# Patient Record
Sex: FEMALE | Race: BLACK OR AFRICAN AMERICAN | Employment: OTHER | URBAN - METROPOLITAN AREA
[De-identification: names, ages, dates, MRNs, and addresses within clinical notes are randomized per-mention and may not be internally consistent; named-entity substitution may affect disease eponyms.]

---

## 2017-02-23 ENCOUNTER — HOSPITAL ENCOUNTER (OUTPATIENT)
Dept: MAMMOGRAPHY | Age: 62
Discharge: HOME | End: 2017-02-23
Attending: FAMILY MEDICINE
Payer: MEDICARE

## 2017-02-23 DIAGNOSIS — Z12.31 VISIT FOR SCREENING MAMMOGRAM: ICD-10-CM

## 2017-02-23 PROCEDURE — 77067 SCR MAMMO BI INCL CAD: CPT

## 2017-10-12 ENCOUNTER — APPOINTMENT (OUTPATIENT)
Dept: GENERAL RADIOLOGY | Age: 62
End: 2017-10-12
Attending: EMERGENCY MEDICINE
Payer: MEDICARE

## 2017-10-12 ENCOUNTER — HOSPITAL ENCOUNTER (EMERGENCY)
Age: 62
Discharge: HOME | End: 2017-10-12
Attending: EMERGENCY MEDICINE
Payer: MEDICARE

## 2017-10-12 VITALS
SYSTOLIC BLOOD PRESSURE: 134 MMHG | HEART RATE: 99 BPM | DIASTOLIC BLOOD PRESSURE: 86 MMHG | TEMPERATURE: 98.8 F | OXYGEN SATURATION: 96 % | RESPIRATION RATE: 18 BRPM | HEIGHT: 66 IN | BODY MASS INDEX: 36 KG/M2 | WEIGHT: 224 LBS

## 2017-10-12 DIAGNOSIS — J98.01 BRONCHOSPASM: ICD-10-CM

## 2017-10-12 DIAGNOSIS — J45.41 MODERATE PERSISTENT ASTHMA WITH ACUTE EXACERBATION: Primary | ICD-10-CM

## 2017-10-12 PROCEDURE — 74011250636 HC RX REV CODE- 250/636: Performed by: NURSE PRACTITIONER

## 2017-10-12 PROCEDURE — 71010 XR CHEST SNGL V: CPT

## 2017-10-12 PROCEDURE — 94640 AIRWAY INHALATION TREATMENT: CPT

## 2017-10-12 PROCEDURE — 74011000250 HC RX REV CODE- 250: Performed by: EMERGENCY MEDICINE

## 2017-10-12 PROCEDURE — 96374 THER/PROPH/DIAG INJ IV PUSH: CPT | Performed by: NURSE PRACTITIONER

## 2017-10-12 PROCEDURE — 74011250637 HC RX REV CODE- 250/637: Performed by: EMERGENCY MEDICINE

## 2017-10-12 PROCEDURE — 74011000250 HC RX REV CODE- 250: Performed by: NURSE PRACTITIONER

## 2017-10-12 PROCEDURE — 99283 EMERGENCY DEPT VISIT LOW MDM: CPT | Performed by: NURSE PRACTITIONER

## 2017-10-12 RX ORDER — PREDNISONE 10 MG/1
TABLET ORAL
Qty: 22 TAB | Refills: 0 | Status: SHIPPED | OUTPATIENT
Start: 2017-10-12 | End: 2017-11-14

## 2017-10-12 RX ORDER — HYDROCODONE BITARTRATE AND ACETAMINOPHEN 5; 325 MG/1; MG/1
1 TABLET ORAL
Qty: 15 TAB | Refills: 0 | Status: SHIPPED | OUTPATIENT
Start: 2017-10-12 | End: 2018-01-26

## 2017-10-12 RX ORDER — ALBUTEROL SULFATE 0.83 MG/ML
5 SOLUTION RESPIRATORY (INHALATION) CONTINUOUS
Status: DISCONTINUED | OUTPATIENT
Start: 2017-10-12 | End: 2017-10-13 | Stop reason: HOSPADM

## 2017-10-12 RX ORDER — IPRATROPIUM BROMIDE AND ALBUTEROL SULFATE 2.5; .5 MG/3ML; MG/3ML
3 SOLUTION RESPIRATORY (INHALATION)
Status: COMPLETED | OUTPATIENT
Start: 2017-10-12 | End: 2017-10-12

## 2017-10-12 RX ORDER — HYDROCODONE BITARTRATE AND ACETAMINOPHEN 7.5; 325 MG/1; MG/1
1 TABLET ORAL
Status: COMPLETED | OUTPATIENT
Start: 2017-10-12 | End: 2017-10-12

## 2017-10-12 RX ADMIN — HYDROCODONE BITARTRATE AND ACETAMINOPHEN 1 TABLET: 7.5; 325 TABLET ORAL at 20:35

## 2017-10-12 RX ADMIN — ALBUTEROL SULFATE 5 MG: 2.5 SOLUTION RESPIRATORY (INHALATION) at 20:43

## 2017-10-12 RX ADMIN — IPRATROPIUM BROMIDE AND ALBUTEROL SULFATE 3 ML: .5; 3 SOLUTION RESPIRATORY (INHALATION) at 19:05

## 2017-10-12 RX ADMIN — IPRATROPIUM BROMIDE AND ALBUTEROL SULFATE 3 ML: .5; 3 SOLUTION RESPIRATORY (INHALATION) at 17:20

## 2017-10-12 RX ADMIN — METHYLPREDNISOLONE SODIUM SUCCINATE 125 MG: 125 INJECTION, POWDER, FOR SOLUTION INTRAMUSCULAR; INTRAVENOUS at 18:57

## 2017-10-12 NOTE — ED PROVIDER NOTES
HPI Comments: 62 y/o f to ed w hsx asthma and seasonal allergies, complains with cough (non productive) and runny nose since Tuesday, which has aggravated her asthma - now with cough and wheezing. No fever. No vomiting. However feels tired, weary. Wheezing audible with no stethescope    Patient is a 63 y.o. female presenting with cough. The history is provided by the patient. No  was used. Cough   This is a new problem. Episode onset: 2-3 days ago. The problem has been gradually worsening. The cough is non-productive. Associated symptoms include eye redness, rhinorrhea, sore throat, shortness of breath and wheezing. Pertinent negatives include no chest pain, no chills, no sweats, no weight loss, no ear congestion, no ear pain, no headaches, no myalgias, no nausea, no vomiting and no confusion. She is not a smoker. Past Medical History:   Diagnosis Date    Acute maxillary sinusitis     Allergic rhinitis 4/24/2015    Dr. Ky Neely    Asthma     Atrophic vaginitis 4/24/2015    Benign hypertension 4/24/2015    Chronic anxiety 4/24/2015    Cystocele, midline 4/24/2015    Diverticulosis of colon 4/24/2015    Dysmenorrhea 4/24/2015    GYN: Referred to Dr. Sapna Ace Edema 4/24/2015    Including peripheral edema.  Eustachian tube dysfunction     Female stress incontinence     Gastrointestinal disorder     diverticulitis, GERD    GERD (gastroesophageal reflux disease)     Hypercholesterolemia     Hyperplastic polyps of stomach 4/24/2015    Colonsocpy. 2013.  Hypertension     Internal hemorrhoids without mention of complication 4/49/2132    Other diseases of lung, not elsewhere classified 4/24/2015    Solitary nodule of lung. 3mm LLB, CT (4/18/2013) benign appearing, (recheck in 1 year if clinically indicated. IN March 2014, obtaining Lung-[facility] blood test). 4/2014 slight increased size compared to 4/2013, but not 11/2013.  In 4/2014 report is recc to repeat 10/2014     Right upper quadrant pain     Urge incontinence     Ventricular bigeminy 8/31/2016       Past Surgical History:   Procedure Laterality Date    HX HYSTERECTOMY      HX KNEE ARTHROSCOPY Left     HX DONATO AND BSO      ,DONATO,BSO         Family History:   Problem Relation Age of Onset    Asthma Mother     Hypertension Sister     Diabetes Sister     Cancer Other      Lung    Malignant Hyperthermia Neg Hx     Pseudocholinesterase Deficiency Neg Hx     Delayed Awakening Neg Hx     Post-op Nausea/Vomiting Neg Hx     Emergence Delirium Neg Hx     Post-op Cognitive Dysfunction Neg Hx     Other Neg Hx     Breast Cancer Neg Hx        Social History     Social History    Marital status:      Spouse name: N/A    Number of children: N/A    Years of education: N/A     Occupational History    Not on file. Social History Main Topics    Smoking status: Former Smoker     Packs/day: 0.25     Years: 10.00     Quit date: 10/16/1996    Smokeless tobacco: Never Used    Alcohol use No    Drug use: No    Sexual activity: Yes     Partners: Male     Other Topics Concern    Not on file     Social History Narrative         ALLERGIES: Latex; Sulfa (sulfonamide antibiotics); and Other medication    Review of Systems   Constitutional: Positive for fatigue. Negative for chills, fever and weight loss. HENT: Positive for postnasal drip, rhinorrhea and sore throat. Negative for ear pain, mouth sores and trouble swallowing. Eyes: Positive for redness. Negative for discharge. Respiratory: Positive for cough, shortness of breath and wheezing. Cardiovascular: Negative for chest pain and palpitations. Gastrointestinal: Negative for abdominal pain, nausea and vomiting. Endocrine: Negative for cold intolerance and heat intolerance. Genitourinary: Negative for difficulty urinating and dysuria. Musculoskeletal: Negative for back pain and myalgias. Skin: Negative for pallor and rash.    Neurological: Negative for dizziness, weakness and headaches. Psychiatric/Behavioral: Negative for confusion and decreased concentration. Vitals:    10/12/17 1640   BP: (!) 166/93   Resp: 20   Temp: 98.8 °F (37.1 °C)   SpO2: 100%   Weight: 101.6 kg (224 lb)   Height: 5' 6\" (1.676 m)            Physical Exam   Constitutional: She is oriented to person, place, and time. She appears well-developed and well-nourished. No distress. HENT:   Head: Normocephalic and atraumatic. Right Ear: External ear normal.   Left Ear: External ear normal.   Nose: Nose normal.   Eyes: Conjunctivae and EOM are normal. Pupils are equal, round, and reactive to light. Neck: Normal range of motion. Neck supple. Cardiovascular: Normal rate, regular rhythm and normal heart sounds. Pulmonary/Chest: Effort normal. No respiratory distress. She has wheezes. Abdominal: Soft. Bowel sounds are normal. She exhibits no distension. There is no tenderness. Musculoskeletal: Normal range of motion. She exhibits no edema or tenderness. Neurological: She is alert and oriented to person, place, and time. No cranial nerve deficit. Coordination normal.   Skin: Skin is warm and dry. No rash noted. Psychiatric: She has a normal mood and affect. Her behavior is normal. Judgment and thought content normal.   Nursing note and vitals reviewed. MDM  Number of Diagnoses or Management Options  Diagnosis management comments: 62 y/o f to ed w hsx asthma and seasonal allergies, complains with cough (non productive) and runny nose since Tuesday, which has aggravated her asthma - now with cough and wheezing. No fever. No vomiting. However feels tired, weary. Wheezing audible with no stethescope    First hhn with little response,  Will repeat as well add iv solumedrol. Cr neg  Will re eval shortly  6:58 PM  End of my shift. Pt getting solumedrol now  Waiting second HHn.   Will sign out to dr lake for continued care       Amount and/or Complexity of Data Reviewed  Tests in the radiology section of CPT®: ordered and reviewed      ED Course       Procedures

## 2017-10-12 NOTE — ED TRIAGE NOTES
Pt arrived ambulatory via POV with c/o cough and chest congestion. Pt reports symptoms started Tuesday. Pt also has h/o asthma.

## 2017-10-13 ENCOUNTER — PATIENT OUTREACH (OUTPATIENT)
Dept: CASE MANAGEMENT | Age: 62
End: 2017-10-13

## 2017-10-13 NOTE — PROGRESS NOTES
This note will not be viewable in [address] Transition of Care Discharge Follow-up Questionnaire   Date/Time of Call:   October 13, 2017 4:00PM   What was the patient hospitalized for? Patient seen in ED on 10/12/2017 forModerate persistent asthma with acute exacerbation              Does the patient understand his/her diagnosis and/or treatment and what happened during the hospitalization? Patient states understanding of diagnosis and treatment during hospitalization. Did the patient receive discharge instructions? Yes     Review any discharge instructions (see notes in [facility]). Ask patient if they understand these. Do they have any questions? Patient states understanding of discharge instructions, patient states no questions. Were home services ordered (nursing, PT, OT, ST, etc.)? No home health services ordered. If so, has the first visit occurred? If not, why? (Assist with coordination of services if necessary. ) NA         Was any DME ordered? No durable medical equipment ordered. If so, has it been received? If not, why?  (Assist with coordination of arranging DME orders if necessary. ) NA         Complete a review of all medications (new, continued and discontinued meds per the D/C instructions and medication tab in [facility]). Care Coordinator reviewed all medications with patient per [facility], two new medications prescribed. predniSONE (DELTASONE) 10 mg tablet Take 4 a day for 3 days, then three a day for two days, then two a day for two days      HYDROcodone-acetaminophen (NORCO) 5-325 mg per tablet Take 1 Tab by mouth every four (4) hours as needed for Pain (or cough). Max Daily Amount: 6 Tabs. Were all new prescriptions filled? If not, why?  (Assist with obtainment of medications if necessary.) Yes, patient states prescriptions filled and currently being taken per doctor's order.          Does the patient understand the purpose and dosing instructions for all medications? (If patient has questions, provide explanation and education.) Patient states understanding of current medications and dosing instructions. Does the patient have any problems in performing ADLs? (If patient is unable to perform ADLs  what is the limiting factor(s)? Do they have a support system that can assist? If no support system is present, discuss possible assistance that they may be able to obtain.) Patient states she is independent with ADL's and ambulation. Patient states she is doing much better and feels okay. Does the patient have all follow-up appointments scheduled? 7 day f/up with PCP?    7-14 day f/up with specialist?    If f/up has not been made  what actions has the care coordinator made to accomplish this? Has transportation been arranged? [facility] Pulmonary follow-up should be within 7 days of discharge; all others should have PCP follow-up within 7 days of discharge; follow-ups with other specialists should be within 7-14 days of discharge.) No, Care Coordinator educated patient on the importance of scheduling follow up with PCP within 7 days. Patient states she will schedule follow once she has completed her ED Prescribed medications. Patient declined assistance from Care Coordinator to obtain follow up appointment with PCP. Yes      NA         Any other questions or concerns expressed by the patient? No further needs identified, patient instructed to call Care Coordinator if further questions or concerns arise. Schedule next appointment with ISABELA Field or refer to RN Case Manager/  per the workflow guidelines. When is care coordinators next follow-up call scheduled? If referred for CCM  what RN care manager was the referral assigned?    NA        NA      NA   NOHEMI Call Completed By: MATIAS Rollins ACO  Care Coordinator

## 2017-10-13 NOTE — DISCHARGE INSTRUCTIONS
Asthma Attack: Care Instructions  Your Care Instructions    During an asthma attack, the airways swell and narrow. This makes it hard to breathe. Severe asthma attacks can be life-threatening, but you can help prevent them by keeping your asthma under control and treating symptoms before they get bad. Symptoms include being short of breath, having chest tightness, coughing, and wheezing. Noting and treating these symptoms can also help you avoid future trips to the emergency room. The doctor has checked you carefully, but problems can develop later. If you notice any problems or new symptoms, get medical treatment right away. Follow-up care is a key part of your treatment and safety. Be sure to make and go to all appointments, and call your doctor if you are having problems. It's also a good idea to know your test results and keep a list of the medicines you take. How can you care for yourself at home? · Follow your asthma action plan to prevent and treat attacks. If you don't have an asthma action plan, work with your doctor to create one. · Take your asthma medicines exactly as prescribed. Talk to your doctor right away if you have any questions about how to take them. ¨ Use your quick-relief medicine when you have symptoms of an attack. Quick-relief medicine is usually an albuterol inhaler. Some people need to use quick-relief medicine before they exercise. ¨ Take your controller medicine every day, not just when you have symptoms. Controller medicine is usually an inhaled corticosteroid. The goal is to prevent problems before they occur. Don't use your controller medicine to treat an attack that has already started. It doesn't work fast enough to help. ¨ If your doctor prescribed corticosteroid pills to use during an attack, take them exactly as prescribed. It may take hours for the pills to work, but they may make the episode shorter and help you breathe better.   ¨ Keep your quick-relief medicine with you at all times. · Talk to your doctor before using other medicines. Some medicines, such as aspirin, can cause asthma attacks in some people. · If you have a peak flow meter, use it to check how well you are breathing. This can help you predict when an asthma attack is going to occur. Then you can take medicine to prevent the asthma attack or make it less severe. · Do not smoke or allow others to smoke around you. Avoid smoky places. Smoking makes asthma worse. If you need help quitting, talk to your doctor about stop-smoking programs and medicines. These can increase your chances of quitting for good. · Learn what triggers an asthma attack for you, and avoid the triggers when you can. Common triggers include colds, smoke, air pollution, dust, pollen, mold, pets, cockroaches, stress, and cold air. · Avoid colds and the flu. Get a pneumococcal vaccine shot. If you have had one before, ask your doctor if you need a second dose. Get a flu vaccine every fall. If you must be around people with colds or the flu, wash your hands often. When should you call for help? Call 911 anytime you think you may need emergency care. For example, call if:  · You have severe trouble breathing. Call your doctor now or seek immediate medical care if:  · Your symptoms do not get better after you have followed your asthma action plan. · You have new or worse trouble breathing. · Your coughing and wheezing get worse. · You cough up dark brown or bloody mucus (sputum). · You have a new or higher fever. Watch closely for changes in your health, and be sure to contact your doctor if:  · You need to use quick-relief medicine on more than 2 days a week (unless it is just for exercise). · You cough more deeply or more often, especially if you notice more mucus or a change in the color of your mucus. · You are not getting better as expected. Where can you learn more?   Go to http://estella-baylee.info/. Enter U826 in the search box to learn more about \"Asthma Attack: Care Instructions. \"  Current as of: March 25, 2017  Content Version: 11.3  © 7619-8239 [employer]. Care instructions adapted under license by [employer] (which disclaims liability or warranty for this information). If you have questions about a medical condition or this instruction, always ask your healthcare professional. [place] [address] any warranty or liability for your use of this information. Learning About Asthma Triggers  What are asthma triggers? When you have asthma, certain things can make your symptoms worse. These are called triggers. Learn what triggers an asthma attack for you, and avoid the triggers when you can. Common triggers include colds, smoke, air pollution, dust, pollen, pets, stress, and cold air. How do asthma triggers affect you? Triggers can make it harder for your lungs to work as they should. They can lead to sudden breathing problems and other symptoms. When you are around a trigger, an asthma attack is more likely. If your symptoms are severe, you may need emergency treatment or have to go to the hospital for treatment. What can you do to avoid triggers? The first thing is to know your triggers. When you are having symptoms, note the things around you that might be causing them. Then look for patterns that may be triggering your symptoms. Record your triggers on a piece of paper or in an asthma diary. When you have your list of possible triggers, work with your doctor to find ways to avoid them. Avoid colds and flu. Get a pneumococcal vaccine shot. If you have had one before, ask your doctor whether you need a second dose. Get a flu vaccine every year, as soon as it's available. If you must be around people with colds or the flu, wash your hands often. Here are some ways to avoid a few common triggers.   · Do not smoke or allow others to smoke around you. If you need help quitting, talk to your doctor about stop-smoking programs and medicines. These can increase your chances of quitting for good. · If there is a lot of pollution, pollen, or dust outside, stay at home and keep your windows closed. Use an air conditioner or air filter in your home. Check your local weather report or newspaper for air quality and pollen reports. What else should you know? · Take your controller medicine every day, not just when you have symptoms. It helps prevent problems before they occur. · Your doctor may suggest that you check how well your lungs are working by measuring your peak expiratory flow (PEF) throughout the day. Your PEF may drop when you are near things that trigger symptoms. Where can you learn more? Go to http://estella-baylee.info/. Enter Q684 in the search box to learn more about \"Learning About Asthma Triggers. \"  Current as of: March 25, 2017  Content Version: 11.3  © 4463-9604 [employer]. Care instructions adapted under license by "[employer] (which disclaims liability or warranty for this information). If you have questions about a medical condition or this instruction, always ask your healthcare professional. [place] [address] any warranty or liability for your use of this information.

## 2017-11-14 PROBLEM — G47.33 OBSTRUCTIVE SLEEP APNEA: Status: ACTIVE | Noted: 2017-11-14

## 2017-11-14 PROBLEM — E66.9 OBESITY (BMI 30-39.9): Status: ACTIVE | Noted: 2017-11-14

## 2017-11-14 PROBLEM — I10 ESSENTIAL HYPERTENSION: Status: ACTIVE | Noted: 2017-11-14

## 2017-11-14 PROBLEM — R53.83 LACK OF ENERGY: Status: ACTIVE | Noted: 2017-11-14

## 2017-11-14 PROBLEM — R53.83 FATIGUE: Status: ACTIVE | Noted: 2017-11-14

## 2017-12-25 ENCOUNTER — HOSPITAL ENCOUNTER (EMERGENCY)
Age: 62
Discharge: HOME | End: 2017-12-25
Attending: EMERGENCY MEDICINE
Payer: COMMERCIAL

## 2017-12-25 VITALS
OXYGEN SATURATION: 99 % | DIASTOLIC BLOOD PRESSURE: 89 MMHG | HEART RATE: 86 BPM | RESPIRATION RATE: 18 BRPM | WEIGHT: 232 LBS | SYSTOLIC BLOOD PRESSURE: 151 MMHG | TEMPERATURE: 98.4 F | BODY MASS INDEX: 37.28 KG/M2 | HEIGHT: 66 IN

## 2017-12-25 DIAGNOSIS — I15.9 SECONDARY HYPERTENSION: Primary | ICD-10-CM

## 2017-12-25 LAB
ALBUMIN SERPL-MCNC: 3.2 G/DL (ref 3.2–4.6)
ALBUMIN/GLOB SERPL: 0.7 {RATIO} (ref 1.2–3.5)
ALP SERPL-CCNC: 107 U/L (ref 50–136)
ALT SERPL-CCNC: 24 U/L (ref 12–65)
ANION GAP SERPL CALC-SCNC: 8 MMOL/L (ref 7–16)
AST SERPL-CCNC: 8 U/L (ref 15–37)
BASOPHILS # BLD: 0 K/UL (ref 0–0.2)
BASOPHILS NFR BLD: 0 % (ref 0–2)
BILIRUB SERPL-MCNC: 0.4 MG/DL (ref 0.2–1.1)
BUN SERPL-MCNC: 16 MG/DL (ref 8–23)
CALCIUM SERPL-MCNC: 8.7 MG/DL (ref 8.3–10.4)
CHLORIDE SERPL-SCNC: 108 MMOL/L (ref 98–107)
CO2 SERPL-SCNC: 29 MMOL/L (ref 21–32)
CREAT SERPL-MCNC: 0.77 MG/DL (ref 0.6–1)
DIFFERENTIAL METHOD BLD: ABNORMAL
EOSINOPHIL # BLD: 0.1 K/UL (ref 0–0.8)
EOSINOPHIL NFR BLD: 1 % (ref 0.5–7.8)
ERYTHROCYTE [DISTWIDTH] IN BLOOD BY AUTOMATED COUNT: 13.7 % (ref 11.9–14.6)
GLOBULIN SER CALC-MCNC: 4.4 G/DL (ref 2.3–3.5)
GLUCOSE SERPL-MCNC: 121 MG/DL (ref 65–100)
HCT VFR BLD AUTO: 36.9 % (ref 35.8–46.3)
HGB BLD-MCNC: 11.9 G/DL (ref 11.7–15.4)
IMM GRANULOCYTES # BLD: 0 K/UL (ref 0–0.5)
IMM GRANULOCYTES NFR BLD AUTO: 0 % (ref 0–5)
LYMPHOCYTES # BLD: 3.8 K/UL (ref 0.5–4.6)
LYMPHOCYTES NFR BLD: 37 % (ref 13–44)
MCH RBC QN AUTO: 28.7 PG (ref 26.1–32.9)
MCHC RBC AUTO-ENTMCNC: 32.2 G/DL (ref 31.4–35)
MCV RBC AUTO: 88.9 FL (ref 79.6–97.8)
MONOCYTES # BLD: 0.4 K/UL (ref 0.1–1.3)
MONOCYTES NFR BLD: 4 % (ref 4–12)
NEUTS SEG # BLD: 5.9 K/UL (ref 1.7–8.2)
NEUTS SEG NFR BLD: 58 % (ref 43–78)
PLATELET # BLD AUTO: 263 K/UL (ref 150–450)
PMV BLD AUTO: 10.7 FL (ref 10.8–14.1)
POTASSIUM SERPL-SCNC: 3.3 MMOL/L (ref 3.5–5.1)
PROT SERPL-MCNC: 7.6 G/DL (ref 6.3–8.2)
RBC # BLD AUTO: 4.15 M/UL (ref 4.05–5.25)
SODIUM SERPL-SCNC: 145 MMOL/L (ref 136–145)
WBC # BLD AUTO: 10.2 K/UL (ref 4.3–11.1)

## 2017-12-25 PROCEDURE — 93005 ELECTROCARDIOGRAM TRACING: CPT | Performed by: EMERGENCY MEDICINE

## 2017-12-25 PROCEDURE — 99283 EMERGENCY DEPT VISIT LOW MDM: CPT | Performed by: EMERGENCY MEDICINE

## 2017-12-25 PROCEDURE — 85025 COMPLETE CBC W/AUTO DIFF WBC: CPT | Performed by: EMERGENCY MEDICINE

## 2017-12-25 PROCEDURE — 80053 COMPREHEN METABOLIC PANEL: CPT | Performed by: EMERGENCY MEDICINE

## 2017-12-26 LAB
ATRIAL RATE: 78 BPM
CALCULATED P AXIS, ECG09: 60 DEGREES
CALCULATED R AXIS, ECG10: -17 DEGREES
CALCULATED T AXIS, ECG11: 32 DEGREES
DIAGNOSIS, 93000: NORMAL
P-R INTERVAL, ECG05: 132 MS
Q-T INTERVAL, ECG07: 384 MS
QRS DURATION, ECG06: 84 MS
QTC CALCULATION (BEZET), ECG08: 437 MS
VENTRICULAR RATE, ECG03: 78 BPM

## 2017-12-26 NOTE — ED PROVIDER NOTES
HPI Comments: 46-year-old female sent in with concerns about her blood pressure. States she checked her blood pressure several times over the past 3 days and occasionally noticed her diastolic over 90. She reports several episodes of lightheadedness and occasionally headache. Right now she states she reports slightly lightheaded but denies any significant headache. She takes valsartan as well as amlodipine and reports compliance. Denies any chest pain, shortness breath, leg swelling or other systemic issues. Patient is a 63 y.o. female presenting with hypertension. The history is provided by the patient. No  was used. Hypertension    Associated symptoms include headaches. Pertinent negatives include no shortness of breath. Past Medical History:   Diagnosis Date    Acute maxillary sinusitis     Allergic rhinitis 4/24/2015    Dr. Bear Numbers    Asthma     Atrophic vaginitis 4/24/2015    Benign hypertension 4/24/2015    Chronic anxiety 4/24/2015    Cystocele, midline 4/24/2015    Diverticulosis of colon 4/24/2015    Dysmenorrhea 4/24/2015    GYN: Referred to Dr. Christy Bay Edema 4/24/2015    Including peripheral edema.  Eustachian tube dysfunction     Female stress incontinence     Gastrointestinal disorder     diverticulitis, GERD    GERD (gastroesophageal reflux disease)     Hypercholesterolemia     Hyperplastic polyps of stomach 4/24/2015    Colonsocpy. 2013.  Hypertension     Internal hemorrhoids without mention of complication 1/54/1368    Obesity (BMI 30-39.9) 11/14/2017    Obstructive sleep apnea 11/14/2017    Other diseases of lung, not elsewhere classified 4/24/2015    Solitary nodule of lung. 3mm LLB, CT (4/18/2013) benign appearing, (recheck in 1 year if clinically indicated. IN March 2014, obtaining [facility] blood test). 4/2014 slight increased size compared to 4/2013, but not 11/2013.  In 4/2014 report is recc to repeat 10/2014     Right upper quadrant pain     Urge incontinence     Ventricular bigeminy 8/31/2016       Past Surgical History:   Procedure Laterality Date    HX HYSTERECTOMY      HX KNEE ARTHROSCOPY Left     HX DONATO AND BSO      ,DONATO,BSO         Family History:   Problem Relation Age of Onset    Asthma Mother     Hypertension Sister     Diabetes Sister     Cancer Other      Lung    Malignant Hyperthermia Neg Hx     Pseudocholinesterase Deficiency Neg Hx     Delayed Awakening Neg Hx     Post-op Nausea/Vomiting Neg Hx     Emergence Delirium Neg Hx     Post-op Cognitive Dysfunction Neg Hx     Other Neg Hx     Breast Cancer Neg Hx        Social History     Social History    Marital status:      Spouse name: N/A    Number of children: N/A    Years of education: N/A     Occupational History    Not on file. Social History Main Topics    Smoking status: Former Smoker     Packs/day: 0.25     Years: 10.00     Quit date: 10/16/1996    Smokeless tobacco: Never Used    Alcohol use No    Drug use: No    Sexual activity: Yes     Partners: Male     Other Topics Concern    Not on file     Social History Narrative         ALLERGIES: Latex; Sulfa (sulfonamide antibiotics); and Other medication    Review of Systems   Constitutional: Negative for fatigue and fever. HENT: Negative for sore throat. Eyes: Negative for pain. Respiratory: Negative for cough, chest tightness and shortness of breath. Cardiovascular: Negative for leg swelling. Gastrointestinal: Negative for abdominal pain. Genitourinary: Negative for dysuria. Musculoskeletal: Negative for back pain. Skin: Negative for rash. Neurological: Positive for light-headedness and headaches. Negative for syncope. Vitals:    12/25/17 1937   BP: 151/89   Pulse: 86   Resp: 18   Temp: 98.4 °F (36.9 °C)   SpO2: 99%   Weight: 105.2 kg (232 lb)   Height: 5' 6\" (1.676 m)            Physical Exam   Constitutional: She is oriented to person, place, and time.  She appears well-developed and well-nourished. No distress. HENT:   Head: Normocephalic and atraumatic. Eyes: Conjunctivae and EOM are normal. Pupils are equal, round, and reactive to light. Neck: Normal range of motion. Neck supple. Cardiovascular: Normal rate, regular rhythm and normal heart sounds. Pulmonary/Chest: Effort normal and breath sounds normal. No respiratory distress. She has no wheezes. She has no rales. Abdominal: Soft. She exhibits no distension. There is no tenderness. There is no rebound. Musculoskeletal: Normal range of motion. She exhibits no edema or tenderness. Neurological: She is alert and oriented to person, place, and time. Clear speech. Moving all extremities without difficulty. Skin: Skin is warm and dry. No rash noted. She is not diaphoretic. Psychiatric: She has a normal mood and affect. Her behavior is normal.   Vitals reviewed. MDM  Number of Diagnoses or Management Options  Secondary hypertension: new and does not require workup  Diagnosis management comments: Patient is resting quite comfortably with a stable blood pressure. Labs showed no significant abnormalities. EKG shows no significant ST or T-wave changes. States her lightheadedness is very minimal at this time really have many symptoms of note. I suggest she follow-up with her primary care provider in the near future. Discussed return precautions for any new or concerning issues.        Amount and/or Complexity of Data Reviewed  Clinical lab tests: ordered and reviewed (Results for orders placed or performed during the hospital encounter of 12/25/17  -CBC WITH AUTOMATED DIFF       Result                                            Value                         Ref Range                       WBC                                               10.2                          4.3 - 11.1 K/uL                 RBC                                               4.15                          4.05 - 5.25 M/uL HGB                                               11.9                          11.7 - 15.4 g/dL                HCT                                               36.9                          35.8 - 46.3 %                   MCV                                               88.9                          79.6 - 97.8 FL                  MCH                                               28.7                          26.1 - 32.9 PG                  MCHC                                              32.2                          31.4 - 35.0 g/dL                RDW                                               13.7                          11.9 - 14.6 %                   PLATELET                                          263                           150 - 450 K/uL                  MPV                                               10.7 (L)                      10.8 - 14.1 FL                  DF                                                AUTOMATED                                                     NEUTROPHILS                                       58                            43 - 78 %                       LYMPHOCYTES                                       37                            13 - 44 %                       MONOCYTES                                         4                             4.0 - 12.0 %                    EOSINOPHILS                                       1                             0.5 - 7.8 %                     BASOPHILS                                         0                             0.0 - 2.0 %                     IMMATURE GRANULOCYTES                             0                             0.0 - 5.0 %                     ABS. NEUTROPHILS                                  5.9                           1.7 - 8.2 K/UL                  ABS.  LYMPHOCYTES                                  3.8                           0.5 - 4.6 K/UL                  ABS. MONOCYTES 0.4                           0.1 - 1.3 K/UL                  ABS. EOSINOPHILS                                  0.1                           0.0 - 0.8 K/UL                  ABS. BASOPHILS                                    0.0                           0.0 - 0.2 K/UL                  ABS. IMM.  GRANS.                                  0.0                           0.0 - 0.5 K/UL             -METABOLIC PANEL, COMPREHENSIVE       Result                                            Value                         Ref Range                       Sodium                                            145                           136 - 145 mmol/L                Potassium                                         3.3 (L)                       3.5 - 5.1 mmol/L                Chloride                                          108 (H)                       98 - 107 mmol/L                 CO2                                               29                            21 - 32 mmol/L                  Anion gap                                         8                             7 - 16 mmol/L                   Glucose                                           121 (H)                       65 - 100 mg/dL                  BUN                                               16                            8 - 23 MG/DL                    Creatinine                                        0.77                          0.6 - 1.0 MG/DL                 GFR est AA                                        >60                           >60 ml/min/1.73m2               GFR est non-AA                                    >60                           >60 ml/min/1.73m2               Calcium                                           8.7                           8.3 - 10.4 MG/DL                Bilirubin, total                                  0.4                           0.2 - 1.1 MG/DL                 ALT (SGPT) 24                            12 - 65 U/L                     AST (SGOT)                                        8 (L)                         15 - 37 U/L                     Alk. phosphatase                                  107                           50 - 136 U/L                    Protein, total                                    7.6                           6.3 - 8.2 g/dL                  Albumin                                           3.2                           3.2 - 4.6 g/dL                  Globulin                                          4.4 (H)                       2.3 - 3.5 g/dL                  A-G Ratio                                         0.7 (L)                       1.2 - 3.5                  )  Review and summarize past medical records: yes  Independent visualization of images, tracings, or specimens: yes    Risk of Complications, Morbidity, and/or Mortality  Presenting problems: low  Diagnostic procedures: low  Management options: low    Patient Progress  Patient progress: improved    ED Course       Procedures

## 2018-04-21 ENCOUNTER — HOSPITAL ENCOUNTER (OUTPATIENT)
Dept: MAMMOGRAPHY | Age: 63
Discharge: HOME | End: 2018-04-21
Attending: FAMILY MEDICINE
Payer: MEDICARE

## 2018-04-21 DIAGNOSIS — Z12.31 VISIT FOR SCREENING MAMMOGRAM: ICD-10-CM

## 2018-04-21 PROCEDURE — 77067 SCR MAMMO BI INCL CAD: CPT

## 2018-07-25 ENCOUNTER — HOSPITAL ENCOUNTER (OUTPATIENT)
Dept: LAB | Age: 63
Discharge: HOME | End: 2018-07-25

## 2018-07-25 PROCEDURE — 88305 TISSUE EXAM BY PATHOLOGIST: CPT | Performed by: INTERNAL MEDICINE

## 2018-07-26 PROBLEM — K63.5 COLON POLYPS: Status: ACTIVE | Noted: 2018-07-26

## 2018-08-14 PROBLEM — D12.6 TUBULOVILLOUS ADENOMA OF COLON: Status: ACTIVE | Noted: 2018-08-14

## 2019-01-13 ENCOUNTER — HOSPITAL ENCOUNTER (INPATIENT)
Age: 64
LOS: 8 days | Discharge: HOME | DRG: 203 | End: 2019-01-21
Attending: EMERGENCY MEDICINE | Admitting: INTERNAL MEDICINE
Payer: MEDICARE

## 2019-01-13 ENCOUNTER — APPOINTMENT (OUTPATIENT)
Dept: GENERAL RADIOLOGY | Age: 64
DRG: 203 | End: 2019-01-13
Attending: EMERGENCY MEDICINE
Payer: MEDICARE

## 2019-01-13 DIAGNOSIS — J45.901 ASTHMA WITH ACUTE EXACERBATION, UNSPECIFIED ASTHMA SEVERITY, UNSPECIFIED WHETHER PERSISTENT: Primary | ICD-10-CM

## 2019-01-13 DIAGNOSIS — R05.9 COUGH: ICD-10-CM

## 2019-01-13 DIAGNOSIS — J10.1 INFLUENZA A: ICD-10-CM

## 2019-01-13 PROBLEM — E87.6 HYPOKALEMIA: Status: ACTIVE | Noted: 2019-01-13

## 2019-01-13 PROBLEM — J11.1 INFLUENZA: Status: ACTIVE | Noted: 2019-01-13

## 2019-01-13 PROBLEM — J45.909 ASTHMA: Status: ACTIVE | Noted: 2019-01-13

## 2019-01-13 LAB
ALBUMIN SERPL-MCNC: 3.2 G/DL (ref 3.2–4.6)
ALBUMIN/GLOB SERPL: 0.8 {RATIO} (ref 1.2–3.5)
ALP SERPL-CCNC: 95 U/L (ref 50–136)
ALT SERPL-CCNC: 23 U/L (ref 12–65)
ANION GAP SERPL CALC-SCNC: 7 MMOL/L (ref 7–16)
AST SERPL-CCNC: 9 U/L (ref 15–37)
BASOPHILS # BLD: 0 K/UL (ref 0–0.2)
BASOPHILS NFR BLD: 0 % (ref 0–2)
BILIRUB SERPL-MCNC: 0.3 MG/DL (ref 0.2–1.1)
BNP SERPL-MCNC: 42 PG/ML
BUN SERPL-MCNC: 9 MG/DL (ref 8–23)
CALCIUM SERPL-MCNC: 8.3 MG/DL (ref 8.3–10.4)
CHLORIDE SERPL-SCNC: 105 MMOL/L (ref 98–107)
CO2 SERPL-SCNC: 29 MMOL/L (ref 21–32)
CREAT SERPL-MCNC: 0.67 MG/DL (ref 0.6–1)
DIFFERENTIAL METHOD BLD: ABNORMAL
EOSINOPHIL # BLD: 0 K/UL (ref 0–0.8)
EOSINOPHIL NFR BLD: 0 % (ref 0.5–7.8)
ERYTHROCYTE [DISTWIDTH] IN BLOOD BY AUTOMATED COUNT: 13.7 % (ref 11.9–14.6)
FLUAV AG NPH QL IA: POSITIVE
FLUBV AG NPH QL IA: NEGATIVE
GLOBULIN SER CALC-MCNC: 4 G/DL (ref 2.3–3.5)
GLUCOSE SERPL-MCNC: 83 MG/DL (ref 65–100)
HCT VFR BLD AUTO: 38.2 % (ref 35.8–46.3)
HGB BLD-MCNC: 12.1 G/DL (ref 11.7–15.4)
IMM GRANULOCYTES # BLD AUTO: 0 K/UL (ref 0–0.5)
IMM GRANULOCYTES NFR BLD AUTO: 0 % (ref 0–5)
LYMPHOCYTES # BLD: 3.8 K/UL (ref 0.5–4.6)
LYMPHOCYTES NFR BLD: 51 % (ref 13–44)
MCH RBC QN AUTO: 28.3 PG (ref 26.1–32.9)
MCHC RBC AUTO-ENTMCNC: 31.7 G/DL (ref 31.4–35)
MCV RBC AUTO: 89.3 FL (ref 79.6–97.8)
MONOCYTES # BLD: 0.5 K/UL (ref 0.1–1.3)
MONOCYTES NFR BLD: 7 % (ref 4–12)
NEUTS SEG # BLD: 3.1 K/UL (ref 1.7–8.2)
NEUTS SEG NFR BLD: 42 % (ref 43–78)
NRBC # BLD: 0 K/UL (ref 0–0.2)
PLATELET # BLD AUTO: 232 K/UL (ref 150–450)
PMV BLD AUTO: 10.9 FL (ref 9.4–12.3)
POTASSIUM SERPL-SCNC: 3 MMOL/L (ref 3.5–5.1)
PROT SERPL-MCNC: 7.2 G/DL (ref 6.3–8.2)
RBC # BLD AUTO: 4.28 M/UL (ref 4.05–5.2)
SODIUM SERPL-SCNC: 141 MMOL/L (ref 136–145)
SPECIMEN SOURCE: ABNORMAL
WBC # BLD AUTO: 7.5 K/UL (ref 4.3–11.1)

## 2019-01-13 PROCEDURE — 74011000250 HC RX REV CODE- 250: Performed by: EMERGENCY MEDICINE

## 2019-01-13 PROCEDURE — 87804 INFLUENZA ASSAY W/OPTIC: CPT

## 2019-01-13 PROCEDURE — 85025 COMPLETE CBC W/AUTO DIFF WBC: CPT

## 2019-01-13 PROCEDURE — 99284 EMERGENCY DEPT VISIT MOD MDM: CPT | Performed by: EMERGENCY MEDICINE

## 2019-01-13 PROCEDURE — 74011250637 HC RX REV CODE- 250/637: Performed by: INTERNAL MEDICINE

## 2019-01-13 PROCEDURE — 83880 ASSAY OF NATRIURETIC PEPTIDE: CPT

## 2019-01-13 PROCEDURE — 94644 CONT INHLJ TX 1ST HOUR: CPT

## 2019-01-13 PROCEDURE — 65270000029 HC RM PRIVATE

## 2019-01-13 PROCEDURE — 71046 X-RAY EXAM CHEST 2 VIEWS: CPT

## 2019-01-13 PROCEDURE — 80053 COMPREHEN METABOLIC PANEL: CPT

## 2019-01-13 PROCEDURE — 74011250636 HC RX REV CODE- 250/636: Performed by: INTERNAL MEDICINE

## 2019-01-13 PROCEDURE — 94640 AIRWAY INHALATION TREATMENT: CPT

## 2019-01-13 PROCEDURE — 74011250636 HC RX REV CODE- 250/636: Performed by: EMERGENCY MEDICINE

## 2019-01-13 PROCEDURE — 74011250637 HC RX REV CODE- 250/637: Performed by: FAMILY MEDICINE

## 2019-01-13 PROCEDURE — 74011250637 HC RX REV CODE- 250/637: Performed by: EMERGENCY MEDICINE

## 2019-01-13 RX ORDER — ALBUTEROL SULFATE 0.83 MG/ML
2.5 SOLUTION RESPIRATORY (INHALATION)
Status: DISCONTINUED | OUTPATIENT
Start: 2019-01-13 | End: 2019-01-16

## 2019-01-13 RX ORDER — ALBUTEROL SULFATE 0.83 MG/ML
10 SOLUTION RESPIRATORY (INHALATION)
Status: DISCONTINUED | OUTPATIENT
Start: 2019-01-13 | End: 2019-01-13 | Stop reason: SDUPTHER

## 2019-01-13 RX ORDER — DOCUSATE SODIUM 100 MG/1
100 CAPSULE, LIQUID FILLED ORAL
Status: DISCONTINUED | OUTPATIENT
Start: 2019-01-13 | End: 2019-01-21 | Stop reason: HOSPADM

## 2019-01-13 RX ORDER — POTASSIUM CHLORIDE 14.9 MG/ML
20 INJECTION INTRAVENOUS
Status: DISPENSED | OUTPATIENT
Start: 2019-01-13 | End: 2019-01-13

## 2019-01-13 RX ORDER — ALBUTEROL SULFATE 0.83 MG/ML
2.5 SOLUTION RESPIRATORY (INHALATION)
Status: DISCONTINUED | OUTPATIENT
Start: 2019-01-13 | End: 2019-01-13 | Stop reason: SDUPTHER

## 2019-01-13 RX ORDER — OSELTAMIVIR PHOSPHATE 75 MG/1
75 CAPSULE ORAL EVERY 12 HOURS
Status: COMPLETED | OUTPATIENT
Start: 2019-01-13 | End: 2019-01-17

## 2019-01-13 RX ORDER — DIPHENHYDRAMINE HCL 25 MG
25 CAPSULE ORAL
Status: DISCONTINUED | OUTPATIENT
Start: 2019-01-13 | End: 2019-01-21 | Stop reason: HOSPADM

## 2019-01-13 RX ORDER — DICYCLOMINE HYDROCHLORIDE 20 MG/1
20 TABLET ORAL
Status: DISCONTINUED | OUTPATIENT
Start: 2019-01-14 | End: 2019-01-13

## 2019-01-13 RX ORDER — ENOXAPARIN SODIUM 100 MG/ML
40 INJECTION SUBCUTANEOUS EVERY 24 HOURS
Status: DISCONTINUED | OUTPATIENT
Start: 2019-01-13 | End: 2019-01-21 | Stop reason: HOSPADM

## 2019-01-13 RX ORDER — OSELTAMIVIR PHOSPHATE 75 MG/1
75 CAPSULE ORAL
Status: COMPLETED | OUTPATIENT
Start: 2019-01-13 | End: 2019-01-13

## 2019-01-13 RX ORDER — LORATADINE 10 MG/1
10 TABLET ORAL DAILY
Status: DISCONTINUED | OUTPATIENT
Start: 2019-01-14 | End: 2019-01-21 | Stop reason: HOSPADM

## 2019-01-13 RX ORDER — GUAIFENESIN 600 MG/1
600 TABLET, EXTENDED RELEASE ORAL EVERY 12 HOURS
Status: DISCONTINUED | OUTPATIENT
Start: 2019-01-13 | End: 2019-01-21 | Stop reason: HOSPADM

## 2019-01-13 RX ORDER — ACETAMINOPHEN 325 MG/1
650 TABLET ORAL
Status: DISCONTINUED | OUTPATIENT
Start: 2019-01-13 | End: 2019-01-21 | Stop reason: HOSPADM

## 2019-01-13 RX ORDER — ONDANSETRON 2 MG/ML
4 INJECTION INTRAMUSCULAR; INTRAVENOUS
Status: DISCONTINUED | OUTPATIENT
Start: 2019-01-13 | End: 2019-01-21 | Stop reason: HOSPADM

## 2019-01-13 RX ORDER — CODEINE PHOSPHATE AND GUAIFENESIN 10; 100 MG/5ML; MG/5ML
5 SOLUTION ORAL
Status: DISCONTINUED | OUTPATIENT
Start: 2019-01-13 | End: 2019-01-18

## 2019-01-13 RX ORDER — AMLODIPINE BESYLATE 10 MG/1
10 TABLET ORAL DAILY
Status: DISCONTINUED | OUTPATIENT
Start: 2019-01-14 | End: 2019-01-21 | Stop reason: HOSPADM

## 2019-01-13 RX ORDER — IPRATROPIUM BROMIDE AND ALBUTEROL SULFATE 2.5; .5 MG/3ML; MG/3ML
3 SOLUTION RESPIRATORY (INHALATION)
Status: COMPLETED | OUTPATIENT
Start: 2019-01-13 | End: 2019-01-13

## 2019-01-13 RX ORDER — SODIUM CHLORIDE 0.9 % (FLUSH) 0.9 %
5-40 SYRINGE (ML) INJECTION EVERY 8 HOURS
Status: DISCONTINUED | OUTPATIENT
Start: 2019-01-13 | End: 2019-01-21 | Stop reason: HOSPADM

## 2019-01-13 RX ORDER — SODIUM CHLORIDE 9 MG/ML
75 INJECTION, SOLUTION INTRAVENOUS CONTINUOUS
Status: DISCONTINUED | OUTPATIENT
Start: 2019-01-13 | End: 2019-01-15

## 2019-01-13 RX ORDER — PANTOPRAZOLE SODIUM 40 MG/1
40 TABLET, DELAYED RELEASE ORAL
Status: DISCONTINUED | OUTPATIENT
Start: 2019-01-14 | End: 2019-01-21 | Stop reason: HOSPADM

## 2019-01-13 RX ORDER — ALBUTEROL SULFATE 0.83 MG/ML
10 SOLUTION RESPIRATORY (INHALATION)
Status: ACTIVE | OUTPATIENT
Start: 2019-01-13 | End: 2019-01-14

## 2019-01-13 RX ORDER — VALSARTAN 80 MG/1
160 TABLET ORAL DAILY
Status: DISCONTINUED | OUTPATIENT
Start: 2019-01-14 | End: 2019-01-21 | Stop reason: HOSPADM

## 2019-01-13 RX ORDER — DICYCLOMINE HYDROCHLORIDE 20 MG/1
20 TABLET ORAL
Status: DISCONTINUED | OUTPATIENT
Start: 2019-01-13 | End: 2019-01-21 | Stop reason: HOSPADM

## 2019-01-13 RX ORDER — SODIUM CHLORIDE 0.9 % (FLUSH) 0.9 %
5-40 SYRINGE (ML) INJECTION AS NEEDED
Status: DISCONTINUED | OUTPATIENT
Start: 2019-01-13 | End: 2019-01-21 | Stop reason: HOSPADM

## 2019-01-13 RX ORDER — POTASSIUM CHLORIDE 20 MEQ/1
40 TABLET, EXTENDED RELEASE ORAL DAILY
Status: DISCONTINUED | OUTPATIENT
Start: 2019-01-13 | End: 2019-01-13

## 2019-01-13 RX ADMIN — METHYLPREDNISOLONE SODIUM SUCCINATE 40 MG: 40 INJECTION, POWDER, FOR SOLUTION INTRAMUSCULAR; INTRAVENOUS at 21:16

## 2019-01-13 RX ADMIN — IPRATROPIUM BROMIDE AND ALBUTEROL SULFATE 3 ML: .5; 3 SOLUTION RESPIRATORY (INHALATION) at 11:52

## 2019-01-13 RX ADMIN — OSELTAMIVIR PHOSPHATE 75 MG: 75 CAPSULE ORAL at 21:16

## 2019-01-13 RX ADMIN — Medication 10 ML: at 21:16

## 2019-01-13 RX ADMIN — METHYLPREDNISOLONE SODIUM SUCCINATE 125 MG: 125 INJECTION, POWDER, FOR SOLUTION INTRAMUSCULAR; INTRAVENOUS at 14:45

## 2019-01-13 RX ADMIN — ACETAMINOPHEN 650 MG: 325 TABLET, FILM COATED ORAL at 15:40

## 2019-01-13 RX ADMIN — ALBUTEROL SULFATE 10 MG: 2.5 SOLUTION RESPIRATORY (INHALATION) at 12:02

## 2019-01-13 RX ADMIN — GUAIFENESIN 600 MG: 600 TABLET, EXTENDED RELEASE ORAL at 15:31

## 2019-01-13 RX ADMIN — OSELTAMIVIR PHOSPHATE 75 MG: 75 CAPSULE ORAL at 13:57

## 2019-01-13 RX ADMIN — SODIUM CHLORIDE 75 ML/HR: 900 INJECTION, SOLUTION INTRAVENOUS at 15:38

## 2019-01-13 RX ADMIN — ENOXAPARIN SODIUM 40 MG: 40 INJECTION SUBCUTANEOUS at 15:24

## 2019-01-13 RX ADMIN — DICYCLOMINE HYDROCHLORIDE 20 MG: 20 TABLET ORAL at 22:55

## 2019-01-13 RX ADMIN — GUAIFENESIN AND CODEINE PHOSPHATE 5 ML: 10; 100 LIQUID ORAL at 22:12

## 2019-01-13 RX ADMIN — GUAIFENESIN 600 MG: 600 TABLET, EXTENDED RELEASE ORAL at 21:16

## 2019-01-13 RX ADMIN — POTASSIUM CHLORIDE 20 MEQ: 14.9 INJECTION, SOLUTION INTRAVENOUS at 15:27

## 2019-01-13 NOTE — PROGRESS NOTES
Patient in bed at this time. Respirations even and unlabored. Oxygen sat 93% on room air. No complaints at this time. Call light within reach. Will continue to monitor.

## 2019-01-13 NOTE — ED TRIAGE NOTES
Pt reports hx of asthma, wheezing in triage. Taking steroids and using neb at home. Also states sinus pain and mucous with cough.

## 2019-01-13 NOTE — H&P
History and Physical 
 
Patient: Carri Mathias MRN: [id number]  SSN: [id number] YOB: 1955  Age: 66 y.o. Sex: female Subjective:  
  
Carri Mathias is a 66 y.o. female who came to ER due to coughing and shortness of breath. Patient has history of asthma. She has allergic rhinitis and been seeing an allergist for that. Also she has hypertension, hyperlipidemia, obesity, GERD. Over the [employer] gathering, she has exposed to her grandson who was diagnosed with influenza at that time. About 5 days ago, she started having scratchy feeling in her throat and coughing. The next day she went to see her PMD who gave her prednisone. She continues to have coughing and more shortness of breath. She came to ER and was found to have bronchospasm. Her influenza rapid test was also positive. Past Medical History:  
Diagnosis Date  Acute maxillary sinusitis  Allergic rhinitis 4/24/2015 Dr. Claritza Herman  Asthma  Atrophic vaginitis 4/24/2015  Benign hypertension 4/24/2015  Chronic anxiety 4/24/2015  Cystocele, midline 4/24/2015  Diverticulosis of colon 4/24/2015  Dysmenorrhea 4/24/2015 GYN: Referred to Dr. Jesica Crespo  Edema 4/24/2015 Including peripheral edema.  Eustachian tube dysfunction  Female stress incontinence  Gastrointestinal disorder   
 diverticulitis, GERD  GERD (gastroesophageal reflux disease)  Hypercholesterolemia  Hyperplastic polyps of stomach 4/24/2015 Colonsocpy. 2013.  Hypertension  Influenza 1/13/2019  Internal hemorrhoids without mention of complication 9/94/3450  Obesity (BMI 30-39.9) 11/14/2017  Obstructive sleep apnea 11/14/2017  Other diseases of lung, not elsewhere classified 4/24/2015 Solitary nodule of lung. 3mm LLB, CT (4/18/2013) benign appearing, (recheck in 1 year if clinically indicated.  IN March 2014, obtaining Lung-ca blood test). 2014 slight increased size compared to 2013, but not 2013. In 2014 report is recc to repeat 10/2014  Right upper quadrant pain  Urge incontinence  Ventricular bigeminy 2016 Past Surgical History:  
Procedure Laterality Date  HX HYSTERECTOMY  HX KNEE ARTHROSCOPY Left  HX DONATO AND BSO   ,DONATO,BSO Family History Problem Relation Age of Onset  Asthma Mother  Hypertension Sister  Diabetes Sister  Cancer Other Lung  Malignant Hyperthermia Neg Hx  Pseudocholinesterase Deficiency Neg Hx  Delayed Awakening Neg Hx  Post-op Nausea/Vomiting Neg Hx  Emergence Delirium Neg Hx  Post-op Cognitive Dysfunction Neg Hx   
 Other Neg Hx  Breast Cancer Neg Hx Social History Tobacco Use  Smoking status: Former Smoker Packs/day: 0.25 Years: 10.00 Pack years: 2.50 Last attempt to quit: 10/16/1996 Years since quittin.2  Smokeless tobacco: Never Used Substance Use Topics  Alcohol use: No  
  
Prior to Admission medications Medication Sig Start Date End Date Taking? Authorizing Provider  
traMADol (ULTRAM) 50 mg tablet Take 1 Tab by mouth every eight (8) hours as needed for Pain. Max Daily Amount: 150 mg. 1/10/19   Heidi Castle MD  
amLODIPine (NORVASC) 10 mg tablet Take 1 Tab by mouth daily. 1/10/19   Heidi Castle MD  
dicyclomine (BENTYL) 20 mg tablet Take 1 Tab by mouth every six (6) hours. 1/10/19   Heidi Castle MD  
omeprazole (PRILOSEC) 20 mg capsule Take 1 Cap by mouth two (2) times a day. 1/10/19   Heidi Castle MD  
valsartan (DIOVAN) 160 mg tablet Take 1 Tab by mouth daily. 1/10/19   Heidi Castle MD  
chlorpheniramine-HYDROcodone (TUSSIONEX) 10-8 mg/5 mL suspension Take 5 mL by mouth every twelve (12) hours as needed for Cough.  Max Daily Amount: 10 mL. 1/10/19   Heidi aCstle MD  
 predniSONE (DELTASONE) 10 mg tablet Take 4 a day for 4 days and then 3 a day for 3 days then two a day for 3 days 1/10/19   Kwabena Avendano MD  
QVAR 80 mcg/actuation inhaler  3/10/16   Provider, Historical  
levalbuterol (XOPENEX) 1.25 mg/3 mL nebu  5/12/16   Provider, Historical  
albuterol (PROAIR HFA) 90 mcg/actuation inhaler Take 2 Puffs by inhalation every four (4) hours as needed for Wheezing. Patient instructed to take morning of surgery per anesthesia guidelines    Provider, Historical  
albuterol (PROVENTIL VENTOLIN) 2.5 mg /3 mL (0.083 %) nebulizer solution  8/29/15   Provider, Historical  
ketorolac (ACULAR) 0.5 % ophthalmic solution Administer 2 Drops to both eyes four (4) times daily. Use 1-2 times per day. 10/8/15   Kwabena Avendano MD  
EPINEPHrine (EPIPEN) 0.3 mg/0.3 mL (1:1,000) injection 0.3 mg by IntraMUSCular route once as needed. Provider, Historical  
Cetirizine (ZYRTEC) 10 mg cap Take  by mouth. Provider, Historical  
fluticasone (FLONASE) 50 mcg/actuation nasal spray nightly. Provider, Historical  
  
 
Allergies Allergen Reactions  Latex Other (comments)  
  wheezing  Sulfa (Sulfonamide Antibiotics) Nausea Only  Other Medication Anaphylaxis Multiple food allergies Review of Systems: 
 
Constitutional: Negative for chills and fever. HENT: Negative for rhinorrhea and sore throat. Eyes: Negative for pain, redness and visual disturbance. Respiratory: Negative for chest tightness, + shortness of breath and wheezing. Cardiovascular: Negative for chest pain and leg swelling. Gastrointestinal: Negative for abdominal pain, bowel incontinence, diarrhea, nausea and vomiting. Genitourinary: Negative for bladder incontinence, dysuria and hematuria. Musculoskeletal: Negative for back pain, gait problem, neck pain and neck stiffness. Skin: Negative for color change and rash. Neurological: negative for speech difficulty.  Negative for focal weakness, weakness, numbness, headaches and loss of balance. Psychiatric/Behavioral: Negative for agitation, confusion and memory loss. Objective:  
 
Vitals:  
 01/13/19 1152 01/13/19 1204 01/13/19 1216 01/13/19 1247 BP:   (!) 198/91 172/80 Pulse:      
Resp:      
Temp:      
SpO2: 100% 100% 100% 100% Weight:      
Height:      
  
 
Physical Exam: 
General:                    The patient is a pleasant female in mild acute distress due to shortness of breath and coughing. .   
Head:                                   Normocephalic/atraumatic. Eyes:                                   No palpebral pallor or scleral icterus. ENT:                                    External auricular and nasal exam within normal limits. Mucous membranes are dry. Neck:                                   Supple, non-tender, no JVD. Lungs:                       bilaterally with wheezes, no crackles. No respiratory accessory muscle use. Heart:                                  Regular rate and rhythm, without murmurs, rubs, or gallops. Abdomen:                  Soft, non-tender, mildly distended due to trancal obesity with normoactive bowel sounds. Genitourinary:           No tenderness over the bladder or bilateral CVAs. Extremities:               Without clubbing, cyanosis, or edema. Skin:                                    Normal color, texture, and turgor. No rashes, lesions, or jaundice. Pulses:                      Radial and dorsalis pedis pulses present 2+ bilaterally. Capillary refill <2s. Neurologic:                CN II-XII grossly intact and symmetrical.  
                                            Moving all four extremities well with no focal deficits. Psychiatric:                Pleasant demeanor, appropriate affect. Alert and oriented x 3 Lab and data Recent Results (from the past 24 hour(s)) CBC WITH AUTOMATED DIFF Collection Time: 01/13/19 12:15 PM  
Result Value Ref Range WBC 7.5 4.3 - 11.1 K/uL  
 RBC 4.28 4.05 - 5.2 M/uL  
 HGB 12.1 11.7 - 15.4 g/dL HCT 38.2 35.8 - 46.3 % MCV 89.3 79.6 - 97.8 FL  
 MCH 28.3 26.1 - 32.9 PG  
 MCHC 31.7 31.4 - 35.0 g/dL  
 RDW 13.7 11.9 - 14.6 % PLATELET 420 498 - 897 K/uL MPV 10.9 9.4 - 12.3 FL ABSOLUTE NRBC 0.00 0.0 - 0.2 K/uL  
 DF AUTOMATED NEUTROPHILS 42 (L) 43 - 78 % LYMPHOCYTES 51 (H) 13 - 44 % MONOCYTES 7 4.0 - 12.0 % EOSINOPHILS 0 (L) 0.5 - 7.8 % BASOPHILS 0 0.0 - 2.0 % IMMATURE GRANULOCYTES 0 0.0 - 5.0 %  
 ABS. NEUTROPHILS 3.1 1.7 - 8.2 K/UL  
 ABS. LYMPHOCYTES 3.8 0.5 - 4.6 K/UL  
 ABS. MONOCYTES 0.5 0.1 - 1.3 K/UL  
 ABS. EOSINOPHILS 0.0 0.0 - 0.8 K/UL  
 ABS. BASOPHILS 0.0 0.0 - 0.2 K/UL  
 ABS. IMM. GRANS. 0.0 0.0 - 0.5 K/UL METABOLIC PANEL, COMPREHENSIVE Collection Time: 01/13/19 12:15 PM  
Result Value Ref Range Sodium 141 136 - 145 mmol/L Potassium 3.0 (L) 3.5 - 5.1 mmol/L Chloride 105 98 - 107 mmol/L  
 CO2 29 21 - 32 mmol/L Anion gap 7 7 - 16 mmol/L Glucose 83 65 - 100 mg/dL BUN 9 8 - 23 MG/DL Creatinine 0.67 0.6 - 1.0 MG/DL  
 GFR est AA >60 >60 ml/min/1.73m2 GFR est non-AA >60 >60 ml/min/1.73m2 Calcium 8.3 8.3 - 10.4 MG/DL Bilirubin, total 0.3 0.2 - 1.1 MG/DL  
 ALT (SGPT) 23 12 - 65 U/L  
 AST (SGOT) 9 (L) 15 - 37 U/L Alk. phosphatase 95 50 - 136 U/L Protein, total 7.2 6.3 - 8.2 g/dL Albumin 3.2 3.2 - 4.6 g/dL Globulin 4.0 (H) 2.3 - 3.5 g/dL A-G Ratio 0.8 (L) 1.2 - 3.5 BNP Collection Time: 01/13/19 12:15 PM  
Result Value Ref Range BNP 42 (H) 0 pg/mL INFLUENZA A & B AG (RAPID TEST) Collection Time: 01/13/19 12:57 PM  
Result Value Ref Range Influenza A Ag POSITIVE (A) NEG Influenza B Ag NEGATIVE  NEG Source NASOPHARYNGEAL    
 
XR chest  
1- Impression:  Normal chest radiograph. No significant interval change. I have reviewed chest x-ray myself. Assessment:  
 
Hospital Problems  Date Reviewed: 1/10/2019 Codes Class Noted POA * (Principal) Asthma with acute exacerbation ICD-10-CM: J45.901 ICD-9-CM: 493.92  1/13/2019 Unknown Influenza ICD-10-CM: J11.1 ICD-9-CM: 487.1  1/13/2019 Unknown Hypokalemia ICD-10-CM: E87.6 ICD-9-CM: 276.8  1/13/2019 Unknown Asthma ICD-10-CM: D49.294 ICD-9-CM: 493.90  1/13/2019 Unknown Obesity (BMI 30-39. 9) ICD-10-CM: E66.9 ICD-9-CM: 278.00  11/14/2017 Yes Essential hypertension ICD-10-CM: I10 
ICD-9-CM: 401.9  11/14/2017 Yes Overview Signed 12/27/2017  7:19 AM by Jennie Olsen MD  
  Battles low potassium Hypercholesteremia ICD-10-CM: E78.00 ICD-9-CM: 272.0  10/8/2015 Yes Allergic rhinitis: Joanna Daniels MD ICD-10-CM: J30.9 ICD-9-CM: 477.9  4/24/2015 Yes Plan:  
 
Asthma exacerbation Influenza Admit to medical floor. IV fluid for hydration. IV Solumedrol. Oseltamivir 75 mg po bid. Symptomatic treatments for cough. Bronchodilator. Droplet precaution. Hyperlipidemia Continue home medications. Hypertension Monitor blood pressure and manage accordingly. Continue home medications. Hypokalemia Patient has been taking Bentyl and PO potassium supplement will make her at risk for bowel complication. Will give IV potassium. Patient requires hospital stay as an in-patient and anticipated stay is more than 2 midnights due to the serious nature of the illness. I have discussed with patient regarding advance directive. Patient would like to have a full-code status. Healthcare power of  is her son, Eleazar Butcher. DVT prophylaxis : Lovenox SC I have discussed the plan of care with patient. Patient denies pain. Risk of deterioration is high.   
 
 
Signed By: Mile Dumont MD   
 January 13, 2019

## 2019-01-13 NOTE — PROGRESS NOTES
TRANSFER - IN REPORT: 
 
Verbal report received from Saloni Foley RN(name) on Liz Fontana  being received from ER(unit) for routine progression of care Report consisted of patients Situation, Background, Assessment and  
Recommendations(SBAR). Information from the following report(s) SBAR was reviewed with the receiving nurse. Opportunity for questions and clarification was provided. Assessment completed upon patients arrival to unit and care assumed.

## 2019-01-13 NOTE — ACP (ADVANCE CARE PLANNING)
Initial visit to assess pt's spiritual needs; advance directive consult. Paperwork left on pt's chart; pt indicated she has spoken with her sister and her children about an advance directive. Ministry of presence & prayer to demonstrate caring & concern, convey emotional & spiritual support.     chicho Bullock MDiv,[facility],PhD

## 2019-01-13 NOTE — ED NOTES
TRANSFER - OUT REPORT: 
 
Verbal report given to [facility] (name) on Margaret Morton  being transferred to [facility](unit) for routine progression of care Report consisted of patients Situation, Background, Assessment and  
Recommendations(SBAR). Information from the following report(s) SBAR, ED Summary, [facility] and Recent Results was reviewed with the receiving nurse. Lines:  
Peripheral IV 01/13/19 Left Hand (Active) Site Assessment Clean, dry, & intact 1/13/2019  2:48 PM  
Phlebitis Assessment 0 1/13/2019  2:48 PM  
Infiltration Assessment 0 1/13/2019  2:48 PM  
Dressing Status Clean, dry, & intact 1/13/2019  2:48 PM  
Dressing Type Transparent 1/13/2019  2:48 PM  
  
 
Opportunity for questions and clarification was provided. Patient transported with: 
 transport

## 2019-01-13 NOTE — PROGRESS NOTES
Patient arrived to floor via stretcher with IV fluids NS at 75 ml and 20 meq Potassium at 20 ml/hr. Patient ambulated from stretcher to bed with no assistance. Patient assessed and educated on call light. Call light within reach.

## 2019-01-13 NOTE — PROGRESS NOTES
Initial visit to assess pt's spiritual needs; advance directive consult. Paperwork left on pt's chart; pt indicated she has spoken with her sister and her children about an advance directive. Ministry of presence & prayer to demonstrate caring & concern, convey emotional & spiritual support.  
 
Chaplain Daylene Schlatter, MDiv,ThM,PhD

## 2019-01-13 NOTE — ED PROVIDER NOTES
Presents with complaint of wheezing. Patient states she has asthma and was seen by her primary care provider and given steroids and some cough syrup but has not done any better. She actually feels worse today. She has not been admitted recently to the hospital.  She is tapering down on her steroids. Low-grade temp. She did not get a flu shot. The history is provided by the patient. Wheezing This is a new problem. The current episode started more than 2 days ago. The problem occurs constantly. The problem has been gradually worsening. Associated symptoms include a fever, sore throat, cough and sputum production. Pertinent negatives include no chest pain. She has tried beta-agonist inhalers and oral steroids for the symptoms. The treatment provided no relief. She has had prior hospitalizations. She has had prior ED visits. Her past medical history is significant for asthma. Past Medical History:  
Diagnosis Date  Acute maxillary sinusitis  Allergic rhinitis 4/24/2015 Dr. Dominick Brooks  Asthma  Atrophic vaginitis 4/24/2015  Benign hypertension 4/24/2015  Chronic anxiety 4/24/2015  Cystocele, midline 4/24/2015  Diverticulosis of colon 4/24/2015  Dysmenorrhea 4/24/2015 GYN: Referred to Dr. Flor Lyon  Edema 4/24/2015 Including peripheral edema.  Eustachian tube dysfunction  Female stress incontinence  Gastrointestinal disorder   
 diverticulitis, GERD  GERD (gastroesophageal reflux disease)  Hypercholesterolemia  Hyperplastic polyps of stomach 4/24/2015 [place]. 2013.  Hypertension  Internal hemorrhoids without mention of complication 8/29/4387  Obesity (BMI 30-39.9) 11/14/2017  Obstructive sleep apnea 11/14/2017  Other diseases of lung, not elsewhere classified 4/24/2015 Solitary nodule of lung. 3mm LLB, CT (4/18/2013) benign appearing, (recheck in 1 year if clinically indicated.  IN March 2014, obtaining [facility] blood test). 2014 slight increased size compared to 2013, but not 2013. In 2014 report is recc to repeat 10/2014  Right upper quadrant pain  Urge incontinence  Ventricular bigeminy 2016 Past Surgical History:  
Procedure Laterality Date  HX HYSTERECTOMY  HX KNEE ARTHROSCOPY Left  HX DONATO AND BSO   ,DONATO,BSO Family History:  
Problem Relation Age of Onset  Asthma Mother  Hypertension Sister  Diabetes Sister  Cancer Other Lung  Malignant Hyperthermia Neg Hx  Pseudocholinesterase Deficiency Neg Hx  Delayed Awakening Neg Hx  Post-op Nausea/Vomiting Neg Hx  Emergence Delirium Neg Hx  Post-op Cognitive Dysfunction Neg Hx   
 Other Neg Hx  Breast Cancer Neg Hx Social History Socioeconomic History  Marital status:  Spouse name: Not on file  Number of children: Not on file  Years of education: Not on file  Highest education level: Not on file Social Needs  Financial resource strain: Not on file  Food insecurity - worry: Not on file  Food insecurity - inability: Not on file  Transportation needs - medical: Not on file  Transportation needs - non-medical: Not on file Occupational History  Not on file Tobacco Use  Smoking status: Former Smoker Packs/day: 0.25 Years: 10.00 Pack years: 2.50 Last attempt to quit: 10/16/1996 Years since quittin.2  Smokeless tobacco: Never Used Substance and Sexual Activity  Alcohol use: No  
 Drug use: No  
 Sexual activity: Yes  
  Partners: Male Other Topics Concern  Not on file Social History Narrative  Not on file ALLERGIES: Latex; Sulfa (sulfonamide antibiotics); and Other medication Review of Systems Constitutional: Positive for fever. HENT: Positive for sore throat. Respiratory: Positive for cough, sputum production and wheezing. Cardiovascular: Negative for chest pain. All other systems reviewed and are negative. Vitals:  
 01/13/19 1142 01/13/19 1152 01/13/19 1204 BP: 138/81 Pulse: 94 Resp: 20 Temp: 99.2 °F (37.3 °C) SpO2: 100% 100% 100% Weight: 100.2 kg (221 lb) Height: 5' 6\" (1.676 m) Physical Exam  
Constitutional: She is oriented to person, place, and time. She appears well-developed and well-nourished. No distress. HENT:  
Head: Normocephalic and atraumatic. Eyes: Conjunctivae are normal. Right eye exhibits no discharge. Left eye exhibits no discharge. Neck: Normal range of motion. Neck supple. Cardiovascular: Normal rate and regular rhythm. Pulmonary/Chest: She is in respiratory distress. She has wheezes. Abdominal: Soft. She exhibits no distension. There is no tenderness. Musculoskeletal: Normal range of motion. She exhibits no edema. Neurological: She is alert and oriented to person, place, and time. No cranial nerve deficit. Skin: Skin is warm and dry. Capillary refill takes less than 2 seconds. She is not diaphoretic. Psychiatric: She has a normal mood and affect. Her behavior is normal.  
Nursing note and vitals reviewed. MDM Number of Diagnoses or Management Options Asthma with acute exacerbation, unspecified asthma severity, unspecified whether persistent:  
Influenza A:  
Diagnosis management comments: Pt with continued wheezing after tx. Also has flu. Will admit for tx and steroids. Amount and/or Complexity of Data Reviewed Clinical lab tests: ordered and reviewed Review and summarize past medical records: yes Discuss the patient with other providers: yes Risk of Complications, Morbidity, and/or Mortality Presenting problems: high Diagnostic procedures: moderate Management options: high Patient Progress Patient progress: improved Procedures

## 2019-01-13 NOTE — PROGRESS NOTES
Primary Nurse Franky Gr RN and Annika Warner RN performed a dual skin assessment on this patient No impairment noted Pritesh score is 22.

## 2019-01-14 LAB
ANION GAP SERPL CALC-SCNC: 8 MMOL/L (ref 7–16)
BASOPHILS # BLD: 0 K/UL (ref 0–0.2)
BASOPHILS NFR BLD: 0 % (ref 0–2)
BUN SERPL-MCNC: 13 MG/DL (ref 8–23)
CALCIUM SERPL-MCNC: 8.7 MG/DL (ref 8.3–10.4)
CHLORIDE SERPL-SCNC: 106 MMOL/L (ref 98–107)
CO2 SERPL-SCNC: 27 MMOL/L (ref 21–32)
CREAT SERPL-MCNC: 0.65 MG/DL (ref 0.6–1)
DIFFERENTIAL METHOD BLD: ABNORMAL
EOSINOPHIL # BLD: 0 K/UL (ref 0–0.8)
EOSINOPHIL NFR BLD: 0 % (ref 0.5–7.8)
ERYTHROCYTE [DISTWIDTH] IN BLOOD BY AUTOMATED COUNT: 13.5 % (ref 11.9–14.6)
GLUCOSE SERPL-MCNC: 174 MG/DL (ref 65–100)
HCT VFR BLD AUTO: 37.3 % (ref 35.8–46.3)
HGB BLD-MCNC: 11.9 G/DL (ref 11.7–15.4)
IMM GRANULOCYTES # BLD AUTO: 0 K/UL (ref 0–0.5)
IMM GRANULOCYTES NFR BLD AUTO: 0 % (ref 0–5)
LYMPHOCYTES # BLD: 1.2 K/UL (ref 0.5–4.6)
LYMPHOCYTES NFR BLD: 20 % (ref 13–44)
MCH RBC QN AUTO: 28.5 PG (ref 26.1–32.9)
MCHC RBC AUTO-ENTMCNC: 31.9 G/DL (ref 31.4–35)
MCV RBC AUTO: 89.4 FL (ref 79.6–97.8)
MONOCYTES # BLD: 0.2 K/UL (ref 0.1–1.3)
MONOCYTES NFR BLD: 3 % (ref 4–12)
NEUTS SEG # BLD: 4.6 K/UL (ref 1.7–8.2)
NEUTS SEG NFR BLD: 77 % (ref 43–78)
NRBC # BLD: 0 K/UL (ref 0–0.2)
PLATELET # BLD AUTO: 244 K/UL (ref 150–450)
PMV BLD AUTO: 10.9 FL (ref 9.4–12.3)
POTASSIUM SERPL-SCNC: 4 MMOL/L (ref 3.5–5.1)
RBC # BLD AUTO: 4.17 M/UL (ref 4.05–5.2)
SODIUM SERPL-SCNC: 141 MMOL/L (ref 136–145)
WBC # BLD AUTO: 6 K/UL (ref 4.3–11.1)

## 2019-01-14 PROCEDURE — 77010033678 HC OXYGEN DAILY

## 2019-01-14 PROCEDURE — 80048 BASIC METABOLIC PNL TOTAL CA: CPT

## 2019-01-14 PROCEDURE — 77030020120 HC VLV RESP PEP HI -B

## 2019-01-14 PROCEDURE — 65270000029 HC RM PRIVATE

## 2019-01-14 PROCEDURE — 94760 N-INVAS EAR/PLS OXIMETRY 1: CPT

## 2019-01-14 PROCEDURE — 77030012341 HC CHMB SPCR OPTC MDI VYRM -A

## 2019-01-14 PROCEDURE — 74011250636 HC RX REV CODE- 250/636: Performed by: INTERNAL MEDICINE

## 2019-01-14 PROCEDURE — 94640 AIRWAY INHALATION TREATMENT: CPT

## 2019-01-14 PROCEDURE — 36415 COLL VENOUS BLD VENIPUNCTURE: CPT

## 2019-01-14 PROCEDURE — 85025 COMPLETE CBC W/AUTO DIFF WBC: CPT

## 2019-01-14 PROCEDURE — 74011000250 HC RX REV CODE- 250: Performed by: INTERNAL MEDICINE

## 2019-01-14 PROCEDURE — 74011250637 HC RX REV CODE- 250/637: Performed by: FAMILY MEDICINE

## 2019-01-14 PROCEDURE — 74011250637 HC RX REV CODE- 250/637: Performed by: INTERNAL MEDICINE

## 2019-01-14 RX ADMIN — ALBUTEROL SULFATE 2.5 MG: 2.5 SOLUTION RESPIRATORY (INHALATION) at 13:43

## 2019-01-14 RX ADMIN — ENOXAPARIN SODIUM 40 MG: 40 INJECTION SUBCUTANEOUS at 13:36

## 2019-01-14 RX ADMIN — METHYLPREDNISOLONE SODIUM SUCCINATE 40 MG: 40 INJECTION, POWDER, FOR SOLUTION INTRAMUSCULAR; INTRAVENOUS at 13:35

## 2019-01-14 RX ADMIN — Medication 10 ML: at 11:20

## 2019-01-14 RX ADMIN — VALSARTAN 160 MG: 80 TABLET ORAL at 08:18

## 2019-01-14 RX ADMIN — AMLODIPINE BESYLATE 10 MG: 10 TABLET ORAL at 08:18

## 2019-01-14 RX ADMIN — Medication 10 ML: at 21:39

## 2019-01-14 RX ADMIN — GUAIFENESIN 600 MG: 600 TABLET, EXTENDED RELEASE ORAL at 08:18

## 2019-01-14 RX ADMIN — DICYCLOMINE HYDROCHLORIDE 20 MG: 20 TABLET ORAL at 05:50

## 2019-01-14 RX ADMIN — DIPHENHYDRAMINE HYDROCHLORIDE 25 MG: 25 CAPSULE ORAL at 21:39

## 2019-01-14 RX ADMIN — PANTOPRAZOLE SODIUM 40 MG: 40 TABLET, DELAYED RELEASE ORAL at 05:53

## 2019-01-14 RX ADMIN — DICYCLOMINE HYDROCHLORIDE 20 MG: 20 TABLET ORAL at 11:19

## 2019-01-14 RX ADMIN — METHYLPREDNISOLONE SODIUM SUCCINATE 40 MG: 40 INJECTION, POWDER, FOR SOLUTION INTRAMUSCULAR; INTRAVENOUS at 05:51

## 2019-01-14 RX ADMIN — DICYCLOMINE HYDROCHLORIDE 20 MG: 20 TABLET ORAL at 16:37

## 2019-01-14 RX ADMIN — LORATADINE 10 MG: 10 TABLET ORAL at 08:18

## 2019-01-14 RX ADMIN — DICYCLOMINE HYDROCHLORIDE 20 MG: 20 TABLET ORAL at 21:49

## 2019-01-14 RX ADMIN — METHYLPREDNISOLONE SODIUM SUCCINATE 40 MG: 40 INJECTION, POWDER, FOR SOLUTION INTRAMUSCULAR; INTRAVENOUS at 21:39

## 2019-01-14 RX ADMIN — OSELTAMIVIR PHOSPHATE 75 MG: 75 CAPSULE ORAL at 08:18

## 2019-01-14 RX ADMIN — GUAIFENESIN 600 MG: 600 TABLET, EXTENDED RELEASE ORAL at 21:39

## 2019-01-14 RX ADMIN — OSELTAMIVIR PHOSPHATE 75 MG: 75 CAPSULE ORAL at 21:39

## 2019-01-14 RX ADMIN — GUAIFENESIN AND CODEINE PHOSPHATE 5 ML: 10; 100 LIQUID ORAL at 05:50

## 2019-01-14 RX ADMIN — Medication 5 ML: at 05:51

## 2019-01-14 NOTE — PROGRESS NOTES
Pt resting in bed. Pt on RA and coughing. Pt encouraged to call for assistance if needed call light in reach, door open will monitor.

## 2019-01-14 NOTE — PROGRESS NOTES
Hand off report received from [facility]. Patient in bed with head of bed elevated. Respirations even and unlabored. Call light is in reach. Safety measures are in place. No needs noted or communicated at this time.

## 2019-01-14 NOTE — PROGRESS NOTES
Pt complaints of chest feeling tight and wheezing rt dose of steroids given and RT call for breathing treatment. Pt on RA with O2 sat 99% at this time. Pt coughing. Will monitor

## 2019-01-14 NOTE — PROGRESS NOTES
MD notified patient would like to restart bentyl medication. Medications is a home medication. MD agreed. Patient also asks for cough medication. MD Mayra Car ordered. RN to administer.

## 2019-01-14 NOTE — PROGRESS NOTES
Pt continues to wheezes and complaints of SOB but reports is better. Will monitor. Pt remains on RA at this time. Call light in reach,  will monitor.

## 2019-01-14 NOTE — PROGRESS NOTES
Problem: Interdisciplinary Rounds Goal: Interdisciplinary Rounds Interdisciplinary team rounds were held 1/14/2019 with the following team members:Care Management, Physical Therapy, Physician and Clinical Coordinator. Plan of care discussed. See clinical pathway and/or care plan for interventions and desired outcomes.

## 2019-01-14 NOTE — PROGRESS NOTES
Pt stable in bed with head of bed elevated. Respirations even and unlabored on room air. Call light is in reach. Safety measures are in place. No needs noted or communicated at this time.

## 2019-01-14 NOTE — PROGRESS NOTES
Progress Note Patient: Teresita May MRN: [id number]  SSN: [id number] YOB: 1955  Age: 66 y.o. Sex: female Admit Date: 1/13/2019 LOS: 1 day Subjective:  
 
From admission note : 
 
\" Teresita May is a 66 y.o. female who came to ER due to coughing and shortness of breath.  
  
Patient has history of asthma. She has allergic rhinitis and been seeing an allergist for that. Also she has hypertension, hyperlipidemia, obesity, GERD.  
  
Over the [employer] gathering, she has exposed to her grandson who was diagnosed with influenza at that time. About 5 days ago, she started having scratchy feeling in her throat and coughing. The next day she went to see her PMD who gave her prednisone.  
  
She continues to have coughing and more shortness of breath. She came to ER and was found to have bronchospasm. Her influenza rapid test was also positive. \"  
 
Today she is feeling better although still with wheezing. No fever. No shaking. No chills. No chest pain. Objective:  
 
Vitals:  
 01/13/19 1922 01/13/19 2336 01/14/19 2574 01/14/19 6992 BP: 139/79 137/85 150/76 169/71 Pulse: 96 86 88 67 Resp: 18 18 18 18 Temp: 98.2 °F (36.8 °C) 97.5 °F (36.4 °C) 97.5 °F (36.4 °C) 98.3 °F (36.8 °C) SpO2: 98% 94% 99% 95% Weight:  100.7 kg (222 lb) Height:      
  
 
Intake and Output: 
Current Shift: No intake/output data recorded. Last three shifts: 01/12 1901 - 01/14 0700 In: 5067 [P.O.:720; I.V.:282] Out: - Physical Exam:  
General:                    The patient is a pleasant female in mild acute distress due to shortness of breath and coughing. less than yesterday. IOZG:                                   [id number]. Eyes:                                   No palpebral pallor or scleral icterus. ENT:                                    External auricular and nasal exam within normal limits.                                             CSPYLV membranes are dry. Neck:                                   Supple, non-tender, no JVD. Lungs:                       bilaterally with inspiratory wheezes, no crackles.                                             No respiratory accessory muscle use. Heart:                                  Regular rate and rhythm, without murmurs, rubs, or gallops. Abdomen:                  Soft, non-tender, mildly distended due to trancal obesity with normoactive bowel sounds. Genitourinary:           No tenderness over the bladder or bilateral CVAs. Extremities:               Without clubbing, cyanosis, or edema. Skin:                                    Normal color, texture, and turgor. No rashes, lesions, or jaundice. Pulses:                      Radial and dorsalis pedis pulses present 2+ bilaterally.  
                                            Capillary refill <2s. Neurologic:                CN II-XII grossly intact and symmetrical.  
                                            Moving all four extremities well with no focal deficits. Psychiatric:                Pleasant demeanor, appropriate affect. Alert and oriented x 3 Lab/Data Review: 
 
Recent Results (from the past 24 hour(s)) CBC WITH AUTOMATED DIFF Collection Time: 01/13/19 12:15 PM  
Result Value Ref Range WBC 7.5 4.3 - 11.1 K/uL  
 RBC 4.28 4.05 - 5.2 M/uL  
 HGB 12.1 11.7 - 15.4 g/dL HCT 38.2 35.8 - 46.3 % MCV 89.3 79.6 - 97.8 FL  
 MCH 28.3 26.1 - 32.9 PG  
 MCHC 31.7 31.4 - 35.0 g/dL  
 RDW 13.7 11.9 - 14.6 % PLATELET 218 571 - 816 K/uL MPV 10.9 9.4 - 12.3 FL ABSOLUTE NRBC 0.00 0.0 - 0.2 K/uL  
 DF AUTOMATED NEUTROPHILS 42 (L) 43 - 78 % LYMPHOCYTES 51 (H) 13 - 44 % MONOCYTES 7 4.0 - 12.0 % EOSINOPHILS 0 (L) 0.5 - 7.8 % BASOPHILS 0 0.0 - 2.0 % IMMATURE GRANULOCYTES 0 0.0 - 5.0 %  
 ABS. NEUTROPHILS 3.1 1.7 - 8.2 K/UL ABS. LYMPHOCYTES 3.8 0.5 - 4.6 K/UL  
 ABS. MONOCYTES 0.5 0.1 - 1.3 K/UL  
 ABS. EOSINOPHILS 0.0 0.0 - 0.8 K/UL  
 ABS. BASOPHILS 0.0 0.0 - 0.2 K/UL  
 ABS. IMM. GRANS. 0.0 0.0 - 0.5 K/UL METABOLIC PANEL, COMPREHENSIVE Collection Time: 01/13/19 12:15 PM  
Result Value Ref Range Sodium 141 136 - 145 mmol/L Potassium 3.0 (L) 3.5 - 5.1 mmol/L Chloride 105 98 - 107 mmol/L  
 CO2 29 21 - 32 mmol/L Anion gap 7 7 - 16 mmol/L Glucose 83 65 - 100 mg/dL BUN 9 8 - 23 MG/DL Creatinine 0.67 0.6 - 1.0 MG/DL  
 GFR est AA >60 >60 ml/min/1.73m2 GFR est non-AA >60 >60 ml/min/1.73m2 Calcium 8.3 8.3 - 10.4 MG/DL Bilirubin, total 0.3 0.2 - 1.1 MG/DL  
 ALT (SGPT) 23 12 - 65 U/L  
 AST (SGOT) 9 (L) 15 - 37 U/L Alk. phosphatase 95 50 - 136 U/L Protein, total 7.2 6.3 - 8.2 g/dL Albumin 3.2 3.2 - 4.6 g/dL Globulin 4.0 (H) 2.3 - 3.5 g/dL A-G Ratio 0.8 (L) 1.2 - 3.5 BNP Collection Time: 01/13/19 12:15 PM  
Result Value Ref Range BNP 42 (H) 0 pg/mL INFLUENZA A & B AG (RAPID TEST) Collection Time: 01/13/19 12:57 PM  
Result Value Ref Range Influenza A Ag POSITIVE (A) NEG Influenza B Ag NEGATIVE  NEG Source NASOPHARYNGEAL    
METABOLIC PANEL, BASIC Collection Time: 01/14/19  6:43 AM  
Result Value Ref Range Sodium 141 136 - 145 mmol/L Potassium 4.0 3.5 - 5.1 mmol/L Chloride 106 98 - 107 mmol/L  
 CO2 27 21 - 32 mmol/L Anion gap 8 7 - 16 mmol/L Glucose 174 (H) 65 - 100 mg/dL BUN 13 8 - 23 MG/DL Creatinine 0.65 0.6 - 1.0 MG/DL  
 GFR est AA >60 >60 ml/min/1.73m2 GFR est non-AA >60 >60 ml/min/1.73m2 Calcium 8.7 8.3 - 10.4 MG/DL  
CBC WITH AUTOMATED DIFF Collection Time: 01/14/19  6:43 AM  
Result Value Ref Range WBC 6.0 4.3 - 11.1 K/uL  
 RBC 4.17 4.05 - 5.2 M/uL  
 HGB 11.9 11.7 - 15.4 g/dL HCT 37.3 35.8 - 46.3 % MCV 89.4 79.6 - 97.8 FL  
 MCH 28.5 26.1 - 32.9 PG  
 MCHC 31.9 31.4 - 35.0 g/dL  
 RDW 13.5 11.9 - 14.6 % PLATELET 498 371 - 867 K/uL MPV 10.9 9.4 - 12.3 FL ABSOLUTE NRBC 0.00 0.0 - 0.2 K/uL  
 DF AUTOMATED NEUTROPHILS 77 43 - 78 % LYMPHOCYTES 20 13 - 44 % MONOCYTES 3 (L) 4.0 - 12.0 % EOSINOPHILS 0 (L) 0.5 - 7.8 % BASOPHILS 0 0.0 - 2.0 % IMMATURE GRANULOCYTES 0 0.0 - 5.0 %  
 ABS. NEUTROPHILS 4.6 1.7 - 8.2 K/UL  
 ABS. LYMPHOCYTES 1.2 0.5 - 4.6 K/UL  
 ABS. MONOCYTES 0.2 0.1 - 1.3 K/UL  
 ABS. EOSINOPHILS 0.0 0.0 - 0.8 K/UL  
 ABS. BASOPHILS 0.0 0.0 - 0.2 K/UL  
 ABS. IMM. GRANS. 0.0 0.0 - 0.5 K/UL  
 
XR chest  
1- Impression:  Normal chest radiograph.   No significant interval change. 
  
I have reviewed chest x-ray myself. Current Facility-Administered Medications:  
  sodium chloride (NS) flush 5-40 mL, 5-40 mL, IntraVENous, Q8H, Jannie Lee MD, 5 mL at 01/14/19 0551   sodium chloride (NS) flush 5-40 mL, 5-40 mL, IntraVENous, PRN, Jannie Lee MD 
  acetaminophen (TYLENOL) tablet 650 mg, 650 mg, Oral, Q6H PRN, Jannie Lee MD, 650 mg at 01/13/19 1540   diphenhydrAMINE (BENADRYL) capsule 25 mg, 25 mg, Oral, Q6H PRN, Jannie Lee MD 
  ondansetron (ZOFRAN) injection 4 mg, 4 mg, IntraVENous, Q6H PRN, Jannie Lee MD 
  docusate sodium (COLACE) capsule 100 mg, 100 mg, Oral, BID PRN, Jannie Lee MD 
  enoxaparin (LOVENOX) injection 40 mg, 40 mg, SubCUTAneous, Q24H, Jannie Lee MD, 40 mg at 01/13/19 1524   oseltamivir (TAMIFLU) capsule 75 mg, 75 mg, Oral, Q12H, Jannie Lee MD, 75 mg at 01/14/19 0818 
  guaiFENesin ER (MUCINEX) tablet 600 mg, 600 mg, Oral, Q12H, Jannie Lee MD, 600 mg at 01/14/19 0818   albuterol (PROVENTIL VENTOLIN) nebulizer solution 2.5 mg, 2.5 mg, Nebulization, Q4H PRN, Jannie Lee MD 
  amLODIPine (NORVASC) tablet 10 mg, 10 mg, Oral, DAILY, Jannie Lee MD, 10 mg at 01/14/19 0818   loratadine (CLARITIN) tablet 10 mg, 10 mg, Oral, DAILY, Jesus MD Jannie, 10 mg at 01/14/19 3855   pantoprazole (PROTONIX) tablet 40 mg, 40 mg, Oral, ACB, Jannie Lee MD, 40 mg at 01/14/19 1448   valsartan (DIOVAN) tablet 160 mg, 160 mg, Oral, DAILY, Jannie Lee MD, 160 mg at 01/14/19 0818   methylPREDNISolone (PF) (SOLU-MEDROL) injection 40 mg, 40 mg, IntraVENous, Q8H, Jannie Lee MD, 40 mg at 01/14/19 0551 
  0.9% sodium chloride infusion, 75 mL/hr, IntraVENous, CONTINUOUS, Jannie Lee MD, Last Rate: 75 mL/hr at 01/13/19 1538, 75 mL/hr at 01/13/19 1538 
  guaiFENesin-codeine (ROBITUSSIN AC) 100-10 mg/5 mL solution 5 mL, 5 mL, Oral, Q4H PRN, Rossana Ortiz MD, 5 mL at 01/14/19 0550 
  dicyclomine (BENTYL) tablet 20 mg, 20 mg, Oral, AC&HS, Rossana Ortiz MD, 20 mg at 01/14/19 7425 Assessment:  
 
Principal Problem: 
  Asthma with acute exacerbation (1/13/2019) Active Problems: 
  Allergic rhinitis: Johny Nicole MD (4/24/2015) Hypercholesteremia (10/8/2015) Obesity (BMI 30-39.9) (11/14/2017) Essential hypertension (11/14/2017) Overview: Battles low potassium Influenza (1/13/2019) Hypokalemia (1/13/2019) Asthma (1/13/2019) Plan:  
 
Asthma exacerbation Influenza Continue IV fluid for hydration. IV Solumedrol. Oseltamivir 75 mg po bid. Symptomatic treatments for cough. Bronchodilator. Droplet precaution.  
  
Hyperlipidemia Continue home medications.  
  
Hypertension Monitor blood pressure and manage accordingly. Continue home medications.   
  
Hypokalemia Resolved after oral intake and IV potassium.  
  
DVT prophylaxis : Lovenox SC 
  
I have discussed the plan of care with patient.  
  
Patient denies pain.  
  
Disposition plan : hopefully she will get better in the next 2-3 days. Signed By: Rosalee Burk MD   
 January 14, 2019

## 2019-01-14 NOTE — PROGRESS NOTES
Pt resting in bed. Pt on RA at this time. Pt reports feeling better but complains of SOB when ambulating back from RR. Pt report small amount of thick yellow sputum production. Pt encouraged to call for assistance if needed call light in reach, will monitor. Pt on flu precaution.

## 2019-01-15 PROCEDURE — 74011250637 HC RX REV CODE- 250/637: Performed by: INTERNAL MEDICINE

## 2019-01-15 PROCEDURE — 65270000029 HC RM PRIVATE

## 2019-01-15 PROCEDURE — 94640 AIRWAY INHALATION TREATMENT: CPT

## 2019-01-15 PROCEDURE — 94660 CPAP INITIATION&MGMT: CPT

## 2019-01-15 PROCEDURE — 94760 N-INVAS EAR/PLS OXIMETRY 1: CPT

## 2019-01-15 PROCEDURE — 74011250636 HC RX REV CODE- 250/636: Performed by: INTERNAL MEDICINE

## 2019-01-15 PROCEDURE — 74011250637 HC RX REV CODE- 250/637: Performed by: FAMILY MEDICINE

## 2019-01-15 PROCEDURE — 74011000250 HC RX REV CODE- 250: Performed by: INTERNAL MEDICINE

## 2019-01-15 RX ORDER — PREDNISONE 20 MG/1
40 TABLET ORAL
Status: DISCONTINUED | OUTPATIENT
Start: 2019-01-16 | End: 2019-01-16

## 2019-01-15 RX ADMIN — GUAIFENESIN AND CODEINE PHOSPHATE 5 ML: 10; 100 LIQUID ORAL at 03:16

## 2019-01-15 RX ADMIN — ALBUTEROL SULFATE 2.5 MG: 2.5 SOLUTION RESPIRATORY (INHALATION) at 23:49

## 2019-01-15 RX ADMIN — ALBUTEROL SULFATE 2.5 MG: 2.5 SOLUTION RESPIRATORY (INHALATION) at 14:17

## 2019-01-15 RX ADMIN — DICYCLOMINE HYDROCHLORIDE 20 MG: 20 TABLET ORAL at 22:11

## 2019-01-15 RX ADMIN — Medication 10 ML: at 05:39

## 2019-01-15 RX ADMIN — DICYCLOMINE HYDROCHLORIDE 20 MG: 20 TABLET ORAL at 05:39

## 2019-01-15 RX ADMIN — GUAIFENESIN AND CODEINE PHOSPHATE 5 ML: 10; 100 LIQUID ORAL at 08:54

## 2019-01-15 RX ADMIN — PANTOPRAZOLE SODIUM 40 MG: 40 TABLET, DELAYED RELEASE ORAL at 05:39

## 2019-01-15 RX ADMIN — DICYCLOMINE HYDROCHLORIDE 20 MG: 20 TABLET ORAL at 17:29

## 2019-01-15 RX ADMIN — GUAIFENESIN 600 MG: 600 TABLET, EXTENDED RELEASE ORAL at 08:54

## 2019-01-15 RX ADMIN — DIPHENHYDRAMINE HYDROCHLORIDE 25 MG: 25 CAPSULE ORAL at 22:11

## 2019-01-15 RX ADMIN — OSELTAMIVIR PHOSPHATE 75 MG: 75 CAPSULE ORAL at 22:11

## 2019-01-15 RX ADMIN — OSELTAMIVIR PHOSPHATE 75 MG: 75 CAPSULE ORAL at 08:54

## 2019-01-15 RX ADMIN — ENOXAPARIN SODIUM 40 MG: 40 INJECTION SUBCUTANEOUS at 13:14

## 2019-01-15 RX ADMIN — GUAIFENESIN AND CODEINE PHOSPHATE 5 ML: 10; 100 LIQUID ORAL at 23:54

## 2019-01-15 RX ADMIN — LORATADINE 10 MG: 10 TABLET ORAL at 08:54

## 2019-01-15 RX ADMIN — AMLODIPINE BESYLATE 10 MG: 10 TABLET ORAL at 08:54

## 2019-01-15 RX ADMIN — Medication 5 ML: at 13:15

## 2019-01-15 RX ADMIN — METHYLPREDNISOLONE SODIUM SUCCINATE 40 MG: 40 INJECTION, POWDER, FOR SOLUTION INTRAMUSCULAR; INTRAVENOUS at 05:39

## 2019-01-15 RX ADMIN — ALBUTEROL SULFATE 2.5 MG: 2.5 SOLUTION RESPIRATORY (INHALATION) at 03:46

## 2019-01-15 RX ADMIN — ACETAMINOPHEN 650 MG: 325 TABLET, FILM COATED ORAL at 19:33

## 2019-01-15 RX ADMIN — VALSARTAN 160 MG: 80 TABLET ORAL at 08:54

## 2019-01-15 RX ADMIN — GUAIFENESIN AND CODEINE PHOSPHATE 5 ML: 10; 100 LIQUID ORAL at 13:13

## 2019-01-15 RX ADMIN — DICYCLOMINE HYDROCHLORIDE 20 MG: 20 TABLET ORAL at 11:29

## 2019-01-15 RX ADMIN — GUAIFENESIN 600 MG: 600 TABLET, EXTENDED RELEASE ORAL at 22:11

## 2019-01-15 RX ADMIN — Medication 5 ML: at 22:11

## 2019-01-15 NOTE — PROGRESS NOTES
Pt resting in bed. Pt awakens when spoken to. Pt oriented time 3 at this time. Pt complains of cough and chest tightness. Pt on RA when CPAP removed. Pt reports feeling better on CPAP. Pt has no acute distress noted at this time. Pt encouraged to call for assistance if needed call light in reach, door open will monitor.

## 2019-01-15 NOTE — PROGRESS NOTES
Pt resting in bed watching tv. Pt is on room air with some bilateral wheezing noted. Pt oxygen saturation is 94%, RR even and unlabored. Pt is alert and oriented x4 with no complaints at this time

## 2019-01-15 NOTE — PROGRESS NOTES
Pt resting in bed, on CPAP. Pt's vital signs are stable. Pt does have notable wheezing and a cough. Scheduled Solumedrol given. Pt has no other complaints at this time. Call light is within reach

## 2019-01-15 NOTE — PROGRESS NOTES
Sitting on side of bed, continues with cough at intervals. C/o of sore mouth and tongue, addressed, chloraseptic spray ordered. Appetite good, states feels some better. Remains on room air. Aware to call for asst as needed. Remains on droplet precautions.

## 2019-01-15 NOTE — PROGRESS NOTES
Care Management Interventions PCP Verified by CM: Yes Physical Therapy Consult: No 
Occupational Therapy Consult: No 
Current Support Network: Other Plan discussed with Pt/Family/Caregiver: Yes Freedom of Choice Offered: Yes Discharge Location Discharge Placement: Home Patient is alert and oriented. Patient's son lives across the [place]. Independent with ambulation and ADLs. Drives. No home 02. No history of HH or STR. CM not anticipating any discharge needs but remains available.

## 2019-01-15 NOTE — PROGRESS NOTES
Pt resting in bed. Pt remains on RA at this time. No acute distress noted at this time. Call light in reach, door open will monitor.

## 2019-01-15 NOTE — PROGRESS NOTES
Problem: Interdisciplinary Rounds Goal: Interdisciplinary Rounds Interdisciplinary team rounds were held 1/15/2019 with the following team members:Care Management, Physical Therapy, Physician and Clinical Coordinator. Plan of care discussed. See clinical pathway and/or care plan for interventions and desired outcomes.

## 2019-01-15 NOTE — PROGRESS NOTES
Progress Note Patient: Gonzalo Brunson MRN: [id number]  SSN: [id number] YOB: 1955  Age: 66 y.o. Sex: female Admit Date: 1/13/2019 LOS: 2 days Subjective:  
F/U asthma exacerbation and influenza A. Admits to SOB during ambulation along with some wheezing. Ambulating more. No chest pain. Current Facility-Administered Medications Medication Dose Route Frequency  sodium chloride (NS) flush 5-40 mL  5-40 mL IntraVENous Q8H  
 sodium chloride (NS) flush 5-40 mL  5-40 mL IntraVENous PRN  
 acetaminophen (TYLENOL) tablet 650 mg  650 mg Oral Q6H PRN  
 diphenhydrAMINE (BENADRYL) capsule 25 mg  25 mg Oral Q6H PRN  
 ondansetron (ZOFRAN) injection 4 mg  4 mg IntraVENous Q6H PRN  
 docusate sodium (COLACE) capsule 100 mg  100 mg Oral BID PRN  
 enoxaparin (LOVENOX) injection 40 mg  40 mg SubCUTAneous Q24H  
 oseltamivir (TAMIFLU) capsule 75 mg  75 mg Oral Q12H  
 guaiFENesin ER (MUCINEX) tablet 600 mg  600 mg Oral Q12H  
 albuterol (PROVENTIL VENTOLIN) nebulizer solution 2.5 mg  2.5 mg Nebulization Q4H PRN  
 amLODIPine (NORVASC) tablet 10 mg  10 mg Oral DAILY  loratadine (CLARITIN) tablet 10 mg  10 mg Oral DAILY  pantoprazole (PROTONIX) tablet 40 mg  40 mg Oral ACB  valsartan (DIOVAN) tablet 160 mg  160 mg Oral DAILY  methylPREDNISolone (PF) (SOLU-MEDROL) injection 40 mg  40 mg IntraVENous Q8H  
 0.9% sodium chloride infusion  75 mL/hr IntraVENous CONTINUOUS  
 guaiFENesin-codeine (ROBITUSSIN AC) 100-10 mg/5 mL solution 5 mL  5 mL Oral Q4H PRN  
 dicyclomine (BENTYL) tablet 20 mg  20 mg Oral AC&HS Objective:  
 
Vitals:  
 01/15/19 3214 01/15/19 0356 01/15/19 5545 01/15/19 1107 BP:  139/76 156/80 162/86 Pulse:  70 66 92 Resp:  18 18 19 Temp:  97.5 °F (36.4 °C) 98.3 °F (36.8 °C) 98 °F (36.7 °C) SpO2: 98% 98% 99% 99% Weight:      
Height:      
  
  
Intake and Output: 
Current Shift: 01/15 0701 - 01/15 1900 In: 240 [P.O.:240] Out: - Last three shifts: 01/13 1901 - 01/15 0700 In: 1080 [P.O.:1080] Out: - Physical Exam:  
General:  Alert, cooperative, no distress Eyes:  Conjunctivae/corneas clear. Ears:  Normal TMs and external ear canals both ears. Nose: Nares normal. Septum midline. Mouth/Throat: Lips, mucosa, and tongue normal. Teeth and gums normal.  
Neck:  no JVD. Back:   Symmetric, no curvature. ROM normal. No CVA tenderness. Lungs:   Clear to auscultation bilaterally. Extrathoracic wheezing. Actively coughing. Heart:  Regular rate and rhythm, S1, S2 normal, no murmur, click, rub or gallop. Abdomen:   Soft, non-tender. Bowel sounds normal. No masses,  No organomegaly. Extremities: Extremities normal, atraumatic, no cyanosis or edema. Pulses: 2+ and symmetric all extremities. Skin: Skin color, texture, turgor normal. No rashes or lesions Lymph nodes: Cervical, supraclavicular, and axillary nodes normal.  
Neurologic: CNII-XII intact. Normal strength, sensation and reflexes throughout. Lab/Data Review: No results found for this or any previous visit (from the past 24 hour(s)). Assessment/ Plan:  
 
Principal Problem: 
  Asthma with acute exacerbation (1/13/2019) Active Problems: 
  Allergic rhinitis: Kain Coronado MD (4/24/2015) Hypercholesteremia (10/8/2015) Obesity (BMI 30-39.9) (11/14/2017) Essential hypertension (11/14/2017) Overview: Battles low potassium Influenza (1/13/2019) Hypokalemia (1/13/2019) Asthma (1/13/2019) Asthma exacerbation - Change IV solumedrol to oral Prednisone. PRN albuterol every 4 hrs. Influenza A - Tamiflu 75mg BID for a total of 5 days. HTN - Amlodipine 10mg daily. Valsartan 160mg daily. DC fluids since eating and drinking well. Look to Vibease. DVT prophylaxis - Lovenox Signed By: Tasia Young,    
 January 15, 2019

## 2019-01-16 PROCEDURE — 74011636637 HC RX REV CODE- 636/637: Performed by: FAMILY MEDICINE

## 2019-01-16 PROCEDURE — 74011250637 HC RX REV CODE- 250/637: Performed by: INTERNAL MEDICINE

## 2019-01-16 PROCEDURE — 74011250636 HC RX REV CODE- 250/636: Performed by: FAMILY MEDICINE

## 2019-01-16 PROCEDURE — 65270000029 HC RM PRIVATE

## 2019-01-16 PROCEDURE — 94760 N-INVAS EAR/PLS OXIMETRY 1: CPT

## 2019-01-16 PROCEDURE — 74011250636 HC RX REV CODE- 250/636: Performed by: INTERNAL MEDICINE

## 2019-01-16 PROCEDURE — 94640 AIRWAY INHALATION TREATMENT: CPT

## 2019-01-16 PROCEDURE — 74011250637 HC RX REV CODE- 250/637: Performed by: FAMILY MEDICINE

## 2019-01-16 PROCEDURE — 74011000250 HC RX REV CODE- 250: Performed by: FAMILY MEDICINE

## 2019-01-16 PROCEDURE — 74011000250 HC RX REV CODE- 250: Performed by: INTERNAL MEDICINE

## 2019-01-16 RX ORDER — ALBUTEROL SULFATE 0.83 MG/ML
2.5 SOLUTION RESPIRATORY (INHALATION)
Status: DISCONTINUED | OUTPATIENT
Start: 2019-01-16 | End: 2019-01-21 | Stop reason: HOSPADM

## 2019-01-16 RX ADMIN — GUAIFENESIN AND CODEINE PHOSPHATE 5 ML: 10; 100 LIQUID ORAL at 21:36

## 2019-01-16 RX ADMIN — GUAIFENESIN AND CODEINE PHOSPHATE 5 ML: 10; 100 LIQUID ORAL at 05:29

## 2019-01-16 RX ADMIN — Medication 5 ML: at 21:36

## 2019-01-16 RX ADMIN — DICYCLOMINE HYDROCHLORIDE 20 MG: 20 TABLET ORAL at 05:26

## 2019-01-16 RX ADMIN — DICYCLOMINE HYDROCHLORIDE 20 MG: 20 TABLET ORAL at 11:02

## 2019-01-16 RX ADMIN — ENOXAPARIN SODIUM 40 MG: 40 INJECTION SUBCUTANEOUS at 15:42

## 2019-01-16 RX ADMIN — AMLODIPINE BESYLATE 10 MG: 10 TABLET ORAL at 08:33

## 2019-01-16 RX ADMIN — GUAIFENESIN 600 MG: 600 TABLET, EXTENDED RELEASE ORAL at 21:36

## 2019-01-16 RX ADMIN — PANTOPRAZOLE SODIUM 40 MG: 40 TABLET, DELAYED RELEASE ORAL at 05:26

## 2019-01-16 RX ADMIN — Medication 10 ML: at 05:26

## 2019-01-16 RX ADMIN — DICYCLOMINE HYDROCHLORIDE 20 MG: 20 TABLET ORAL at 15:43

## 2019-01-16 RX ADMIN — ALBUTEROL SULFATE 2.5 MG: 2.5 SOLUTION RESPIRATORY (INHALATION) at 20:00

## 2019-01-16 RX ADMIN — PREDNISONE 40 MG: 20 TABLET ORAL at 08:33

## 2019-01-16 RX ADMIN — GUAIFENESIN AND CODEINE PHOSPHATE 5 ML: 10; 100 LIQUID ORAL at 16:55

## 2019-01-16 RX ADMIN — METHYLPREDNISOLONE SODIUM SUCCINATE 40 MG: 40 INJECTION, POWDER, FOR SOLUTION INTRAMUSCULAR; INTRAVENOUS at 11:02

## 2019-01-16 RX ADMIN — GUAIFENESIN AND CODEINE PHOSPHATE 5 ML: 10; 100 LIQUID ORAL at 11:04

## 2019-01-16 RX ADMIN — GUAIFENESIN 600 MG: 600 TABLET, EXTENDED RELEASE ORAL at 08:33

## 2019-01-16 RX ADMIN — ALBUTEROL SULFATE 2.5 MG: 2.5 SOLUTION RESPIRATORY (INHALATION) at 09:07

## 2019-01-16 RX ADMIN — Medication 10 ML: at 11:04

## 2019-01-16 RX ADMIN — OSELTAMIVIR PHOSPHATE 75 MG: 75 CAPSULE ORAL at 08:33

## 2019-01-16 RX ADMIN — ALBUTEROL SULFATE 2.5 MG: 2.5 SOLUTION RESPIRATORY (INHALATION) at 12:12

## 2019-01-16 RX ADMIN — LORATADINE 10 MG: 10 TABLET ORAL at 08:33

## 2019-01-16 RX ADMIN — OSELTAMIVIR PHOSPHATE 75 MG: 75 CAPSULE ORAL at 21:36

## 2019-01-16 RX ADMIN — ALBUTEROL SULFATE 2.5 MG: 2.5 SOLUTION RESPIRATORY (INHALATION) at 23:33

## 2019-01-16 RX ADMIN — DICYCLOMINE HYDROCHLORIDE 20 MG: 20 TABLET ORAL at 21:36

## 2019-01-16 RX ADMIN — VALSARTAN 160 MG: 80 TABLET ORAL at 08:33

## 2019-01-16 RX ADMIN — ALBUTEROL SULFATE 2.5 MG: 2.5 SOLUTION RESPIRATORY (INHALATION) at 15:24

## 2019-01-16 NOTE — PROGRESS NOTES
Pt sitting up in bed on room air with no distress. Pt does c/o of a \"slight headache\" prn tylenol po given at this time. Pt is a&o x4 and instructed to call for assistance. Call light in reach.

## 2019-01-16 NOTE — PROGRESS NOTES
Pt sitting up in bed no distress noted at this time. Call light in reach,  will monitor. Pt remains on RA at this time.

## 2019-01-16 NOTE — PROGRESS NOTES
Problem: Interdisciplinary Rounds Goal: Interdisciplinary Rounds Outcome: Progressing Towards Goal 
Interdisciplinary team rounds were held 1/16/2019 with the following team members:Care Management, Nursing, Physical Therapy, Physician and Clinical Coordinator and the patient. Plan of care discussed. See clinical pathway and/or care plan for interventions and desired outcomes.

## 2019-01-16 NOTE — PROGRESS NOTES
Pt resting in bed. Pt alert oriented times 3 at this time. Pt on RA At this time. Pt continues to complaints of cough. Pt has no acute distress noted at this time. Pt remains on flu precautions. Pt encouraged to call for assistance if needed call light in reach, door open will monitor.

## 2019-01-16 NOTE — PROGRESS NOTES
Progress Note Patient: Eli Tomas MRN: [id number]  SSN: [id number] YOB: 1955  Age: 66 y.o. Sex: female Admit Date: 1/13/2019 LOS: 3 days Subjective:  
F/U asthma exacerbation and influenza A. Admits to SOB during ambulation along with some wheezing that is worse since IV steroid stopped. Ambulating more. No chest pain. Current Facility-Administered Medications Medication Dose Route Frequency  albuterol (PROVENTIL VENTOLIN) nebulizer solution 2.5 mg  2.5 mg Nebulization Q4H RT  
 methylPREDNISolone (PF) (SOLU-MEDROL) injection 40 mg  40 mg IntraVENous Q24H  phenol throat spray (CHLORASEPTIC) 1 Spray  1 Spray Oral PRN  
 sodium chloride (NS) flush 5-40 mL  5-40 mL IntraVENous Q8H  
 sodium chloride (NS) flush 5-40 mL  5-40 mL IntraVENous PRN  
 acetaminophen (TYLENOL) tablet 650 mg  650 mg Oral Q6H PRN  
 diphenhydrAMINE (BENADRYL) capsule 25 mg  25 mg Oral Q6H PRN  
 ondansetron (ZOFRAN) injection 4 mg  4 mg IntraVENous Q6H PRN  
 docusate sodium (COLACE) capsule 100 mg  100 mg Oral BID PRN  
 enoxaparin (LOVENOX) injection 40 mg  40 mg SubCUTAneous Q24H  
 oseltamivir (TAMIFLU) capsule 75 mg  75 mg Oral Q12H  
 guaiFENesin ER (MUCINEX) tablet 600 mg  600 mg Oral Q12H  
 amLODIPine (NORVASC) tablet 10 mg  10 mg Oral DAILY  loratadine (CLARITIN) tablet 10 mg  10 mg Oral DAILY  pantoprazole (PROTONIX) tablet 40 mg  40 mg Oral ACB  valsartan (DIOVAN) tablet 160 mg  160 mg Oral DAILY  guaiFENesin-codeine (ROBITUSSIN AC) 100-10 mg/5 mL solution 5 mL  5 mL Oral Q4H PRN  
 dicyclomine (BENTYL) tablet 20 mg  20 mg Oral AC&HS Objective:  
 
Vitals:  
 01/15/19 2349 01/16/19 0351 01/16/19 0746 01/16/19 9962 BP:  130/69 138/80 Pulse:  61 81 Resp:  18 18 Temp:  97.9 °F (36.6 °C) 98.7 °F (37.1 °C) SpO2: 98% 100% 98% 97% Weight:      
Height:      
  
  
Intake and Output: 
Current Shift: 01/16 0701 - 01/16 1161 In: 240 [P.O.:240] Out: - Last three shifts: 01/14 1901 - 01/16 0700 In: 1440 [P.O.:1440] Out: - Physical Exam:  
General:  Alert, cooperative, no distress Eyes:  Conjunctivae/corneas clear. Ears:  Normal TMs and external ear canals both ears. Nose: Nares normal. Septum midline. Mouth/Throat: Lips, mucosa, and tongue normal. Teeth and gums normal.  
Neck:  no JVD. Back:   Symmetric, no curvature. ROM normal. No CVA tenderness. Lungs:   End expiratory wheezing at upper lung fields. Extrathoracic wheezing. Actively coughing. Heart:  Regular rate and rhythm, S1, S2 normal, no murmur, click, rub or gallop. Abdomen:   Soft, non-tender. Bowel sounds normal. No masses,  No organomegaly. Extremities: Extremities normal, atraumatic, no cyanosis or edema. Pulses: 2+ and symmetric all extremities. Skin: Skin color, texture, turgor normal. No rashes or lesions Lymph nodes: Cervical, supraclavicular, and axillary nodes normal.  
Neurologic: CNII-XII intact. Normal strength, sensation and reflexes throughout. Lab/Data Review: No results found for this or any previous visit (from the past 24 hour(s)). Assessment/ Plan:  
 
Principal Problem: 
  Asthma with acute exacerbation (1/13/2019) Active Problems: 
  Allergic rhinitis: Tiny Arriaza MD (4/24/2015) Hypercholesteremia (10/8/2015) Obesity (BMI 30-39.9) (11/14/2017) Essential hypertension (11/14/2017) Overview: Battles low potassium Influenza (1/13/2019) Hypokalemia (1/13/2019) Asthma (1/13/2019) Asthma exacerbation - Change to IV solumedrol due to worsening wheezing today. Albuterol every 4 hrs. Influenza A - Tamiflu 75mg BID for a total of 5 days. HTN - Amlodipine 10mg daily. Valsartan 160mg daily. Look to dc tomorrow if wheezing improves. Patient lives alone. DVT prophylaxis - Lovenox Signed By: Zackery Severs, DO   
 January 16, 2019

## 2019-01-16 NOTE — PROGRESS NOTES
Cough better at this time. Pt sitting up on side of bed eating dinner. No distress noted at this time. Pt on RA at this time. Call light in reach, will monitor.

## 2019-01-17 PROCEDURE — 74011250636 HC RX REV CODE- 250/636: Performed by: INTERNAL MEDICINE

## 2019-01-17 PROCEDURE — 94640 AIRWAY INHALATION TREATMENT: CPT

## 2019-01-17 PROCEDURE — 94760 N-INVAS EAR/PLS OXIMETRY 1: CPT

## 2019-01-17 PROCEDURE — 77010033678 HC OXYGEN DAILY

## 2019-01-17 PROCEDURE — 65270000029 HC RM PRIVATE

## 2019-01-17 PROCEDURE — 74011636637 HC RX REV CODE- 636/637: Performed by: FAMILY MEDICINE

## 2019-01-17 PROCEDURE — 74011250637 HC RX REV CODE- 250/637: Performed by: INTERNAL MEDICINE

## 2019-01-17 PROCEDURE — 74011000250 HC RX REV CODE- 250: Performed by: FAMILY MEDICINE

## 2019-01-17 PROCEDURE — 74011250637 HC RX REV CODE- 250/637: Performed by: FAMILY MEDICINE

## 2019-01-17 RX ORDER — ARFORMOTEROL TARTRATE 15 UG/2ML
15 SOLUTION RESPIRATORY (INHALATION)
Status: DISCONTINUED | OUTPATIENT
Start: 2019-01-17 | End: 2019-01-21 | Stop reason: HOSPADM

## 2019-01-17 RX ORDER — PREDNISONE 20 MG/1
40 TABLET ORAL
Status: DISCONTINUED | OUTPATIENT
Start: 2019-01-17 | End: 2019-01-18

## 2019-01-17 RX ORDER — BUDESONIDE 0.5 MG/2ML
500 INHALANT ORAL
Status: DISCONTINUED | OUTPATIENT
Start: 2019-01-17 | End: 2019-01-21 | Stop reason: HOSPADM

## 2019-01-17 RX ADMIN — GUAIFENESIN AND CODEINE PHOSPHATE 5 ML: 10; 100 LIQUID ORAL at 19:34

## 2019-01-17 RX ADMIN — ALBUTEROL SULFATE 2.5 MG: 2.5 SOLUTION RESPIRATORY (INHALATION) at 21:01

## 2019-01-17 RX ADMIN — GUAIFENESIN 600 MG: 600 TABLET, EXTENDED RELEASE ORAL at 09:17

## 2019-01-17 RX ADMIN — Medication 5 ML: at 21:20

## 2019-01-17 RX ADMIN — Medication 5 ML: at 14:43

## 2019-01-17 RX ADMIN — ARFORMOTEROL TARTRATE 15 MCG: 15 SOLUTION RESPIRATORY (INHALATION) at 10:51

## 2019-01-17 RX ADMIN — VALSARTAN 160 MG: 80 TABLET ORAL at 09:17

## 2019-01-17 RX ADMIN — ALBUTEROL SULFATE 2.5 MG: 2.5 SOLUTION RESPIRATORY (INHALATION) at 15:40

## 2019-01-17 RX ADMIN — PREDNISONE 40 MG: 20 TABLET ORAL at 11:57

## 2019-01-17 RX ADMIN — ALBUTEROL SULFATE 2.5 MG: 2.5 SOLUTION RESPIRATORY (INHALATION) at 10:51

## 2019-01-17 RX ADMIN — BUDESONIDE 500 MCG: 0.5 INHALANT RESPIRATORY (INHALATION) at 21:01

## 2019-01-17 RX ADMIN — Medication 5 ML: at 23:16

## 2019-01-17 RX ADMIN — DICYCLOMINE HYDROCHLORIDE 20 MG: 20 TABLET ORAL at 11:57

## 2019-01-17 RX ADMIN — DICYCLOMINE HYDROCHLORIDE 20 MG: 20 TABLET ORAL at 21:20

## 2019-01-17 RX ADMIN — ARFORMOTEROL TARTRATE 15 MCG: 15 SOLUTION RESPIRATORY (INHALATION) at 21:01

## 2019-01-17 RX ADMIN — LORATADINE 10 MG: 10 TABLET ORAL at 09:17

## 2019-01-17 RX ADMIN — Medication 5 ML: at 19:37

## 2019-01-17 RX ADMIN — DICYCLOMINE HYDROCHLORIDE 20 MG: 20 TABLET ORAL at 16:29

## 2019-01-17 RX ADMIN — OSELTAMIVIR PHOSPHATE 75 MG: 75 CAPSULE ORAL at 21:21

## 2019-01-17 RX ADMIN — GUAIFENESIN 600 MG: 600 TABLET, EXTENDED RELEASE ORAL at 21:21

## 2019-01-17 RX ADMIN — ALBUTEROL SULFATE 2.5 MG: 2.5 SOLUTION RESPIRATORY (INHALATION) at 08:49

## 2019-01-17 RX ADMIN — OSELTAMIVIR PHOSPHATE 75 MG: 75 CAPSULE ORAL at 09:17

## 2019-01-17 RX ADMIN — GUAIFENESIN AND CODEINE PHOSPHATE 5 ML: 10; 100 LIQUID ORAL at 03:45

## 2019-01-17 RX ADMIN — Medication 5 ML: at 06:08

## 2019-01-17 RX ADMIN — DICYCLOMINE HYDROCHLORIDE 20 MG: 20 TABLET ORAL at 06:09

## 2019-01-17 RX ADMIN — ENOXAPARIN SODIUM 40 MG: 40 INJECTION SUBCUTANEOUS at 14:42

## 2019-01-17 RX ADMIN — PANTOPRAZOLE SODIUM 40 MG: 40 TABLET, DELAYED RELEASE ORAL at 06:09

## 2019-01-17 RX ADMIN — Medication 5 ML: at 11:57

## 2019-01-17 RX ADMIN — AMLODIPINE BESYLATE 10 MG: 10 TABLET ORAL at 09:18

## 2019-01-17 RX ADMIN — BUDESONIDE 500 MCG: 0.5 INHALANT RESPIRATORY (INHALATION) at 10:51

## 2019-01-17 RX ADMIN — DIPHENHYDRAMINE HYDROCHLORIDE 25 MG: 25 CAPSULE ORAL at 23:24

## 2019-01-17 RX ADMIN — ALBUTEROL SULFATE 2.5 MG: 2.5 SOLUTION RESPIRATORY (INHALATION) at 03:29

## 2019-01-17 NOTE — PROGRESS NOTES
Pt resting in bed. Pt alert oriented times 3 at this time. Pt on RA At this time. Pt has no acute distress noted at this time. Pt remains on flu precautions. Pt encouraged to call for assistance if needed call light in reach, door open will monitor.

## 2019-01-17 NOTE — PROGRESS NOTES
Pt sitting up on side of bed. No distress noted this time. Call light in reach, door open will monitor.

## 2019-01-17 NOTE — PROGRESS NOTES
Progress Note Patient: Alex Gutierrez MRN: [id number]  SSN: [id number] YOB: 1955  Age: 66 y.o. Sex: female Admit Date: 1/13/2019 LOS: 4 days Subjective:  
F/U asthma exacerbation and influenza A. Admits to SOB during ambulation along with some wheezing that is persistent. Ambulating more. No chest pain. Current Facility-Administered Medications Medication Dose Route Frequency  . PHARMACY TO SUBSTITUTE PER PROTOCOL (Reordered from: QVAR 80 mcg/actuation inhaler)    Per Protocol  albuterol (PROVENTIL VENTOLIN) nebulizer solution 2.5 mg  2.5 mg Nebulization Q4H RT  
 methylPREDNISolone (PF) (SOLU-MEDROL) injection 40 mg  40 mg IntraVENous Q24H  phenol throat spray (CHLORASEPTIC) 1 Spray  1 Spray Oral PRN  
 sodium chloride (NS) flush 5-40 mL  5-40 mL IntraVENous Q8H  
 sodium chloride (NS) flush 5-40 mL  5-40 mL IntraVENous PRN  
 acetaminophen (TYLENOL) tablet 650 mg  650 mg Oral Q6H PRN  
 diphenhydrAMINE (BENADRYL) capsule 25 mg  25 mg Oral Q6H PRN  
 ondansetron (ZOFRAN) injection 4 mg  4 mg IntraVENous Q6H PRN  
 docusate sodium (COLACE) capsule 100 mg  100 mg Oral BID PRN  
 enoxaparin (LOVENOX) injection 40 mg  40 mg SubCUTAneous Q24H  
 oseltamivir (TAMIFLU) capsule 75 mg  75 mg Oral Q12H  
 guaiFENesin ER (MUCINEX) tablet 600 mg  600 mg Oral Q12H  
 amLODIPine (NORVASC) tablet 10 mg  10 mg Oral DAILY  loratadine (CLARITIN) tablet 10 mg  10 mg Oral DAILY  pantoprazole (PROTONIX) tablet 40 mg  40 mg Oral ACB  valsartan (DIOVAN) tablet 160 mg  160 mg Oral DAILY  guaiFENesin-codeine (ROBITUSSIN AC) 100-10 mg/5 mL solution 5 mL  5 mL Oral Q4H PRN  
 dicyclomine (BENTYL) tablet 20 mg  20 mg Oral AC&HS Objective:  
 
Vitals:  
 01/16/19 2344 01/17/19 2222 01/17/19 9532 01/17/19 5730 BP: 139/87 125/65 148/88 Pulse: 84 63 74 Resp: 18 18 18 Temp: 97.4 °F (36.3 °C) 97.2 °F (36.2 °C) 97.6 °F (36.4 °C) SpO2: 97% 100% 97% 96% Weight:      
Height:      
  
  
Intake and Output: 
Current Shift: No intake/output data recorded. Last three shifts: 01/15 1901 - 01/17 0700 In: 0858 [P.O.:1285; I.V.:10] Out: - Physical Exam:  
General:  Alert, cooperative, tearful on exam   
Eyes:  Conjunctivae/corneas clear. Ears:  Normal TMs and external ear canals both ears. Nose: Nares normal. Septum midline. Mouth/Throat: Lips, mucosa, and tongue normal.   
Neck:  no JVD. Back:   Symmetric, no curvature. ROM normal. No CVA tenderness. Lungs:   End expiratory wheezing at upper lung fields. Extrathoracic wheezing. Actively coughing. Heart:  Regular rate and rhythm, S1, S2 normal  
Abdomen:   Soft, non-tender. Bowel sounds normal. No masses,  No organomegaly. Extremities: Extremities normal, atraumatic, no cyanosis or edema. Pulses: 2+ and symmetric all extremities. Skin: Skin color, texture, turgor normal. No rashes or lesions Lymph nodes: Cervical, supraclavicular, and axillary nodes normal.  
Neurologic: CNII-XII intact. Normal strength, sensation and reflexes throughout. Lab/Data Review: No results found for this or any previous visit (from the past 24 hour(s)). Assessment/ Plan:  
 
Principal Problem: 
  Asthma with acute exacerbation (1/13/2019) Active Problems: 
  Allergic rhinitis: Arsalan Sandoval MD (4/24/2015) Hypercholesteremia (10/8/2015) Obesity (BMI 30-39.9) (11/14/2017) Essential hypertension (11/14/2017) Overview: Battles low potassium Influenza (1/13/2019) Hypokalemia (1/13/2019) Asthma (1/13/2019) Asthma exacerbation - Change IV solumedrol to PO prednisone. Albuterol every 4 hrs. Continue Claritin and will add inhaled steroid since she takes Qvar at home. Patient is not on a long acting beta agonist so would benefit from at discharge. Will start Brovanva BID. Influenza A - Tamiflu 75mg BID for a total of 5 days, last dose tonight. HTN - Amlodipine 10mg daily. Valsartan 160mg daily. Look to dc tomorrow if wheezing improves. Patient lives alone. DVT prophylaxis - Lovenox Signed By: Warren Bee DO   
 January 17, 2019

## 2019-01-17 NOTE — PROGRESS NOTES
Bedside report received from Katherine Gilbert, [facility]. Assessment completed. Pt is lying in bed at this time. Pt A&O x 4. Respirations even and unlabored. No s/sx of acute pain or distress. Bed in low, locked position. Call light within reach. Pt instructed to call for assistance. Will monitor.

## 2019-01-17 NOTE — PROGRESS NOTES
Bedside report received from [facility], [address] [place] [address]. Assessment completed. Pt is lying in bed at this time. Pt A&O x 4. Respirations even and unlabored. No s/sx of acute pain or distress. Bed in low, locked position. Call light within reach. Pt instructed to call for assistance. Will monitor.

## 2019-01-17 NOTE — PROGRESS NOTES
Problem: Interdisciplinary Rounds Goal: Interdisciplinary Rounds Interdisciplinary team rounds were held 1/17/2019 with the following team members:Care Management, Physical Therapy, Physician and Clinical Coordinator and the patient. Plan of care discussed. See clinical pathway and/or care plan for interventions and desired outcomes.

## 2019-01-18 PROCEDURE — 94760 N-INVAS EAR/PLS OXIMETRY 1: CPT

## 2019-01-18 PROCEDURE — 74011000250 HC RX REV CODE- 250: Performed by: FAMILY MEDICINE

## 2019-01-18 PROCEDURE — 94761 N-INVAS EAR/PLS OXIMETRY MLT: CPT

## 2019-01-18 PROCEDURE — 74011250637 HC RX REV CODE- 250/637: Performed by: INTERNAL MEDICINE

## 2019-01-18 PROCEDURE — 65270000029 HC RM PRIVATE

## 2019-01-18 PROCEDURE — 74011250637 HC RX REV CODE- 250/637: Performed by: FAMILY MEDICINE

## 2019-01-18 PROCEDURE — 74011636637 HC RX REV CODE- 636/637: Performed by: FAMILY MEDICINE

## 2019-01-18 PROCEDURE — 77030020263 HC SOL INJ SOD CL0.9% LFCR 1000ML

## 2019-01-18 PROCEDURE — 94640 AIRWAY INHALATION TREATMENT: CPT

## 2019-01-18 PROCEDURE — 74011250636 HC RX REV CODE- 250/636: Performed by: INTERNAL MEDICINE

## 2019-01-18 RX ORDER — PREDNISONE 20 MG/1
80 TABLET ORAL
Status: DISCONTINUED | OUTPATIENT
Start: 2019-01-18 | End: 2019-01-20

## 2019-01-18 RX ORDER — HYDROCODONE BITARTRATE AND HOMATROPINE METHYLBROMIDE 1.5; 5 MG/5ML; MG/5ML
5 SYRUP ORAL
Status: DISCONTINUED | OUTPATIENT
Start: 2019-01-18 | End: 2019-01-21 | Stop reason: HOSPADM

## 2019-01-18 RX ADMIN — ALBUTEROL SULFATE 2.5 MG: 2.5 SOLUTION RESPIRATORY (INHALATION) at 23:44

## 2019-01-18 RX ADMIN — Medication 10 ML: at 21:31

## 2019-01-18 RX ADMIN — DICYCLOMINE HYDROCHLORIDE 20 MG: 20 TABLET ORAL at 16:32

## 2019-01-18 RX ADMIN — ARFORMOTEROL TARTRATE 15 MCG: 15 SOLUTION RESPIRATORY (INHALATION) at 20:36

## 2019-01-18 RX ADMIN — HYDROCODONE BITARTRATE AND HOMATROPINE METHYLBROMIDE 5 ML: 5; 1.5 SOLUTION ORAL at 16:32

## 2019-01-18 RX ADMIN — ARFORMOTEROL TARTRATE 15 MCG: 15 SOLUTION RESPIRATORY (INHALATION) at 08:54

## 2019-01-18 RX ADMIN — Medication 10 ML: at 14:40

## 2019-01-18 RX ADMIN — GUAIFENESIN 600 MG: 600 TABLET, EXTENDED RELEASE ORAL at 08:49

## 2019-01-18 RX ADMIN — HYDROCODONE BITARTRATE AND HOMATROPINE METHYLBROMIDE 5 ML: 5; 1.5 SOLUTION ORAL at 11:59

## 2019-01-18 RX ADMIN — ALBUTEROL SULFATE 2.5 MG: 2.5 SOLUTION RESPIRATORY (INHALATION) at 12:23

## 2019-01-18 RX ADMIN — DICYCLOMINE HYDROCHLORIDE 20 MG: 20 TABLET ORAL at 05:23

## 2019-01-18 RX ADMIN — ALBUTEROL SULFATE 2.5 MG: 2.5 SOLUTION RESPIRATORY (INHALATION) at 16:01

## 2019-01-18 RX ADMIN — VALSARTAN 160 MG: 80 TABLET ORAL at 08:49

## 2019-01-18 RX ADMIN — LORATADINE 10 MG: 10 TABLET ORAL at 08:49

## 2019-01-18 RX ADMIN — DICYCLOMINE HYDROCHLORIDE 20 MG: 20 TABLET ORAL at 20:16

## 2019-01-18 RX ADMIN — Medication 5 ML: at 05:23

## 2019-01-18 RX ADMIN — DIPHENHYDRAMINE HYDROCHLORIDE 25 MG: 25 CAPSULE ORAL at 22:43

## 2019-01-18 RX ADMIN — BUDESONIDE 500 MCG: 0.5 INHALANT RESPIRATORY (INHALATION) at 20:36

## 2019-01-18 RX ADMIN — GUAIFENESIN 600 MG: 600 TABLET, EXTENDED RELEASE ORAL at 20:15

## 2019-01-18 RX ADMIN — ALBUTEROL SULFATE 2.5 MG: 2.5 SOLUTION RESPIRATORY (INHALATION) at 20:36

## 2019-01-18 RX ADMIN — HYDROCODONE BITARTRATE AND HOMATROPINE METHYLBROMIDE 5 ML: 5; 1.5 SOLUTION ORAL at 20:16

## 2019-01-18 RX ADMIN — PREDNISONE 80 MG: 20 TABLET ORAL at 11:59

## 2019-01-18 RX ADMIN — BUDESONIDE 500 MCG: 0.5 INHALANT RESPIRATORY (INHALATION) at 08:54

## 2019-01-18 RX ADMIN — ENOXAPARIN SODIUM 40 MG: 40 INJECTION SUBCUTANEOUS at 16:32

## 2019-01-18 RX ADMIN — ALBUTEROL SULFATE 2.5 MG: 2.5 SOLUTION RESPIRATORY (INHALATION) at 08:54

## 2019-01-18 RX ADMIN — PANTOPRAZOLE SODIUM 40 MG: 40 TABLET, DELAYED RELEASE ORAL at 05:23

## 2019-01-18 RX ADMIN — AMLODIPINE BESYLATE 10 MG: 10 TABLET ORAL at 08:49

## 2019-01-18 RX ADMIN — DICYCLOMINE HYDROCHLORIDE 20 MG: 20 TABLET ORAL at 11:59

## 2019-01-18 NOTE — PROGRESS NOTES
Progress Note Patient: Nerissa Hill MRN: [id number]  SSN: [id number] YOB: 1955  Age: 66 y.o. Sex: female Admit Date: 1/13/2019 LOS: 5 days Subjective:  
F/U asthma exacerbation and influenza A. Admits to SOB during ambulation along with some wheezing that is worse compared to yesterday since reducing steroid dose. Current Facility-Administered Medications Medication Dose Route Frequency  predniSONE (DELTASONE) tablet 40 mg  40 mg Oral DAILY WITH LUNCH  
 arformoterol (BROVANA) neb solution 15 mcg  15 mcg Nebulization BID RT  
 budesonide (PULMICORT) 500 mcg/2 ml nebulizer suspension  500 mcg Nebulization BID RT  
 albuterol (PROVENTIL VENTOLIN) nebulizer solution 2.5 mg  2.5 mg Nebulization Q4H RT  
 phenol throat spray (CHLORASEPTIC) 1 Spray  1 Spray Oral PRN  
 sodium chloride (NS) flush 5-40 mL  5-40 mL IntraVENous Q8H  
 sodium chloride (NS) flush 5-40 mL  5-40 mL IntraVENous PRN  
 acetaminophen (TYLENOL) tablet 650 mg  650 mg Oral Q6H PRN  
 diphenhydrAMINE (BENADRYL) capsule 25 mg  25 mg Oral Q6H PRN  
 ondansetron (ZOFRAN) injection 4 mg  4 mg IntraVENous Q6H PRN  
 docusate sodium (COLACE) capsule 100 mg  100 mg Oral BID PRN  
 enoxaparin (LOVENOX) injection 40 mg  40 mg SubCUTAneous Q24H  
 guaiFENesin ER (MUCINEX) tablet 600 mg  600 mg Oral Q12H  
 amLODIPine (NORVASC) tablet 10 mg  10 mg Oral DAILY  loratadine (CLARITIN) tablet 10 mg  10 mg Oral DAILY  pantoprazole (PROTONIX) tablet 40 mg  40 mg Oral ACB  valsartan (DIOVAN) tablet 160 mg  160 mg Oral DAILY  guaiFENesin-codeine (ROBITUSSIN AC) 100-10 mg/5 mL solution 5 mL  5 mL Oral Q4H PRN  
 dicyclomine (BENTYL) tablet 20 mg  20 mg Oral AC&HS Objective:  
 
Vitals:  
 01/17/19 2353 01/18/19 8235 01/18/19 4490 01/18/19 0792 BP: 132/72 143/77 128/87 Pulse: 77 68 69 Resp: 20 18 19 Temp: 97.8 °F (36.6 °C) 98.4 °F (36.9 °C) 98.5 °F (36.9 °C) SpO2: 93% 96% 100% 98% Weight:      
Height:      
  
  
Intake and Output: 
Current Shift: 01/18 0701 - 01/18 1900 In: 120 [P.O.:120] Out: - Last three shifts: 01/16 1901 - 01/18 0700 In: 9498 [P.O.:1255; I.V.:20] Out: - Physical Exam:  
General:  Alert, cooperative, tearful on exam   
Eyes:  Conjunctivae/corneas clear. Ears:  Normal TMs and external ear canals both ears. Nose: Nares normal. Septum midline. Mouth/Throat: Lips, mucosa, and tongue normal.   
Neck:  no JVD. Back:   Symmetric, no curvature. ROM normal. No CVA tenderness. Lungs: Inspiratory wheezing at lower bases bilaterally. Extrathoracic wheezing. Actively coughing. Heart:  Regular rate and rhythm, S1, S2 normal  
Abdomen:   Soft, non-tender. Bowel sounds normal. No masses,  No organomegaly. Extremities: Extremities normal, atraumatic, no cyanosis or edema. Pulses: 2+ and symmetric all extremities. Skin: Skin color, texture, turgor normal. No rashes or lesions Lymph nodes: Cervical, supraclavicular, and axillary nodes normal.  
Neurologic: CNII-XII intact. Normal strength, sensation and reflexes throughout. Lab/Data Review: No results found for this or any previous visit (from the past 24 hour(s)). Assessment/ Plan:  
 
Principal Problem: 
  Asthma with acute exacerbation (1/13/2019) Active Problems: 
  Allergic rhinitis: Naima Arrington MD (4/24/2015) Hypercholesteremia (10/8/2015) Obesity (BMI 30-39.9) (11/14/2017) Essential hypertension (11/14/2017) Overview: Battles low potassium Influenza (1/13/2019) Hypokalemia (1/13/2019) Asthma (1/13/2019) Asthma exacerbation - Change IV solumedrol to PO prednisone 80mg daily. Albuterol every 4 hrs. Continue Claritin and inhaled steroid since she takes Qvar at home. Patient is not on a long acting beta agonist so would benefit from at discharge. Pulmicort BID. Brovana BID. Change her robitussin to Hycodan. Influenza A - Tamiflu 75mg BID for a total of 5 days with last treatment on 1/17/19 HTN - Amlodipine 10mg daily. Valsartan 160mg daily. Look to dc tomorrow if wheezing improves. Patient lives alone. DVT prophylaxis - Lovenox Signed By: Ava Templeton DO   
 January 18, 2019

## 2019-01-18 NOTE — PROGRESS NOTES
Pt resting in bed. Pt alert oriented times 3 at this time. Pt on RA At this time. Pt has no acute distress noted at this time. Pt remains on flu precautions. Pt encouraged to call for assistance if needed call light in reach, door open will monitor

## 2019-01-19 ENCOUNTER — APPOINTMENT (OUTPATIENT)
Dept: GENERAL RADIOLOGY | Age: 64
DRG: 203 | End: 2019-01-19
Attending: FAMILY MEDICINE
Payer: MEDICARE

## 2019-01-19 LAB
BASOPHILS # BLD: 0 K/UL (ref 0–0.2)
BASOPHILS NFR BLD: 0 % (ref 0–2)
DIFFERENTIAL METHOD BLD: ABNORMAL
EOSINOPHIL # BLD: 0 K/UL (ref 0–0.8)
EOSINOPHIL NFR BLD: 0 % (ref 0.5–7.8)
ERYTHROCYTE [DISTWIDTH] IN BLOOD BY AUTOMATED COUNT: 14.1 % (ref 11.9–14.6)
HCT VFR BLD AUTO: 37.9 % (ref 35.8–46.3)
HGB BLD-MCNC: 11.6 G/DL (ref 11.7–15.4)
IMM GRANULOCYTES # BLD AUTO: 0.1 K/UL (ref 0–0.5)
IMM GRANULOCYTES NFR BLD AUTO: 1 % (ref 0–5)
LYMPHOCYTES # BLD: 5 K/UL (ref 0.5–4.6)
LYMPHOCYTES NFR BLD: 37 % (ref 13–44)
MCH RBC QN AUTO: 28 PG (ref 26.1–32.9)
MCHC RBC AUTO-ENTMCNC: 30.6 G/DL (ref 31.4–35)
MCV RBC AUTO: 91.5 FL (ref 79.6–97.8)
MONOCYTES # BLD: 0.5 K/UL (ref 0.1–1.3)
MONOCYTES NFR BLD: 4 % (ref 4–12)
NEUTS SEG # BLD: 7.9 K/UL (ref 1.7–8.2)
NEUTS SEG NFR BLD: 59 % (ref 43–78)
NRBC # BLD: 0 K/UL (ref 0–0.2)
PLATELET # BLD AUTO: 238 K/UL (ref 150–450)
PMV BLD AUTO: 10.8 FL (ref 9.4–12.3)
PROCALCITONIN SERPL-MCNC: <0.1 NG/ML
RBC # BLD AUTO: 4.14 M/UL (ref 4.05–5.2)
WBC # BLD AUTO: 13.5 K/UL (ref 4.3–11.1)

## 2019-01-19 PROCEDURE — 94760 N-INVAS EAR/PLS OXIMETRY 1: CPT

## 2019-01-19 PROCEDURE — 71046 X-RAY EXAM CHEST 2 VIEWS: CPT

## 2019-01-19 PROCEDURE — 84145 PROCALCITONIN (PCT): CPT

## 2019-01-19 PROCEDURE — 94640 AIRWAY INHALATION TREATMENT: CPT

## 2019-01-19 PROCEDURE — 74011250636 HC RX REV CODE- 250/636: Performed by: INTERNAL MEDICINE

## 2019-01-19 PROCEDURE — 36415 COLL VENOUS BLD VENIPUNCTURE: CPT

## 2019-01-19 PROCEDURE — 94761 N-INVAS EAR/PLS OXIMETRY MLT: CPT

## 2019-01-19 PROCEDURE — 74011636637 HC RX REV CODE- 636/637: Performed by: FAMILY MEDICINE

## 2019-01-19 PROCEDURE — 65270000029 HC RM PRIVATE

## 2019-01-19 PROCEDURE — 85025 COMPLETE CBC W/AUTO DIFF WBC: CPT

## 2019-01-19 PROCEDURE — 74011000250 HC RX REV CODE- 250: Performed by: FAMILY MEDICINE

## 2019-01-19 PROCEDURE — 74011250637 HC RX REV CODE- 250/637: Performed by: INTERNAL MEDICINE

## 2019-01-19 PROCEDURE — 74011250637 HC RX REV CODE- 250/637: Performed by: FAMILY MEDICINE

## 2019-01-19 RX ORDER — SODIUM CHLORIDE FOR INHALATION 3 %
4 VIAL, NEBULIZER (ML) INHALATION EVERY 12 HOURS
Status: DISCONTINUED | OUTPATIENT
Start: 2019-01-19 | End: 2019-01-21 | Stop reason: HOSPADM

## 2019-01-19 RX ADMIN — HYDROCODONE BITARTRATE AND HOMATROPINE METHYLBROMIDE 5 ML: 5; 1.5 SOLUTION ORAL at 14:07

## 2019-01-19 RX ADMIN — SODIUM CHLORIDE 30 MG/ML INHALATION SOLUTION 4 ML: 30 SOLUTION INHALANT at 12:06

## 2019-01-19 RX ADMIN — ALBUTEROL SULFATE 2.5 MG: 2.5 SOLUTION RESPIRATORY (INHALATION) at 03:57

## 2019-01-19 RX ADMIN — DIPHENHYDRAMINE HYDROCHLORIDE 25 MG: 25 CAPSULE ORAL at 23:37

## 2019-01-19 RX ADMIN — PANTOPRAZOLE SODIUM 40 MG: 40 TABLET, DELAYED RELEASE ORAL at 05:13

## 2019-01-19 RX ADMIN — GUAIFENESIN 600 MG: 600 TABLET, EXTENDED RELEASE ORAL at 09:54

## 2019-01-19 RX ADMIN — HYDROCODONE BITARTRATE AND HOMATROPINE METHYLBROMIDE 5 ML: 5; 1.5 SOLUTION ORAL at 21:27

## 2019-01-19 RX ADMIN — PREDNISONE 80 MG: 20 TABLET ORAL at 12:23

## 2019-01-19 RX ADMIN — VALSARTAN 160 MG: 80 TABLET ORAL at 09:54

## 2019-01-19 RX ADMIN — BUDESONIDE 500 MCG: 0.5 INHALANT RESPIRATORY (INHALATION) at 07:53

## 2019-01-19 RX ADMIN — ARFORMOTEROL TARTRATE 15 MCG: 15 SOLUTION RESPIRATORY (INHALATION) at 07:53

## 2019-01-19 RX ADMIN — SODIUM CHLORIDE 30 MG/ML INHALATION SOLUTION 4 ML: 30 SOLUTION INHALANT at 23:14

## 2019-01-19 RX ADMIN — DICYCLOMINE HYDROCHLORIDE 20 MG: 20 TABLET ORAL at 16:08

## 2019-01-19 RX ADMIN — DICYCLOMINE HYDROCHLORIDE 20 MG: 20 TABLET ORAL at 12:23

## 2019-01-19 RX ADMIN — ALBUTEROL SULFATE 2.5 MG: 2.5 SOLUTION RESPIRATORY (INHALATION) at 20:25

## 2019-01-19 RX ADMIN — ALBUTEROL SULFATE 2.5 MG: 2.5 SOLUTION RESPIRATORY (INHALATION) at 15:49

## 2019-01-19 RX ADMIN — DICYCLOMINE HYDROCHLORIDE 20 MG: 20 TABLET ORAL at 05:14

## 2019-01-19 RX ADMIN — ALBUTEROL SULFATE 2.5 MG: 2.5 SOLUTION RESPIRATORY (INHALATION) at 07:53

## 2019-01-19 RX ADMIN — ALBUTEROL SULFATE 2.5 MG: 2.5 SOLUTION RESPIRATORY (INHALATION) at 12:06

## 2019-01-19 RX ADMIN — BUDESONIDE 500 MCG: 0.5 INHALANT RESPIRATORY (INHALATION) at 20:25

## 2019-01-19 RX ADMIN — DICYCLOMINE HYDROCHLORIDE 20 MG: 20 TABLET ORAL at 21:26

## 2019-01-19 RX ADMIN — ALBUTEROL SULFATE 2.5 MG: 2.5 SOLUTION RESPIRATORY (INHALATION) at 23:14

## 2019-01-19 RX ADMIN — AMLODIPINE BESYLATE 10 MG: 10 TABLET ORAL at 09:54

## 2019-01-19 RX ADMIN — LORATADINE 10 MG: 10 TABLET ORAL at 09:54

## 2019-01-19 RX ADMIN — Medication 5 ML: at 05:12

## 2019-01-19 RX ADMIN — ENOXAPARIN SODIUM 40 MG: 40 INJECTION SUBCUTANEOUS at 16:08

## 2019-01-19 RX ADMIN — ARFORMOTEROL TARTRATE 15 MCG: 15 SOLUTION RESPIRATORY (INHALATION) at 20:25

## 2019-01-19 RX ADMIN — GUAIFENESIN 600 MG: 600 TABLET, EXTENDED RELEASE ORAL at 21:26

## 2019-01-19 NOTE — PROGRESS NOTES
Progress Note Patient: Becki Tucker MRN: [id number]  SSN: [id number] YOB: 1955  Age: 66 y.o. Sex: female Admit Date: 1/13/2019 LOS: 6 days Subjective:  
F/U asthma exacerbation and influenza A. Admits to SOB during ambulation along with some wheezing that is worse compared to yesterday. Cough is improving and now have more productive cough. States more SOB during ambulation today. Current Facility-Administered Medications Medication Dose Route Frequency  sodium chloride 3% hypertonic nebulizer soln  4 mL Nebulization Q12H  
 HYDROcodone-homatropine (HYCODAN) 5-1.5 mg/5 mL (5 mL) syrup 5 mL  5 mL Oral Q4H PRN  predniSONE (DELTASONE) tablet 80 mg  80 mg Oral DAILY WITH LUNCH  
 magic mouthwash (OMID) oral suspension 5 mL  5 mL Oral Q4H PRN  
 arformoterol (BROVANA) neb solution 15 mcg  15 mcg Nebulization BID RT  
 budesonide (PULMICORT) 500 mcg/2 ml nebulizer suspension  500 mcg Nebulization BID RT  
 albuterol (PROVENTIL VENTOLIN) nebulizer solution 2.5 mg  2.5 mg Nebulization Q4H RT  
 phenol throat spray (CHLORASEPTIC) 1 Spray  1 Spray Oral PRN  
 sodium chloride (NS) flush 5-40 mL  5-40 mL IntraVENous Q8H  
 sodium chloride (NS) flush 5-40 mL  5-40 mL IntraVENous PRN  
 acetaminophen (TYLENOL) tablet 650 mg  650 mg Oral Q6H PRN  
 diphenhydrAMINE (BENADRYL) capsule 25 mg  25 mg Oral Q6H PRN  
 ondansetron (ZOFRAN) injection 4 mg  4 mg IntraVENous Q6H PRN  
 docusate sodium (COLACE) capsule 100 mg  100 mg Oral BID PRN  
 enoxaparin (LOVENOX) injection 40 mg  40 mg SubCUTAneous Q24H  
 guaiFENesin ER (MUCINEX) tablet 600 mg  600 mg Oral Q12H  
 amLODIPine (NORVASC) tablet 10 mg  10 mg Oral DAILY  loratadine (CLARITIN) tablet 10 mg  10 mg Oral DAILY  pantoprazole (PROTONIX) tablet 40 mg  40 mg Oral ACB  valsartan (DIOVAN) tablet 160 mg  160 mg Oral DAILY  dicyclomine (BENTYL) tablet 20 mg  20 mg Oral AC&HS Objective: Vitals:  
 01/19/19 5638 01/19/19 6806 01/19/19 0729 01/19/19 7581 BP:   134/81 Pulse: 78  61 Resp:   18 Temp:   98.6 °F (37 °C) SpO2:  97% 98% 96% Weight:      
Height:      
  
  
Intake and Output: 
Current Shift: No intake/output data recorded. Last three shifts: 01/17 1901 - 01/19 0700 In: 80 [P.O.:570; I.V.:10] Out: - Physical Exam:  
General:  Alert, cooperative, More work of breathing noted today. Eyes:  Conjunctivae/corneas clear. Ears:  Normal TMs and external ear canals both ears. Nose: Nares normal. Septum midline. Mouth/Throat: Lips, mucosa, and tongue normal.   
Neck:  no JVD. Back:   Symmetric, no curvature. ROM normal. No CVA tenderness. Lungs: Inspiratory wheezing and crackles at LLL. Actively coughing. Heart:  Regular rate and rhythm, S1, S2 normal  
Abdomen:   Soft, non-tender. Bowel sounds normal.   
Extremities: Extremities normal, atraumatic, no cyanosis or edema. Pulses: 2+ and symmetric all extremities. Skin: Skin color, texture, turgor normal. No rashes or lesions Lymph nodes: Cervical, supraclavicular, and axillary nodes normal.  
Neurologic: CNII-XII intact. Normal strength, sensation and reflexes throughout. Lab/Data Review: No results found for this or any previous visit (from the past 24 hour(s)). Assessment/ Plan:  
 
Principal Problem: 
  Asthma with acute exacerbation (1/13/2019) Active Problems: 
  Allergic rhinitis: Jean Jorge MD (4/24/2015) Hypercholesteremia (10/8/2015) Obesity (BMI 30-39.9) (11/14/2017) Essential hypertension (11/14/2017) Overview: Battles low potassium Influenza (1/13/2019) Hypokalemia (1/13/2019) Asthma (1/13/2019) Asthma exacerbation - PO prednisone 80mg daily. Albuterol every 4 hrs. Continue Claritin and inhaled steroid since she takes Qvar at home.  Patient is not on a long acting beta agonist so would benefit from at discharge. Pulmicort BID. Brovana BID. Hycodan. Due to increased productive cough along with influenza, will order chest x ray to see if there are signs of possible pneumonia. Order respiratory culture. RT to do 6 minute walk to ensure no desaturations during ambulation. Add 3% NS nebulizer BID to help with secretions. Influenza A - Tamiflu 75mg BID for a total of 5 days with last treatment on 1/17/19 HTN - Amlodipine 10mg daily. Valsartan 160mg daily. Look to dc tomorrow if wheezing improves. Patient lives alone. DVT prophylaxis - Lovenox Signed By: Mich Davidson DO   
 January 19, 2019

## 2019-01-19 NOTE — PROGRESS NOTES
Patient requested benadryl so she could breathe easier and sleep. Medicated with 25 mg po as ordered. Resting quietly, head of bed elevated, no indication of pain.

## 2019-01-19 NOTE — PROGRESS NOTES
Pt O2 SAT checked on Room Air at rest SAT-99%/ HR-90. Pt ambulated on Room Air SAT-97%/ HR-105. Pt back to her room on Room Air SAT-98%/ HR-100.

## 2019-01-19 NOTE — PROGRESS NOTES
01/19/19 0966 Oxygen Therapy O2 Sat (%) 96 % Pulse via Oximetry 85 beats per minute O2 Device Room air

## 2019-01-19 NOTE — PROGRESS NOTES
Patient resting quietly in bed, respirations easy and non labored at this time. Will continue to monitor.

## 2019-01-19 NOTE — PROGRESS NOTES
Pt sitting on edge of bed. Pt is stable. Pt is axo. Wheezing heard upon auscultation. Pt denies pain or discomfort. Bed is in low and locked position. Call light is within reach. Pt instructed to ask for assistance if needed. Will continue to monitor.

## 2019-01-19 NOTE — PROGRESS NOTES
Patient awake, states she slept pretty well last night, tolerated c-pap without difficulty. Resting quietly in bed.

## 2019-01-20 PROCEDURE — 74011250637 HC RX REV CODE- 250/637: Performed by: FAMILY MEDICINE

## 2019-01-20 PROCEDURE — 65270000029 HC RM PRIVATE

## 2019-01-20 PROCEDURE — 74011000250 HC RX REV CODE- 250: Performed by: FAMILY MEDICINE

## 2019-01-20 PROCEDURE — 94760 N-INVAS EAR/PLS OXIMETRY 1: CPT

## 2019-01-20 PROCEDURE — 94640 AIRWAY INHALATION TREATMENT: CPT

## 2019-01-20 PROCEDURE — 94660 CPAP INITIATION&MGMT: CPT

## 2019-01-20 PROCEDURE — 74011636637 HC RX REV CODE- 636/637: Performed by: FAMILY MEDICINE

## 2019-01-20 PROCEDURE — 74011250637 HC RX REV CODE- 250/637: Performed by: INTERNAL MEDICINE

## 2019-01-20 PROCEDURE — 74011250636 HC RX REV CODE- 250/636: Performed by: INTERNAL MEDICINE

## 2019-01-20 RX ORDER — PREDNISONE 20 MG/1
60 TABLET ORAL
Status: DISCONTINUED | OUTPATIENT
Start: 2019-01-20 | End: 2019-01-21 | Stop reason: HOSPADM

## 2019-01-20 RX ADMIN — AMLODIPINE BESYLATE 10 MG: 10 TABLET ORAL at 08:41

## 2019-01-20 RX ADMIN — ALBUTEROL SULFATE 2.5 MG: 2.5 SOLUTION RESPIRATORY (INHALATION) at 08:07

## 2019-01-20 RX ADMIN — ENOXAPARIN SODIUM 40 MG: 40 INJECTION SUBCUTANEOUS at 15:21

## 2019-01-20 RX ADMIN — GUAIFENESIN 600 MG: 600 TABLET, EXTENDED RELEASE ORAL at 22:08

## 2019-01-20 RX ADMIN — HYDROCODONE BITARTRATE AND HOMATROPINE METHYLBROMIDE 5 ML: 5; 1.5 SOLUTION ORAL at 15:23

## 2019-01-20 RX ADMIN — GUAIFENESIN 600 MG: 600 TABLET, EXTENDED RELEASE ORAL at 08:41

## 2019-01-20 RX ADMIN — ALBUTEROL SULFATE 2.5 MG: 2.5 SOLUTION RESPIRATORY (INHALATION) at 20:23

## 2019-01-20 RX ADMIN — VALSARTAN 160 MG: 80 TABLET ORAL at 08:41

## 2019-01-20 RX ADMIN — BUDESONIDE 500 MCG: 0.5 INHALANT RESPIRATORY (INHALATION) at 08:08

## 2019-01-20 RX ADMIN — HYDROCODONE BITARTRATE AND HOMATROPINE METHYLBROMIDE 5 ML: 5; 1.5 SOLUTION ORAL at 05:46

## 2019-01-20 RX ADMIN — SODIUM CHLORIDE 30 MG/ML INHALATION SOLUTION 4 ML: 30 SOLUTION INHALANT at 11:51

## 2019-01-20 RX ADMIN — SODIUM CHLORIDE 30 MG/ML INHALATION SOLUTION 4 ML: 30 SOLUTION INHALANT at 23:13

## 2019-01-20 RX ADMIN — ARFORMOTEROL TARTRATE 15 MCG: 15 SOLUTION RESPIRATORY (INHALATION) at 08:08

## 2019-01-20 RX ADMIN — DICYCLOMINE HYDROCHLORIDE 20 MG: 20 TABLET ORAL at 05:43

## 2019-01-20 RX ADMIN — PREDNISONE 60 MG: 20 TABLET ORAL at 13:07

## 2019-01-20 RX ADMIN — Medication 5 ML: at 13:09

## 2019-01-20 RX ADMIN — ALBUTEROL SULFATE 2.5 MG: 2.5 SOLUTION RESPIRATORY (INHALATION) at 23:13

## 2019-01-20 RX ADMIN — ARFORMOTEROL TARTRATE 15 MCG: 15 SOLUTION RESPIRATORY (INHALATION) at 20:23

## 2019-01-20 RX ADMIN — DICYCLOMINE HYDROCHLORIDE 20 MG: 20 TABLET ORAL at 13:08

## 2019-01-20 RX ADMIN — LORATADINE 10 MG: 10 TABLET ORAL at 08:41

## 2019-01-20 RX ADMIN — PANTOPRAZOLE SODIUM 40 MG: 40 TABLET, DELAYED RELEASE ORAL at 05:43

## 2019-01-20 RX ADMIN — ALBUTEROL SULFATE 2.5 MG: 2.5 SOLUTION RESPIRATORY (INHALATION) at 16:35

## 2019-01-20 RX ADMIN — DICYCLOMINE HYDROCHLORIDE 20 MG: 20 TABLET ORAL at 16:23

## 2019-01-20 RX ADMIN — ALBUTEROL SULFATE 2.5 MG: 2.5 SOLUTION RESPIRATORY (INHALATION) at 11:50

## 2019-01-20 RX ADMIN — ALBUTEROL SULFATE 2.5 MG: 2.5 SOLUTION RESPIRATORY (INHALATION) at 03:20

## 2019-01-20 RX ADMIN — BUDESONIDE 500 MCG: 0.5 INHALANT RESPIRATORY (INHALATION) at 20:23

## 2019-01-20 RX ADMIN — DICYCLOMINE HYDROCHLORIDE 20 MG: 20 TABLET ORAL at 22:08

## 2019-01-20 RX ADMIN — HYDROCODONE BITARTRATE AND HOMATROPINE METHYLBROMIDE 5 ML: 5; 1.5 SOLUTION ORAL at 22:08

## 2019-01-20 NOTE — PROGRESS NOTES
Progress Note Patient: Dell Abbott MRN: [id number]  SSN: [id number] YOB: 1955  Age: 66 y.o. Sex: female Admit Date: 1/13/2019 LOS: 7 days Subjective:  
F/U asthma exacerbation and influenza A. Admits to SOB during ambulation along with some wheezing that is better compared to yesterday. Since using hypertonic saline nebulizer having better productive cough that is helping with breathing. Does not feel safe to go home today but thinks tomorrow she will be stable enough to go home. Current Facility-Administered Medications Medication Dose Route Frequency  sodium chloride 3% hypertonic nebulizer soln  4 mL Nebulization Q12H  
 HYDROcodone-homatropine (HYCODAN) 5-1.5 mg/5 mL (5 mL) syrup 5 mL  5 mL Oral Q4H PRN  predniSONE (DELTASONE) tablet 80 mg  80 mg Oral DAILY WITH LUNCH  
 magic mouthwash (OMID) oral suspension 5 mL  5 mL Oral Q4H PRN  
 arformoterol (BROVANA) neb solution 15 mcg  15 mcg Nebulization BID RT  
 budesonide (PULMICORT) 500 mcg/2 ml nebulizer suspension  500 mcg Nebulization BID RT  
 albuterol (PROVENTIL VENTOLIN) nebulizer solution 2.5 mg  2.5 mg Nebulization Q4H RT  
 phenol throat spray (CHLORASEPTIC) 1 Spray  1 Spray Oral PRN  
 sodium chloride (NS) flush 5-40 mL  5-40 mL IntraVENous Q8H  
 sodium chloride (NS) flush 5-40 mL  5-40 mL IntraVENous PRN  
 acetaminophen (TYLENOL) tablet 650 mg  650 mg Oral Q6H PRN  
 diphenhydrAMINE (BENADRYL) capsule 25 mg  25 mg Oral Q6H PRN  
 ondansetron (ZOFRAN) injection 4 mg  4 mg IntraVENous Q6H PRN  
 docusate sodium (COLACE) capsule 100 mg  100 mg Oral BID PRN  
 enoxaparin (LOVENOX) injection 40 mg  40 mg SubCUTAneous Q24H  
 guaiFENesin ER (MUCINEX) tablet 600 mg  600 mg Oral Q12H  
 amLODIPine (NORVASC) tablet 10 mg  10 mg Oral DAILY  loratadine (CLARITIN) tablet 10 mg  10 mg Oral DAILY  pantoprazole (PROTONIX) tablet 40 mg  40 mg Oral ACB  valsartan (DIOVAN) tablet 160 mg  160 mg Oral DAILY  dicyclomine (BENTYL) tablet 20 mg  20 mg Oral AC&HS Objective:  
 
Vitals:  
 01/19/19 7779 01/20/19 0320 01/20/19 4308 01/20/19 0377 BP:   133/76 Pulse:   74 Resp:   18 Temp:   97.4 °F (36.3 °C) SpO2: 98% 93% 100% 99% Weight:      
Height:      
  
  
Intake and Output: 
Current Shift: No intake/output data recorded. Last three shifts: 01/18 1901 - 01/20 0700 In: 600 [P.O.:600] Out: - Physical Exam:  
General:  Alert, cooperative, more comfortable today. Talking more without being as SOB. Extrathoracic wheezing. Eyes:  Conjunctivae/corneas clear. Ears:  Normal TMs and external ear canals both ears. Nose: Nares normal. Septum midline. Mouth/Throat: Lips, mucosa, and tongue normal.   
Neck:  no JVD. Back:   deferred Lungs:   End inspiratory wheezing at lower bases bilaterally Heart:  Regular rate and rhythm, S1, S2 normal  
Abdomen:   Soft, non-tender. Bowel sounds normal.   
Extremities: Extremities normal, atraumatic, no cyanosis or edema. Pulses: 2+ and symmetric all extremities. Skin: Skin color, texture, turgor normal. No rashes or lesions Lymph nodes: Cervical, supraclavicular, and axillary nodes normal.  
Neurologic: CNII-XII intact. Normal strength, sensation and reflexes throughout. Lab/Data Review: 
 
Recent Results (from the past 24 hour(s)) CBC WITH AUTOMATED DIFF Collection Time: 01/19/19  9:04 AM  
Result Value Ref Range WBC 13.5 (H) 4.3 - 11.1 K/uL  
 RBC 4.14 4.05 - 5.2 M/uL  
 HGB 11.6 (L) 11.7 - 15.4 g/dL HCT 37.9 35.8 - 46.3 % MCV 91.5 79.6 - 97.8 FL  
 MCH 28.0 26.1 - 32.9 PG  
 MCHC 30.6 (L) 31.4 - 35.0 g/dL  
 RDW 14.1 11.9 - 14.6 % PLATELET 994 382 - 847 K/uL MPV 10.8 9.4 - 12.3 FL ABSOLUTE NRBC 0.00 0.0 - 0.2 K/uL  
 DF AUTOMATED NEUTROPHILS 59 43 - 78 % LYMPHOCYTES 37 13 - 44 % MONOCYTES 4 4.0 - 12.0 % EOSINOPHILS 0 (L) 0.5 - 7.8 % BASOPHILS 0 0.0 - 2.0 % IMMATURE GRANULOCYTES 1 0.0 - 5.0 %  
 ABS. NEUTROPHILS 7.9 1.7 - 8.2 K/UL  
 ABS. LYMPHOCYTES 5.0 (H) 0.5 - 4.6 K/UL  
 ABS. MONOCYTES 0.5 0.1 - 1.3 K/UL  
 ABS. EOSINOPHILS 0.0 0.0 - 0.8 K/UL  
 ABS. BASOPHILS 0.0 0.0 - 0.2 K/UL  
 ABS. IMM. GRANS. 0.1 0.0 - 0.5 K/UL PROCALCITONIN Collection Time: 01/19/19  9:04 AM  
Result Value Ref Range Procalcitonin <0.1 ng/mL Assessment/ Plan:  
 
Principal Problem: 
  Asthma with acute exacerbation (1/13/2019) Active Problems: 
  Allergic rhinitis: Oc Farmer MD (4/24/2015) Hypercholesteremia (10/8/2015) Obesity (BMI 30-39.9) (11/14/2017) Essential hypertension (11/14/2017) Overview: Battles low potassium Influenza (1/13/2019) Hypokalemia (1/13/2019) Asthma (1/13/2019) Asthma exacerbation - PO prednisone to 60mg daily. Albuterol every 4 hrs. Continue Claritin and inhaled steroid since she takes Qvar at home. Patient is not on a long acting beta agonist so would benefit from at discharge. Pulmicort BID. Brovana BID. Hycodan. Chest x ray showed no pneumonia. Have not been able to obtain respiratory culture. 3% NS nebulizer BID to help with secretions while in hospital. Slowly improving. Influenza A - Tamiflu 75mg BID for a total of 5 days with last treatment on 1/17/19 HTN - Amlodipine 10mg daily. Valsartan 160mg daily. Look to dc tomorrow if wheezing improves. Patient lives alone. DVT prophylaxis - Lovenox Signed By: Aziza Espinal DO   
 January 20, 2019

## 2019-01-20 NOTE — PROGRESS NOTES
Pt resting quietly on CPAP. Pt awakens easily and takes morning meds. Pt requests prn cough syrup. PRN Hycodan 5 ml po given at this time

## 2019-01-20 NOTE — PROGRESS NOTES
Pt resting in bed, talking on cellphone. Pt is alert and oriented x4 with no complaints at this time. Pt is on room air with no distress noted. Pt states she is \"felling better\". Pt does report some dyspnea on exertion and is instructed to call for assistance. Call light in reach.

## 2019-01-20 NOTE — PROGRESS NOTES
Assumed care of patient. Assessment completed and documented, see docflow. Patient awake, sitting on side of bed. Patient A/Ox3. Respirations even and non labored on RA. Patient verbalized understanding to call for needs. Call light within reach. Will continue to monitor. Esteban Kat

## 2019-01-20 NOTE — PROGRESS NOTES
Problem: Falls - Risk of 
Goal: *Absence of Falls Document [place] Shows Fall Risk and appropriate interventions in the flowsheet. Outcome: Progressing Towards Goal 
Fall Risk Interventions: 
Mobility Interventions: Bed/chair exit alarm Medication Interventions: Bed/chair exit alarm Elimination Interventions: Call light in reach History of Falls Interventions: Door open when patient unattended

## 2019-01-21 VITALS
TEMPERATURE: 98.2 F | HEIGHT: 66 IN | SYSTOLIC BLOOD PRESSURE: 130 MMHG | RESPIRATION RATE: 20 BRPM | BODY MASS INDEX: 35.68 KG/M2 | HEART RATE: 63 BPM | OXYGEN SATURATION: 100 % | DIASTOLIC BLOOD PRESSURE: 89 MMHG | WEIGHT: 222 LBS

## 2019-01-21 PROCEDURE — 74011250637 HC RX REV CODE- 250/637: Performed by: FAMILY MEDICINE

## 2019-01-21 PROCEDURE — 94760 N-INVAS EAR/PLS OXIMETRY 1: CPT

## 2019-01-21 PROCEDURE — 74011250637 HC RX REV CODE- 250/637: Performed by: INTERNAL MEDICINE

## 2019-01-21 PROCEDURE — 74011000250 HC RX REV CODE- 250: Performed by: FAMILY MEDICINE

## 2019-01-21 PROCEDURE — 94660 CPAP INITIATION&MGMT: CPT

## 2019-01-21 PROCEDURE — 94640 AIRWAY INHALATION TREATMENT: CPT

## 2019-01-21 RX ORDER — PREDNISONE 20 MG/1
TABLET ORAL
Qty: 14 TAB | Refills: 0 | Status: SHIPPED | OUTPATIENT
Start: 2019-01-21 | End: 2019-09-04

## 2019-01-21 RX ORDER — PANTOPRAZOLE SODIUM 40 MG/1
40 TABLET, DELAYED RELEASE ORAL
Qty: 30 TAB | Refills: 0 | Status: SHIPPED | OUTPATIENT
Start: 2019-01-22 | End: 2019-07-10 | Stop reason: SDUPTHER

## 2019-01-21 RX ORDER — HYDROCODONE BITARTRATE AND HOMATROPINE METHYLBROMIDE 1.5; 5 MG/5ML; MG/5ML
5 SYRUP ORAL
Qty: 30 ML | Refills: 0 | Status: SHIPPED | OUTPATIENT
Start: 2019-01-21 | End: 2019-07-10

## 2019-01-21 RX ADMIN — HYDROCODONE BITARTRATE AND HOMATROPINE METHYLBROMIDE 5 ML: 5; 1.5 SOLUTION ORAL at 05:39

## 2019-01-21 RX ADMIN — LORATADINE 10 MG: 10 TABLET ORAL at 09:06

## 2019-01-21 RX ADMIN — VALSARTAN 160 MG: 80 TABLET ORAL at 09:06

## 2019-01-21 RX ADMIN — ARFORMOTEROL TARTRATE 15 MCG: 15 SOLUTION RESPIRATORY (INHALATION) at 08:23

## 2019-01-21 RX ADMIN — GUAIFENESIN 600 MG: 600 TABLET, EXTENDED RELEASE ORAL at 09:06

## 2019-01-21 RX ADMIN — AMLODIPINE BESYLATE 10 MG: 10 TABLET ORAL at 09:06

## 2019-01-21 RX ADMIN — ALBUTEROL SULFATE 2.5 MG: 2.5 SOLUTION RESPIRATORY (INHALATION) at 04:23

## 2019-01-21 RX ADMIN — ALBUTEROL SULFATE 2.5 MG: 2.5 SOLUTION RESPIRATORY (INHALATION) at 08:23

## 2019-01-21 RX ADMIN — DIPHENHYDRAMINE HYDROCHLORIDE 25 MG: 25 CAPSULE ORAL at 00:21

## 2019-01-21 RX ADMIN — BUDESONIDE 500 MCG: 0.5 INHALANT RESPIRATORY (INHALATION) at 08:23

## 2019-01-21 RX ADMIN — Medication 10 ML: at 05:38

## 2019-01-21 RX ADMIN — PANTOPRAZOLE SODIUM 40 MG: 40 TABLET, DELAYED RELEASE ORAL at 05:38

## 2019-01-21 RX ADMIN — DICYCLOMINE HYDROCHLORIDE 20 MG: 20 TABLET ORAL at 05:38

## 2019-01-21 NOTE — PROGRESS NOTES
Pt taken to front of hospital via [facility] for [facility] home with son

## 2019-01-21 NOTE — DISCHARGE SUMMARY
Hospitalist Discharge Summary     Patient ID:  Kiley Young  [id number]  87 y.o.  1955  Admit date: 1/13/2019 11:42 AM  Discharge date and time: 1/21/2019  Attending: Brandan López DO  PCP:  Sarita Corbett MD  Treatment Team: Attending Provider: Brandan López DO; Utilization Review: Liliana Kumar; Care Manager: Marylee Guy Principal Diagnosis Asthma with acute exacerbation   Principal Problem:    Asthma with acute exacerbation (1/13/2019)    Active Problems:    Allergic rhinitis: Griselda Bernal MD (4/24/2015)      Hypercholesteremia (10/8/2015)      Obesity (BMI 30-39.9) (11/14/2017)      Essential hypertension (11/14/2017)      Overview: Battles low potassium      Influenza (1/13/2019)      Hypokalemia (1/13/2019)      Asthma (1/13/2019)     Hospital Course: 67 yo hx of asthma admitted for asthma exacerbation and influenza A. Patient treated with 5 day course of Tamiflu completed on 1/17/19 and treated with steroids, long acting beta 2 agonist, inhaled steroids, and 3% hypertonic saline while in hospital. Patient continued to have persistent SOB and wheezing but throughout stay improved. Satting well on RA. Ambulating well without SOB. Will discharge with the below changes. If worsening SOB, chest pain, please come back to the ED. Please refer to the admission H&P for details of presentation. In summary, the patient is stable for discharge. Significant Diagnostic Studies:       Labs: Results:       Chemistry No results for input(s): GLU, NA, K, CL, CO2, BUN, CREA, CA, AGAP, BUCR, TBIL, GPT, AP, TP, ALB, GLOB, AGRAT in the last 72 hours. CBC w/Diff Recent Labs     01/19/19  0904   WBC 13.5*   RBC 4.14   HGB 11.6*   HCT 37.9      GRANS 59   LYMPH 37   EOS 0*      Cardiac Enzymes No results for input(s): CPK, CKND1, SWETA in the last 72 hours.     No lab exists for component: CKRMB, TROIP   Coagulation No results for input(s): PTP, INR, APTT in the last 72 hours.    No lab exists for component: INREXT    Lipid Panel Lab Results   Component Value Date/Time    Cholesterol, total 206 (H) 07/16/2018 12:03 PM    HDL Cholesterol 65 07/16/2018 12:03 PM    LDL, calculated 129 (H) 07/16/2018 12:03 PM    VLDL, calculated 12 07/16/2018 12:03 PM    Triglyceride 60 07/16/2018 12:03 PM      BNP No results for input(s): BNPP in the last 72 hours. Liver Enzymes No results for input(s): TP, ALB, TBIL, AP, SGOT, GPT in the last 72 hours. No lab exists for component: DBIL   Thyroid Studies Lab Results   Component Value Date/Time    TSH 3.190 12/16/2016 09:14 AM            Discharge Exam:  Visit Vitals  /89 (BP 1 Location: Left arm, BP Patient Position: At rest)   Pulse 63   Temp 98.2 °F (36.8 °C)   Resp 20   Ht 5' 6\" (1.676 m)   Wt 100.7 kg (222 lb)   SpO2 100%   Breastfeeding? No   BMI 35.83 kg/m²     General appearance: alert, cooperative, no distress, appears stated age  Lungs: clear to auscultation bilaterally. Extra thoracic wheeze that is mild   Heart: regular rate and rhythm, S1, S2 normal, no murmur, click, rub or gallop  Abdomen: soft, non-tender. Bowel sounds normal. No masses,  no organomegaly  Extremities: no cyanosis or edema  Neurologic: Grossly normal    Disposition: home   Discharge Condition: stable  Patient Instructions: as above   Current Discharge Medication List      START taking these medications    Details   pantoprazole (PROTONIX) 40 mg tablet Take 1 Tab by mouth Daily (before breakfast). Qty: 30 Tab, Refills: 0      HYDROcodone-homatropine (HYCODAN) 5-1.5 mg/5 mL (5 mL) syrup Take 5 mL by mouth every four (4) hours as needed for Cough. Max Daily Amount: 30 mL. Qty: 30 mL, Refills: 0    Associated Diagnoses: Cough      salmeterol (SEREVENT) 50 mcg/dose dsdv Take 1 Puff by inhalation two (2) times a day. Qty: 1 Blister, Refills: 0      magic mouthwash (OMID) susp Take 5 mL by mouth every four (4) hours as needed.   Qty: 1 Bottle, Refills: 0 CONTINUE these medications which have CHANGED    Details   predniSONE (DELTASONE) 20 mg tablet Three tablets daily for one day, two tablets daily for three days, one tablet daily for three days, 1/2 tablet daily for three days. Qty: 14 Tab, Refills: 0         CONTINUE these medications which have NOT CHANGED    Details   traMADol (ULTRAM) 50 mg tablet Take 1 Tab by mouth every eight (8) hours as needed for Pain. Max Daily Amount: 150 mg.  Qty: 45 Tab, Refills: 1    Associated Diagnoses: Pain of foot, unspecified laterality      amLODIPine (NORVASC) 10 mg tablet Take 1 Tab by mouth daily. Qty: 30 Tab, Refills: 5    Associated Diagnoses: Essential hypertension      dicyclomine (BENTYL) 20 mg tablet Take 1 Tab by mouth every six (6) hours. Qty: 120 Tab, Refills: 5      valsartan (DIOVAN) 160 mg tablet Take 1 Tab by mouth daily. Qty: 30 Tab, Refills: 5    Associated Diagnoses: Essential hypertension      QVAR 80 mcg/actuation inhaler Refills: 5    Associated Diagnoses: Allergic rhinitis due to pollen; Acute recurrent maxillary sinusitis; Dysfunction of both eustachian tubes; Medicare annual wellness visit, subsequent      albuterol (PROAIR HFA) 90 mcg/actuation inhaler Take 2 Puffs by inhalation every four (4) hours as needed for Wheezing. Patient instructed to take morning of surgery per anesthesia guidelines      albuterol (PROVENTIL VENTOLIN) 2.5 mg /3 mL (0.083 %) nebulizer solution Refills: 5    Associated Diagnoses: Edema; Benign hypertension; Hypercholesteremia      ketorolac (ACULAR) 0.5 % ophthalmic solution Administer 2 Drops to both eyes four (4) times daily. Use 1-2 times per day. Qty: 1 Bottle, Refills: 2    Associated Diagnoses: Edema; Benign hypertension; Hypercholesteremia      EPINEPHrine (EPIPEN) 0.3 mg/0.3 mL (1:1,000) injection 0.3 mg by IntraMUSCular route once as needed. Cetirizine (ZYRTEC) 10 mg cap Take  by mouth. fluticasone (FLONASE) 50 mcg/actuation nasal spray nightly. STOP taking these medications       omeprazole (PRILOSEC) 20 mg capsule Comments:   Reason for Stopping:         chlorpheniramine-HYDROcodone (TUSSIONEX) 10-8 mg/5 mL suspension Comments:   Reason for Stopping:         levalbuterol (XOPENEX) 1.25 mg/3 mL nebu Comments:   Reason for Stopping:               Activity: up and tor   Diet: Cardiac   Wound Care: none     Follow-up PCP in one week.    ·     Time spent to discharge patient 35 minutes  Signed:  Robert Neal DO  1/21/2019  7:48 AM

## 2019-01-21 NOTE — PROGRESS NOTES
Discharge instructions, follow up information, medication list, and prescriptions provided and explained to the pt. IV removed by primary RN, no remote telemetry on. Opportunity for questions provided. Patient states her son is downstairs, PCT notified patient is ready to leave.

## 2019-01-21 NOTE — PROGRESS NOTES
Pt resting quietly in bed. RR even and unlabored. Pt has no complaints at this time. Call light in reach, will continue to monitor.

## 2019-01-21 NOTE — PROGRESS NOTES
Pt resting in bed, on room air. Pt is alert and oriented with no complaints at this time. Call light in reach

## 2019-01-21 NOTE — DISCHARGE INSTRUCTIONS
DISCHARGE SUMMARY from Nurse    PATIENT INSTRUCTIONS:    After general anesthesia or intravenous sedation, for 24 hours or while taking prescription Narcotics:  · Limit your activities  · Do not drive and operate hazardous machinery  · Do not make important personal or business decisions  · Do  not drink alcoholic beverages  · If you have not urinated within 8 hours after discharge, please contact your surgeon on call. What to do at Home:  Recommended activity: Activity as tolerated. If you experience any of the following symptoms temp > 101.5, worsening cough or wheezing, shortness of breath or fatigue not relieved with rest, please follow up with MD.    *  Please give a list of your current medications to your Primary Care Provider. *  Please update this list whenever your medications are discontinued, doses are      changed, or new medications (including over-the-counter products) are added. *  Please carry medication information at all times in case of emergency situations. These are general instructions for a healthy lifestyle:    No smoking/ No tobacco products/ Avoid exposure to second hand smoke  Surgeon General's Warning:  Quitting smoking now greatly reduces serious risk to your health. Obesity, smoking, and sedentary lifestyle greatly increases your risk for illness    A healthy diet, regular physical exercise & weight monitoring are important for maintaining a healthy lifestyle    You may be retaining fluid if you have a history of heart failure or if you experience any of the following symptoms:  Weight gain of 3 pounds or more overnight or 5 pounds in a week, increased swelling in our hands or feet or shortness of breath while lying flat in bed. Please call your doctor as soon as you notice any of these symptoms; do not wait until your next office visit.     Recognize signs and symptoms of STROKE:    F-face looks uneven    A-arms unable to move or move unevenly    S-speech slurred or non-existent    T-time-call 911 as soon as signs and symptoms begin-DO NOT go       Back to bed or wait to see if you get better-TIME IS BRAIN. Warning Signs of HEART ATTACK     Call 911 if you have these symptoms:   Chest discomfort. Most heart attacks involve discomfort in the center of the chest that lasts more than a few minutes, or that goes away and comes back. It can feel like uncomfortable pressure, squeezing, fullness, or pain.  Discomfort in other areas of the upper body. Symptoms can include pain or discomfort in one or both arms, the back, neck, jaw, or stomach.  Shortness of breath with or without chest discomfort.  Other signs may include breaking out in a cold sweat, nausea, or lightheadedness. Don't wait more than five minutes to call 911 - MINUTES MATTER! Fast action can save your life. Calling 911 is almost always the fastest way to get lifesaving treatment. Emergency Medical Services staff can begin treatment when they arrive -- up to an hour sooner than if someone gets to the hospital by car. The discharge information has been reviewed with the patient. The patient verbalized understanding. Discharge medications reviewed with the patient and appropriate educational materials and side effects teaching were provided. Patient Education   Patient Education        Asthma in Adults: Care Instructions  Your Care Instructions    During an asthma attack, your airways swell and narrow as a reaction to certain things (triggers). This makes it hard to breathe. You may be able to prevent asthma attacks if you avoid the things that set off your asthma symptoms. Keeping your asthma under control and treating symptoms before they get bad can help you avoid severe attacks. If you can control your asthma, you may be able to do all of your normal daily activities. You may also avoid asthma attacks and trips to the hospital.  Follow-up care is a key part of your treatment and safety.  Be sure to make and go to all appointments, and call your doctor if you are having problems. It's also a good idea to know your test results and keep a list of the medicines you take. How can you care for yourself at home? · Follow your asthma action plan so you can manage your symptoms at home. An asthma action plan will help you prevent and control airway reactions and will tell you what to do during an asthma attack. If you do not have an asthma action plan, work with your doctor to build one. · Take your asthma medicine exactly as prescribed. Medicine plays an important role in controlling asthma. Talk to your doctor right away if you have any questions about what to take and how to take it. ? Use your quick-relief medicine when you have symptoms of an attack. Quick-relief medicine often is an albuterol inhaler. Some people need to use quick-relief medicine before they exercise. ? Take your controller medicine every day, not just when you have symptoms. Controller medicine is usually an inhaled corticosteroid. The goal is to prevent problems before they occur. Do not use your controller medicine to try to treat an attack that has already started. It does not work fast enough to help. ? If your doctor prescribed corticosteroid pills to use during an attack, take them as directed. They may take hours to work, but they may shorten the attack and help you breathe better. ? Keep your quick-relief medicine with you at all times. · Talk to your doctor before using other medicines. Some medicines, such as aspirin, can cause asthma attacks in some people. · Check yourself for asthma symptoms to know which step to follow in your action plan. Watch for things like being short of breath, having chest tightness, coughing, and wheezing. Also notice if symptoms wake you up at night or if you get tired quickly when you exercise. · If you have a peak flow meter, use it to check how well you are breathing.  This can help you predict when an asthma attack is going to occur. Then you can take medicine to prevent the asthma attack or make it less severe. · See your doctor regularly. These visits will help you learn more about asthma and what you can do to control it. Your doctor will monitor your treatment to make sure the medicine is helping you. · Keep track of your asthma attacks and your treatment. After you have had an attack, write down what triggered it, what helped end it, and any concerns you have about your asthma action plan. Take your diary when you see your doctor. You can then review your asthma action plan and decide if it is working. · Do not smoke or allow others to smoke around you. Avoid smoky places. Smoking makes asthma worse. If you need help quitting, talk to your doctor about stop-smoking programs and medicines. These can increase your chances of quitting for good. · Learn what triggers an asthma attack for you, and avoid the triggers when you can. Common triggers include colds, smoke, air pollution, dust, pollen, mold, pets, cockroaches, stress, and cold air. · Avoid colds and the flu. Get a pneumococcal vaccine shot. If you have had one before, ask your doctor whether you need a second dose. Get a flu vaccine every fall. If you must be around people with colds or the flu, wash your hands often. When should you call for help? Call 911 anytime you think you may need emergency care.  For example, call if:    · You have severe trouble breathing.    Call your doctor now or seek immediate medical care if:    · Your symptoms do not get better after you have followed your asthma action plan.     · You cough up yellow, dark brown, or bloody mucus (sputum).    Watch closely for changes in your health, and be sure to contact your doctor if:    · Your coughing and wheezing get worse.     · You need to use quick-relief medicine on more than 2 days a week (unless it is just for exercise).     · You need help figuring out what is triggering your asthma attacks. Where can you learn more? Go to http://estella-baylee.info/. Enter P597 in the search box to learn more about \"Asthma in Adults: Care Instructions. \"  Current as of: September 5, 2018  Content Version: 11.9  © 5196-6228 [employer]. Care instructions adapted under license by [employer] (which disclaims liability or warranty for this information). If you have questions about a medical condition or this instruction, always ask your healthcare professional. [place] [address] any warranty or liability for your use of this information. Influenza (Flu): Care Instructions  Your Care Instructions    Influenza (flu) is an infection in the lungs and breathing passages. It is caused by the influenza virus. There are different strains, or types, of the flu virus from year to year. Unlike the common cold, the flu comes on suddenly and the symptoms, such as a cough, congestion, fever, chills, fatigue, aches, and pains, are more severe. These symptoms may last up to 10 days. Although the flu can make you feel very sick, it usually doesn't cause serious health problems. Home treatment is usually all you need for flu symptoms. But your doctor may prescribe antiviral medicine to prevent other health problems, such as pneumonia, from developing. Older people and those who have a long-term health condition, such as lung disease, are most at risk for having pneumonia or other health problems. Follow-up care is a key part of your treatment and safety. Be sure to make and go to all appointments, and call your doctor if you are having problems. It's also a good idea to know your test results and keep a list of the medicines you take. How can you care for yourself at home? · Get plenty of rest.  · Drink plenty of fluids, enough so that your urine is light yellow or clear like water.  If you have kidney, heart, or liver disease and have to limit fluids, talk with your doctor before you increase the amount of fluids you drink. · Take an over-the-counter pain medicine if needed, such as acetaminophen (Tylenol), ibuprofen (Advil, Motrin), or naproxen (Aleve), to relieve fever, headache, and muscle aches. Read and follow all instructions on the label. No one younger than 20 should take aspirin. It has been linked to Reye syndrome, a serious illness. · Do not smoke. Smoking can make the flu worse. If you need help quitting, talk to your doctor about stop-smoking programs and medicines. These can increase your chances of quitting for good. · Breathe moist air from a hot shower or from a sink filled with hot water to help clear a stuffy nose. · Before you use cough and cold medicines, check the label. These medicines may not be safe for young children or for people with certain health problems. · If the skin around your nose and lips becomes sore, put some petroleum jelly on the area. · To ease coughing:  ? Drink fluids to soothe a scratchy throat. ? Suck on cough drops or plain hard candy. ? Take an over-the-counter cough medicine that contains dextromethorphan to help you get some sleep. Read and follow all instructions on the label. ? Raise your head at night with an extra pillow. This may help you rest if coughing keeps you awake. · Take any prescribed medicine exactly as directed. Call your doctor if you think you are having a problem with your medicine. To avoid spreading the flu  · Wash your hands regularly, and keep your hands away from your face. · Stay home from school, work, and other public places until you are feeling better and your fever has been gone for at least 24 hours. The fever needs to have gone away on its own without the help of medicine. · Ask people living with you to talk to their doctors about preventing the flu. They may get antiviral medicine to keep from getting the flu from you.   · To prevent the flu in the future, get a flu vaccine every fall. Encourage people living with you to get the vaccine. · Cover your mouth when you cough or sneeze. When should you call for help? Call 911 anytime you think you may need emergency care. For example, call if:    · You have severe trouble breathing.    Call your doctor now or seek immediate medical care if:    · You have new or worse trouble breathing.     · You seem to be getting much sicker.     · You feel very sleepy or confused.     · You have a new or higher fever.     · You get a new rash.    Watch closely for changes in your health, and be sure to contact your doctor if:    · You begin to get better and then get worse.     · You are not getting better after 1 week. Where can you learn more? Go to http://estella-baylee.info/. Enter R213 in the search box to learn more about \"Influenza (Flu): Care Instructions. \"  Current as of: September 5, 2018  Content Version: 11.9  © 6119-7769 [employer]. Care instructions adapted under license by [employer] (which disclaims liability or warranty for this information). If you have questions about a medical condition or this instruction, always ask your healthcare professional. [address] any warranty or liability for your use of this information.          ___________________________________________________________________________________________________________________________________

## 2019-01-21 NOTE — PROGRESS NOTES
Pt sitting up in on side of bed. Pt alert oriented times 3 at this time. Pt complaints of feeling weak but denies pain or distress at this time. Pt on RA AT this time. Pt encouraged to call for assistance if needed call light in reach, door open will monitor.

## 2019-01-22 ENCOUNTER — PATIENT OUTREACH (OUTPATIENT)
Dept: CASE MANAGEMENT | Age: 64
End: 2019-01-22

## 2019-01-22 NOTE — PROGRESS NOTES
This note will not be viewable in [address] Date/Time of Call: 
 01/22/19 1124am  
What was the patient hospitalized for? Asthma with Acute Exacerbation Consent for [facility]ABELA MAHMOOD Call Does the patient understand his/her diagnosis and/or treatment and what happened during the hospitalization? Patient agrees to call Yes, patient states that she understands the hospitalization Did the patient receive discharge instructions? Yes  
CM Assessed Risk for Readmission:  
 
 
Patient stated Risk for Readmission:  
 patient is a moderate risk for readmission per Care Coordinator assessment Patient states that she isnt concerned about readmission at this time Review any discharge instructions (see discharge instructions/AVS in [facility]). Ask patient if they understand these. Do they have any questions? DC instructions reviewed Were home services ordered (nursing, PT, OT, ST, etc.)? No   
If so, has the first visit occurred? If not, why? (Assist with coordination of services if necessary. ) 
 NA Was any DME ordered? No  
If so, has it been received? If not, why?  (Assist patient in obtaining DME orders &/or equipment if necessary. ) NA Complete a review of all medications (new, continued and discontinued meds per the D/C instructions and medication tab in [facility]). Reviewed new, changed and DC medications with patient. Offered to review all continued medications and she declined stating that she knows her medications. Were all new prescriptions filled? If not, why?  (Assist patient in obtaining medications if necessary  escalate for CCM &/or SW if ongoing issues are verbalized by pt or anticipated) No, patient states that she cannot afford her medications at this time. Offered SW referral for assistance, she is in agreeance. Referral sent to [facility] Does the patient understand the purpose and dosing instructions for all medications? (If patient has questions, provide explanation and education.) Patient states that she understands her medications Does the patient have any problems in performing ADLs? (If patient is unable to perform ADLs  what is the limiting factor(s)? Do they have a support system that can assist? If no support system is present, discuss possible assistance that they may be able to obtain. Escalate for CCM/SW if ongoing issues are verbalized by pt or anticipated) 
 patient states that she is independent with ADLs Does the patient have all follow-up appointments scheduled? 7 day f/up with PCP?  
(f/up with PCP may be w/in 14 days if patient has a f/up with their specialist w/in 7 days) 7-14 day f/up with specialist?  
(or per discharge instructions) If f/up has not been made  what actions has the care coordinator made to accomplish this? Has transportation been arranged? Yes  
 
 
01/24/19 @ 1130am 
 
 
 
Patient states that she is scheduled with Pulmonology Reviewed apt information with patient Patient will transport herself to her appointments Any other questions or concerns expressed by the patient? Only concern is medication costs as listed above. No other questions or concerns at this time. Care Coordinator contact information provided should anything arise that the patient needs assistance with prior to SW contacting. Schedule next appointment with ISABELA Field or refer to RN Case Manager/ per the workflow guidelines. When is care coordinators next follow-up call scheduled? If referred for CCM  what RN care manager was the referral assigned? Referral to [facility] Call Completed By: Jeremy Alvares, Harrison [place] Shayy Care Coordinator

## 2019-01-22 NOTE — PROGRESS NOTES
APOLINAR MARTIN called pt to follow up and talk with her about possible community resources that could help her with medications. However, APOLINAR MARTIN got pt's voice mail and had to leave a message. PLAN: 
APOLINAR MARTIN will attempt to outreach to again tomorrow if APOLINAR MARTIN doesn't hear from her before then. This note will not be viewable in [address]

## 2019-01-23 ENCOUNTER — PATIENT OUTREACH (OUTPATIENT)
Dept: CASE MANAGEMENT | Age: 64
End: 2019-01-23

## 2019-01-23 NOTE — PROGRESS NOTES
Ambulatory Care Coordination  Social Work Assessment Referral from: [facility]A [facility]MO[facility] team   
Previously referred? If so, reason and brief outcome No  
Reason for current referral: Community resources Income information (if needed): Pt makes 1059 in disability and 799 from prison Sources of Support: Family ACP set up? Needs information? None in place but would like to review medical POA Medication Cost assistance needed? Yes Referral to CLTC/Medicaid needed? NA Referral to Medicare Extra Help/LIS program needed? NA Small home repair needed? NA  
MOW referral? NA Any other concerns/questions? NA Next steps: APOLINAR MARTIN will make a home visit tomorrow to help pt review financial assistance programs APOLINAR MARTIN will bring medical POA form for pt to review and fill out Pt stated that her car payment was accidentally paid twice this month and that is the cause for her financial issues. Pt stated that she's trying to get the payment back but hasn't been able to get things processed at this time. This note will not be viewable in [address]

## 2019-01-23 NOTE — PROGRESS NOTES
Complex Case Management  Initial Assessment  Addendum to Connect Care Note  Utilize questions pertinent to patient Referral Source & Reason SW due to patient being [place] and questions about inhalers Primary concerns per patient and/or pts family/caregiver Referred due to [employer] and questions about inhalers Recent Hospitalization(s)/ED Visits 1/13/19-1/21/19 for influenza A and asthma exacerbation Current with Home Health? 
- Agency? No  
N/a Social Needs: - Able to afford medications? - Financial assessment? 
- Access to care? Transportation? No  currently working with APOLINAR Working with SW 
Patient drives herself Nutritional Assessment - Appetite? 
-  
- Obesity? 
- Failure to Thrive? - Bowels ? Patient reports no problems with appetite and denies any services like MOWs No  
No  
Regular BMs Cognitive Assessment 
- History of dementia 
- Health literacy Negative Will take another outreach before I can properly assess literacy at this time but answers questions appropriately and very knowledgeable about her disease process and meds Mobility/Activity Assessment - Bed/chair bound? - Make use of assistive devices? - Does patient still drive? No 
No  
Yes Plan/Interventions/Education - Follow up Appointments - Referrals The patient has her inhalers and jet nebs txs and will be using those The only thing she doesnt have is the new Serevent (APOLINAR working with patient on getting assistance to get these meds) She is going to her asthma specialist tomorrow, Dr. Collette Boatman. She assured me that she is resting and doing much better (she has the typical wheezing sound of the asthmatic patient). She is using her inhalers and jet nebs and feels that she will be ok until she gets to asthma specialist tomorrow. She will be asking for a sample of the Serevent from Dr. Collette Boatman and I will be following up tomorrow. APOLINAR will be making a home visit to patient tomorrow This note will not be viewable in [address]

## 2019-01-24 ENCOUNTER — PATIENT OUTREACH (OUTPATIENT)
Dept: CASE MANAGEMENT | Age: 64
End: 2019-01-24

## 2019-01-24 NOTE — PROGRESS NOTES
Ambulatory Care Coordination  Social Work Assessment Referral from: [facility]A MA[facility]MO[facility] team   
Previously referred? If so, reason and brief outcome No  
Reason for current referral: Community resources Income information (if needed): Not provided Sources of Support: Family ACP set up? Needs information? Needs Medical POA info Medication Cost assistance needed? Yes Referral to CLTC/Medicaid needed? NA Referral to Medicare Extra Help/LIS program needed? NA Small home repair needed? NA  
MOW referral? NA Any other concerns/questions? NA Next steps: APOLINAR MARTIN provided pt with utility and rent assistance information APOLINAR MARTIN provided pt with Medical POA info APOLINAR MARTIN will follow up in three weeks Pt stated that she had a car payment get withdrawn from her bank account twice which has made it difficult for her to afford her new medications. Pt said she knows that she doesn't qualify for most medication assistance programs and just felt like she's behind because of her double payment. APOLINAR MARTIN provided pt with information on utility assistance and rent assistance programs that may be able to help her get back ahead on her financials. PLAN: 
APOLINAR MARTIN will check in with pt in three weeks per pt's request  
 
 
This note will not be viewable in [address]

## 2019-01-24 NOTE — PROGRESS NOTES
· Outreached to f/u with patient · Patient stated she did well through the night and has been using her nebulizer · Stated she feels that she is improving · Stated that she has been dealing with asthma for over 30 years now · Patient states that she is scheduled for Dr. Shannon Loredo today at 3pm 
· SW scheduled to visit with patient at 11:30am this morning PLAN: 
· Will f/u with patient next week · Encouraged patient to outreach to me sooner if she has any questions or concerns This note will not be viewable in [address]

## 2019-01-31 ENCOUNTER — PATIENT OUTREACH (OUTPATIENT)
Dept: CASE MANAGEMENT | Age: 64
End: 2019-01-31

## 2019-01-31 NOTE — PROGRESS NOTES
· Outreached to f/u with patient · Patient stated she is doing much better and continuing to improve · Patient went to Dr. Anuja Muñoz asthma doctor last week  he gave her antibiotics for a sinus infection and is feeling much better · Patient stated that she got outside and got some fresh air today and enjoyed it so much · Meds reviewed and verbalized understanding of all meds · Patient was in good spirits and very appreciative of my call PLAN: 
· Will f/u with patient in 2 weeks · Encouraged patient to outreach to me sooner if she has any questions or concerns This note will not be viewable in [address]

## 2019-02-14 ENCOUNTER — PATIENT OUTREACH (OUTPATIENT)
Dept: CASE MANAGEMENT | Age: 64
End: 2019-02-14

## 2019-02-14 NOTE — PROGRESS NOTES
APOLINAR MARTIN called to follow up with pt and see how she's been doing since APOLINAR MARTIN visit and if she's been able to review POA information. However, APOLINAR MARTIN got pt's voice mail and had to leave a message. PLAN: 
APOLINAR MARTIN will follow up with pt in a week if APOLINAR MARTIN doesn't hear from her before then. This note will not be viewable in [address]

## 2019-02-19 ENCOUNTER — PATIENT OUTREACH (OUTPATIENT)
Dept: CASE MANAGEMENT | Age: 64
End: 2019-02-19

## 2019-02-19 NOTE — PROGRESS NOTES
· Outreached to f/u with patient · Patient stated she is doing much better and continuing to improve and trying to get her strength back · Patient f/u with Dr. Colten Pereyra asthma doctor · Patient stated that she has been avoiding crowds and using hygiene methods to try to prevent getting sick again · Meds reviewed and verbalized understanding of all meds  inhalers obtained from Dr. Naman Purdy office · Patient was in good spirits and very appreciative of my call · Denies any med, transport, or psychosocial issues at this time · No further questions or concerns PLAN: 
· Case closed and patient graduated at this time  patient appears to be stable at this time · Encouraged patient to outreach to me if she has any further questions or concerns This note will not be viewable in [address]

## 2019-02-21 ENCOUNTER — PATIENT OUTREACH (OUTPATIENT)
Dept: CASE MANAGEMENT | Age: 64
End: 2019-02-21

## 2019-02-21 NOTE — PROGRESS NOTES
APOLINAR MARTIN called to follow up with pt but got her voice mail. APOLINAR MARTIN can see where RN CM has closed CCM episode and graduated pt. APOLINAR MARTIN will stay on caseload for one more week to allow pt time to APOLINAR MARTIN back. However, if APOLINAR MARTIN doesn't hear from pt in a week APOLINAR MARTIN will remove herself from pt's care team. 
 
PLAN: 
APOLINAR MARTIN will remain available for pt to call back for a week and remove herself from care team after that time if no call is received. This note will not be viewable in [address]

## 2019-02-28 ENCOUNTER — PATIENT OUTREACH (OUTPATIENT)
Dept: CASE MANAGEMENT | Age: 64
End: 2019-02-28

## 2019-02-28 NOTE — PROGRESS NOTES
APOLINAR MARTIN called and spoke with pt who said that she feels much stronger then she's felt in a while but she's just a little worried about today. Pt explained that she goes to mediation with her  today over their divorce and she's just a little worried about how that will go but otherwise feels like she's much better then she was when APOLINAR MARTIN came out to see her. APOLINAR MARTIN made sure that pt knows how to reach APOLINAR MARTIN if any new needs arise in the future, and pt was able to voice understanding. PLAN: 
APOLINAR MARTIN will remove herself from pt's care team at this time. This note will not be viewable in [address]

## 2019-07-10 PROBLEM — J45.901 ASTHMA WITH ACUTE EXACERBATION: Status: RESOLVED | Noted: 2019-01-13 | Resolved: 2019-07-10

## 2019-07-10 PROBLEM — J11.1 INFLUENZA: Status: RESOLVED | Noted: 2019-01-13 | Resolved: 2019-07-10

## 2019-07-10 PROBLEM — J45.909 ASTHMA: Status: RESOLVED | Noted: 2019-01-13 | Resolved: 2019-07-10

## 2019-07-10 PROBLEM — E87.6 HYPOKALEMIA: Status: RESOLVED | Noted: 2019-01-13 | Resolved: 2019-07-10

## 2019-07-15 ENCOUNTER — HOSPITAL ENCOUNTER (OUTPATIENT)
Dept: MAMMOGRAPHY | Age: 64
Discharge: HOME | End: 2019-07-15
Attending: FAMILY MEDICINE
Payer: MEDICARE

## 2019-07-15 DIAGNOSIS — Z12.31 VISIT FOR SCREENING MAMMOGRAM: ICD-10-CM

## 2019-07-15 PROCEDURE — 77067 SCR MAMMO BI INCL CAD: CPT

## 2020-01-30 ENCOUNTER — HOSPITAL ENCOUNTER (OUTPATIENT)
Dept: ULTRASOUND IMAGING | Age: 65
Discharge: HOME | End: 2020-01-30
Attending: FAMILY MEDICINE
Payer: MEDICARE

## 2020-01-30 DIAGNOSIS — R11.0 NAUSEA: ICD-10-CM

## 2020-01-30 DIAGNOSIS — K80.50 BILIARY COLIC: ICD-10-CM

## 2020-01-30 PROCEDURE — 76700 US EXAM ABDOM COMPLETE: CPT

## 2020-03-03 ENCOUNTER — HOSPITAL ENCOUNTER (OUTPATIENT)
Dept: PHYSICAL THERAPY | Age: 65
Discharge: HOME | End: 2020-03-03
Payer: MEDICARE

## 2020-03-03 PROCEDURE — 97140 MANUAL THERAPY 1/> REGIONS: CPT

## 2020-03-03 PROCEDURE — 97161 PT EVAL LOW COMPLEX 20 MIN: CPT

## 2020-03-03 PROCEDURE — 97110 THERAPEUTIC EXERCISES: CPT

## 2020-03-03 NOTE — THERAPY EVALUATION
Nancy Baig  : 1955  Payor: [address] / Plan: [address] / Product Type: PPO /  [address]  at [facility]  [address], [address], [place] [address] [address] [facility]  [phone]   [phone]         OUTPATIENT PHYSICAL THERAPY:Initial Assessment and PM 3/3/2020    ICD-10: Treatment Diagnosis:   Difficulty in walking, not elsewhere classified (R26.2)  Pain in right ankle and joints of right foot (M25.571)  Stiffness of right ankle, not elsewhere classified (M25.671)      PRECAUTIONS/ALLERGIES:   Latex; Sulfa (sulfonamide antibiotics); and Other medication     FALL RISK SCORE:0  (? 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 Federal Law prohibits the replication, distribution or use without written permission from [address] [place]:  Ms. Nancy Baig has attended 1 physical therapy session including initial evaluation as of 3/3/2020. Nancy Baig demonstrates decreased strength, decreased ROM, decreased tolerance of ADLs, decreased functional mobility, and decreased activity tolerance, all consistent with S/S of R plantarfascitis. According to their responses on the Foot and Ankle Ability Measure, Nancy Baig is 67% limited by their ankle pain and dysfunction in their ability to participate in ADLs and their overall functional tolerance.  Recommending skilled PT: manual therapeutic techniques (as appropriate), therapeutic exercises and activities, balance and comprehensive home exercises program to address current impairment. Prakash Servin will benefit from skilled PT (medically necessary) to address above deficits affecting participation in basic ADLs and overall functional tolerance. PROBLEM LIST (Impacting functional limitations):  1. Decreased Strength  2. Decreased ADL/Functional Activities  3. Decreased Transfer Abilities  4. Decreased Ambulation Ability/Technique  5. Decreased Balance  6. Increased Pain  7. Decreased Activity Tolerance  8. Decreased [place] with Home Exercise Program INTERVENTIONS PLANNED:  1. Balance Exercise  2. Bed Mobility  3. Cold  4. Cryotherapy  5. Family Education  6. Gait Training  7. Heat  8. Home Exercise Program (HEP)  9. Manual Therapy  10. Neuromuscular Re-education/Strengthening  11. Range of Motion (ROM)  12. Therapeutic Activites  13. Therapeutic Exercise/Strengthening  14. Transfer Training  15. Ultrasound (US)  16. Aquatic therapy  17. Electrical Stimulation   TREATMENT PLAN:  Effective Dates: 3/3/2020 TO 5/2/2020 (60 days). Frequency/Duration: 2 times a week for 60 Days    SHORT-TERM FUNCTIONAL GOALS: Time Frame: 4 weeks  1. Prakash Servin will be compliant with home exercise program within 4 weeks in order to improve active participation with management of patient's symptoms and/or functional deficits. [address] [place] [address] will report <=3/10 pain with walking >15 minutes in order to participate in daily exercise and daily activities. [address] [place] [address] will be able to stand >=15 minutes with <2/10 pain to feet in order to participate in household duties without issues/compromise. [address] [place] [address]  will improve R ankle dorsiflexion AROM from 0 to 10 degrees in order to show improvement in ankle ROM and tolerance for functional activity.    [address] [place] [address] will be report improved score on the Foot and Ankle Ability Measure from 56 to 62 to indicate improvement in functional independence. DISCHARGE GOALS: Time Frame: 8 weeks  1. Dio Blakely will be independent with home exercise program within 8 weeks in order to improve independence with management of patient's symptoms and/or functional deficits. [address] will report <=2/10 pain with participation in activities of daily living and overall functional mobility including walking and stair ambulation. [address] will be able to stand >=30 minutes without reports of increased foot/ankle pain. [address] will be report improved score on the Foot and Ankle Ability Measure from 62 to 70 to indicate improvement in functional independence. [address] will improve ankle strength to >=5/5 to improve tolerance of ADLs and improve overall functional mobility. REHABILITATION POTENTIAL FOR STATED GOALS: GOOD    Regarding Justin Rabago's therapy, I certify that the treatment plan above will be carried out by a therapist or under their direction. Thank you for this referral,  Piedad Torres       Referring Physician Signature: Bonilla Steinberg MD              Date                    HISTORY:  All history obtained on 3/3/2020 unless otherwise noted. PATIENT STATED GOAL:   Pt reports pain at arch of R foot, Pt reports pain is exacerbated with walking and standing. Pt reports standing/walking tolerance up to 15 minutes at this time before increased pain. Pt reports pain first thing in the morning with getting up to the bathroom. HISTORY OF PRESENT INJURY/ILLNESS (REASON FOR REFERRAL):    Pt with c/o R plantar fascitis. MD wanting to give injection but wanting to try PT first. Pt has night splint that she wears. Pt has had plantar fascitis to L foot before as well. Pt states the pain can get as high as 10/10 and as low as 5/10.      PAST MEDICAL HISTORY/COMORBIDITIES:  Ms. Contreras Isbell  has a past medical history of Acute maxillary sinusitis, Allergic rhinitis (4/24/2015), Asthma, Atrophic vaginitis (4/24/2015), Benign hypertension (4/24/2015), Chronic anxiety (4/24/2015), Cystocele, midline (4/24/2015), Diverticulosis of colon (4/24/2015), Dysmenorrhea (4/24/2015), Edema (4/24/2015), Eustachian tube dysfunction, Female stress incontinence, Gastrointestinal disorder, GERD (gastroesophageal reflux disease), Hypercholesterolemia, Hyperplastic polyps of stomach (4/24/2015), Hypertension, Influenza (1/13/2019), Internal hemorrhoids without mention of complication (9/66/6949), Obesity (BMI 30-39.9) (11/14/2017), Obstructive sleep apnea (11/14/2017), Other diseases of lung, not elsewhere classified (4/24/2015), Right upper quadrant pain, Urge incontinence, and Ventricular bigeminy (8/31/2016). She also has no past medical history of Adverse effect of anesthesia, Aneurysm ([address] Arthritis, Autoimmune disease ([address] CAD (coronary artery disease), Cancer ([address] Chronic kidney disease, Chronic pain, Coagulation disorder ([address] COPD, Dementia, Diabetes ([address] Difficult intubation, Endocrine disease, Heart failure ([address] Ill-defined condition, Liver disease, Malignant hyperthermia due to anesthesia, Nausea & vomiting, Neurological disorder, Other ill-defined conditions(799.89), Pseudocholinesterase deficiency, Psychiatric disorder, PUD (peptic ulcer disease), Seizures ([address] Stroke ([address] Thromboembolus ([address] or Thyroid disease. Ms. Panda Pisano  has a past surgical history that includes hx cathy and bso (); hx hysterectomy; and hx knee arthroscopy (Left).     Active Ambulatory Problems     Diagnosis Date Noted    Chronic anxiety 04/24/2015    Allergic rhinitis: Chey Olivera MD 04/24/2015    Dysmenorrhea: Violette Love MD 04/24/2015    Cystocele, midline 04/24/2015    Urge incontinence 04/24/2015    Atrophic vaginitis 04/24/2015    Diverticulosis of colon 04/24/2015    Other diseases of lung, not elsewhere classified 04/24/2015    Antral gastritis 04/24/2015    Hypercholesteremia 10/08/2015    Chondromalacia of right patella 11/06/2015    Medicare annual wellness visit, subsequent 05/18/2016    Advanced directives, counseling/discussion: 6-2-16 06/02/2016    Laura kumari 08/31/2016    Moderate persistent asthma: Angelina Hernandez MD [facility] 12/16/2016    Obstructive sleep apnea 11/14/2017    Obesity (BMI 30-39.9) 11/14/2017    Fatigue 11/14/2017    Essential hypertension 11/14/2017    Tubulovillous adenoma of colon 08/14/2018     Resolved Ambulatory Problems     Diagnosis Date Noted    Tear of medial cartilage or meniscus of knee, current 07/17/2009    Tear of lateral cartilage or meniscus of knee, current 07/17/2009    Back pain 08/14/2014    Edema 04/24/2015    Complex tear of lateral meniscus of right knee as current injury 11/06/2015    Asthma with acute exacerbation 01/13/2019    Influenza 01/13/2019    Hypokalemia 01/13/2019    Asthma 01/13/2019     Past Medical History:   Diagnosis Date    Acute maxillary sinusitis     Benign hypertension 4/24/2015    Eustachian tube dysfunction     Female stress incontinence     Gastrointestinal disorder     GERD (gastroesophageal reflux disease)     Hypercholesterolemia     Hyperplastic polyps of stomach 4/24/2015    Hypertension     Internal hemorrhoids without mention of complication 7/77/4706    Right upper quadrant pain        SOCIAL HISTORY/LIVING ENVIRONMENT:       Social History     Socioeconomic History    Marital status:      Spouse name: Not on file    Number of children: Not on file    Years of education: Not on file    Highest education level: Not on file   Occupational History    Not on file   Social Needs    Financial resource strain: Not on file    Food insecurity:     Worry: Not on file     Inability: Not on file    Transportation needs:     Medical: Not on file     Non-medical: Not on file   Tobacco Use    Smoking status: Former Smoker     Packs/day: 0.25 Years: 10.00     Pack years: 2.50     Last attempt to quit: 10/16/1996     Years since quittin.3    Smokeless tobacco: Never Used   Substance and Sexual Activity    Alcohol use: No    Drug use: No    Sexual activity: Yes     Partners: Male   Lifestyle    Physical activity:     Days per week: Not on file     Minutes per session: Not on file    Stress: Not on file   Relationships    Social connections:     Talks on phone: Not on file     Gets together: Not on file     Attends Jewish service: Not on file     Active member of club or organization: Not on file     Attends meetings of clubs or organizations: Not on file     Relationship status: Not on file    Intimate partner violence:     Fear of current or ex partner: Not on file     Emotionally abused: Not on file     Physically abused: Not on file     Forced sexual activity: Not on file   Other Topics Concern    Not on file   Social History Narrative    Not on file       PRIOR LEVEL OF FUNCTION/WOR/ACTIVITY:  Pt had a fall in 2018 and does have pain into R hip and possible sciatic nerve pain since this fall    CURRENT MEDICATIONS:    Current Outpatient Medications:     pantoprazole (PROTONIX) 40 mg tablet, Take 1 Tab by mouth two (2) times a day., Disp: 60 Tab, Rfl: 5    amLODIPine (NORVASC) 10 mg tablet, Take 1 Tab by mouth daily. , Disp: 30 Tab, Rfl: 5    dicyclomine (BENTYL) 20 mg tablet, Take 1 Tab by mouth every six (6) hours. , Disp: 120 Tab, Rfl: 5    valsartan (DIOVAN) 160 mg tablet, Take 1 Tab by mouth daily. , Disp: 30 Tab, Rfl: 5    salmeterol (SEREVENT) 50 mcg/dose dsdv, Take 1 Puff by inhalation two (2) times a day., Disp: 1 Blister, Rfl: 0    QVAR 80 mcg/actuation inhaler, , Disp: , Rfl: 5    albuterol (PROAIR HFA) 90 mcg/actuation inhaler, Take 2 Puffs by inhalation every four (4) hours as needed for Wheezing.  Patient instructed to take morning of surgery per anesthesia guidelines, Disp: , Rfl:     albuterol (PROVENTIL VENTOLIN) 2.5 mg /3 mL (0.083 %) nebulizer solution, , Disp: , Rfl: 5    ketorolac (ACULAR) 0.5 % ophthalmic solution, Administer 2 Drops to both eyes four (4) times daily. Use 1-2 times per day., Disp: 1 Bottle, Rfl: 2    EPINEPHrine (EPIPEN) 0.3 mg/0.3 mL (1:1,000) injection, 0.3 mg by IntraMUSCular route once as needed. , Disp: , Rfl:     Cetirizine (ZYRTEC) 10 mg cap, Take  by mouth., Disp: , Rfl:     fluticasone (FLONASE) 50 mcg/actuation nasal spray, nightly., Disp: , Rfl:      Date Last Reviewed:  3/3/2020   Number of Personal Factors/Comorbidities that affect the Plan of Care: 1-2: MODERATE COMPLEXITY   EXAMINATION:         BALANCE (SLS) Date:  3/3/2020    Date: Date:   Right 2 seconds     Left 30 seconds         MEASUREMENTS:          Date:  3/3/2020  Date:  Date:     Right Left Right Left Right Left            Figure 8 19.5 in 20.5 in         AROM/PROM         Joint: Date:3/3/2020  Date:  Date:    Active LE ROM Right Left Right Left Right Left   Hip Flexion WFL WFL       Hip Extension NT NT       Hip IR NT NT       Hip ER NT NT       Knee Extension [facility] WFL       Knee Flexion [facility] WFL       Ankle DF 0 degrees 5 degrees       Ankle PF 30 degrees 41 degrees        Ankle IV 10 degrees 10 degrees       Ankle EV 10 degrees 5 degrees       Passive LE ROM         Ankle PF         Ankle DF         Ankle IV         Ankle EV          + decreased mm extensibility B HS, B gastroc/soleus   STRENGTH         Joint: Date: 3/3/2020  Date:  Date:     Right Left Right Left Right Left   Hip Abduction 3/5 pain in LE        Hip Adduction NT NT       Hip IR NT NT       Hip ER NT NT       Hip Flexion 4+/5 pain quad 4+/5 pain quad       Knee Extension 5/5 5/5       Knee Flexion 5/5 5/5        Ankle DF 5/5 5/5        Ankle PF 5/5, pain 5/5        Ankle IV 4/5, pain 4/5 pain       Ankle EV 5/5 5/5           PALPATION Date: 3/3/2020   TTP R plantar fascia   TONE      Joint mobilization: hypomobility noted at navicular, medial/lateral glide calcaneus, PA- AP glide talocrural joint    NEUROLOGICAL SCREEN: Assessed @ Initial Visit    -RADIATING SYMPTOMS: YES down posterior aspect of thigh, dull pain at quad at times       Body Structures Involved:  1. Bones  2. Joints  3. Muscles  4. Ligaments Body Functions Affected:  1. Sensory/Pain  2. Neuromusculoskeletal  3. Movement Related Activities and Participation Affected:  1. Mobility  2. Self Care  3. Domestic Life  4. Interpersonal Interactions and Relationships  5. Community, Social and [place]   Number of elements that affect the Plan of Care: 4+: HIGH COMPLEXITY   CLINICAL PRESENTATION:   Presentation: Evolving clinical presentation with changing clinical characteristics: MODERATE COMPLEXITY   CLINICAL DECISION MAKING:   TOOL USED:   -FOOT AND ANKLE ABILITY MEASURE (FAAM)  Score:  Initial: 56/84 Most Recent: X (Date: -- )   Interpretation of Score: For the \"Activities of Daily Living\", there are 21 questions each scored on a 5 point scale with 0 representing \"Unable to do\" and 4 representing \"No difficulty\". The lower the score, the greater the functional disability. 84/84 represents no disability. Minimal detectable change is 5.7 points. With the addition of the 8 questions in the \"Sports Subscale,\" there are 29 questions, each scored on a 5 point scale with 0 representing \"Unable to do\" and 4 representing \"No difficulty\". The lower the score, the greater the functional disability. 116/116 represents no disability. Minimal detectable change is 12.3 points. MEDICAL NECESSITY:  · Skilled intervention continues to be required due to above deficits affecting participation in basic ADLs and overall functional tolerance. REASON FOR SERVICES/ OTHER COMMENTS:  · Patient continues to require skilled intervention due to  above deficits affecting participation in basic ADLs and overall functional tolerance.      Use of outcome tool(s) and clinical judgement create a POC that gives a: Clear prediction of patient's progress: LOW COMPLEXITY

## 2020-03-03 NOTE — PROGRESS NOTES
Flory Beach  : 1955  Payor: [facility] / Plan: [address] / Product Type: PPO /  [address]  at [facility]  [address], [address] [facility] [place], [address]  [phone]   [phone]      OUTPATIENT PHYSICAL THERAPY: Daily Treatment Note 3/3/2020  Visit Count:  1    ICD-10: Treatment Diagnosis:   Difficulty in walking, not elsewhere classified (R26.2)  Pain in right ankle and joints of right foot (M25.571)  Stiffness of right ankle, not elsewhere classified (M25.671)     Precautions/Allergies:   Latex; Sulfa (sulfonamide antibiotics); and Other medication     TREATMENT PLAN:  Effective Dates: 3/3/2020 TO 2020 (60 days). Frequency/Duration: 2 times a week for 60 Days        PRE-TREATMENT SYMPTOMS/COMPLAINTS:  R foot pain increased with walking and standing. MEDICATIONS REVIEWED:  3/3/2020   TREATMENT:   (In addition to Assessment/Re-Assessment sessions the following treatments were rendered)    THERAPEUTIC EXERCISE: (8 minutes):  Exercises per grid below to improve mobility, strength and balance. Required minimal visual and verbal cues to promote proper body alignment and promote proper body posture. Progressed resistance, range and complexity of movement as indicated. Date:  3/3/2020 Date:   Date:     Activity/Exercise Parameters Parameters Parameters   Standing gastroc stretch HEP     Seated gastroc stretch HEP     1st ray stretch HEP     Slant board stretch      Intrinsic foot       Towel scrunches      Tilt board (all directions)      SLS      Tandem balance      Ankle TB - 4 way      bridges      clamshell      Hip abduction              MANUAL THERAPY: (30 minutes): Joint mobilization, Soft tissue mobilization and Manipulation was utilized and necessary because of the patient's restricted joint motion, painful spasm, loss of articular motion and restricted motion of soft tissue.    +STM R plantarfascia  +PA-AP grade III talocrural joint  + medial-lateral glide grade III calcaneus  + A-P glide grade III 1st ray  +stretching 1st ray, gastroc, PF, eversion    (Used abbreviations: MET - muscle energy technique; PNF - proprioceptive neuromuscular facilitation; NMR - neuromuscular re-education; AP - anterior to posterior; PA - posterior to anterior)    MODALITIES: (0 minutes):      none  Recommended ice for pain and inflammation however pt declined stating ice increases her pain. TREATMENT/SESSION ASSESSMENT:  Flory Yong verbalized understanding of role of PT and POC. Pes planus noted with WBing in standing. Education: Provided pt with HEP handout. RECOMMENDATIONS/INTENT FOR NEXT TREATMENT SESSION: \"Next visit will focus on advancements to more challenging activities\".     PAIN: Initial: 5/10 Post Session: 7 /10     [employer] [facility]    Total Treatment Billable Duration: 50 minutes  PT Patient Time In/Time Out  Time In: 7879  Time Out: 8557  Cherelle Blancas, PT, DPT    Future Appointments   Date Time Provider Julia Mariaisti   3/5/2020  9:20 AM Kyle Cervantes NP SSA PSCD PP   3/6/2020  1:45 PM Varghese MCKEON SFDORPT SFD   3/9/2020  2:00 PM MD MARBELLA Morin EMG EMG   3/10/2020  2:30 PM Varghese KINGSLEYDOMASOOD SFD   3/13/2020  2:30 PM Lynda Wyatt PTA [facility] SFD   7/14/2020 10:00 AM MD MARBELLA Morin EMG EMG

## 2020-03-06 ENCOUNTER — HOSPITAL ENCOUNTER (OUTPATIENT)
Dept: PHYSICAL THERAPY | Age: 65
Discharge: HOME | End: 2020-03-06
Payer: MEDICARE

## 2020-03-06 NOTE — PROGRESS NOTES
Tylor Palomino  : 1955  [place]  [facility] [address] [facility], [address]  [phone]   [phone]      OUTPATIENT DAILY NOTE    NAME/AGE/GENDER: Tylor Palomino is a 60 y.o. female. DATE: 3/6/2020    SUBJECTIVE:  Patient no-showed for appointment today. Will plan to follow up on next scheduled visit. Called and left voicemail reminding pt of next appointment time.     Rick Cash, PT, DPT

## 2020-03-10 ENCOUNTER — HOSPITAL ENCOUNTER (OUTPATIENT)
Dept: PHYSICAL THERAPY | Age: 65
Discharge: HOME | End: 2020-03-10
Payer: MEDICARE

## 2020-03-10 PROCEDURE — 97140 MANUAL THERAPY 1/> REGIONS: CPT

## 2020-03-10 PROCEDURE — 97110 THERAPEUTIC EXERCISES: CPT

## 2020-03-10 NOTE — PROGRESS NOTES
Flory Beach  : 1955  Payor: [facility] / Plan: [address] / Product Type: PPO /  [address] at [facility]  [facility], [address] [place] [address], [address]  [phone]   [phone]      OUTPATIENT PHYSICAL THERAPY: Daily Treatment Note 3/10/2020  Visit Count:  2    ICD-10: Treatment Diagnosis:   Difficulty in walking, not elsewhere classified (R26.2)  Pain in right ankle and joints of right foot (M25.571)  Stiffness of right ankle, not elsewhere classified (M25.671)     Precautions/Allergies:   Latex; Sulfa (sulfonamide antibiotics); and Other medication     TREATMENT PLAN:  Effective Dates: 3/3/2020 TO 2020 (60 days). Frequency/Duration: 2 times a week for 60 Days        PRE-TREATMENT SYMPTOMS/COMPLAINTS:  Pt reports just getting over a virus so she hasn't been doing her HEP like she should be. Pt reports continued pain at foot with prolonged standing. MEDICATIONS REVIEWED:  3/10/2020   TREATMENT:   (In addition to Assessment/Re-Assessment sessions the following treatments were rendered)    THERAPEUTIC EXERCISE: (15 minutes):  Exercises per grid below to improve mobility, strength and balance. Required minimal visual and verbal cues to promote proper body alignment and promote proper body posture. Progressed resistance, range and complexity of movement as indicated.      Date:  3/3/2020 Date:  3/10/20 Date:     Activity/Exercise Parameters Parameters Parameters   Standing gastroc stretch HEP --    Seated gastroc stretch HEP --    1st ray stretch HEP --    Slant board stretch  30 sec 3 X     Intrinsic foot   30 X     Towel scrunches  3X     Tilt board (all directions)  10 X     SLS  --    Tandem balance  --    Ankle TB - 4 way  YTB 10 X     bridges  10 X     clamshell  15 X     Hip abduction  --    Ankle ABC  1 X      MANUAL THERAPY: (25 minutes): Joint mobilization, Soft tissue mobilization and Manipulation was utilized and necessary because of the patient's restricted joint motion, painful spasm, loss of articular motion and restricted motion of soft tissue. +STM R plantarfascia  +PA-AP grade III talocrural joint  + medial-lateral glide grade III calcaneus  + A-P glide grade III 1st ray  +stretching 1st ray, gastroc, PF, eversion    (Used abbreviations: MET - muscle energy technique; PNF - proprioceptive neuromuscular facilitation; NMR - neuromuscular re-education; AP - anterior to posterior; PA - posterior to anterior)    MODALITIES: (0 minutes):      none  Recommended ice for pain and inflammation however pt declined stating ice increases her pain. TREATMENT/SESSION ASSESSMENT:  Julio C Ruckerirena verbalized understanding of role of PT and POC. Pt tolerated addition of exercises this session. Pt requiring additional cue to intrinsic foot exercise. Pt with increased difficulty performing. Education: Provided pt with HEP handout. RECOMMENDATIONS/INTENT FOR NEXT TREATMENT SESSION: \"Next visit will focus on advancements to more challenging activities\".     PAIN: Initial:3 /10 Post Session: 4 /10     [facility]    Total Treatment Billable Duration: 42 minutes  PT Patient Time In/Time Out  Time In: 3962  Time Out: 8872  Francisca Jasmine DPT    Future Appointments   Date Time Provider Julia Thibodeaux   3/13/2020  2:30 PM Marny Brunner [facility]   4/14/2020  2:20 PM Jerson Mcclellan NP [facility] PSCD PP   7/14/2020 10:00 AM MD MARBELLA Mejia EMG EMG

## 2020-03-13 ENCOUNTER — HOSPITAL ENCOUNTER (OUTPATIENT)
Dept: PHYSICAL THERAPY | Age: 65
Discharge: HOME | End: 2020-03-13
Payer: MEDICARE

## 2020-03-13 PROCEDURE — 97140 MANUAL THERAPY 1/> REGIONS: CPT

## 2020-03-13 PROCEDURE — 97035 APP MDLTY 1+ULTRASOUND EA 15: CPT

## 2020-03-13 NOTE — PROGRESS NOTES
Duncan Ohara  : 1955  Payor: [facility] STATE / Plan: [address] / Product Type: PPO /  [address]  at [facility]  [address], [address] [facility], [place] [address] [address] [facility]  [phone]   [phone]      OUTPATIENT PHYSICAL THERAPY: Daily Treatment Note 3/13/2020  Visit Count:  3    ICD-10: Treatment Diagnosis:   Difficulty in walking, not elsewhere classified (R26.2)  Pain in right ankle and joints of right foot (M25.571)  Stiffness of right ankle, not elsewhere classified (M25.671)     Precautions/Allergies:   Latex; Sulfa (sulfonamide antibiotics); and Other medication     TREATMENT PLAN:  Effective Dates: 3/3/2020 TO 2020 (60 days). Frequency/Duration: 2 times a week for 60 Days        PRE-TREATMENT SYMPTOMS/COMPLAINTS:  Pt reports that she may be a little better.  and painful in medial arch (plantar fascia) area). It is painful with certain movements. Not using ice at home. It makes it ache. Working on exercises at home some. MEDICATIONS REVIEWED:  3/13/2020   TREATMENT:   (In addition to Assessment/Re-Assessment sessions the following treatments were rendered)    THERAPEUTIC EXERCISE: (15 minutes):  Exercises per grid below to improve mobility, strength and balance. Required minimal visual and verbal cues to promote proper body alignment and promote proper body posture. Progressed resistance, range and complexity of movement as indicated.      Date:  3/3/2020 Date:  3/10/20 Date:     Activity/Exercise Parameters Parameters Parameters   Standing gastroc stretch HEP --    Seated gastroc stretch HEP --    1st ray stretch HEP --    Slant board stretch  30 sec 3 X     Intrinsic foot   30 X     Towel scrunches  3X     Tilt board (all directions)  10 X     SLS  --    Tandem balance  --    Ankle TB - 4 way  YTB 10 X     bridges  10 X     clamshell  15 X     Hip abduction  --    Ankle ABC  1 X      MANUAL THERAPY: (30 minutes): Joint mobilization, Soft tissue mobilization and Manipulation was utilized and necessary because of the patient's restricted joint motion, painful spasm, loss of articular motion and restricted motion of soft tissue. +STM R plantarfascia  +PA-AP grade III talocrural joint  + A-P glide grade III 1st ray  +stretching 1st ray, gastroc, PF, eversion    (Used abbreviations: MET - muscle energy technique; PNF - proprioceptive neuromuscular facilitation; NMR - neuromuscular re-education; AP - anterior to posterior; PA - posterior to anterior)    MODALITIES: (18  minutes): Patient prone for ultrasound at 1.5w/cm2 at 50% for 8 minutes over plantar fascia area right with plantar fascia stretch during and after treatment. Tried the Game Ready ankle on patient but this did not cover the inflamed area well so had patient sit and wrapped cervical gel pack around foot for 10 minutes. TREATMENT/SESSION ASSESSMENT:  Maday Wellsr instructed to use ice 2-3 times a day for 10 minutes at a time and continue with stretches and exercises and work on plantar fascia stretch prior to getting out of bed or after sitting for long periods and avoid staing and walking for long periods. Patient understood and agreeable. Patient tolerated treatment well but very tender to touch over medial plantar fascia area. Education: Provided pt with HEP handout. RECOMMENDATIONS/INTENT FOR NEXT TREATMENT SESSION: \"Next visit will focus on advancements to more challenging activities\".     PAIN: Initial:3 /10 Post Session: 3 /10     [facility] Portal    Total Treatment Billable Duration: 38 minutes  PT Patient Time In/Time Out  Time In: 1430  Time Out: [address], PTA    Future Appointments   Date Time Provider Julia Thibodeaux   3/17/2020  2:30 [facility]   3/20/2020  2:30 PM Pat MCKEON [facility] SFD   4/14/2020  2:20 PM Chani Mcclellan NP [facility] PSCD PP   7/14/2020 10:00 AM Valentino Pick, MD [facility]SA EMG EMG

## 2020-03-17 ENCOUNTER — HOSPITAL ENCOUNTER (OUTPATIENT)
Dept: PHYSICAL THERAPY | Age: 65
Discharge: HOME | End: 2020-03-17
Payer: MEDICARE

## 2020-03-17 PROCEDURE — 97035 APP MDLTY 1+ULTRASOUND EA 15: CPT

## 2020-03-17 PROCEDURE — 97110 THERAPEUTIC EXERCISES: CPT

## 2020-03-17 PROCEDURE — 97140 MANUAL THERAPY 1/> REGIONS: CPT

## 2020-03-17 NOTE — PROGRESS NOTES
Dio Blakely  : 1955  Payor: [employer] STATE / Plan: [address] / Product Type: PPO /  [address] at [address] [facility] [address], [address], [place], [facility]  [phone]   [phone]      OUTPATIENT PHYSICAL THERAPY: Daily Treatment Note 3/17/2020  Visit Count:  4    ICD-10: Treatment Diagnosis:   Difficulty in walking, not elsewhere classified (R26.2)  Pain in right ankle and joints of right foot (M25.571)  Stiffness of right ankle, not elsewhere classified (M25.671)     Precautions/Allergies:   Latex; Sulfa (sulfonamide antibiotics); and Other medication     TREATMENT PLAN:  Effective Dates: 3/3/2020 TO 2020 (60 days). Frequency/Duration: 2 times a week for 60 Days        PRE-TREATMENT SYMPTOMS/COMPLAINTS:  Pt reports that she is feeling better with less pain and tenderness. Patient states she has made it a point to be on her feet less and thinks that that has helped. Patient reports she has not used ice at home. MEDICATIONS REVIEWED:  3/17/2020   TREATMENT:   (In addition to Assessment/Re-Assessment sessions the following treatments were rendered)    THERAPEUTIC EXERCISE: ( 25 minutes):  Exercises per grid below to improve mobility, strength and balance. Required minimal visual and verbal cues to promote proper body alignment and promote proper body posture. Progressed resistance, range and complexity of movement as indicated.      Date:  3/3/2020 Date:  3/10/20 Date:  3/17/20     Activity/Exercise Parameters Parameters Parameters   Standing gastroc stretch HEP --    Seated gastroc stretch HEP --    1st ray stretch HEP --    Slant board stretch  30 sec 3 X  3 x 30 second hold     Intrinsic foot   30 X     Towel scrunches  3X  3 reps    Tilt board (all directions)  10 X     SLS  --    Tandem balance  --    Ankle TB - 4 way  YTB 10 X  Yellow theraband  2 x 10 reps each   bridges  10 X  2 x 10 reps   clamshell  15 X  2 x 10 reps B   Hip abduction  --    Ankle ABC  1 X      MANUAL THERAPY: (15 minutes): Joint mobilization, Soft tissue mobilization and Manipulation was utilized and necessary because of the patient's restricted joint motion, painful spasm, loss of articular motion and restricted motion of soft tissue. Patient able to tolerate STM over plantar fascia and even over tender area medial heel. Decreased tenderness today during treatment. Better tolerance to all treatment today. Passive plantar fascia stretching. (Used abbreviations: MET - muscle energy technique; PNF - proprioceptive neuromuscular facilitation; NMR - neuromuscular re-education; AP - anterior to posterior; PA - posterior to anterior)    MODALITIES: (12  minutes): Patient supine with knee wedge in place  for ultrasound at 1.5w/cm2 at 50% for 8 minutes over right plantar fascia area with plantar fascia stretch during and after treatment. Following exercises and US ice massage to right plantar fascia over tender area for 3-4 minutes with good tolerance. TREATMENT/SESSION ASSESSMENT:  Lonnie Briones with decreased pain and tenderness in plantar fascia and medial heel today. Increased tolerance to exercises and treatment. Patient able to tolerate ice massage today. Instructed patient in HEP and given written handouts and yellow theraband for home. Patient understood exercises and instructed to perform exercises 1-2 times a day and use ice 2-3 times a day. .     Education: Provided pt with HEP handout. RECOMMENDATIONS/INTENT FOR NEXT TREATMENT SESSION: \"Next visit will focus on advancements to more challenging activities\".     PAIN: Initial:no number given Post Session: no number given     [employer] Portal    Total Treatment Billable Duration:  Minutes 48  PT Patient Time In/Time Out  Time In: 1430  Time Out: 1619 Trisha Simon, VIGNESH    Future Appointments   Date Time Provider Julia Thibodeaux   3/20/2020  2:30 PM Susan Zacarias [facility] [place]   3/24/2020  2:30 PM Jose Francisco Manuelr, [facility] SFD   3/27/2020  2:30 PM Jose Francisco , [facility] [place] SFD   3/31/2020  2:30 PM Geni MCKEON [facility] SFD   4/3/2020  2:30 PM Geni MCKEON [facility] SFD   4/14/2020  2:20 PM Jerson Mcclellan NP [facility] PSCD PP   7/14/2020 10:00 AM Trena Damon MD SSA EMG EMG

## 2020-08-14 NOTE — THERAPY DISCHARGE
Andrea Calderon  : 1955  [place] / Product Type: Medicare /  [address]  at [facility]  [address], [address] [facility], [place] [address] [address] [facility]  [phone]   [phone]      OUTPATIENT PHYSICAL THERAPY: Discontinuation Summary:  Pt did not return to clinic following re-opening after closure due to covid-19. Will discharge from PT at this time. Thank you for this referral.     ICD-10: Treatment Diagnosis:   Difficulty in walking, not elsewhere classified (R26.2)  Pain in right ankle and joints of right foot (M25.571)  Stiffness of right ankle, not elsewhere classified (M25.671)     Precautions/Allergies:   Latex; Sulfa (sulfonamide antibiotics); and Other medication     TREATMENT PLAN:  Effective Dates: 3/3/2020 TO 2020 (60 days). Frequency/Duration: 2 times a week for 60 Days        PRE-TREATMENT SYMPTOMS/COMPLAINTS:  Pt reports that she is feeling better with less pain and tenderness. Patient states she has made it a point to be on her feet less and thinks that that has helped. Patient reports she has not used ice at home. MEDICATIONS REVIEWED:  3/10/2020   TREATMENT:   (In addition to Assessment/Re-Assessment sessions the following treatments were rendered)    THERAPEUTIC EXERCISE: ( 25 minutes):  Exercises per grid below to improve mobility, strength and balance. Required minimal visual and verbal cues to promote proper body alignment and promote proper body posture. Progressed resistance, range and complexity of movement as indicated.      Date:  3/3/2020 Date:  3/10/20 Date:  3/17/20     Activity/Exercise Parameters Parameters Parameters   Standing gastroc stretch HEP --    Seated gastroc stretch HEP --    1st ray stretch HEP --    Slant board stretch  30 sec 3 X  3 x 30 second hold     Intrinsic foot   30 X     Towel scrunches  3X  3 reps    Tilt board (all directions)  10 X     SLS  --    Tandem balance  --    Ankle TB - 4 way  YTB 10 X  Yellow theraband  2 x 10 reps each   bridges  10 X  2 x 10 reps   clamshell  15 X  2 x 10 reps B   Hip abduction  --    Ankle ABC  1 X      MANUAL THERAPY: (15 minutes): Joint mobilization, Soft tissue mobilization and Manipulation was utilized and necessary because of the patient's restricted joint motion, painful spasm, loss of articular motion and restricted motion of soft tissue. Patient able to tolerate STM over plantar fascia and even over tender area medial heel. Decreased tenderness today during treatment. Better tolerance to all treatment today. Passive plantar fascia stretching. (Used abbreviations: MET - muscle energy technique; PNF - proprioceptive neuromuscular facilitation; NMR - neuromuscular re-education; AP - anterior to posterior; PA - posterior to anterior)    MODALITIES: (12  minutes): Patient supine with knee wedge in place  for ultrasound at 1.5w/cm2 at 50% for 8 minutes over right plantar fascia area with plantar fascia stretch during and after treatment. Following exercises and US ice massage to right plantar fascia over tender area for 3-4 minutes with good tolerance. TREATMENT/SESSION ASSESSMENT:  Nat Shield with decreased pain and tenderness in plantar fascia and medial heel today. Increased tolerance to exercises and treatment. Patient able to tolerate ice massage today. Instructed patient in HEP and given written handouts and yellow theraband for home. Patient understood exercises and instructed to perform exercises 1-2 times a day and use ice 2-3 times a day. .     Education: Provided pt with HEP handout. RECOMMENDATIONS/INTENT FOR NEXT TREATMENT SESSION: \"Next visit will focus on advancements to more challenging activities\".     PAIN: Initial:no number given Post Session: no number given     [employer] Portal    Total Treatment Billable Duration:  Minutes 48  PT Patient Time In/Time Out  Time In: 9051  Time Out: 1411 [address] Appointments   Date Time Provider Julia Morelia   8/17/2020 11:00 AM MD MARBELLA Leon EMG EMG   8/18/2020  9:00 AM [facility] ROOM 4 [facility]   1/20/2021  8:40 AM MD MARBELLA Leon EMG EMG

## 2020-08-18 ENCOUNTER — HOSPITAL ENCOUNTER (OUTPATIENT)
Dept: MAMMOGRAPHY | Age: 65
Discharge: HOME | End: 2020-08-18
Attending: FAMILY MEDICINE
Payer: MEDICARE

## 2020-08-18 DIAGNOSIS — Z12.31 OTHER SCREENING MAMMOGRAM: ICD-10-CM

## 2020-08-18 PROCEDURE — 77063 BREAST TOMOSYNTHESIS BI: CPT

## 2020-10-20 NOTE — ED NOTES
Medication Instructions:    - Make an appointment with Allergy & Immunology.  As soon as you have your appointment scheduled, call my office to schedule an appointment with me for about 1-2 weeks after your appointment with Allergy & Immunology.     - Stop picking and scratching all of the areas.    - Stop betamethasone dipropionate (DIPROSONE) 0.05 % cream and stop oral benadryl.    - Start clobetasol 0.05% (Temovate) topical solution - Apply to affected areas of scalp and shoulder sparingly, rub in thoroughly, twice daily. DO NOT USE ON FACE.    - Start fluticasone (Cutivate) 0.05% cream - Apply to affected areas of face sparingly, rub in thoroughly, nightly at bedtime.    - Continue fexofenadine (ALLEGRA ALLERGY) 180 MG tablet - Take 1 tablet by mouth twice daily.    - Start cetirizine (Zyrtec) 10 mg tablet - Take 1 tablet by mouth at bedtime.      If you have any questions or concerns-  During the hours of 8:00 am to 5:00 pm Monday through Friday, please contact us at one of the following offices:       [facility]: [phone]  [facility]):  [phone]    Festus Dupree MD if out of the office on Wednesdays and Fridays.  If you call the office on one of those days, your call may be taken care of by a covering physician if it is urgent.  If it is not urgent, it may wait until he is back in the office.  Please notify the  if it is urgent and/or you would like the covering physician to address for you.  Otherwise, a nurse from Festus Dupree MD's office will return your call upon his return to the office.    You can also contact us anytime thru [facility] to make an appointment, questions for your provider, and medication refills.    If it is urgent that you speak with someone outside of these hours, our [facility] Call Center will be able to assist you at [phone].    Thank you for choosing [facility] for your Dermatology  Pt was up for discharge, did not have prescriptions, left without discharge papers or signing. care.    Festus Dupree MD

## 2020-11-10 PROBLEM — E66.01 SEVERE OBESITY (HCC): Status: ACTIVE | Noted: 2020-11-10

## 2021-07-03 ENCOUNTER — APPOINTMENT (OUTPATIENT)
Dept: GENERAL RADIOLOGY | Age: 66
End: 2021-07-03
Attending: PHYSICIAN ASSISTANT
Payer: MEDICARE

## 2021-07-03 ENCOUNTER — HOSPITAL ENCOUNTER (EMERGENCY)
Age: 66
Discharge: HOME | End: 2021-07-03
Attending: EMERGENCY MEDICINE
Payer: MEDICARE

## 2021-07-03 VITALS
HEART RATE: 86 BPM | SYSTOLIC BLOOD PRESSURE: 164 MMHG | WEIGHT: 232 LBS | BODY MASS INDEX: 37.28 KG/M2 | HEIGHT: 66 IN | TEMPERATURE: 98.5 F | DIASTOLIC BLOOD PRESSURE: 103 MMHG | RESPIRATION RATE: 16 BRPM | OXYGEN SATURATION: 100 %

## 2021-07-03 DIAGNOSIS — M25.562 ACUTE PAIN OF LEFT KNEE: ICD-10-CM
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
## 2021-07-03 PROCEDURE — 99283 EMERGENCY DEPT VISIT LOW MDM: CPT

## 2021-07-03 PROCEDURE — 73562 X-RAY EXAM OF KNEE 3: CPT

## 2021-07-03 PROCEDURE — 74011250637 HC RX REV CODE- 250/637: Performed by: PHYSICIAN ASSISTANT

## 2021-07-03 RX ORDER — BACLOFEN 10 MG/1
10 TABLET ORAL 3 TIMES DAILY
Qty: 12 TABLET | Refills: 0 | Status: SHIPPED | OUTPATIENT
Start: 2021-07-03 | End: 2021-07-15

## 2021-07-03 RX ORDER — BACLOFEN 10 MG/1
10 TABLET ORAL 3 TIMES DAILY
Status: DISCONTINUED | OUTPATIENT
Start: 2021-07-03 | End: 2021-07-03 | Stop reason: HOSPADM

## 2021-07-03 RX ADMIN — BACLOFEN 10 MG: 10 TABLET ORAL at 16:23

## 2021-07-03 NOTE — ED PROVIDER NOTES
26-year-old female who presents to emergency room with chief complaint of right-sided flank pain, left knee pain after she slipped on some fruit in the produce section at [facility]. She states she did not fall all the way to the ground, she was able to catch herself with the shopping cart before she hit the ground. States she twisted her knee in the process. Pain is worse with palpation, weightbearing. Alleviated by immobilization. Past Medical History:   Diagnosis Date    Acute maxillary sinusitis     Allergic rhinitis 4/24/2015    Dr. Miguel Holcomb    Asthma     Atrophic vaginitis 4/24/2015    Benign hypertension 4/24/2015    Chondromalacia of right patella     Chronic anxiety 4/24/2015    Cystocele, midline 4/24/2015    Diverticulosis of colon 4/24/2015    Dysmenorrhea 4/24/2015    GYN: Referred to Dr. Zeke Lyle Edema 4/24/2015    Including peripheral edema.  Eustachian tube dysfunction     Female stress incontinence     Gastrointestinal disorder     diverticulitis, GERD    GERD (gastroesophageal reflux disease)     Hypercholesterolemia     Hyperplastic polyps of stomach 4/24/2015    Colonsocpy. 2013.  Hypertension     Influenza 1/13/2019    Internal hemorrhoids without mention of complication 0/29/5996    Obesity (BMI 30-39.9) 11/14/2017    Obstructive sleep apnea 11/14/2017    Other diseases of lung, not elsewhere classified 4/24/2015    Solitary nodule of lung. 3mm LLB, CT (4/18/2013) benign appearing, (recheck in 1 year if clinically indicated. IN March 2014, obtaining Lung-ca blood test). 4/2014 slight increased size compared to 4/2013, but not 11/2013.  In 4/2014 report is recc to repeat 10/2014     Right upper quadrant pain     Urge incontinence     Ventricular bigeminy 8/31/2016       Past Surgical History:   Procedure Laterality Date    HX HYSTERECTOMY      HX KNEE ARTHROSCOPY Left     HX DONATO AND BSO      ,DONATO,BSO         Family History:   Problem Relation Age of Onset    Asthma Mother     Hypertension Sister     Diabetes Sister     Cancer Other         Lung    Malignant Hyperthermia Neg Hx     Pseudocholinesterase Deficiency Neg Hx     Delayed Awakening Neg Hx     Post-op Nausea/Vomiting Neg Hx     Emergence Delirium Neg Hx     Post-op Cognitive Dysfunction Neg Hx     Other Neg Hx     Breast Cancer Neg Hx        Social History     Socioeconomic History    Marital status:      Spouse name: Not on file    Number of children: Not on file    Years of education: Not on file    Highest education level: Not on file   Occupational History    Not on file   Tobacco Use    Smoking status: Former Smoker     Packs/day: 0.25     Years: 10.00     Pack years: 2.50     Quit date: 10/16/1996     Years since quittin.7    Smokeless tobacco: Never Used   Substance and Sexual Activity    Alcohol use: No    Drug use: No    Sexual activity: Yes     Partners: Male   Other Topics Concern    Not on file   Social History Narrative    Not on file     Social Determinants of Health     Financial Resource Strain:     Difficulty of Paying Living Expenses:    Food Insecurity:     Worried About Running Out of Food in the Last Year:     Ran Out of Food in the Last Year:    Transportation Needs:     Lack of Transportation (Medical):  Lack of Transportation (Non-Medical):    Physical Activity:     Days of Exercise per Week:     Minutes of Exercise per Session:    Stress:     Feeling of Stress :    Social Connections:     Frequency of Communication with Friends and Family:     Frequency of Social Gatherings with Friends and Family:     Attends Oriental orthodox Services:     Active Member of Clubs or Organizations:     Attends Club or Organization Meetings:     Marital Status:    Intimate Partner Violence:     Fear of Current or Ex-Partner:     Emotionally Abused:     Physically Abused:     Sexually Abused:           ALLERGIES: Latex, Sulfa (sulfonamide antibiotics), and Other medication    Review of Systems   Constitutional: Negative for chills and fever. HENT: Negative for facial swelling. Respiratory: Negative for chest tightness and shortness of breath. Cardiovascular: Negative for chest pain. Gastrointestinal: Negative for abdominal pain, nausea and vomiting. Musculoskeletal: Positive for arthralgias and back pain. Negative for myalgias. Neurological: Negative for headaches. Psychiatric/Behavioral: Negative for confusion. All other systems reviewed and are negative. Vitals:    07/03/21 1355   BP: (!) 164/103   Pulse: 86   Resp: 16   Temp: 98.5 °F (36.9 °C)   SpO2: 100%   Weight: 105.2 kg (232 lb)   Height: 5' 6\" (1.676 m)            Physical Exam  Vitals and nursing note reviewed. Constitutional:       Appearance: Normal appearance. HENT:      Head: Normocephalic and atraumatic. Mouth/Throat:      Mouth: Mucous membranes are moist.   Eyes:      Pupils: Pupils are equal, round, and reactive to light. Cardiovascular:      Rate and Rhythm: Normal rate and regular rhythm. Pulmonary:      Effort: No respiratory distress. Breath sounds: Normal breath sounds. Abdominal:      Palpations: Abdomen is soft. Tenderness: There is no abdominal tenderness. There is no guarding. Musculoskeletal:      Right knee: Normal.      Left knee: Tenderness present. Skin:     General: Skin is warm and dry. Neurological:      Mental Status: She is alert and oriented to person, place, and time. Mental status is at baseline. Psychiatric:         Mood and Affect: Mood normal.          MDM  Number of Diagnoses or Management Options  Diagnosis management comments: Patient improved in the emergency room after administration of muscle fracture. Advised to do ice on affected knee. X-ray is unremarkable no bony abnormality, no joint effusion present. Also advised to follow-up with primary care, patient verbalized understanding.   Time of discharge patient resting company no acute distress. Voice dictation software was used during the making of this note. This software is not perfect and grammatical and other typographical errors may be present. This note has been proofread, but may still contain errors.    Norman Guan PA-C        Amount and/or Complexity of Data Reviewed  Tests in the radiology section of CPT®: ordered and reviewed    Risk of Complications, Morbidity, and/or Mortality  Presenting problems: low  Diagnostic procedures: low  Management options: low           Procedures

## 2021-07-03 NOTE — ED NOTES
I have reviewed discharge instructions with the patient. The patient verbalized understanding. Patient left ED via Discharge Method: ambulatory to Home. Opportunity for questions and clarification provided. Patient given 1 scripts. To continue your aftercare when you leave the hospital, you may receive an automated call from our care team to check in on how you are doing. This is a free service and part of our promise to provide the best care and service to meet your aftercare needs.  If you have questions, or wish to unsubscribe from this service please call [phone]. Thank you for Choosing our [address] [place] [address] Emergency Department.

## 2021-07-03 NOTE — ED TRIAGE NOTES
Pt to triage via wheelchair. Reports she had mechanical fall. Reports she slid on a piece of fruit with her right foot, twisted her body to the left. Pt denies hitting the ground. Reports pain in left knee and down her right hip and leg.  Able to walk after, pain worsens with movement

## 2021-07-08 PROBLEM — R53.83 FATIGUE: Status: RESOLVED | Noted: 2017-11-14 | Resolved: 2021-07-08

## 2021-07-15 ENCOUNTER — HOSPITAL ENCOUNTER (OUTPATIENT)
Dept: PHYSICAL THERAPY | Age: 66
Discharge: HOME | End: 2021-07-15
Attending: FAMILY MEDICINE
Payer: MEDICARE

## 2021-07-15 DIAGNOSIS — S83.412A SPRAIN OF MEDIAL COLLATERAL LIGAMENT OF LEFT KNEE, INITIAL ENCOUNTER: ICD-10-CM

## 2021-07-15 DIAGNOSIS — M25.562 ACUTE PAIN OF LEFT KNEE: ICD-10-CM

## 2021-07-15 PROCEDURE — 97530 THERAPEUTIC ACTIVITIES: CPT

## 2021-07-15 PROCEDURE — 97162 PT EVAL MOD COMPLEX 30 MIN: CPT

## 2021-07-15 NOTE — THERAPY EVALUATION
Sascha Ferrer  : 1955  [place] / [place] / Product Type: Medicare /  [address]  at [facility]  [address] [facility], [address]  [phone]   [phone]        OUTPATIENT PHYSICAL THERAPY:Initial Assessment 7/15/2021    ICD-10: Treatment Diagnosis:   M25.562 Pain in Left Knee  R26.89 Other Abnormalities of Gait  M25.662 Stiffness Left Knee    Precautions/Allergies:   Latex, Sulfa (sulfonamide antibiotics), and Other medication   Fall Risk Score: (? 5 = High Risk)  MD Orders:  MEDICAL/REFERRING DIAGNOSIS:  Sprain of medial collateral ligament of left knee, initial encounter [S83.412A]  Acute pain of left knee [M25.562]   DATE OF ONSET:   REFERRING PHYSICIAN: Mita Barkley MD  RETURN PHYSICIAN APPOINTMENT:      ASSESSMENT:  Ms. Gissel Solano has attended 1 physical therapy session including the initial evaluation. Patient presents with signs and symptoms that are consistent with: pain due to degenerative changes that may have been further aggravating by slipping in [employer]. It is possible she had/has a mild MCL strain or disruption of the medial meniscus, but clinical testing was negative this date. The only testing that was positive was in bilateral hips. The current impairments identified include: Decreased strength, dec ROM, muscle coordination, pain, abnormal posture. The functional deficits are as follows: Sit to stand and stand to sit, prolonged walking (<15min), stairs (2STE home and 1 flight inside she rarely uses)    Recommending skilled PT: manual therapeutic techniques (as appropriate), therapeutic exercises and activities, balance interventions, and a comprehensive home exercise  program to address the current impairments, as listed above.   Sascha Ferrer will benefit from skilled PT (medically necessary) to address above deficits affecting participation in basic ADLs and overall functional tolerance. PROBLEM LIST (Impacting functional limitations):  1. Decreased Strength  2. Decreased ADL/Functional Activities  3. Decreased Transfer Abilities  4. Decreased Ambulation Ability/Technique  5. Decreased Balance  6. Increased Pain  7. Decreased Flexibility/Joint Mobility  8. Decreased [place] with Home Exercise Program INTERVENTIONS PLANNED:  1. Balance Exercise  2. Cold  3. Cryotherapy  4. Electrical Stimulation  5. Family Education  6. Gait Training  7. Heat  8. Home Exercise Program (HEP)  9. Manual Therapy  10. Neuromuscular Re-education/Strengthening  11. Range of Motion (ROM)  12. Therapeutic Activites  13. Therapeutic Exercise/Strengthening  14. Transcutaneous Electrical Nerve Stimulation (TENS)  15. Transfer Training   TREATMENT PLAN:  TREATMENT PLAN:  Effective Dates: 7/15/2021 TO 10/13/2021 (90 days). Frequency/Duration: 2 times a week for 90 Days  GOALS: (Goals have been discussed and agreed upon with patient.)  Short-Term Functional Goals: Time Frame: 4 weeks  1. PT will be compliant with initial HEP. 2.   Pt will decrease worst pain to 4/10 with functional activities. 3.   LEFS score equal to or more than 25/80. Discharge Goals: Time Frame: 8 weeks  1. PT will be independent with final HEP. 2. Pt will decrease worst pain to 1/10 with all functional activities. 3. LEFS score equal to or more than 50/80. 4. Pt will be able to shower, dress, and perform household chores without limitation. 5. Pt will be able to perform a 30 second chair rise 10 times or more to demonstrate a reduction in fall risk per age group. Rehabilitation Potential For Stated Goals: Guarded    Outcome Measure: Tool Used: Lower Extremity Functional Scale (LEFS)  Score:  Initial: 11/80 Most Recent: X/80 (Date: -- )   Interpretation of Score: 20 questions each scored on a 5 point scale with 0 representing \"extreme difficulty or unable to perform\" and 4 representing \"no difficulty\".   The lower the score, the greater the functional disability. 80/80 represents no disability. Minimal detectable change is 9 points. Medical Necessity:   · Skilled intervention continues to be required due to decreased strength, ROM, balance, and functional mobility. Reason for Services/Other Comments:  · Patient continues to require skilled intervention due to decreased strength, balance, and ROM with increased pain affecting pt functional mobility. Regarding [place] [facility] therapy, I certify that the treatment plan above will be carried out by a therapist or under their direction. Thank you for this referral,  Karolina Smith, PT, DPT  Referring Physician Signature: Lizeth Altamirano MD              Date                    The information in this section was collected on 7/15/21 (except where otherwise noted). HISTORY:   History of Present Injury/Illness (Reason for Referral): Pt is a 61 y.o. female presents today for evaluation of left knee pain. She states she was at [facility] 6/3/21 and slipped but did not quite fall but did twist her L knee on a planted foot and then went to the ER. She felt a pop in her L knee and then sensed sharp pain. Also some soreness in her left lower back with some pains radiating down her left anterior thigh. She states she has a history of bilateral knee arthroscopic surgery. She is going to an ortho specialist in mid August. She at first denied a history of LBP or hip pain. She later stated she had bilateral hip pain prior to her slip. Xray of L knee was negative. She states [employer] has agreed to reimburse her health ins company for her medical care.        Treatment Side: Left    Past Medical History/Comorbidities:   Ms. Sheryle Manns  has a past medical history of Acute maxillary sinusitis, Allergic rhinitis (4/24/2015), Asthma, Atrophic vaginitis (4/24/2015), Benign hypertension (4/24/2015), Chondromalacia of right patella, Chronic anxiety (4/24/2015), Cystocele, midline (4/24/2015), Diverticulosis of colon (4/24/2015), Dysmenorrhea (4/24/2015), Edema (4/24/2015), Eustachian tube dysfunction, Female stress incontinence, Gastrointestinal disorder, GERD (gastroesophageal reflux disease), Hypercholesterolemia, Hyperplastic polyps of stomach (4/24/2015), Hypertension, Influenza (1/13/2019), Internal hemorrhoids without mention of complication (0/42/5955), Obesity (BMI 30-39.9) (11/14/2017), Obstructive sleep apnea (11/14/2017), Other diseases of lung, not elsewhere classified (4/24/2015), Right upper quadrant pain, Urge incontinence, and Ventricular bigeminy (8/31/2016). She also has no past medical history of Adverse effect of anesthesia, Aneurysm ([address] Autoimmune disease ([address] CAD (coronary artery disease), Cancer ([address] Chronic kidney disease, Chronic pain, Coagulation disorder ([address] COPD, Dementia, Diabetes ([address] Difficult intubation, Endocrine disease, Heart failure ([address] Ill-defined condition, Liver disease, Malignant hyperthermia due to anesthesia, Nausea & vomiting, Neurological disorder, Other ill-defined conditions(799.89), Pseudocholinesterase deficiency, Psychiatric disorder, PUD (peptic ulcer disease), Seizures ([address] Stroke ([address] Thromboembolus ([address] or Thyroid disease. Ms. Tennille Huston  has a past surgical history that includes hx cathy and bso (); hx hysterectomy; and hx knee arthroscopy (Left).       Pain/Symptom Location: L knee (medial joint line and patella), anterior hip (bilat), thigh, occasionally to her L foot        Worst Pain: 3/10      Current Pain: 6/10        Aggravating Factors/Functional Limitations: Sit to stand and stand to sit, prolonged walking (<15min), stairs (2STE home and 1 flight inside she rarely uses)        Alleviating Factors/Positions/Motions: rest, heat (states she is sensitive to cold)        Occupation: on disability due to asthma         Falls: none    Current Medications:       Current Outpatient Medications:     naproxen (NAPROSYN) 375 mg tablet, Take 1 Tablet by mouth two (2) times daily (with meals). , Disp: 30 Tablet, Rfl: 0    baclofen (LIORESAL) 10 mg tablet, Take 1 Tablet by mouth three (3) times daily. (Patient not taking: Reported on 7/8/2021), Disp: 12 Tablet, Rfl: 0    amLODIPine (NORVASC) 10 mg tablet, Take 1 Tab by mouth daily. , Disp: 30 Tab, Rfl: 5    pantoprazole (PROTONIX) 40 mg tablet, Take 1 Tab by mouth two (2) times a day., Disp: 60 Tab, Rfl: 5    dicyclomine (BENTYL) 20 mg tablet, Take 1 Tab by mouth every six (6) hours. , Disp: 120 Tab, Rfl: 5    valsartan (DIOVAN) 160 mg tablet, Take 1 Tab by mouth nightly., Disp: 30 Tab, Rfl: 5    QVAR 80 mcg/actuation inhaler, , Disp: , Rfl: 5    albuterol (PROAIR HFA) 90 mcg/actuation inhaler, Take 2 Puffs by inhalation every four (4) hours as needed for Wheezing. Patient instructed to take morning of surgery per anesthesia guidelines, Disp: , Rfl:     albuterol (PROVENTIL VENTOLIN) 2.5 mg /3 mL (0.083 %) nebulizer solution, , Disp: , Rfl: 5    EPINEPHrine (EPIPEN) 0.3 mg/0.3 mL (1:1,000) injection, 0.3 mg by IntraMUSCular route once as needed. , Disp: , Rfl:     Cetirizine (ZYRTEC) 10 mg cap, Take  by mouth., Disp: , Rfl:     fluticasone (FLONASE) 50 mcg/actuation nasal spray, nightly., Disp: , Rfl:    Date Last Reviewed:  7/15/2021       Ambulatory/Rehab Services H2 Model Falls Risk Assessment    Risk Factors:       No Risk Factors Identified Ability to Rise from Chair:       (1)  Pushes up, successful in one attempt    Falls Prevention Plan:       No modifications necessary   Total: (5 or greater = High Risk): 1    ©2010 [facility] . [facility] States Patent #[id number].  Federal Law prohibits the replication, distribution or use without written permission from [facility]        Number of Personal Factors/Comorbidities that affect the Plan of Care: 1-2: MODERATE COMPLEXITY   EXAMINATION: ________________________________________________________________________________________________  Observation        Posture: forward head/shoulers, inc knee/hip/trunk flexion        Gait: Antalgic        ________________________________________________________________________________________________  Range of Motion            Knee:    Left Right   AROM (0 to 105) pain in L ant thigh (0 to 105) pain in L ant thigh   PROM (0 to 110) (0 to 110)   Circumference at joint line 24cm 23cm       ________________________________________________________________________________________________  Strength        Lower Extremity  Joint:      RIGHT LEFT   Hip Flexion 3-/5 3-/5   Hip Extension     Hip Internal Rotation     Hip External Rotation     Hip Abduction 4-/5 4-/5   Hip Adduction     Knee Flexion 4-/5 4-/5   Knee Extension 4-/5 4-/5     ________________________________________________________________________________________________  [facility]        Sensation: Unremarkable     Balance: DNT this date due to low tolerance for weight bearing     Left Right   FT     Tandem     Single Limb Stance       30 Second Chair Rise: DNT this date due to pain with sit to stand    ________________________________________________________________________________________________  Palpation: She reported left hip pail with palpation of her left MCL with the left LE in a neutral position. No pain in her knee with any palpation of her left knee. Joint mobilization:     Patellar mobility: lateral patellar tilt bilat but normal mobility  ________________________________________________________________________________________________  Special Tests:    Posterior Drawer: DNT  Anterior Drawer: Negative    Varus Stress Test: at 0deg: Negative for laxity or pain; at 30deg: Negative for laxity or pain;  Valgus Stress Test: at 0deg: Negative for laxity or pain; at 30deg: Negative for laxity or pain;     Appley's Compression Test: DNT  Chelsey's Test: with IR: Negative; with ER: Negative  Bounce Home Test: Negative    Sky Test: Pt would not tolerate position. 90/90 Hamstring Test: Pt would not tolerate position. Scour Test: Positive on L  SHELBI: Positive bilat  FADIR: Positive bilat  Note: instant relief in L hip pain with L long axis distraction       Body Structures Involved:  1. Nerves  2. Bones  3. Joints  4. Muscles  5. Ligaments Body Functions Affected:  1. Sensory/Pain  2. Neuromusculoskeletal  3. Movement Related Activities and Participation Affected:  1. General Tasks and Demands  2. Mobility  3. Self Care  4. Domestic Life  5. Interpersonal Interactions and Relationships  6.  Community, Social and [place]   Number of elements (examined above) that affect the Plan of Care: 3: MODERATE COMPLEXITY   CLINICAL PRESENTATION:   Presentation: Evolving clinical presentation with changing clinical characteristics: MODERATE COMPLEXITY   CLINICAL DECISION MAKING:      Use of outcome tool(s) and clinical judgement create a POC that gives a: Questionable prediction of patient's progress: MODERATE COMPLEXITY

## 2021-07-15 NOTE — PROGRESS NOTES
Arsalan Ferrer  : 1955  [place] / Product Type: Medicare /  [address]  at [address] [facility] [address], [address] [facility], [place] [address] [address] [facility]  [phone]   [phone]      OUTPATIENT PHYSICAL THERAPY: Daily Treatment Note 7/15/2021  Visit Count:  1    ICD-10: Treatment Diagnosis:   M25.562 Pain in Left Knee  R26.89 Other Abnormalities of Gait  M25.662 Stiffness Left Knee    Precautions/Allergies:   Latex, Sulfa (sulfonamide antibiotics), and Other medication     TREATMENT PLAN:  Effective Dates: 7/15/2021 TO 10/13/2021 (90 days). Frequency/Duration: 2 times a week for 90 Days        PRE-TREATMENT SYMPTOMS/COMPLAINTS:  L knee pain (Note: also bilat hip and L LBP)    Functional limitations reported at initial Eval: Sit to stand and stand to sit, prolonged walking (<15min), stairs (2STE home and 1 flight inside she rarely uses)    MEDICATIONS REVIEWED:  7/15/2021   TREATMENT:   (In addition to Assessment/Re-Assessment sessions the following treatments were rendered)    THERAPEUTIC EXERCISE: Exercises per grid below to improve mobility, strength, gait, and balance. Required minimal visual and verbal cues to promote proper body alignment and promote proper body posture. Progressed resistance, range and complexity of movement as indicated. Dx: L knee pain Date:  7/15/2021 Date:   Date:     Activity/Exercise Parameters Parameters Parameters                                                                 Initial HEP: SLR hip flxn/abd, heel slides,     MANUAL THERAPY: Note performed apart from evaluation. MODALITIES: Not performed. Note: states she does not like cold. TREATMENT/SESSION ASSESSMENT:  Arsalan Ferrer verbalized understanding of role of PT and POC.     Education: Pt education for initial HEP safety, exercise frequency and duration, symptom control, mechanics, fall risk, and anatomy and physiology of present condition/healing time. RECOMMENDATIONS/INTENT FOR NEXT TREATMENT SESSION: Next visit will focus on advancements to more challenging activities.      PAIN: Initial: 5/10 Post Session:  5/10         Total Treatment Billable Duration: 24 minutes     Manual Therapy: (0 minutes)     Ther-Ex: (0 minutes)     Ther-Act: (24 minutes)     Neuro Re-ed (0 minutes)     Modalities: (0 minutes)  PT Patient Time In/Time Out  Time In: 1300  Time Out: 1345  Dennise Baig, PT, DPT    Future Appointments   Date Time Provider Julia Thibodeaux   7/22/2021  1:00 PM Magno Evans PT [facility]   8/12/2021  9:05 AM Yuan Pruitt MD [facility]   8/19/2021 10:40 AM Sandra Frost MD [facility]   1/4/2022 10:20 AM Sandra Frost MD [facility]       Visit Approval Visit # Therapist initials Date A NS / Cx < 24 hr >24 hr Cx Comments    1 RH 7/15/21 [x]  [] [] Initial evaluation       [] [] []        [] [] []        [] [] []        [] [] []        [] [] []        [] [] []        [] [] []        [] [] []        [] [] []        [] [] []        [] [] []        [] [] []        [] [] []        [] [] []        [] [] []        [] [] []        [] [] []

## 2021-07-16 PROBLEM — R73.03 PREDIABETES: Status: ACTIVE | Noted: 2021-07-16

## 2021-07-22 ENCOUNTER — HOSPITAL ENCOUNTER (OUTPATIENT)
Dept: PHYSICAL THERAPY | Age: 66
Discharge: HOME | End: 2021-07-22
Attending: FAMILY MEDICINE
Payer: MEDICARE

## 2021-07-22 PROCEDURE — 97140 MANUAL THERAPY 1/> REGIONS: CPT

## 2021-07-22 PROCEDURE — 97530 THERAPEUTIC ACTIVITIES: CPT

## 2021-07-22 PROCEDURE — 97110 THERAPEUTIC EXERCISES: CPT

## 2021-07-22 NOTE — PROGRESS NOTES
Roshan Ferrer  : 1955  [place] / Product Type: Medicare /  [address]  at [address] [facility] [address], [address] [facility], [place] [address] [address] [facility]  [phone]   [phone]      OUTPATIENT PHYSICAL THERAPY: Daily Treatment Note 2021  Visit Count:  2    ICD-10: Treatment Diagnosis:   M25.562 Pain in Left Knee  R26.89 Other Abnormalities of Gait  M25.662 Stiffness Left Knee    Precautions/Allergies:   Latex, Sulfa (sulfonamide antibiotics), and Other medication     TREATMENT PLAN:  Effective Dates: 7/15/2021 TO 10/13/2021 (90 days). Frequency/Duration: 2 times a week for 90 Days        PRE-TREATMENT SYMPTOMS/COMPLAINTS:  L knee pain (Note: also bilat hip and L LBP)    Functional limitations reported at initial Eval: Sit to stand and stand to sit, prolonged walking (<15min), stairs (2STE home and 1 flight inside she rarely uses)     Daily Note Subjective: Pt reports the pain is in her anterior L knee. She states when she feels it a \"catch\" in her knee at times. She reports compliance with her HEP. MEDICATIONS REVIEWED:  2021   TREATMENT:   (In addition to Assessment/Re-Assessment sessions the following treatments were rendered)    THERAPEUTIC EXERCISE: Exercises per grid below to improve mobility, strength, gait, and balance. Required minimal visual and verbal cues to promote proper body alignment and promote proper body posture. Progressed resistance, range and complexity of movement as indicated.     Dx: L knee pain Date:  2021 Date:   Date:     Activity/Exercise Parameters Parameters Parameters   SLR 2 x 10 (painfull)     Sit to stand (elevated) 5x (painfull)     Swiss ball roll outs with heel digs 2 x 10 (painfull)     Nustep 5min; no resistance     Standing hip abd 2 x 10                                     Initial HEP: SLR hip flxn/abd, heel slides,     MANUAL THERAPY: PROM, gentle sup/inf patellar mobs with lift, STM quads    MODALITIES: Not performed. Note: states she does not like cold. TREATMENT/SESSION ASSESSMENT: Pt had low tolerance for exercise overall - even ones in non weightbearing. Recommended aquatic physical therapy to start, but she states it is too far from her house. Education: Pt education for initial HEP safety, exercise frequency and duration, symptom control, mechanics, fall risk, and anatomy and physiology of present condition/healing time. RECOMMENDATIONS/INTENT FOR NEXT TREATMENT SESSION: Next visit will focus on advancements to more challenging activities.      PAIN: Initial: 5/10 Post Session:  5/10     Total Treatment Billable Duration: 40 minutes      Manual Therapy: (14 minutes)     Ther-Ex: (16 minutes)     Ther-Act: (10 minutes)     Neuro Re-ed (0 minutes)     Modalities: (0 minutes)  PT Patient Time In/Time Out  Time In: 1300  Time Out: 1340  Alok Espinosa, DPT    Future Appointments   Date Time Provider Julia Thibodeaux   8/12/2021  9:05 AM Massiel Olivares MD [facility] POA   8/19/2021 10:40 AM Deirdre Horowitz MD [facility]   1/4/2022 10:20 AM Deirdre Horowitz MD [facility]       Visit Approval Visit # Therapist initials Date A NS / Cx < 24 hr >24 hr Cx Comments    1  7/15/21 [x]  [] [] Initial evaluation    2  7/22/21 [x] [] []        [] [] []        [] [] []        [] [] []        [] [] []        [] [] []        [] [] []        [] [] []        [] [] []        [] [] []        [] [] []        [] [] []        [] [] []        [] [] []        [] [] []        [] [] []        [] [] []

## 2021-07-28 ENCOUNTER — HOSPITAL ENCOUNTER (OUTPATIENT)
Dept: PHYSICAL THERAPY | Age: 66
Discharge: HOME | End: 2021-07-28
Attending: FAMILY MEDICINE
Payer: MEDICARE

## 2021-07-28 PROCEDURE — 97140 MANUAL THERAPY 1/> REGIONS: CPT

## 2021-07-28 PROCEDURE — 97110 THERAPEUTIC EXERCISES: CPT

## 2021-07-28 NOTE — PROGRESS NOTES
Moises Berne Nabil  : 1955  [place] / Product Type: Medicare /  [address]  at [facility]  [address], [address] [facility], [place] [address] [address] [facility]  [phone]   [phone]      OUTPATIENT PHYSICAL THERAPY: Daily Treatment Note 2021  Visit Count:  3    ICD-10: Treatment Diagnosis:   M25.562 Pain in Left Knee  R26.89 Other Abnormalities of Gait  M25.662 Stiffness Left Knee    Precautions/Allergies:   Latex, Sulfa (sulfonamide antibiotics), and Other medication     TREATMENT PLAN:  Effective Dates: 7/15/2021 TO 10/13/2021 (90 days). Frequency/Duration: 2 times a week for 90 Days        PRE-TREATMENT SYMPTOMS/COMPLAINTS:  Patient with complaints of L knee, back, and B hips being painful. Functional limitations reported at initial Eval: Sit to stand and stand to sit, prolonged walking (<15min), stairs (2STE home and 1 flight inside she rarely uses)     Daily Note Subjective: Pt reports the pain is in her lateral L knee. She states she feels it Rosetta Marv' all the time. She reports she has been trying to stay active. MEDICATIONS REVIEWED:  2021   TREATMENT:   (In addition to Assessment/Re-Assessment sessions the following treatments were rendered)    THERAPEUTIC EXERCISE: Exercises per grid below to improve mobility, strength, gait, and balance. Required minimal visual and verbal cues to promote proper body alignment and promote proper body posture. Progressed resistance, range and complexity of movement as indicated.     Dx: L knee pain Date:  2021 Date:  2021 Date:     Activity/Exercise Parameters Parameters Parameters   SLR 2 x 10 (painfull) 2 x 10 B     Sit to stand (elevated) 5x (painfull) Throughout session     Swiss ball roll outs with heel digs 2 x 10 (painfull) 2 x 10     Nustep 5min; no resistance 6 minutes  No resistance     Standing hip abd 2 x 10 2 x 10     Side lying - clams   X 10 B     Side lying - reverse clams   X 10 B     Bridging   X 10                   Initial HEP: SLR hip flxn/abd, heel slides,     MANUAL THERAPY: STM  L quads and IT band with thera-roller to decrease tightness and pain. MODALITIES: Not performed. Note: states she does not like cold. TREATMENT/SESSION ASSESSMENT: Patient with complaints - even ones in non weightbearing. Recommended aquatic physical therapy to start, but she states it is too far from her house. Education: Pt education and review of HEP with additions as noted to patient. RECOMMENDATIONS/INTENT FOR NEXT TREATMENT SESSION: Next visit will focus on advancements to more challenging activities.      PAIN: Initial: 5/10 Post Session:  5/10     Total Treatment Billable Duration: 48 minutes      Manual Therapy: (15 minutes)     Ther-Ex: (32 minutes)     Ther-Act: (0 minutes)     Neuro Re-ed (0 minutes)     Modalities: (0 minutes)  PT Patient Time In/Time Out  Time In: 2057  Time Out: [address] 9967, PTA    Future Appointments   Date Time Provider [facility]   7/30/2021  9:00 AM Tamiko Bardales MD [facility] POA   8/12/2021  9:05 AM Zaira Vargas MD [facility] POA   8/19/2021 10:40 AM Mojgan Dominguez MD [facility]   1/4/2022 10:20 AM Mojgan Dominguez MD [facility]       Visit Approval Visit # Therapist initials Date A NS / Cx < 24 hr >24 hr Cx Comments    1  7/15/21 [x]  [] [] Initial evaluation    2  7/22/21 [x] [] []     3 PDE 7- [x] [] []        [] [] []        [] [] []        [] [] []        [] [] []        [] [] []        [] [] []        [] [] []        [] [] []        [] [] []        [] [] []        [] [] []        [] [] []        [] [] []        [] [] []        [] [] []

## 2021-08-11 ENCOUNTER — HOSPITAL ENCOUNTER (OUTPATIENT)
Dept: PHYSICAL THERAPY | Age: 66
Discharge: HOME | End: 2021-08-11
Attending: FAMILY MEDICINE
Payer: MEDICARE

## 2021-08-11 NOTE — PROGRESS NOTES
Brenda Ferrer  : 1955  [place] / Product Type: Medicare /  [address]  at [facility]  [address], [address] [facility], [address]  [phone]   [phone]      CANCELLATION NOTE  Pt was a cancellation for physical therapy appointment today due to forgetting her appointment is today. Please disregard below information as it is inapplicable to today's note. # Recent No Shows/Same-Day Cancellations: 1            OUTPATIENT PHYSICAL THERAPY: Daily Treatment Note 2021  Visit Count:  4    ICD-10: Treatment Diagnosis:   M25.562 Pain in Left Knee  R26.89 Other Abnormalities of Gait  M25.662 Stiffness Left Knee    Precautions/Allergies:   Latex, Sulfa (sulfonamide antibiotics), and Other medication     TREATMENT PLAN:  Effective Dates: 7/15/2021 TO 10/13/2021 (90 days). Frequency/Duration: 2 times a week for 90 Days        REASON FOR TREATMENT:  L knee pain (Note: also bilat hip and L LBP)    Functional limitations reported at initial Eval: Sit to stand and stand to sit, prolonged walking (<15min), stairs (2STE home and 1 flight inside she rarely uses)     Daily Note Subjective: Pt reports the pain is in her lateral L knee. She states she feels it Szymanski Bent' all the time. She reports she has been trying to stay active. MEDICATIONS REVIEWED:  2021   TREATMENT:   (In addition to Assessment/Re-Assessment sessions the following treatments were rendered)    THERAPEUTIC EXERCISE AND ACTIVITIES: Exercises per grid below to improve mobility, strength, gait, and balance. Required minimal visual and verbal cues to promote proper body alignment and promote proper body posture. Progressed resistance, range and complexity of movement as indicated.     Dx: L knee pain Date:  2021 Date:  2021 Date:  21   Activity/Exercise Parameters Parameters Parameters   SLR 2 x 10 (painfull) 2 x 10 B     Sit to stand (elevated) 5x (painfull) Throughout session     Swiss ball roll outs with heel digs 2 x 10 (painfull) 2 x 10     Nustep 5min; no resistance 6 minutes  No resistance     Standing hip abd 2 x 10 2 x 10     Side lying - clams   X 10 B     Side lying - reverse clams   X 10 B     Bridging   X 10                   Initial HEP: SLR hip flxn/abd, heel slides,     MANUAL THERAPY: STM  L quads and IT band with thera-roller to decrease tightness and pain. MODALITIES: Not performed. Note: states she does not like cold. TREATMENT/SESSION ASSESSMENT: Patient with complaints - even ones in non weightbearing. Recommended aquatic physical therapy to start, but she states it is too far from her house. Education: Pt education and review of HEP with additions as noted to patient. RECOMMENDATIONS/INTENT FOR NEXT TREATMENT SESSION: Next visit will focus on advancements to more challenging activities.      PAIN: Initial: 5/10 Post Session:  5/10     Total Treatment Billable Duration: 48 minutes     Manual Therapy: (15 minutes)     Ther-Ex: (32 minutes)     Ther-Act: (0 minutes)     Neuro Re-ed (0 minutes)     Modalities: (0 minutes)     Rand Horton, PT, DPT    Future Appointments   Date Time Provider Julia Morelia   8/11/2021  3:15 PM Susan Fairchild PT [facility]   8/12/2021  9:05 AM Josee Li MD [facility] PO[facility]   8/19/2021 10:40 AM Mehul Lyn MD [facility]   9/10/2021  9:40 AM Carmelo Lopez MD [facility]   1/4/2022 10:20 AM Mehul Lyn MD [facility]       Visit Approval Visit # Therapist initials Date A NS / Cx < 24 hr >24 hr Cx Comments    1  7/15/21 [x]  [] [] Initial evaluation    2  7/22/21 [x] [] []     3 PDE 7- [x] [] []     [facility] 8/11[facility] [] [x] []        [] [] []        [] [] []        [] [] []        [] [] []        [] [] []        [] [] []        [] [] []        [] [] []        [] [] []        [] [] []        [] [] []        [] [] []        [] [] []        [] [] []

## 2021-08-13 ENCOUNTER — HOSPITAL ENCOUNTER (OUTPATIENT)
Dept: PHYSICAL THERAPY | Age: 66
Discharge: HOME | End: 2021-08-13
Attending: FAMILY MEDICINE
Payer: MEDICARE

## 2021-08-13 PROCEDURE — 97110 THERAPEUTIC EXERCISES: CPT

## 2021-08-13 PROCEDURE — 97530 THERAPEUTIC ACTIVITIES: CPT

## 2021-08-13 NOTE — PROGRESS NOTES
Rupali Socrates Ferrer  : 1955  [place] / Product Type: Medicare /  [address]  at [address] [facility] [address], [address] [facility], [place] [address] [address] [facility]  [phone]   [phone]        OUTPATIENT PHYSICAL THERAPY: Daily Treatment Note 2021  Visit Count:  4    ICD-10: Treatment Diagnosis:   M25.562 Pain in Left Knee  R26.89 Other Abnormalities of Gait  M25.662 Stiffness Left Knee    Precautions/Allergies:   Latex, Sulfa (sulfonamide antibiotics), and Other medication     TREATMENT PLAN:  Effective Dates: 7/15/2021 TO 10/13/2021 (90 days). Frequency/Duration: 2 times a week for 90 Days        REASON FOR TREATMENT:  L knee pain (Note: also bilat hip and L LBP)    Functional limitations reported at initial Eval: Sit to stand and stand to sit, prolonged walking (<15min), stairs (2STE home and 1 flight inside she rarely uses)     Daily Note Subjective: Pt states she was told she has a L ankle sprain. She is in a L ankle M/L support brace. She states she will be getting a knee injection on 21. MEDICATIONS REVIEWED:  2021   TREATMENT:   (In addition to Assessment/Re-Assessment sessions the following treatments were rendered)    THERAPEUTIC EXERCISE AND ACTIVITIES: Exercises per grid below to improve mobility, strength, gait, and balance. Required minimal visual and verbal cues to promote proper body alignment and promote proper body posture. Progressed resistance, range and complexity of movement as indicated.     Dx: L knee pain Date:  2021 Date:  2021 Date:  21   Activity/Exercise Parameters Parameters Parameters   SLR 2 x 10 (painfull) 2 x 10 B     Sit to stand (elevated) 5x (painfull) Throughout session     Swiss ball roll outs with heel digs 2 x 10 (painfull) 2 x 10     Nustep 5min; no resistance 6 minutes  No resistance  5min; no resistance   Standing hip abd 2 x 10 2 x 10     Side lying - clams   X 10 B Side lying - reverse clams   X 10 B     Bridging   X 10  yes   Seated hip IR/ER with TB      SLR flxn/ABD   3 x 10 ea   Supine march with PPT   30x   Hook lying hip add with ball   30x                 Initial HEP: SLR hip flxn/abd, heel slides    MANUAL THERAPY: L LE LAD. MODALITIES: Not performed. Note: states she does not like cold. TREATMENT/SESSION ASSESSMENT: Patient continues to have limited tolerance for movement and exercises - even in non weight-bearing. Pt reported severe jolts of pain in her L lateral knee with no movement and knee in a neutral position while in sidelying prior to straight leg raise hip abd. Recommended aquatic physical therapy to her again today. She states she may try to get an appointment with them after her next appointment here. Education: Pt education and review of HEP with additions as noted to patient. Educated pt on shoes to wear with her ankle brace instead slip on flats. RECOMMENDATIONS/INTENT FOR NEXT TREATMENT SESSION: Next visit will focus on advancements to more challenging activities.      PAIN: Initial: 5/10 Post Session:  5/10     Total Treatment Billable Duration: 45 minutes     Manual Therapy: (3 minutes)     Ther-Ex: (24 minutes)     Ther-Act: (16 minutes)     Neuro Re-ed (0 minutes)     Modalities: (0 minutes)  PT Patient Time In/Time Out  Time In: 4514  Time Out: 1555  Meera Garibay PT, DPT    Future Appointments   Date Time Provider Julia Thibodeaux   8/19/2021 10:40 AM Mita Barkley MD [facility]   8/19/2021  1:45 PM Vishnu Garrison PT [facility]   8/26/2021 10:05 AM Rose Mary Gonzalez MD [facility]   9/10/2021  9:40 AM Jonathon Mejia MD [facility] POA   1/4/2022 10:20 AM Mita Barkley MD [facility]       Visit Approval Visit # Therapist initials Date A NS / Cx < 24 hr >24 hr Cx Comments    1  7/15/21 [x]  [] [] Initial evaluation    2  7/22/21 [x] [] []     3 PDE 7- [x] [] []     [facility] 8/11/21 [] [x] []     4 [facility] 8/13/21 [x] [] []        [] [] []        [] [] []        [] [] []        [] [] []        [] [] []        [] [] []        [] [] []        [] [] []        [] [] []        [] [] []        [] [] []        [] [] []        [] [] []

## 2021-08-19 ENCOUNTER — HOSPITAL ENCOUNTER (OUTPATIENT)
Dept: PHYSICAL THERAPY | Age: 66
Discharge: HOME | End: 2021-08-19
Attending: FAMILY MEDICINE
Payer: MEDICARE

## 2021-08-19 PROCEDURE — 97530 THERAPEUTIC ACTIVITIES: CPT

## 2021-08-19 PROCEDURE — 97110 THERAPEUTIC EXERCISES: CPT

## 2021-08-19 NOTE — PROGRESS NOTES
Darrel Ferrer  : 1955  [place] / Product Type: Medicare /  [address]  at [facility]  [address], [address], [address]  [phone]   [phone]        OUTPATIENT PHYSICAL THERAPY: Daily Treatment Note 2021  Visit Count:  5    ICD-10: Treatment Diagnosis:   M25.562 Pain in Left Knee  R26.89 Other Abnormalities of Gait  M25.662 Stiffness Left Knee    Precautions/Allergies:   Latex, Sulfa (sulfonamide antibiotics), and Other medication     TREATMENT PLAN:  Effective Dates: 7/15/2021 TO 10/13/2021 (90 days). Frequency/Duration: 2 times a week for 90 Days        REASON FOR TREATMENT:  L knee pain (Note: also bilat hip and L LBP)    Functional limitations reported at initial Eval: Sit to stand and stand to sit, prolonged walking (<15min), stairs (2STE home and 1 flight inside she rarely uses)     Daily Note Subjective: Pt states she has not called to get an appointment with [facility]. She states she feels like her knee pain is slightly improved this date. She states she will be getting a knee injection on 21. She states she is frustrated that she has not had an MRI of her L knee yet. MEDICATIONS REVIEWED:  2021   TREATMENT:   (In addition to Assessment/Re-Assessment sessions the following treatments were rendered)    THERAPEUTIC EXERCISE AND ACTIVITIES: Exercises per grid below to improve mobility, strength, gait, and balance. Required minimal visual and verbal cues to promote proper body alignment and promote proper body posture. Progressed resistance, range and complexity of movement as indicated.     Dx: L knee pain Date:  2021 Date:  2021 Date:  21 Date:  21   Activity/Exercise Parameters Parameters Parameters    SLR 2 x 10 (painfull) 2 x 10 B      Sit to stand (elevated) 5x (painfull) Throughout session      Swiss ball roll outs with heel digs 2 x 10 (painfull) 2 x 10 3 x 10  And LTR   Nustep 5min; no resistance 6 minutes  No resistance  5min; no resistance 5min; no resistance   Standing hip abd 2 x 10 2 x 10      Side lying - clams   X 10 B      Side lying - reverse clams   X 10 B      Bridging   X 10  yes 3 x 10   Seated hip IR/ER with TB       SLR flxn/ABD   3 x 10 (not able for abd) 3 x 10 for flxn  2 x 10 for abd   Supine march with PPT   30x 3 x 10   Hook lying hip add with ball   30x 30x with 2 sec holds                   Initial HEP: SLR hip flxn/abd, heel slides    MANUAL THERAPY: Not performed. MODALITIES: Not performed. Note: states she does not like cold. TREATMENT/SESSION ASSESSMENT: Patient continues to have limited tolerance for movement and exercises - even in non weight-bearing. Last session pt reported severe jolts of pain in her L lateral knee with no movement and knee in a neutral position while in sidelying prior to straight leg raise hip abd, but she was able to perform without increased pain this session. She states she may try to get an appointment with them after her appointment here. She is not sure if she will continue with on land PT at this clinic until that time. Education: Pt education and review of HEP with additions as noted to patient. Educated pt on shoes to wear with her ankle brace instead slip on flats. RECOMMENDATIONS/INTENT FOR NEXT TREATMENT SESSION: Next visit will focus on advancements to more challenging activities.      PAIN: Initial: 5/10 Post Session:  5/10     Total Treatment Billable Duration: 40 minutes      Manual Therapy: (0 minutes)     Ther-Ex: (27 minutes)     Ther-Act: (13 minutes)     Neuro Re-ed (0 minutes)     Modalities: (0 minutes)  PT Patient Time In/Time Out  Time In: 3432  Time Out: 1425  Abilio Vidal PT, DPT    Future Appointments   Date Time Provider Julia Thibodeaux   8/26/2021 10:05 AM Ramone New MD [facility] POA   9/10/2021  9:40 AM Thomas Sahni MD [facility] POA   1/4/2022 10:20 AM Amy Jose Josué Murphy MD [facility]       Visit Approval Visit # Therapist initials Date A NS / Cx < 24 hr >24 hr Cx Comments    1  7/15/21 [x]  [] [] Initial evaluation    2  7/22/21 [x] [] []     3 PDE 7- [x] [] []     [facility] 8/11/21 [] [x] []     4  8/13/21 [x] [] []     5  8/19/21 [x] [] [] Going to aqua therapy after this visit?; She's not sure.       [] [] []        [] [] []        [] [] []        [] [] []        [] [] []        [] [] []        [] [] []        [] [] []        [] [] []        [] [] []        [] [] []        [] [] []

## 2021-08-23 ENCOUNTER — TRANSCRIBE ORDER (OUTPATIENT)
Dept: SCHEDULING | Age: 66
End: 2021-08-23

## 2021-08-23 DIAGNOSIS — Z12.31 SCREENING MAMMOGRAM FOR HIGH-RISK PATIENT: Primary | ICD-10-CM

## 2021-08-30 ENCOUNTER — HOSPITAL ENCOUNTER (OUTPATIENT)
Dept: PHYSICAL THERAPY | Age: 66
Discharge: HOME | End: 2021-08-30
Attending: FAMILY MEDICINE
Payer: MEDICARE

## 2021-08-30 PROCEDURE — 97110 THERAPEUTIC EXERCISES: CPT

## 2021-08-30 PROCEDURE — 97530 THERAPEUTIC ACTIVITIES: CPT

## 2021-08-30 NOTE — PROGRESS NOTES
Lisa SOODWOLFyasmany  : 1955  [place] / Product Type: Medicare /  [address]  at [address] [facility], [address], [facility], [address] [facility]  [phone]   [phone]        OUTPATIENT PHYSICAL THERAPY: Daily Treatment Note 2021  Visit Count:  6    ICD-10: Treatment Diagnosis:   M25.562 Pain in Left Knee  R26.89 Other Abnormalities of Gait  M25.662 Stiffness Left Knee    Precautions/Allergies:   Latex, Sulfa (sulfonamide antibiotics), and Other medication     TREATMENT PLAN:  Effective Dates: 7/15/2021 TO 10/13/2021 (90 days). Frequency/Duration: 2 times a week for 90 Days        REASON FOR TREATMENT:  L knee pain (Note: also bilat hip and L LBP)    Functional limitations reported at initial Eval: Sit to stand and stand to sit, prolonged walking (<15min), stairs (2STE home and 1 flight inside she rarely uses)     Daily Note Subjective: Pt states she had an injection in her L knee last Thursday. She says her pain is a little better today. MEDICATIONS REVIEWED:  2021   TREATMENT:   (In addition to Assessment/Re-Assessment sessions the following treatments were rendered)    TREATMENT GRID: Exercises per grid below to improve mobility, strength, gait, and balance. Required minimal visual and verbal cues to promote proper body alignment and promote proper body posture. Progressed resistance, range and complexity of movement as indicated.     Dx: L knee pain Date:  2021 Date:  21 Date:  21 Date:  21   Activity/Exercise Parameters Parameters     SLR 2 x 10 B       Sit to stand (elevated) Throughout session       Swiss ball roll outs with heel digs 2 x 10   3 x 10  And LTR 3 x 10  And LTR   Nustep 6 minutes  No resistance  5min; no resistance 5min; no resistance 5min; no   Standing hip abd 2 x 10       Side lying - clams  X 10 B    3 x 10  Supine with YTB   Bridging  X 10  yes 3 x 10 3 x 10 with ball squeeze   Seated hip IR/ER with TB       SLR flxn/ABD  3 x 10 (not able for abd) 3 x 10 for flxn  2 x 10 for abd 3 x 10 for flxn and abd   Supine march with PPT  30x 3 x 10    Hook lying hip add with ball  30x 30x with 2 sec holds 30x with 5 sec holds   Standing hip ROM with TB    3 x 10 bilat YTB  abd and ext            Initial HEP: SLR hip flxn/abd, heel slides    MANUAL THERAPY: Not performed. MODALITIES: Not performed. Note: states she does not like cold. TREATMENT/SESSION ASSESSMENT: Patient continues to have limited tolerance for movement and exercises - even in non weight-bearing. Her tolerance was slightly better this date compared to her last session. She plans to begin aqua therapy soon. Education: Pt education and review of HEP with additions as noted to patient. Educated pt on shoes to wear with her ankle brace instead slip on flats. RECOMMENDATIONS/INTENT FOR NEXT TREATMENT SESSION: Next visit will focus on advancements to more challenging activities. PAIN: Initial: 5/10 Post Session:  5/10     Total Treatment Billable Duration: 40 minutes      Manual Therapy: (0 minutes)     Ther-Ex: (24 minutes)     Ther-Act: (16 minutes)     Neuro Re-ed (0 minutes)     Modalities: (0 minutes)  PT Patient Time In/Time Out  Time In: 8582  Time Out: 1197  Isael Christy PT, DPT    Future Appointments   Date Time Provider Julia Thibodeaux   9/10/2021  9:40 AM [facility], Eugenie Bland MD [facility]   9/11/2021  1:30 PM [facility] ROOM 4 ERMAM [facility]   9/23/2021  1:00 PM Tammy Luna NP [facility] PP   1/4/2022 10:20 AM Audley Peabody, MD [facility]       Visit Approval Visit # Therapist initials Date A NS / Cx < 24 hr >24 hr Cx Comments    1  7/15/21 [x]  [] [] Initial evaluation    2  7/22/21 [x] [] []     3 PDE 7- [x] [] []     [facility] 8/11/21 [] [x] []     4  8/13/21 [x] [] []     5  8/19/21 [x] [] [] Going to aqua therapy after this visit?; She's not sure.     6  8/30/21 [x] [] [] [] [] []        [] [] []        [] [] []        [] [] []        [] [] []        [] [] []        [] [] []        [] [] []        [] [] []        [] [] []        [] [] []

## 2021-09-01 NOTE — THERAPY EVALUATION
Kiley Ferrer  : 1955  [place] / Product Type: Medicare /  [address]  at [facility]  [address], [address] [facility], [address]  [phone]   [phone]        OUTPATIENT PHYSICAL THERAPY:Progress Report 2021    ICD-10: Treatment Diagnosis:   M25.562 Pain in Left Knee  R26.89 Other Abnormalities of Gait  M25.662 Stiffness Left Knee    Precautions/Allergies:   Latex, Sulfa (sulfonamide antibiotics), and Other medication   Fall Risk Score: (? 5 = High Risk)  MD Orders:  MEDICAL/REFERRING DIAGNOSIS:  Sprain of medial collateral ligament of left knee, initial encounter [S83.412A]  Pain in left knee [M25.562]   DATE OF ONSET:   REFERRING PHYSICIAN: Isadora Riley MD  RETURN PHYSICIAN APPOINTMENT:      TRANSFER NOTE 21: With patient in agreement, she will be transferring to the [facility] to begin aquatic physical therapy due to her low tolerance for on land physical therapy. No final measurements taken, as she was not seen this date. INITIAL ASSESSMENT:  Ms. Jose Raul Troy has attended 1 physical therapy session including the initial evaluation. Patient presents with signs and symptoms that are consistent with: pain due to degenerative changes that may have been further aggravating by slipping in [place]. It is possible she had/has a mild MCL strain or disruption of the medial meniscus, but clinical testing was negative this date. The only testing that was positive was in bilateral hips. The current impairments identified include: Decreased strength, dec ROM, muscle coordination, pain, abnormal posture.    The functional deficits are as follows: Sit to stand and stand to sit, prolonged walking (<15min), stairs (2STE home and 1 flight inside she rarely uses)    Recommending skilled PT: manual therapeutic techniques (as appropriate), therapeutic exercises and activities, balance interventions, and a comprehensive home exercise  program to address the current impairments, as listed above. Landry Ferrer will benefit from skilled PT (medically necessary) to address above deficits affecting participation in basic ADLs and overall functional tolerance. PROBLEM LIST (Impacting functional limitations):  1. Decreased Strength  2. Decreased ADL/Functional Activities  3. Decreased Transfer Abilities  4. Decreased Ambulation Ability/Technique  5. Decreased Balance  6. Increased Pain  7. Decreased Flexibility/Joint Mobility  8. Decreased Elliott with Home Exercise Program INTERVENTIONS PLANNED:  1. Balance Exercise  2. Cold  3. Cryotherapy  4. Electrical Stimulation  5. Family Education  6. Gait Training  7. Heat  8. Home Exercise Program (HEP)  9. Manual Therapy  10. Neuromuscular Re-education/Strengthening  11. Range of Motion (ROM)  12. Therapeutic Activites  13. Therapeutic Exercise/Strengthening  14. Transcutaneous Electrical Nerve Stimulation (TENS)  15. Transfer Training   TREATMENT PLAN:  TREATMENT PLAN:  Effective Dates: 7/15/2021 TO 10/13/2021 (90 days). Frequency/Duration: 2 times a week for 90 Days  GOALS: (Goals have been discussed and agreed upon with patient.)  Short-Term Functional Goals: Time Frame: 4 weeks  1. PT will be compliant with initial HEP. 2.   Pt will decrease worst pain to 4/10 with functional activities. 3.   LEFS score equal to or more than 25/80. Discharge Goals: Time Frame: 8 weeks  1. PT will be independent with final HEP. 2. Pt will decrease worst pain to 1/10 with all functional activities. 3. LEFS score equal to or more than 50/80. 4. Pt will be able to shower, dress, and perform household chores without limitation. 5. Pt will be able to perform a 30 second chair rise 10 times or more to demonstrate a reduction in fall risk per age group. Rehabilitation Potential For Stated Goals: Guarded    Outcome Measure:    Tool Used: Lower Extremity Functional Scale (LEFS)  Score:  Initial: 11/80 Most Recent: X/80 (Date: -- )   Interpretation of Score: 20 questions each scored on a 5 point scale with 0 representing \"extreme difficulty or unable to perform\" and 4 representing \"no difficulty\". The lower the score, the greater the functional disability. 80/80 represents no disability. Minimal detectable change is 9 points. Medical Necessity:   · Skilled intervention continues to be required due to decreased strength, ROM, balance, and functional mobility. Reason for Services/Other Comments:  · Patient continues to require skilled intervention due to decreased strength, balance, and ROM with increased pain affecting pt functional mobility. Regarding Carisa Zuluaga Nabil's therapy, I certify that the treatment plan above will be carried out by a therapist or under their direction. Thank you for this referral,  Yaquelin Vega, PT, DPT  Referring Physician Signature: Aurelia Moya MD             The information in this section was collected on 7/15/21 (except where otherwise noted). HISTORY:   History of Present Injury/Illness (Reason for Referral): Pt is a 61 y.o. female presents today for evaluation of left knee pain. She states she was at [facility] 6/3/21 and slipped but did not quite fall but did twist her L knee on a planted foot and then went to the ER. She felt a pop in her L knee and then sensed sharp pain. Also some soreness in her left lower back with some pains radiating down her left anterior thigh. She states she has a history of bilateral knee arthroscopic surgery. She is going to an ortho specialist in mid August. She at first denied a history of LBP or hip pain. She later stated she had bilateral hip pain prior to her slip. Xray of L knee was negative. She states [facility] has agreed to reimburse her health ins company for her medical care.        Treatment Side: Left    Past Medical History/Comorbidities:   Ms. Gama Da Silva  has a past medical history of Acute maxillary sinusitis, Allergic rhinitis (4/24/2015), Asthma, Atrophic vaginitis (4/24/2015), Benign hypertension (4/24/2015), Chondromalacia of right patella, Chronic anxiety (4/24/2015), Cystocele, midline (4/24/2015), Diverticulosis of colon (4/24/2015), Dysmenorrhea (4/24/2015), Edema (4/24/2015), Eustachian tube dysfunction, Female stress incontinence, Gastrointestinal disorder, GERD (gastroesophageal reflux disease), Hypercholesterolemia, Hyperplastic polyps of stomach (4/24/2015), Hypertension, Influenza (1/13/2019), Internal hemorrhoids without mention of complication (9/09/0720), Obesity (BMI 30-39.9) (11/14/2017), Obstructive sleep apnea (11/14/2017), Other diseases of lung, not elsewhere classified (4/24/2015), Right upper quadrant pain, Urge incontinence, and Ventricular bigeminy (8/31/2016). She also has no past medical history of Adverse effect of anesthesia, Aneurysm ([address] Autoimmune disease ([address] CAD (coronary artery disease), Cancer ([address] Chronic kidney disease, Chronic pain, Coagulation disorder ([address] COPD, Dementia, Diabetes ([address] Difficult intubation, Endocrine disease, Heart failure ([address] Ill-defined condition, Liver disease, Malignant hyperthermia due to anesthesia, Nausea & vomiting, Neurological disorder, Other ill-defined conditions(799.89), Pseudocholinesterase deficiency, Psychiatric disorder, PUD (peptic ulcer disease), Seizures ([address] Stroke ([address] Thromboembolus ([address] or Thyroid disease. Ms. Jose A Darling  has a past surgical history that includes hx cathy and bso (); hx hysterectomy; and hx knee arthroscopy (Left).       Pain/Symptom Location: L knee (medial joint line and patella), anterior hip (bilat), thigh, occasionally to her L foot        Worst Pain: 3/10      Current Pain: 6/10        Aggravating Factors/Functional Limitations: Sit to stand and stand to sit, prolonged walking (<15min), stairs (2STE home and 1 flight inside she rarely uses)        Alleviating Factors/Positions/Motions: rest, heat (states she is sensitive to cold)        Occupation: on disability due to asthma         Falls: none    Current Medications:       Current Outpatient Medications:     amLODIPine (NORVASC) 10 mg tablet, Take 1 Tablet by mouth daily. , Disp: 30 Tablet, Rfl: 5    pantoprazole (PROTONIX) 40 mg tablet, Take 1 Tablet by mouth two (2) times a day., Disp: 60 Tablet, Rfl: 5    dicyclomine (BENTYL) 20 mg tablet, Take 1 Tablet by mouth every six (6) hours. , Disp: 120 Tablet, Rfl: 5    valsartan (DIOVAN) 160 mg tablet, Take 1 Tablet by mouth nightly., Disp: 30 Tablet, Rfl: 5    naproxen (NAPROSYN) 375 mg tablet, Take 1 Tablet by mouth two (2) times daily (with meals). , Disp: 30 Tablet, Rfl: 0    albuterol (PROAIR HFA) 90 mcg/actuation inhaler, Take 2 Puffs by inhalation every four (4) hours as needed for Wheezing. Patient instructed to take morning of surgery per anesthesia guidelines, Disp: , Rfl:     albuterol (PROVENTIL VENTOLIN) 2.5 mg /3 mL (0.083 %) nebulizer solution, , Disp: , Rfl: 5    EPINEPHrine (EPIPEN) 0.3 mg/0.3 mL (1:1,000) injection, 0.3 mg by IntraMUSCular route once as needed. , Disp: , Rfl:     Cetirizine (ZYRTEC) 10 mg cap, Take  by mouth., Disp: , Rfl:     fluticasone (FLONASE) 50 mcg/actuation nasal spray, nightly., Disp: , Rfl:    Date Last Reviewed:  8/30/2021       Ambulatory/Rehab Services H2 Model Falls Risk Assessment    Risk Factors:       No Risk Factors Identified Ability to Rise from Chair:       (1)  Pushes up, successful in one attempt    Falls Prevention Plan:       No modifications necessary   Total: (5 or greater = High Risk): 1    ©2010 [facility] Patent #[id number].  Federal Law prohibits the replication, distribution or use without written permission from [facility] [employer]        Number of Personal Factors/Comorbidities that affect the Plan of Care: 1-2: MODERATE COMPLEXITY   EXAMINATION: ________________________________________________________________________________________________  Observation        Posture: forward head/shoulers, inc knee/hip/trunk flexion        Gait: Antalgic        ________________________________________________________________________________________________  Range of Motion            Knee:    Left Right   AROM (0 to 105) pain in L ant thigh (0 to 105) pain in L ant thigh   PROM (0 to 110) (0 to 110)   Circumference at joint line 24cm 23cm       ________________________________________________________________________________________________  Strength        Lower Extremity  Joint:      RIGHT LEFT   Hip Flexion 3-/5 3-/5   Hip Extension     Hip Internal Rotation     Hip External Rotation     Hip Abduction 4-/5 4-/5   Hip Adduction     Knee Flexion 4-/5 4-/5   Knee Extension 4-/5 4-/5     ________________________________________________________________________________________________  [facility]        Sensation: Unremarkable     Balance: DNT this date due to low tolerance for weight bearing     Left Right   FT     Tandem     Single Limb Stance       30 Second Chair Rise: DNT this date due to pain with sit to stand    ________________________________________________________________________________________________  Palpation: She reported left hip pail with palpation of her left MCL with the left LE in a neutral position. No pain in her knee with any palpation of her left knee. Joint mobilization:     Patellar mobility: lateral patellar tilt bilat but normal mobility  ________________________________________________________________________________________________  Special Tests:    Posterior Drawer: DNT  Anterior Drawer: Negative    Varus Stress Test: at 0deg: Negative for laxity or pain; at 30deg: Negative for laxity or pain;  Valgus Stress Test: at 0deg: Negative for laxity or pain; at 30deg: Negative for laxity or pain;     Appley's Compression Test: DNT  Chelsey's Test: with IR: Negative; with ER: Negative  Bounce Home Test: Negative    Sky Test: Pt would not tolerate position. 90/90 Hamstring Test: Pt would not tolerate position. Scour Test: Positive on L  SHELBI: Positive bilat  FADIR: Positive bilat  Note: instant relief in L hip pain with L long axis distraction       Body Structures Involved:  1. Nerves  2. Bones  3. Joints  4. Muscles  5. Ligaments Body Functions Affected:  1. Sensory/Pain  2. Neuromusculoskeletal  3. Movement Related Activities and Participation Affected:  1. General Tasks and Demands  2. Mobility  3. Self Care  4. Domestic Life  5. Interpersonal Interactions and Relationships  6.  Community, Social and [place]   Number of elements (examined above) that affect the Plan of Care: 3: MODERATE COMPLEXITY   CLINICAL PRESENTATION:   Presentation: Evolving clinical presentation with changing clinical characteristics: MODERATE COMPLEXITY   CLINICAL DECISION MAKING:      Use of outcome tool(s) and clinical judgement create a POC that gives a: Questionable prediction of patient's progress: MODERATE COMPLEXITY

## 2021-09-11 ENCOUNTER — HOSPITAL ENCOUNTER (OUTPATIENT)
Dept: MAMMOGRAPHY | Age: 66
Discharge: HOME | End: 2021-09-11
Attending: FAMILY MEDICINE
Payer: MEDICARE

## 2021-09-11 DIAGNOSIS — Z12.31 SCREENING MAMMOGRAM FOR HIGH-RISK PATIENT: ICD-10-CM

## 2021-09-11 PROCEDURE — 77063 BREAST TOMOSYNTHESIS BI: CPT

## 2021-11-02 ENCOUNTER — HOSPITAL ENCOUNTER (OUTPATIENT)
Dept: LAB | Age: 66
Discharge: HOME | End: 2021-11-02

## 2021-11-02 PROCEDURE — 88305 TISSUE EXAM BY PATHOLOGIST: CPT

## 2021-12-23 ENCOUNTER — HOSPITAL ENCOUNTER (OUTPATIENT)
Dept: GENERAL RADIOLOGY | Age: 66
Discharge: HOME | End: 2021-12-23
Payer: MEDICARE

## 2021-12-23 DIAGNOSIS — M25.552 LEFT HIP PAIN: ICD-10-CM

## 2021-12-23 DIAGNOSIS — M54.42 ACUTE LEFT-SIDED LOW BACK PAIN WITH LEFT-SIDED SCIATICA: ICD-10-CM

## 2021-12-23 PROCEDURE — 72100 X-RAY EXAM L-S SPINE 2/3 VWS: CPT

## 2021-12-23 PROCEDURE — 73502 X-RAY EXAM HIP UNI 2-3 VIEWS: CPT

## 2022-01-21 ENCOUNTER — APPOINTMENT (OUTPATIENT)
Dept: PHYSICAL THERAPY | Age: 67
End: 2022-01-21
Attending: ORTHOPAEDIC SURGERY

## 2022-01-28 ENCOUNTER — HOSPITAL ENCOUNTER (OUTPATIENT)
Dept: PHYSICAL THERAPY | Age: 67
Discharge: HOME | End: 2022-01-28
Attending: ORTHOPAEDIC SURGERY
Payer: MEDICARE

## 2022-01-28 PROCEDURE — 97162 PT EVAL MOD COMPLEX 30 MIN: CPT

## 2022-01-28 PROCEDURE — 97110 THERAPEUTIC EXERCISES: CPT

## 2022-01-28 PROCEDURE — 97530 THERAPEUTIC ACTIVITIES: CPT

## 2022-01-28 NOTE — PROGRESS NOTES
Ever Welaka  : 1955  [place] / Product Type: Medicare /  [address]  at [address] [facility] [address], 101 [facility], [address]  [phone]   [phone]      OUTPATIENT PHYSICAL THERAPY: Daily Treatment Note 2022  Visit Count:  1    ICD-10: Treatment Diagnosis:   M25.552 Pain in Left Hip  M25.652 Stiffness in Left Hip  R26.2 Difficulty in Walking, Not elsewhere classified    Precautions/Allergies:   Latex, Sulfa (sulfonamide antibiotics), and Other medication     TREATMENT PLAN:  Effective Dates: 2022 TO 2022 (90 days). Frequency/Duration: 1 time a week for 90 Days      REASON FOR TREATMENT:  L hip OA. Note, she also has back, R hip, and bilateral knee pain. Functional limitations reported at initial Eval: Standing (30-45 min), walking (15 min), and stairs (2STE home and 1 flight inside she rarely uses). Daily Note Subjective:     MEDICATIONS REVIEWED:  2022   TREATMENT:   (In addition to Assessment/Re-Assessment sessions the following treatments were rendered)    TREATMENT GRID: Exercises and activities per grid below to improve mobility, strength, balance, and overall function. Required minimal visual and verbal cues to promote proper body alignment and promote proper body posture. Progressed resistance, range and complexity of movement as indicated. Dx: L hip pain Date:  2022 Date:   Date:     Activity/Exercise Parameters Parameters Parameters                                                                 HEP: bridges, SLR flxn, clamshells    MANUAL THERAPY: Not performed. MODALITIES: Not performed. TREATMENT/SESSION ASSESSMENT:  Filippo Wong verbalized understanding of role of PT and POC. Education: Pt education for initial HEP safety, exercise frequency and duration, symptom control, mechanics, fall risk, and anatomy and physiology of present condition/healing time. RECOMMENDATIONS/INTENT FOR NEXT TREATMENT SESSION: Next visit will focus on advancements to more challenging activities.      PAIN: Initial: 4/10 Post Session:  4/10         Total Treatment Billable Duration: 24 minutes plus eval     Manual Therapy: (0 minutes)     Ther-Ex: (10 minutes)     Ther-Act: (14 minutes)     Neuro Re-ed (0 minutes)     Modalities: (0 minutes)  PT Patient Time In/Time Out  Time In: 1300  Time Out: 1340  Nancy Avendano, PT, DPT    Future Appointments   Date Time Provider Julia Thibodeaux   5/2/2022 11:00 AM Navid Guerrero PT [facility]   6/14/2022  9:20 AM Mena Lawson MD [facility] PO[facility]   7/6/2022  9:20 AM Lori Collier MD [facility]   9/19/2022  3:40 PM Syd Herman NP [facility] PP       Visit Approval Visit # Therapist initials Date A NS / Cx < 24 hr >24 hr Cx Comments    1 RH 1/28/22 [x]  [] [] Initial evaluation       [] [] []        [] [] []        [] [] []        [] [] []        [] [] []        [] [] []        [] [] []        [] [] []        [] [] []        [] [] []        [] [] []        [] [] []        [] [] []        [] [] []        [] [] []        [] [] []        [] [] []

## 2022-01-28 NOTE — THERAPY EVALUATION
Sridhar Gonzalez  : 1955  [place] / [place] / Product Type: Medicare /  [address]  [facility], [address]  [phone]   [phone]        OUTPATIENT PHYSICAL THERAPY:Initial Assessment 2022    ICD-10: Treatment Diagnosis:   M25.552 Pain in Left Hip  M25.652 Stiffness in Left Hip  R26.2 Difficulty in Walking, Not elsewhere classified    Precautions/Allergies:   Latex, Sulfa (sulfonamide antibiotics), and Other medication   Fall Risk Score: 0 (? 5 = High Risk)  MD Orders:  MEDICAL/REFERRING DIAGNOSIS:  Pain in left hip [M25.552]   DATE OF ONSET:   REFERRING PHYSICIAN: Katherine Perez MD  RETURN PHYSICIAN APPOINTMENT:      ASSESSMENT:  Ms. Josette Min has attended 1 physical therapy session including the initial evaluation. Patient presents with signs and symptoms that are consistent with: L hip OA. Note, she also has back, R hip, and bilateral knee pain. The current impairments identified include: Decreased strength, dec ROM, muscle coordination, pain, abnormal posture. The functional deficits/aggravating factors are as follows: Standing (30-45 min), walking (15 min), and stairs (2STE home and 1 flight inside she rarely uses). Recommending skilled PT: manual therapeutic techniques (as appropriate), therapeutic exercises and activities, balance interventions, and a comprehensive home exercise program to address the current impairments, as listed above. Sridhar Gonzalez will benefit from skilled PT (medically necessary) to address above deficits affecting participation in basic ADLs and overall functional tolerance. PROBLEM LIST (Impacting functional limitations):   1. Decreased Strength  2. Decreased ADL/Functional Activities  3. Decreased Transfer Abilities  4. Decreased Ambulation Ability/Technique  5. Decreased Balance  6. Increased Pain  7.  Decreased Flexibility/Joint Mobility  8. Decreased [place] with Home Exercise Program INTERVENTIONS PLANNED:  1. Balance Exercise  2. Electrical Stimulation  3. Gait Training  4. Home Exercise Program (HEP)  5. Manual Therapy  6. Neuromuscular Re-education/Strengthening  7. Range of Motion (ROM)  8. Therapeutic Activites  9. Therapeutic Exercise/Strengthening  10. Transcutaneous Electrical Nerve Stimulation (TENS)  11. Transfer Training   TREATMENT PLAN:    Effective Dates: 1/28/2022 TO 4/28/2022 (90 days). Frequency/Duration: 1 time a week for 90 Days    GOALS: (Goals have been discussed and agreed upon with patient.)  Short-Term Functional Goals: Time Frame: 4 weeks  1. PT will be compliant with initial HEP. 2.   Pt will decrease worst pain to 4/10 with functional activities. 3.   LEFS score equal to or more than 50/80.     Discharge Goals: Time Frame: 8 weeks  1. PT will be independent with final HEP. 2. Pt will decrease worst pain to 1/10 with all functional activities. 3. LEFS score equal to or more than 65/80. 4. Pt will be able to shower, dress, and perform household chores without limitation. 5. Pt will be able to perform a 30 second chair rise 12 times or more to demonstrate a reduction in fall risk per age group. Rehabilitation Potential For Stated Goals: Fair    Outcome Measure: Tool Used: Lower Extremity Functional Scale (LEFS)  Score:  Initial: 29/80 Most Recent: X/80 (Date: -- )   Interpretation of Score: 20 questions each scored on a 5 point scale with 0 representing \"extreme difficulty or unable to perform\" and 4 representing \"no difficulty\". The lower the score, the greater the functional disability. 80/80 represents no disability. Minimal detectable change is 9 points. Medical Necessity:   · Skilled intervention continues to be required due to decreased strength, ROM, balance, and functional mobility.   Reason for Services/Other Comments:  · Patient continues to require skilled intervention due to decreased strength, balance, and ROM with increased pain affecting pt functional mobility. ·   Regarding Rachelle Rabago's therapy, I certify that the treatment plan above will be carried out by a therapist or under their direction. Thank you for this referral,  Nery Kaiser, PT     Referring Physician Signature: Laura Catalan MD              Date                    The information in this section was collected on 1/28/22 (except where otherwise noted). HISTORY:   History of Present Injury/Illness (Reason for Referral): Pt is a 60 yo female referred to physical therapy due to left hip pain. She was treated here for left knee pain summer 2021 following a slip and fall at [facility] 4/3/22. She was referred to aquatic PT due to low tolerance for on land PT but did not go. She eventually went to [facility] and had PT for her L hip there that ended in Dec 2021. She says she is still doing some of her exercises. She states her pain has improved some since she was in PT the last time. Note, she reports that her L knee marlo at times, but she has had no falls. She reports her L hip also began hurting following the fall and denies any hip pain prior. At her last evaluation, she reported she did have bilateral hip and low back pain prior to her fall.            Treatment Side: Left      Past Medical History/Comorbidities:   Ms. David Pineda  has a past medical history of Acute maxillary sinusitis, Allergic rhinitis (4/24/2015), Asthma, Atrophic vaginitis (4/24/2015), Benign hypertension (4/24/2015), Chondromalacia of right patella, Chronic anxiety (4/24/2015), Cystocele, midline (4/24/2015), Diverticulosis of colon (4/24/2015), Dysmenorrhea (4/24/2015), Edema (4/24/2015), Eustachian tube dysfunction, Female stress incontinence, Gastrointestinal disorder, GERD (gastroesophageal reflux disease), Hypercholesterolemia, Hyperplastic polyps of stomach (4/24/2015), Hypertension, Influenza (1/13/2019), Internal hemorrhoids without mention of complication (3/54/4085), Obesity (BMI 30-39.9) (11/14/2017), Obstructive sleep apnea (11/14/2017), Other diseases of lung, not elsewhere classified (4/24/2015), Right upper quadrant pain, Urge incontinence, and Ventricular bigeminy (8/31/2016). She has no past medical history of Adverse effect of anesthesia, Aneurysm ([address] Autoimmune disease ([address] CAD (coronary artery disease), Cancer ([address] Chronic kidney disease, Chronic pain, Coagulation disorder ([address] COPD, Dementia, Diabetes ([address] Difficult intubation, Endocrine disease, Heart failure ([address] Ill-defined condition, Liver disease, Malignant hyperthermia due to anesthesia, Nausea & vomiting, Neurological disorder, Other ill-defined conditions(799.89), Pseudocholinesterase deficiency, Psychiatric disorder, PUD (peptic ulcer disease), Seizures ([address] Stroke ([address] Thromboembolus ([address] or Thyroid disease. Ms. Ana Santamaria  has a past surgical history that includes hx cathy and bso (); hx hysterectomy; and hx knee arthroscopy (Left). Social History/Living Environment:        Pain/Symptom Location: anterior left hip primarily, occasionally L lateral hip, L anterior thigh, (report of location inconsistent)    Worst Pain: 8/10  Current Pain: 4/10    Aggravating Factors: Standing (30-45 min), walking (15 min), stairs (2STE home and 1 flight inside she rarely uses)    Alleviating Factors/Positions/Motions: heat    Diagnostic Imaging: see xray in chart    Occupation: on disability due to asthma; retired from teaching    Current Medications:       Current Outpatient Medications:     amLODIPine (NORVASC) 10 mg tablet, Take 1 Tablet by mouth daily. , Disp: 30 Tablet, Rfl: 5    pantoprazole (PROTONIX) 40 mg tablet, Take 1 Tablet by mouth two (2) times a day., Disp: 60 Tablet, Rfl: 5    dicyclomine (BENTYL) 20 mg tablet, Take 1 Tablet by mouth every six (6) hours. , Disp: 120 Tablet, Rfl: 5    valsartan (DIOVAN) 160 mg tablet, Take 1 Tablet by mouth nightly., Disp: 30 Tablet, Rfl: 5    albuterol (PROAIR HFA) 90 mcg/actuation inhaler, Take 2 Puffs by inhalation every four (4) hours as needed for Wheezing. Patient instructed to take morning of surgery per anesthesia guidelines, Disp: , Rfl:     albuterol (PROVENTIL VENTOLIN) 2.5 mg /3 mL (0.083 %) nebulizer solution, , Disp: , Rfl: 5    EPINEPHrine (EPIPEN) 0.3 mg/0.3 mL (1:1,000) injection, 0.3 mg by IntraMUSCular route once as needed. , Disp: , Rfl:     Cetirizine (ZYRTEC) 10 mg cap, Take  by mouth., Disp: , Rfl:     fluticasone (FLONASE) 50 mcg/actuation nasal spray, nightly., Disp: , Rfl:    Date Last Reviewed:  1/28/2022       Ambulatory/Rehab Services H2 Model Falls Risk Assessment    Risk Factors:       No Risk Factors Identified Ability to Rise from Chair:       (0)  Ability to rise in a single movement    Falls Prevention Plan:       No modifications necessary   Total: (5 or greater = High Risk): 1    ©2010 [facility] [address] [place] [address] States Patent #[id number].  Federal Law prohibits the replication, distribution or use without written permission from [facility] [employer]        Number of Personal Factors/Comorbidities that affect the Plan of Care: 1-2: MODERATE COMPLEXITY   EXAMINATION:     ______________________________________________________________________________________________  Observation    Posture: forward head/shoulers, inc knee/hip/trunk flexion             Gait: mildly antalgic, toe out bilat, dec trunk rotation and arm swing        Assistive Device: none        ________________________________________________________________________________________________  Strength            Lower Extremity  Joint:      RIGHT LEFT   Hip Flexion 4/5 4/5   Hip Extension     Hip Internal Rotation     Hip External Rotation     Hip Abduction 4/5 4/5   Hip Adduction 4/5 4/5   Knee Flexion 4/5 4/5   Knee Extension 4+/5 4+/5 ________________________________________________________________________________________________      Additional Comments:     Neruo-[facility]: Unremarkable       ________________________________________________________________________________________________  Special Tests    30 Second Chair Rise: 7 times (<12 is an increased fall risk)    Scour Test: Positive on L  HSELBI: Positive bilat  FADIR: Positive bilat    Sky Test: Pt would not tolerate position. 90/90 Hamstring Test: Pt would not tolerate position. ________________________________________________________________________________________________  Palpation: No tenderness with palpation. Body Structures Involved:  1. Nerves  2. Bones  3. Joints  4. Muscles  5. Ligaments Body Functions Affected:  1. Sensory/Pain  2. Neuromusculoskeletal  3. Movement Related Activities and Participation Affected:  1. General Tasks and Demands  2. Mobility  3. Self Care  4. Domestic Life  5. Interpersonal Interactions and Relationships  6.  Community, Social and [place]   Number of elements (examined above) that affect the Plan of Care: 3: MODERATE COMPLEXITY   CLINICAL PRESENTATION:   Presentation: Evolving clinical presentation with changing clinical characteristics: MODERATE COMPLEXITY   CLINICAL DECISION MAKING:      Use of outcome tool(s) and clinical judgement create a POC that gives a: Questionable prediction of patient's progress: MODERATE COMPLEXITY

## 2022-01-31 ENCOUNTER — HOSPITAL ENCOUNTER (OUTPATIENT)
Dept: PHYSICAL THERAPY | Age: 67
Discharge: HOME | End: 2022-01-31
Attending: ORTHOPAEDIC SURGERY
Payer: MEDICARE

## 2022-01-31 PROCEDURE — 97140 MANUAL THERAPY 1/> REGIONS: CPT

## 2022-01-31 PROCEDURE — 97110 THERAPEUTIC EXERCISES: CPT

## 2022-01-31 NOTE — PROGRESS NOTES
Juli Blair  : 1955  [place] / Product Type: Medicare /  [address]  at [address] [facility], 101 [facility], [address]  [phone]   [phone]      OUTPATIENT PHYSICAL THERAPY: Daily Treatment Note 2022  Visit Count:  2    ICD-10: Treatment Diagnosis:   M25.552 Pain in Left Hip  M25.652 Stiffness in Left Hip  R26.2 Difficulty in Walking, Not elsewhere classified    Precautions/Allergies:   Latex, Sulfa (sulfonamide antibiotics), and Other medication     TREATMENT PLAN:  Effective Dates: 2022 TO 2022 (90 days). Frequency/Duration: 1 time a week for 90 Days      REASON FOR TREATMENT:  L hip OA. Note, she also has back, R hip, and bilateral knee pain. Functional limitations reported at initial Eval: Standing (30-45 min), walking (15 min), and stairs (2STE home and 1 flight inside she rarely uses). Daily Note Subjective: My R knee has been buckling all weekend. She reports her son had to help her at [employer] due to pain. MEDICATIONS REVIEWED:  2022   TREATMENT:   (In addition to Assessment/Re-Assessment sessions the following treatments were rendered)    TREATMENT GRID: Exercises and activities per grid below to improve mobility, strength, balance, and overall function. Required minimal visual and verbal cues to promote proper body alignment and promote proper body posture. Progressed resistance, range and complexity of movement as indicated. Dx: L hip pain Date:  2022 Date:   Date:     Activity/Exercise Parameters Parameters Parameters   Long arc quads X 20 B     Heel slides 2 X 10 B      hooklying - clams 2 x 10 B     Straight leg raise  2 x 10 B     Bridging  2 x 10 B      Seated - hip flexion  X 20 B                               HEP: bridges, SLR flxn, eileenhells    MANUAL THERAPY: STM to L quad for decrease in pain and tightness.       MODALITIES: Not performed. TREATMENT/SESSION ASSESSMENT:  Alonso Orona is not sure why she is having so much pain in her R knee. She reports she is to see Dr. Ivy Navarro about her hip on Feb 10th. Education: Pt education for initial HEP safety, exercise frequency and duration, symptom control, mechanics, fall risk, and anatomy and physiology of present condition/healing time. RECOMMENDATIONS/INTENT FOR NEXT TREATMENT SESSION: Next visit will focus on advancements to more challenging activities. PAIN: Initial: 6/10- major pain in R lateral knee.   - no pain number for L hip  Post Session:  5-6/10  R LE-  No pain number for L hip          Total Treatment Billable Duration: 44 minutes      Manual Therapy: (14 minutes)     Ther-Ex: (30 minutes)     Ther-Act: (0 minutes)     Neuro Re-ed (0 minutes)     Modalities: (0 minutes)  PT Patient Time In/Time Out  Time In: 1430  Time Out: [address], PTA    Future Appointments   Date Time Provider [facility]   5/2/2022 11:00 AM Garth Sanchez PT [facility]   6/14/2022  9:20 AM Kelvin Ríos MD [facility]   7/6/2022  9:20 AM Donell Epley, MD [facility]   9/19/2022  3:40 PM Denver Shames, Marce Purdue, NP [facility] PP       Visit Approval Visit # Therapist initials Date A NS / Cx < 24 hr >24 hr Cx Comments    1 RH 1/28/22 [x]  [] [] Initial evaluation    2 PDE 1- [x] [] []        [] [] []        [] [] []        [] [] []        [] [] []        [] [] []        [] [] []        [] [] []        [] [] []        [] [] []        [] [] []        [] [] []        [] [] []        [] [] []        [] [] []        [] [] []        [] [] []

## 2022-02-05 ENCOUNTER — APPOINTMENT (OUTPATIENT)
Dept: ULTRASOUND IMAGING | Age: 67
End: 2022-02-05
Attending: EMERGENCY MEDICINE
Payer: MEDICARE

## 2022-02-05 ENCOUNTER — APPOINTMENT (OUTPATIENT)
Dept: GENERAL RADIOLOGY | Age: 67
End: 2022-02-05
Attending: EMERGENCY MEDICINE
Payer: MEDICARE

## 2022-02-05 ENCOUNTER — HOSPITAL ENCOUNTER (EMERGENCY)
Age: 67
Discharge: HOME | End: 2022-02-05
Attending: EMERGENCY MEDICINE
Payer: MEDICARE

## 2022-02-05 VITALS
OXYGEN SATURATION: 98 % | HEIGHT: 66 IN | RESPIRATION RATE: 16 BRPM | TEMPERATURE: 98.4 F | HEART RATE: 94 BPM | DIASTOLIC BLOOD PRESSURE: 89 MMHG | BODY MASS INDEX: 36.96 KG/M2 | SYSTOLIC BLOOD PRESSURE: 152 MMHG | WEIGHT: 230 LBS

## 2022-02-05 DIAGNOSIS — M79.661 PAIN IN RIGHT LOWER LEG: Primary | ICD-10-CM

## 2022-02-05 PROCEDURE — 73562 X-RAY EXAM OF KNEE 3: CPT

## 2022-02-05 PROCEDURE — 93971 EXTREMITY STUDY: CPT

## 2022-02-05 PROCEDURE — 99282 EMERGENCY DEPT VISIT SF MDM: CPT

## 2022-02-05 NOTE — ED TRIAGE NOTES
Pt arrives ambulatory to triage. States pain that radiates down right leg for a week. Pain comes when she turns. NAD. Masked.

## 2022-02-05 NOTE — ED PROVIDER NOTES
82-year-old female who presents to the emergency department for complaints of right leg pain. She states symptoms began about 1 week ago and have been progressively worsening. She states that she has some ongoing issues with her left leg and has been working with physical therapy. She states she does not normally have any pain in the right leg. Pain is in the lower right leg. Pain is worse with ambulation. She states pain also wakes her up at night. She states that sometimes it feels like her knee is going to give out on her when she walks. She occasionally takes ibuprofen for the pain with mild relief. The history is provided by the patient. Leg Pain  This is a new problem. The current episode started more than 1 week ago. The problem occurs constantly. The problem has been gradually worsening. Pertinent negatives include no chest pain, no abdominal pain, no headaches and no shortness of breath. The symptoms are aggravated by walking. The symptoms are relieved by rest.        Past Medical History:   Diagnosis Date    Acute maxillary sinusitis     Allergic rhinitis 4/24/2015    Dr. Rickey Bustamante    Asthma     Atrophic vaginitis 4/24/2015    Benign hypertension 4/24/2015    Chondromalacia of right patella     Chronic anxiety 4/24/2015    Cystocele, midline 4/24/2015    Diverticulosis of colon 4/24/2015    Dysmenorrhea 4/24/2015    GYN: Referred to Dr. Sam Lewis Edema 4/24/2015    Including peripheral edema.  Eustachian tube dysfunction     Female stress incontinence     Gastrointestinal disorder     diverticulitis, GERD    GERD (gastroesophageal reflux disease)     Hypercholesterolemia     Hyperplastic polyps of stomach 4/24/2015    Colonsocpy. 2013.     Hypertension     Influenza 1/13/2019    Internal hemorrhoids without mention of complication 3/19/9947    Obesity (BMI 30-39.9) 11/14/2017    Obstructive sleep apnea 11/14/2017    Other diseases of lung, not elsewhere classified 4/24/2015 Solitary nodule of lung. 3mm LLB, CT (2013) benign appearing, (recheck in 1 year if clinically indicated. IN 2014, obtaining Lung-ca blood test). 2014 slight increased size compared to 2013, but not 2013. In 2014 report is recc to repeat 10/2014     Right upper quadrant pain     Urge incontinence     Ventricular bigeminy 2016       Past Surgical History:   Procedure Laterality Date    HX HYSTERECTOMY      HX KNEE ARTHROSCOPY Left     HX DONATO AND BSO      ,DONATO,BSO         Family History:   Problem Relation Age of Onset    Asthma Mother     Hypertension Sister     Diabetes Sister     Cancer Other         Lung    Malignant Hyperthermia Neg Hx     Pseudocholinesterase Deficiency Neg Hx     Delayed Awakening Neg Hx     Post-op Nausea/Vomiting Neg Hx     Emergence Delirium Neg Hx     Post-op Cognitive Dysfunction Neg Hx     Other Neg Hx     Breast Cancer Neg Hx        Social History     Socioeconomic History    Marital status:      Spouse name: Not on file    Number of children: Not on file    Years of education: Not on file    Highest education level: Not on file   Occupational History    Not on file   Tobacco Use    Smoking status: Former Smoker     Packs/day: 0.25     Years: 10.00     Pack years: 2.50     Quit date: 10/16/1996     Years since quittin.3    Smokeless tobacco: Never Used   Substance and Sexual Activity    Alcohol use: No    Drug use: No    Sexual activity: Yes     Partners: Male   Other Topics Concern    Not on file   Social History Narrative    Not on file     Social Determinants of Health     Financial Resource Strain:     Difficulty of Paying Living Expenses: Not on file   Food Insecurity:     Worried About Running Out of Food in the Last Year: Not on file    Billy of Food in the Last Year: Not on file   Transportation Needs:     Lack of Transportation (Medical): Not on file    Lack of Transportation (Non-Medical):  Not on file   Physical Activity:     Days of Exercise per Week: Not on file    Minutes of Exercise per Session: Not on file   Stress:     Feeling of Stress : Not on file   Social Connections:     Frequency of Communication with Friends and Family: Not on file    Frequency of Social Gatherings with Friends and Family: Not on file    Attends Yazidi Services: Not on file    Active Member of [address] [place] [address] or Organizations: Not on file    Attends Club or Organization Meetings: Not on file    Marital Status: Not on file   Intimate Partner Violence:     Fear of Current or Ex-Partner: Not on file    Emotionally Abused: Not on file    Physically Abused: Not on file    Sexually Abused: Not on file   Housing Stability:     Unable to Pay for Housing in the Last Year: Not on file    Number of [place] in the Last Year: Not on file    Unstable Housing in the Last Year: Not on file         ALLERGIES: Latex, Sulfa (sulfonamide antibiotics), and Other medication    Review of Systems   Constitutional: Negative for fever. Respiratory: Negative for shortness of breath. Cardiovascular: Negative for chest pain. Gastrointestinal: Negative for abdominal pain. Musculoskeletal: Positive for arthralgias. Skin: Negative for wound. Neurological: Negative for headaches. All other systems reviewed and are negative. Vitals:    02/05/22 1658   BP: (!) 152/89   Pulse: 94   Resp: 16   Temp: 98.4 °F (36.9 °C)   SpO2: 98%   Weight: 104.3 kg (230 lb)   Height: 5' 6\" (1.676 m)            Physical Exam  Vitals and nursing note reviewed. Constitutional:       General: She is not in acute distress. Appearance: Normal appearance. She is not ill-appearing, toxic-appearing or diaphoretic. HENT:      Head: Normocephalic and atraumatic. Right Ear: External ear normal.      Left Ear: External ear normal.      Mouth/Throat:      Mouth: Mucous membranes are moist.      Pharynx: Oropharynx is clear.    Eyes:      General: No scleral icterus. Extraocular Movements: Extraocular movements intact. Conjunctiva/sclera: Conjunctivae normal.   Cardiovascular:      Rate and Rhythm: Normal rate. Pulses:           Dorsalis pedis pulses are 2+ on the right side and 2+ on the left side. Posterior tibial pulses are 2+ on the right side and 2+ on the left side. Heart sounds: Normal heart sounds. Pulmonary:      Effort: Pulmonary effort is normal. No respiratory distress. Breath sounds: Normal breath sounds. Abdominal:      General: Abdomen is flat. There is no distension. Musculoskeletal:         General: Normal range of motion. Cervical back: Normal range of motion and neck supple. No rigidity. Right knee: No swelling, deformity, effusion or bony tenderness. Normal range of motion. No tenderness. Right lower leg: Tenderness present. No swelling. No edema. Left lower leg: No swelling. No edema. Right ankle: Normal. Normal pulse. Left ankle: Normal. Normal pulse. Comments: Tenderness with palpation to anterior right lower leg. Patient is ambulatory with a noted limp, favoring right leg. She has full range of motion and her right hip, knee, and ankle. No swelling noted to right knee or lower leg. She has good, palpable pedal pulses. Skin color and temperature appears normal.  Sensation is intact to lower extremities. Skin:     General: Skin is warm and dry. Capillary Refill: Capillary refill takes less than 2 seconds. Neurological:      General: No focal deficit present. Mental Status: She is alert and oriented to person, place, and time. Psychiatric:         Mood and Affect: Mood normal.         Behavior: Behavior normal.         Thought Content:  Thought content normal.         Judgment: Judgment normal.          MDM  Number of Diagnoses or Management Options  Pain in right lower leg: new and requires workup  Diagnosis management comments: Overall well-appearing 73-year-old female presents emergency department today for complaint of right leg pain. Physical exam revealed some tenderness in her anterior right lower leg. There is no erythema or swelling. Pain is worse with ambulation. She has good pedal pulses that are equal bilaterally. X-ray is negative for acute process. Ultrasound is negative for DVT. Patient has been working with physical therapy recently for issues with her left leg. It is possible her pain is caused from the increased activity and exercises she has been doing with physical therapy. Supportive treatment discussed and encouraged. She is established with orthopedics. I have encouraged her to follow-up with them but to continue to monitor for any new or worsening symptoms. I have discussed the results of all labs, procedures, radiographs, and/or treatments with the patient and available family members. Janneth Noon is agreed upon by the patient and the patient is ready for discharge.  Questions about treatment in the ED and differential diagnosis of presenting condition were answered. Danica Gibson was given verbal discharge instructions including, but not limited to, importance of returning to the emergency department for any concern of worsening or continued symptoms.  Instructions were given to follow up with a primary care provider or specialist within 1-2 days. Donna Hong effects of medications, if prescribed, were discussed and patient was advised to refrain from significant physical activity until followed up by primary care physician and to not drive or operate heavy machinery after taking any sedating substances.      Martita Stallings NP; 2/5/2022 @7:20 PM Voice dictation software was used during the making of  this note. This software is not perfect and grammatical and other typographical errors  may be present. This note has not been proofread for errors.          Amount and/or Complexity of Data Reviewed  Tests in the radiology section of CPT®: reviewed    Risk of Complications, Morbidity, and/or Mortality  Presenting problems: low  Diagnostic procedures: low  Management options: low    Patient Progress  Patient progress: improved    ED Course as of 02/05/22 1921   Sat Feb 05, 2022   1832 XR KNEE RT 3 V  FINDINGS: 3 views of the knee demonstrate no displaced fracture or malalignment. Soft tissues are normal.  Mild osteoarthritis is present in the medial  compartment.     IMPRESSION  No acute findings. [BC]   1854 DUPLEX LOWER EXT VENOUS RIGHT  FINDINGS:    The common femoral vein, femoral vein, popliteal vein, posterior  tibial veins and peroneal veins compress appropriately. There is normal color  Doppler flow within these vessels. There is normal phasic, variable pulse wave  Doppler flow from the popliteal vein to the common femoral vein.        IMPRESSION  No DVT in the deep veins of the right leg.  [BC]      ED Course User Index  [BC] Ruby Oden NP       Procedures

## 2022-02-06 NOTE — DISCHARGE INSTRUCTIONS
As we discussed, your x-ray and ultrasound were normal today. Your pain is possibly caused from the increased activity you are doing with physical therapy. Continue to monitor for any new or worsening symptoms. Follow-up with orthopedics as you are scheduled. Return to the emergency department for any new, worsening, or concerning symptoms.

## 2022-02-07 ENCOUNTER — HOSPITAL ENCOUNTER (OUTPATIENT)
Dept: PHYSICAL THERAPY | Age: 67
Discharge: HOME | End: 2022-02-07
Attending: ORTHOPAEDIC SURGERY
Payer: MEDICARE

## 2022-02-07 PROCEDURE — 97140 MANUAL THERAPY 1/> REGIONS: CPT

## 2022-02-07 PROCEDURE — 97530 THERAPEUTIC ACTIVITIES: CPT

## 2022-02-07 PROCEDURE — 97110 THERAPEUTIC EXERCISES: CPT

## 2022-02-07 NOTE — PROGRESS NOTES
Umer Pa  : 1955  [place] / Product Type: Medicare /  [address]  at [facility]  [address] [facility], [place] [address] [address] [facility]  [phone]   [phone]      OUTPATIENT PHYSICAL THERAPY: Daily Treatment Note 2022  Visit Count:  3    ICD-10: Treatment Diagnosis:   M25.552 Pain in Left Hip  M25.652 Stiffness in Left Hip  R26.2 Difficulty in Walking, Not elsewhere classified    Precautions/Allergies:   Latex, Sulfa (sulfonamide antibiotics), and Other medication     TREATMENT PLAN:  Effective Dates: 2022 TO 2022 (90 days). Frequency/Duration: 1 time a week for 90 Days      REASON FOR TREATMENT:  L hip OA. Note, she also has back, R hip, and bilateral knee pain. Functional limitations reported at initial Eval: Standing (30-45 min), walking (15 min), and stairs (2STE home and 1 flight inside she rarely uses). Daily Note Subjective: Pt states her R lower LE has been hurting lateral and distal to her R knee. She went to the ER Saturday due to pain. It is much better today. She has done some of her HEP. MEDICATIONS REVIEWED:  2022   TREATMENT:   (In addition to Assessment/Re-Assessment sessions the following treatments were rendered)    TREATMENT GRID: Exercises and activities per grid below to improve mobility, strength, balance, and overall function. Required minimal visual and verbal cues to promote proper body alignment and promote proper body posture. Progressed resistance, range and complexity of movement as indicated.     Dx: L hip pain Date:  2022 Date:  2022 Date:     Activity/Exercise Parameters Parameters Parameters   Long arc quads X 20 B     Heel slides 2 X 10 B      hooklying - clams 2 x 10 B     Straight leg raise  2 x 10 B     Bridging  2 x 10 B      Seated - hip flexion  X 20 B     Prone quad stretch  30sec x 2 bilat    Hamstring stretch  30 sec x 2    Gastroc/soleus stretch  30sec x 2            HEP: bridges, SLR flxn, clamshells; added prone quad stretch    MANUAL THERAPY: L LAD, grade 3-4 L hip lateral and inferior mobilizations    MODALITIES: Not performed. TREATMENT/SESSION ASSESSMENT: Pt tolerated session well overall. Focused on ROM and pain control. Will add strengthening ex to pt tolerance. Education: Educated pt on how to use TheraCane for self STM. Pt education for HEP safety, exercise frequency and duration, symptom control, mechanics, fall risk, and anatomy and physiology of present condition/healing time. RECOMMENDATIONS/INTENT FOR NEXT TREATMENT SESSION: Next visit will focus on advancements to more challenging activities.      PAIN: Initial: 4/10 Post Session:  3/10           Total Treatment Billable Duration: 40 minutes      Manual Therapy: (14 minutes)     Ther-Ex: (17 minutes)     Ther-Act: (9 minutes)     Neuro Re-ed (0 minutes)     Modalities: (0 minutes)  PT Patient Time In/Time Out  Time In: 4040  Time Out: 1555  [place] Tian[place], PT, DPT    Future Appointments   Date Time Provider Julia Thibodeaux   2/10/2022  9:25 AM Severa Arthur, MD [facility] POA   3/14/2022 10:20 AM Mert Sung MD [facility] POA   7/6/2022  9:20 AM Meryle Bi, MD [facility] EMG EMG   9/19/2022  3:40 PM Debbie Gary NP [facility] PSCD PP       Visit Approval Visit # Therapist initials Date A NS / Cx < 24 hr >24 hr Cx Comments    1 RH 1/28/22 [x]  [] [] Initial evaluation    2 PDE 1- [x] [] []     3  2/7/22 [x] [] []        [] [] []        [] [] []        [] [] []        [] [] []        [] [] []        [] [] []        [] [] []        [] [] []        [] [] []        [] [] []        [] [] []        [] [] []        [] [] []        [] [] []        [] [] []

## 2022-02-14 ENCOUNTER — HOSPITAL ENCOUNTER (OUTPATIENT)
Dept: PHYSICAL THERAPY | Age: 67
Discharge: HOME | End: 2022-02-14
Attending: ORTHOPAEDIC SURGERY
Payer: MEDICARE

## 2022-02-14 PROCEDURE — 97140 MANUAL THERAPY 1/> REGIONS: CPT

## 2022-02-14 PROCEDURE — 97110 THERAPEUTIC EXERCISES: CPT

## 2022-02-14 NOTE — PROGRESS NOTES
Virgilio Coburn  : 1955  [place] / Product Type: Medicare /  [address]  at [facility]  [address], [address] [facility] [place] [facility]", [address]  [phone]   [phone]      OUTPATIENT PHYSICAL THERAPY: Daily Treatment Note 2022  Visit Count:  4    ICD-10: Treatment Diagnosis:   M25.552 Pain in Left Hip  M25.652 Stiffness in Left Hip  R26.2 Difficulty in Walking, Not elsewhere classified    Precautions/Allergies:   Latex, Sulfa (sulfonamide antibiotics), and Other medication     TREATMENT PLAN:  Effective Dates: 2022 TO 2022 (90 days). Frequency/Duration: 1 time a week for 90 Days      REASON FOR TREATMENT:  L hip OA. Note, she also has back, R hip, and bilateral knee pain. Functional limitations reported at initial Eval: Standing (30-45 min), walking (15 min), and stairs (2STE home and 1 flight inside she rarely uses). Daily Note Subjective: Pt states her her L hip is not very painful today. MEDICATIONS REVIEWED:  2022   TREATMENT:   (In addition to Assessment/Re-Assessment sessions the following treatments were rendered)    TREATMENT GRID: Exercises and activities per grid below to improve mobility, strength, balance, and overall function. Required minimal visual and verbal cues to promote proper body alignment and promote proper body posture. Progressed resistance, range and complexity of movement as indicated.     Dx: L hip pain Date:  2022 Date:  2022 Date:  22   Activity/Exercise Parameters Parameters Parameters   Long arc quads X 20 B     Heel slides 2 X 10 B      Supine clams 2 x 10 B  2 x 15 GTB   Straight leg raise  2 x 10 B     Bridges 2 x 10 B   With ball squeeze 3 x 10   Seated - hip flexion  X 20 B     Prone quad stretch  30sec x 2 bilat    Hamstring stretch  30 sec x 2    Gastroc/soleus stretch  30sec x 2    Supine clams        HEP: bridges, SLR flxn, clamshells; added prone quad stretch    MANUAL THERAPY: grade 3 L hip lateral and inferior mobilizations    MODALITIES: Not performed. TREATMENT/SESSION ASSESSMENT: Pt had limited tolerance for manual and exercise today. Education: Reminded pt of attendance policy. Pt education for HEP safety, exercise frequency and duration, symptom control, mechanics, fall risk, and anatomy and physiology of present condition/healing time. RECOMMENDATIONS/INTENT FOR NEXT TREATMENT SESSION: Next visit will focus on advancements to more challenging activities.      PAIN: Initial: 4/10 Post Session:  3/10           Total Treatment Billable Duration: 25 minutes      Manual Therapy: (10 minutes)     Ther-Ex: (15 minutes)     Ther-Act: (0 minutes)     Neuro Re-ed (0 minutes)     Modalities: (0 minutes)  PT Patient Time In/Time Out  Time In: 2439  Time Out: 1510  Mercedes Tobar, PT, DPT    Future Appointments   Date Time Provider Julia Thibodeaux   2/21/2022 11:00 AM Carlita Haley, PT [facility]   2/28/2022  2:30 PM Carlita Haley, PT [facility]   3/10/2022 10:00 AM [facility] XR RF ROOM 2 SFDRAD SFD   3/10/2022 11:00 AM S[facility] MRI UNIT 1 SFDRMRI [facility]FD   3/14/2022 10:20 AM Quoc Gallagher MD [facility] POAI POA   7/6/2022  9:20 AM Esequiel Grimaldo MD [facility] EMG EMG   9/19/2022  3:40 PM Melissa Hawley NP [facility] PSCD PP       Visit Approval Visit # Therapist initials Date A NS / Cx < 24 hr >24 hr Cx Comments    1  1/28/22 [x]  [] [] Initial evaluation    2 PDE 1- [x] [] []     3 RH 2/7/22 [x] [] []     4  2/14/22 [x] [] [] 15 minutes late       [] [] []        [] [] []        [] [] []        [] [] []        [] [] []        [] [] []        [] [] []        [] [] []        [] [] []        [] [] []        [] [] []        [] [] []        [] [] []        [] [] []

## 2022-02-21 ENCOUNTER — HOSPITAL ENCOUNTER (OUTPATIENT)
Dept: PHYSICAL THERAPY | Age: 67
Discharge: HOME | End: 2022-02-21
Attending: ORTHOPAEDIC SURGERY
Payer: MEDICARE

## 2022-02-21 PROCEDURE — 97110 THERAPEUTIC EXERCISES: CPT

## 2022-02-21 PROCEDURE — 97140 MANUAL THERAPY 1/> REGIONS: CPT

## 2022-02-21 PROCEDURE — 97530 THERAPEUTIC ACTIVITIES: CPT

## 2022-02-21 NOTE — PROGRESS NOTES
Freddy Reno  : 1955  [place] / Product Type: Medicare /  [address] at [address] [facility]  [address] [facility], [address] [place] [address], [address]  [phone]   [phone]      OUTPATIENT PHYSICAL THERAPY: Daily Treatment Note 2022  Visit Count:  5    ICD-10: Treatment Diagnosis:   M25.552 Pain in Left Hip  M25.652 Stiffness in Left Hip  R26.2 Difficulty in Walking, Not elsewhere classified    Precautions/Allergies:   Latex, Sulfa (sulfonamide antibiotics), and Other medication     TREATMENT PLAN:  Effective Dates: 2022 TO 2022 (90 days). Frequency/Duration: 1 time a week for 90 Days      REASON FOR TREATMENT:  L hip OA. Note, she also has back, R hip, and bilateral knee pain. Functional limitations reported at initial Eval: Standing (30-45 min), walking (15 min), and stairs (2STE home and 1 flight inside she rarely uses). Daily Note Subjective: Pt states her hip is a little stiff today. She is partially compliant with her HEP. MEDICATIONS REVIEWED:  2022   TREATMENT:   (In addition to Assessment/Re-Assessment sessions the following treatments were rendered)    TREATMENT GRID: Exercises and activities per grid below to improve mobility, strength, balance, and overall function. Required minimal visual and verbal cues to promote proper body alignment and promote proper body posture. Progressed resistance, range and complexity of movement as indicated.     Dx: L hip pain Date:  2022 Date:  2022 Date:  22 Date:  22   Activity/Exercise Parameters Parameters Parameters    NuStep    5 min   Supine clams 2 x 10 B  2 x 15 GTB 2 x 15 GTB   Straight leg raise  2 x 10 B      Bridges 2 x 10 B   With ball squeeze 3 x 10 2 x 15   Seated - hip flexion  X 20 B      Prone quad stretch  30sec x 2 bilat     Hamstring stretch  30 sec x 2  reviewed   Gastroc/soleus stretch  30sec x 2     Resistance    2 x 10 bilat each  Hip abd/ext RTB   STS    2 x 10 With march between     HEP: bridges, SLR flxn, clamshells; added prone quad stretch    MANUAL THERAPY: grade 3 L hip lateral and inferior mobilizations    MODALITIES: Not performed. TREATMENT/SESSION ASSESSMENT: Pt fatigued quickly but did not report pain with any ex. Education: HEP review. Pt education for HEP safety, exercise frequency and duration, symptom control, mechanics, fall risk, and anatomy and physiology of present condition/healing time. RECOMMENDATIONS/INTENT FOR NEXT TREATMENT SESSION: Next visit will focus on advancements to more challenging activities.      PAIN: Initial: 4/10 Post Session:  3/10           Total Treatment Billable Duration: 40 minutes      Manual Therapy: (14 minutes)     Ther-Ex: (17 minutes)     Ther-Act: (9 minutes)     Neuro Re-ed (0 minutes)     Modalities: (0 minutes)  PT Patient Time In/Time Out  Time In: 1100  Time Out: 1140  Eliu Tobias PT, DPT    Future Appointments   Date Time Provider [facility] [place]   2/28/2022  2:30 PM Kunal Stephenson, [facility]   3/10/2022 10:00 AM [facility] XR RF ROOM 2 SFDRAD [facility]FD   3/10/2022 11:00 AM SFD MRI UNIT 1 SFDRMRI SFD   3/14/2022 10:20 AM Geneva Gallagher MD [facility] POAI POA   7/6/2022  9:20 AM Cornelius Cohen MD [facility] EMG EMG   9/19/2022  3:40 PM Carol Evans NP [facility] PSCD PP       Visit Approval Visit # Therapist initials Date A NS / Cx < 24 hr >24 hr Cx Comments    1 RH 1/28/22 [x]  [] [] Initial evaluation    2 PDE 1- [x] [] []     3 RH 2/7/22 [x] [] []     4 RH 2/14/22 [x] [] [] 15 minutes late    5 RH 2/21/22 [x] [] []        [] [] []        [] [] []        [] [] []        [] [] []        [] [] []        [] [] []        [] [] []        [] [] []        [] [] []        [] [] []        [] [] []        [] [] []        [] [] []

## 2022-02-28 ENCOUNTER — HOSPITAL ENCOUNTER (OUTPATIENT)
Dept: PHYSICAL THERAPY | Age: 67
Discharge: HOME | End: 2022-02-28
Attending: ORTHOPAEDIC SURGERY
Payer: MEDICARE

## 2022-02-28 NOTE — PROGRESS NOTES
Anat Webber  : 1955  [place] / Product Type: Medicare /  [address]  at [facility] [address], [address], [address]  [phone]   [phone]        CANCELLATION NOTE    Ms. Tian Krueger was a same-day cancellation for today's appointment.      # Recent No Shows/Same-Day Cancellations: 1    2022  Eliu Tobias, PT, DPT            Visit Approval Visit # Therapist initials Date A NS / Cx < 24 hr >24 hr Cx Comments    1 RH 22 [x]  [] [] Initial evaluation    2 PDE 2022 [x] [] []     3 RH 22 [x] [] []     4 RH 22 [x] [] [] 15 minutes late    5 RH 22 [x] [] []     n/a [facility] 22 [] [x] [] Same day cancel; not feeling well       [] [] []        [] [] []        [] [] []        [] [] []        [] [] []        [] [] []        [] [] []        [] [] []        [] [] []        [] [] []        [] [] []        [] [] []

## 2022-03-02 ENCOUNTER — APPOINTMENT (OUTPATIENT)
Dept: CT IMAGING | Age: 67
End: 2022-03-02
Attending: PHYSICIAN ASSISTANT
Payer: MEDICARE

## 2022-03-02 ENCOUNTER — HOSPITAL ENCOUNTER (EMERGENCY)
Age: 67
Discharge: HOME | End: 2022-03-02
Attending: EMERGENCY MEDICINE
Payer: MEDICARE

## 2022-03-02 VITALS
OXYGEN SATURATION: 100 % | WEIGHT: 230 LBS | BODY MASS INDEX: 36.96 KG/M2 | RESPIRATION RATE: 16 BRPM | HEART RATE: 85 BPM | SYSTOLIC BLOOD PRESSURE: 141 MMHG | HEIGHT: 66 IN | TEMPERATURE: 98.4 F | DIASTOLIC BLOOD PRESSURE: 77 MMHG

## 2022-03-02 DIAGNOSIS — K57.92 ACUTE DIVERTICULITIS: Primary | ICD-10-CM

## 2022-03-02 LAB
ALBUMIN SERPL-MCNC: 3.3 G/DL (ref 3.2–4.6)
ALBUMIN/GLOB SERPL: 0.7 {RATIO} (ref 1.2–3.5)
ALP SERPL-CCNC: 111 U/L (ref 50–136)
ALT SERPL-CCNC: 21 U/L (ref 12–65)
ANION GAP SERPL CALC-SCNC: 3 MMOL/L (ref 7–16)
AST SERPL-CCNC: 12 U/L (ref 15–37)
BACTERIA URNS QL MICRO: 0 /HPF
BASOPHILS # BLD: 0 K/UL (ref 0–0.2)
BASOPHILS NFR BLD: 0 % (ref 0–2)
BILIRUB SERPL-MCNC: 0.8 MG/DL (ref 0.2–1.1)
BILIRUB UR QL: ABNORMAL
BUN SERPL-MCNC: 8 MG/DL (ref 8–23)
CALCIUM SERPL-MCNC: 9.3 MG/DL (ref 8.3–10.4)
CASTS URNS QL MICRO: 0 /LPF
CHLORIDE SERPL-SCNC: 106 MMOL/L (ref 98–107)
CO2 SERPL-SCNC: 29 MMOL/L (ref 21–32)
CREAT SERPL-MCNC: 0.7 MG/DL (ref 0.6–1)
CRYSTALS URNS QL MICRO: 0 /LPF
DIFFERENTIAL METHOD BLD: ABNORMAL
EOSINOPHIL # BLD: 0.1 K/UL (ref 0–0.8)
EOSINOPHIL NFR BLD: 1 % (ref 0.5–7.8)
EPI CELLS #/AREA URNS HPF: NORMAL /HPF
ERYTHROCYTE [DISTWIDTH] IN BLOOD BY AUTOMATED COUNT: 13.4 % (ref 11.9–14.6)
GLOBULIN SER CALC-MCNC: 4.8 G/DL (ref 2.3–3.5)
GLUCOSE SERPL-MCNC: 97 MG/DL (ref 65–100)
GLUCOSE UR QL STRIP.AUTO: NEGATIVE MG/DL
HCT VFR BLD AUTO: 40.2 % (ref 35.8–46.3)
HGB BLD-MCNC: 12.6 G/DL (ref 11.7–15.4)
IMM GRANULOCYTES # BLD AUTO: 0 K/UL (ref 0–0.5)
IMM GRANULOCYTES NFR BLD AUTO: 0 % (ref 0–5)
KETONES UR-MCNC: 15 MG/DL
LEUKOCYTE ESTERASE UR QL STRIP: NEGATIVE
LIPASE SERPL-CCNC: 79 U/L (ref 73–393)
LYMPHOCYTES # BLD: 2.5 K/UL (ref 0.5–4.6)
LYMPHOCYTES NFR BLD: 27 % (ref 13–44)
MCH RBC QN AUTO: 27.8 PG (ref 26.1–32.9)
MCHC RBC AUTO-ENTMCNC: 31.3 G/DL (ref 31.4–35)
MCV RBC AUTO: 88.7 FL (ref 79.6–97.8)
MONOCYTES # BLD: 0.4 K/UL (ref 0.1–1.3)
MONOCYTES NFR BLD: 4 % (ref 4–12)
MUCOUS THREADS URNS QL MICRO: 0 /LPF
NEUTS SEG # BLD: 6.1 K/UL (ref 1.7–8.2)
NEUTS SEG NFR BLD: 67 % (ref 43–78)
NITRITE UR QL: NEGATIVE
NRBC # BLD: 0 K/UL (ref 0–0.2)
OTHER OBSERVATIONS,UCOM: NORMAL
PH UR: 6 [PH] (ref 5–9)
PLATELET # BLD AUTO: 253 K/UL (ref 150–450)
PMV BLD AUTO: 10.4 FL (ref 9.4–12.3)
POTASSIUM SERPL-SCNC: 3.5 MMOL/L (ref 3.5–5.1)
PROT SERPL-MCNC: 8.1 G/DL (ref 6.3–8.2)
PROT UR QL: 30 MG/DL
RBC # BLD AUTO: 4.53 M/UL (ref 4.05–5.2)
RBC # UR STRIP: ABNORMAL /UL
RBC #/AREA URNS HPF: NORMAL /HPF
SERVICE CMNT-IMP: ABNORMAL
SODIUM SERPL-SCNC: 138 MMOL/L (ref 136–145)
SP GR UR: 1.02 (ref 1–1.02)
UROBILINOGEN UR QL: 4 EU/DL (ref 0.2–1)
WBC # BLD AUTO: 9.1 K/UL (ref 4.3–11.1)
WBC URNS QL MICRO: NORMAL /HPF

## 2022-03-02 PROCEDURE — 80053 COMPREHEN METABOLIC PANEL: CPT

## 2022-03-02 PROCEDURE — 83690 ASSAY OF LIPASE: CPT

## 2022-03-02 PROCEDURE — 85025 COMPLETE CBC W/AUTO DIFF WBC: CPT

## 2022-03-02 PROCEDURE — 81003 URINALYSIS AUTO W/O SCOPE: CPT

## 2022-03-02 PROCEDURE — 99285 EMERGENCY DEPT VISIT HI MDM: CPT

## 2022-03-02 PROCEDURE — 74011000258 HC RX REV CODE- 258: Performed by: EMERGENCY MEDICINE

## 2022-03-02 PROCEDURE — 74177 CT ABD & PELVIS W/CONTRAST: CPT

## 2022-03-02 PROCEDURE — 74011250637 HC RX REV CODE- 250/637: Performed by: PHYSICIAN ASSISTANT

## 2022-03-02 PROCEDURE — 74011000636 HC RX REV CODE- 636: Performed by: EMERGENCY MEDICINE

## 2022-03-02 PROCEDURE — 74011250636 HC RX REV CODE- 250/636: Performed by: PHYSICIAN ASSISTANT

## 2022-03-02 PROCEDURE — 96374 THER/PROPH/DIAG INJ IV PUSH: CPT

## 2022-03-02 PROCEDURE — 81015 MICROSCOPIC EXAM OF URINE: CPT

## 2022-03-02 PROCEDURE — 96375 TX/PRO/DX INJ NEW DRUG ADDON: CPT

## 2022-03-02 PROCEDURE — 96376 TX/PRO/DX INJ SAME DRUG ADON: CPT

## 2022-03-02 RX ORDER — SODIUM CHLORIDE 0.9 % (FLUSH) 0.9 %
10 SYRINGE (ML) INJECTION
Status: COMPLETED | OUTPATIENT
Start: 2022-03-02 | End: 2022-03-02

## 2022-03-02 RX ORDER — ONDANSETRON 2 MG/ML
4 INJECTION INTRAMUSCULAR; INTRAVENOUS ONCE
Status: COMPLETED | OUTPATIENT
Start: 2022-03-02 | End: 2022-03-02

## 2022-03-02 RX ORDER — MORPHINE SULFATE 4 MG/ML
4 INJECTION INTRAVENOUS ONCE
Status: COMPLETED | OUTPATIENT
Start: 2022-03-02 | End: 2022-03-02

## 2022-03-02 RX ORDER — METRONIDAZOLE 500 MG/1
500 TABLET ORAL
Status: COMPLETED | OUTPATIENT
Start: 2022-03-02 | End: 2022-03-02

## 2022-03-02 RX ORDER — CIPROFLOXACIN 500 MG/1
500 TABLET ORAL EVERY 12 HOURS
Status: DISCONTINUED | OUTPATIENT
Start: 2022-03-02 | End: 2022-03-02 | Stop reason: HOSPADM

## 2022-03-02 RX ORDER — CIPROFLOXACIN 500 MG/1
500 TABLET ORAL 2 TIMES DAILY
Qty: 20 TABLET | Refills: 0 | Status: SHIPPED | OUTPATIENT
Start: 2022-03-02 | End: 2022-03-12

## 2022-03-02 RX ORDER — HYDROCODONE BITARTRATE AND ACETAMINOPHEN 5; 325 MG/1; MG/1
1 TABLET ORAL
Qty: 10 TABLET | Refills: 0 | Status: SHIPPED | OUTPATIENT
Start: 2022-03-02 | End: 2022-03-05

## 2022-03-02 RX ORDER — METRONIDAZOLE 500 MG/1
500 TABLET ORAL 3 TIMES DAILY
Qty: 30 TABLET | Refills: 0 | Status: SHIPPED | OUTPATIENT
Start: 2022-03-02 | End: 2022-03-12

## 2022-03-02 RX ADMIN — IOPAMIDOL 100 ML: 755 INJECTION, SOLUTION INTRAVENOUS at 15:00

## 2022-03-02 RX ADMIN — Medication 10 ML: at 15:00

## 2022-03-02 RX ADMIN — MORPHINE SULFATE 4 MG: 4 INJECTION INTRAVENOUS at 15:33

## 2022-03-02 RX ADMIN — ONDANSETRON 4 MG: 2 INJECTION INTRAMUSCULAR; INTRAVENOUS at 13:25

## 2022-03-02 RX ADMIN — SODIUM CHLORIDE 1000 ML: 900 INJECTION, SOLUTION INTRAVENOUS at 13:26

## 2022-03-02 RX ADMIN — SODIUM CHLORIDE 100 ML: 9 INJECTION, SOLUTION INTRAVENOUS at 15:00

## 2022-03-02 RX ADMIN — METRONIDAZOLE 500 MG: 500 TABLET ORAL at 15:33

## 2022-03-02 RX ADMIN — MORPHINE SULFATE 4 MG: 4 INJECTION INTRAVENOUS at 13:26

## 2022-03-02 NOTE — ED NOTES
Patient updated on plan of care. Verbalizes understanding. Vital signs are stable. Appears in no distress. Denies additional needs at this time.

## 2022-03-02 NOTE — ED TRIAGE NOTES
Pt ambulatory to triage. Pt reports sharp pains in her lower abdomen and on the left side, sometimes the pain shoots down into her pubic area. Pt reports the pain started on Sunday, nausea and fever/chills has accompanied the pain. Pt states she has some pressure when she urinates, also some urgency. Pt denies vomiting/ diarrhea, chest pain, shob, body or headaches. Pt states she has never had this pain before.

## 2022-03-02 NOTE — ED PROVIDER NOTES
Patient is 62-year-old female history of hypertension, GERD. She had prior hysterectomy. She presents complaining of 3 days of lower abdominal pain associated with nausea and decreased p.o. intake. Denies diarrhea or abnormal bowel movements. She reports increased urinary frequency and urgency. She says the pain feels like horrible spasms almost like labor pain in her lower abdomen. She has no uterus or ovaries per her report. She denies fever but has felt sweats and chills. No URI symptoms. She has had diverticulitis before that felt somewhat similar. She states she is unsure if this is a bladder infection or diverticulitis or something else. Pain worse with walking, better if she rests, but still has \"spasms\" intermittently. Feels like a pressure in her vagina. Denies discharge or bleeding. Past Medical History:   Diagnosis Date    Acute maxillary sinusitis     Allergic rhinitis 4/24/2015    Dr. Marialuisa Rios    Asthma     Atrophic vaginitis 4/24/2015    Benign hypertension 4/24/2015    Chondromalacia of right patella     Chronic anxiety 4/24/2015    Cystocele, midline 4/24/2015    Diverticulosis of colon 4/24/2015    Dysmenorrhea 4/24/2015    GYN: Referred to Dr. Kenyetta Pike Edema 4/24/2015    Including peripheral edema.  Eustachian tube dysfunction     Female stress incontinence     Gastrointestinal disorder     diverticulitis, GERD    GERD (gastroesophageal reflux disease)     Hypercholesterolemia     Hyperplastic polyps of stomach 4/24/2015    Colonsocpy. 2013.  Hypertension     Influenza 1/13/2019    Internal hemorrhoids without mention of complication 9/95/4290    Obesity (BMI 30-39.9) 11/14/2017    Obstructive sleep apnea 11/14/2017    Other diseases of lung, not elsewhere classified 4/24/2015    Solitary nodule of lung. 3mm LLB, CT (4/18/2013) benign appearing, (recheck in 1 year if clinically indicated. IN March 2014, obtaining Lung-ca blood test).  4/2014 slight increased size compared to 2013, but not 2013. In 2014 report is recc to repeat 10/2014     Right upper quadrant pain     Urge incontinence     Ventricular bigeminy 2016       Past Surgical History:   Procedure Laterality Date    HX HYSTERECTOMY      HX KNEE ARTHROSCOPY Left     HX DONATO AND BSO      ,DONATO,BSO         Family History:   Problem Relation Age of Onset    Asthma Mother     Hypertension Sister     Diabetes Sister     Cancer Other         Lung    Malignant Hyperthermia Neg Hx     Pseudocholinesterase Deficiency Neg Hx     Delayed Awakening Neg Hx     Post-op Nausea/Vomiting Neg Hx     Emergence Delirium Neg Hx     Post-op Cognitive Dysfunction Neg Hx     Other Neg Hx     Breast Cancer Neg Hx        Social History     Socioeconomic History    Marital status:      Spouse name: Not on file    Number of children: Not on file    Years of education: Not on file    Highest education level: Not on file   Occupational History    Not on file   Tobacco Use    Smoking status: Former Smoker     Packs/day: 0.25     Years: 10.00     Pack years: 2.50     Quit date: 10/16/1996     Years since quittin.3    Smokeless tobacco: Never Used   Substance and Sexual Activity    Alcohol use: No    Drug use: No    Sexual activity: Yes     Partners: Male   Other Topics Concern    Not on file   Social History Narrative    Not on file     Social Determinants of Health     Financial Resource Strain:     Difficulty of Paying Living Expenses: Not on file   Food Insecurity:     Worried About Running Out of Food in the Last Year: Not on file    Billy of Food in the Last Year: Not on file   Transportation Needs:     Lack of Transportation (Medical): Not on file    Lack of Transportation (Non-Medical):  Not on file   Physical Activity:     Days of Exercise per Week: Not on file    Minutes of Exercise per Session: Not on file   Stress:     Feeling of Stress : Not on file Social Connections:     Frequency of Communication with Friends and Family: Not on file    Frequency of Social Gatherings with Friends and Family: Not on file    Attends [employer] Services: Not on file    Active Member of Clubs or Organizations: Not on file    Attends Club or Organization Meetings: Not on file    Marital Status: Not on file   Intimate Partner Violence:     Fear of Current or Ex-Partner: Not on file    Emotionally Abused: Not on file    Physically Abused: Not on file    Sexually Abused: Not on file   Housing Stability:     Unable to Pay for Housing in the Last Year: Not on file    Number of [place] in the Last Year: Not on file    Unstable Housing in the Last Year: Not on file         ALLERGIES: Latex, Sulfa (sulfonamide antibiotics), and Other medication    Review of Systems   Constitutional: Positive for appetite change. HENT: Negative. Respiratory: Negative. Cardiovascular: Negative. Gastrointestinal: Positive for abdominal pain and nausea. Genitourinary: Positive for frequency and urgency. All other systems reviewed and are negative. Vitals:    03/02/22 1124 03/02/22 1126   BP: (!) 152/95    Pulse: (!) 104    Resp: 16    Temp:  98.4 °F (36.9 °C)   SpO2: 100%             Physical Exam  Vitals and nursing note reviewed. Constitutional:       General: She is not in acute distress. Appearance: Normal appearance. She is normal weight. She is not ill-appearing or toxic-appearing. HENT:      Head: Normocephalic. Cardiovascular:      Rate and Rhythm: Normal rate and regular rhythm. Pulmonary:      Effort: Pulmonary effort is normal.      Breath sounds: Normal breath sounds. Abdominal:      General: Bowel sounds are normal.      Palpations: Abdomen is soft. There is no mass. Tenderness: There is abdominal tenderness in the right lower quadrant, suprapubic area and left lower quadrant. There is no guarding or rebound.    Musculoskeletal: General: Normal range of motion. Cervical back: Normal range of motion. Skin:     General: Skin is warm and dry. Findings: No rash. Neurological:      General: No focal deficit present. Mental Status: She is alert. Motor: No weakness. Gait: Gait normal.   Psychiatric:         Mood and Affect: Mood normal.         Behavior: Behavior normal.         Thought Content: Thought content normal.          MDM  Number of Diagnoses or Management Options  Acute diverticulitis  Diagnosis management comments: Patient is 59-year-old female who presents with lower abdominal pain for 3 days. Found to have acute uncomplicated sigmoid diverticulitis. No evidence of her for abscess. Patient has been treated with Cipro and Flagyl in the past for diverticulitis and tolerated well. We will go ahead and send in Cipro, Flagyl, Norco.  Strict return precautions given for any worsening or change in symptoms.        Amount and/or Complexity of Data Reviewed  Clinical lab tests: reviewed  Tests in the radiology section of CPT®: reviewed    Risk of Complications, Morbidity, and/or Mortality  Presenting problems: moderate  Diagnostic procedures: moderate  Management options: moderate    Patient Progress  Patient progress: stable         Procedures

## 2022-03-10 ENCOUNTER — HOSPITAL ENCOUNTER (OUTPATIENT)
Dept: MRI IMAGING | Age: 67
Discharge: HOME | End: 2022-03-10
Attending: ORTHOPAEDIC SURGERY
Payer: MEDICARE

## 2022-03-10 ENCOUNTER — HOSPITAL ENCOUNTER (OUTPATIENT)
Dept: GENERAL RADIOLOGY | Age: 67
Discharge: HOME | End: 2022-03-10
Attending: ORTHOPAEDIC SURGERY
Payer: MEDICARE

## 2022-03-10 VITALS
HEART RATE: 82 BPM | RESPIRATION RATE: 18 BRPM | OXYGEN SATURATION: 99 % | SYSTOLIC BLOOD PRESSURE: 143 MMHG | TEMPERATURE: 97.9 F | DIASTOLIC BLOOD PRESSURE: 80 MMHG

## 2022-03-10 DIAGNOSIS — M25.552 LEFT HIP PAIN: ICD-10-CM

## 2022-03-10 PROCEDURE — 73722 MRI JOINT OF LWR EXTR W/DYE: CPT

## 2022-03-10 PROCEDURE — 74011000258 HC RX REV CODE- 258: Performed by: ORTHOPAEDIC SURGERY

## 2022-03-10 PROCEDURE — 27093 INJECTION FOR HIP X-RAY: CPT

## 2022-03-10 PROCEDURE — 74011000636 HC RX REV CODE- 636: Performed by: ORTHOPAEDIC SURGERY

## 2022-03-10 PROCEDURE — 74011000250 HC RX REV CODE- 250: Performed by: ORTHOPAEDIC SURGERY

## 2022-03-10 PROCEDURE — 74011250636 HC RX REV CODE- 250/636: Performed by: ORTHOPAEDIC SURGERY

## 2022-03-10 PROCEDURE — A9576 INJ PROHANCE MULTIPACK: HCPCS | Performed by: ORTHOPAEDIC SURGERY

## 2022-03-10 RX ORDER — LIDOCAINE HYDROCHLORIDE 20 MG/ML
0.2 INJECTION, SOLUTION INFILTRATION; PERINEURAL
Status: COMPLETED | OUTPATIENT
Start: 2022-03-10 | End: 2022-03-10

## 2022-03-10 RX ADMIN — LIDOCAINE HYDROCHLORIDE 15 ML: 20 INJECTION, SOLUTION INFILTRATION; PERINEURAL at 12:01

## 2022-03-10 RX ADMIN — SODIUM CHLORIDE 15 ML: 900 INJECTION, SOLUTION INTRAVENOUS at 12:18

## 2022-03-10 RX ADMIN — IOPAMIDOL 5 ML: 612 INJECTION, SOLUTION INTRATHECAL at 12:03

## 2022-03-10 RX ADMIN — GADOTERIDOL 1.5 ML: 279.3 INJECTION, SOLUTION INTRAVENOUS at 12:01

## 2022-03-18 PROBLEM — D12.6 TUBULOVILLOUS ADENOMA OF COLON: Status: ACTIVE | Noted: 2018-08-14

## 2022-03-18 PROBLEM — I10 ESSENTIAL HYPERTENSION: Status: ACTIVE | Noted: 2017-11-14

## 2022-03-19 PROBLEM — G47.33 OBSTRUCTIVE SLEEP APNEA: Status: ACTIVE | Noted: 2017-11-14

## 2022-03-19 PROBLEM — R73.03 PREDIABETES: Status: ACTIVE | Noted: 2021-07-16

## 2022-03-21 ENCOUNTER — HOSPITAL ENCOUNTER (OUTPATIENT)
Dept: PHYSICAL THERAPY | Age: 67
Discharge: HOME | End: 2022-03-21
Attending: ORTHOPAEDIC SURGERY
Payer: MEDICARE

## 2022-03-21 PROCEDURE — 97140 MANUAL THERAPY 1/> REGIONS: CPT

## 2022-03-21 PROCEDURE — 97530 THERAPEUTIC ACTIVITIES: CPT

## 2022-03-21 PROCEDURE — 97110 THERAPEUTIC EXERCISES: CPT

## 2022-03-21 NOTE — PROGRESS NOTES
Virgilio Coburn  : 1955  [place] / Product Type: Medicare /  [address]  at [address] [facility] [address], [address] [facility], [address]  [phone]   [phone]      OUTPATIENT PHYSICAL THERAPY: Daily Treatment Note 3/21/2022  Visit Count:  6    ICD-10: Treatment Diagnosis:   M25.552 Pain in Left Hip  M25.652 Stiffness in Left Hip  R26.2 Difficulty in Walking, Not elsewhere classified    Precautions/Allergies:   Latex, Sulfa (sulfonamide antibiotics), and Other medication     TREATMENT PLAN:  Effective Dates: 2022 TO 2022 (90 days). Frequency/Duration: 1 time a week for 90 Days      REASON FOR TREATMENT:  L hip OA. Note, she also has back, R hip, and bilateral knee pain. Functional limitations reported at initial Eval: Standing (30-45 min), walking (15 min), and stairs (2STE home and 1 flight inside she rarely uses). Daily Note Subjective: Pt states her hip is a little stiff today. She is partially compliant with her HEP. She has been out due to being ill. MRI of L hip    MEDICATIONS REVIEWED:  3/21/2022   TREATMENT:   (In addition to Assessment/Re-Assessment sessions the following treatments were rendered)    TREATMENT GRID: Exercises and activities per grid below to improve mobility, strength, balance, and overall function. Required minimal visual and verbal cues to promote proper body alignment and promote proper body posture. Progressed resistance, range and complexity of movement as indicated.     Dx: L hip pain Date:  2022 Date:  2022 Date:  22 Date:  22 Date:  3/21/22   Activity/Exercise Parameters Parameters Parameters     NuStep    5 min 6 min   Supine clams 2 x 10 B  2 x 15 GTB 2 x 15 GTB Sidelying 3 x 10    Straight leg raise  2 x 10 B    3 x 10   Bridges 2 x 10 B   With ball squeeze 3 x 10 2 x 15    Seated - hip flexion  X 20 B       Prone quad stretch  30sec x 2 bilat Hamstring stretch  30 sec x 2  reviewed    Gastroc/soleus stretch  30sec x 2      Resistance    2 x 10 bilat each  Hip abd/ext RTB    STS    2 x 10 With march between      HEP: bridges, SLR flxn, clamshells; added prone quad stretch; added clams    MANUAL THERAPY: grade 3 L hip lateral and inferior mobilizations    MODALITIES: Not performed. TREATMENT/SESSION ASSESSMENT: Pt fatigued quickly but did not report pain with any ex. Education: HEP review. Pt education for HEP safety, exercise frequency and duration, symptom control, mechanics, fall risk, and anatomy and physiology of present condition/healing time. RECOMMENDATIONS/INTENT FOR NEXT TREATMENT SESSION: Next visit will focus on advancements to more challenging activities.      PAIN: Initial: 4/10 Post Session:  3/10           Total Treatment Billable Duration: 40 minutes      Manual Therapy: (14 minutes)     Ther-Ex: (17 minutes)     Ther-Act: (9 minutes)     Neuro Re-ed (0 minutes)     Modalities: (0 minutes)  PT Patient Time In/Time Out  Time In: 1430  Time Out: 1510  Swati Hickman PT, DPT    Future Appointments   Date Time Provider Julia Thibodeaux   3/28/2022  1:00 PM Areli Bullock PT [facility]   4/7/2022  2:20 PM Vicente Larios MD [facility]   7/6/2022  9:20 AM Mamie Johnson MD [facility]   9/19/2022  3:40 PM Félix Foley NP [facility] PP       Visit Approval Visit # Therapist initials Date A NS / Cx < 24 hr >24 hr Cx Comments    1  1/28/22 [x]  [] [] Initial evaluation    2 PDE 1- [x] [] []     3  2/7/22 [x] [] []     4  2/14/22 [x] [] [] 15 minutes late    5  2/21/22 [x] [] []     n/a [facility] 2/28/22 [] [x] [] Same day cancel; not feeling well    n/a [facility] 3/3/22 [] [x] [] NO SHOW; has diverticulitis    6  3/21/22 [x] [] []        [] [] []        [] [] []        [] [] []        [] [] []        [] [] []        [] [] []        [] [] []        [] [] []        [] [] []        [] [] []

## 2022-03-28 ENCOUNTER — HOSPITAL ENCOUNTER (OUTPATIENT)
Dept: PHYSICAL THERAPY | Age: 67
Discharge: HOME | End: 2022-03-28
Attending: ORTHOPAEDIC SURGERY
Payer: MEDICARE

## 2022-03-28 PROCEDURE — 97530 THERAPEUTIC ACTIVITIES: CPT

## 2022-03-28 PROCEDURE — 97110 THERAPEUTIC EXERCISES: CPT

## 2022-03-28 PROCEDURE — 97140 MANUAL THERAPY 1/> REGIONS: CPT

## 2022-03-28 NOTE — PROGRESS NOTES
Jere Smoker  : 1955  [place] / Product Type: Medicare /  [address]  at [facility]  [address] [facility], [address]  [phone]   [phone]      OUTPATIENT PHYSICAL THERAPY: Daily Treatment Note 3/28/2022  Visit Count:  7    ICD-10: Treatment Diagnosis:   M25.552 Pain in Left Hip  M25.652 Stiffness in Left Hip  R26.2 Difficulty in Walking, Not elsewhere classified    Precautions/Allergies:   Latex, Sulfa (sulfonamide antibiotics), and Other medication     TREATMENT PLAN:  Effective Dates: 2022 TO 2022 (90 days). Frequency/Duration: 1 time a week for 90 Days      REASON FOR TREATMENT:  L hip OA. Note, she also has back, R hip, and bilateral knee pain. Functional limitations reported at initial Eval: Standing (30-45 min), walking (15 min), and stairs (2STE home and 1 flight inside she rarely uses). Daily Note Subjective: Pt states her hip feeling a little better today. She is partially compliant with her HEP. She has been helping with her son who has cancer. She returns to her referring provider soon and says she may be sent to a spine specialist soon. MRI of L hip    MEDICATIONS REVIEWED:  3/28/2022   TREATMENT:   (In addition to Assessment/Re-Assessment sessions the following treatments were rendered)    TREATMENT GRID: Exercises and activities per grid below to improve mobility, strength, balance, and overall function. Required minimal visual and verbal cues to promote proper body alignment and promote proper body posture. Progressed resistance, range and complexity of movement as indicated.     Dx: L hip pain Date:  2022 Date:  22 Date:  22 Date:  3/21/22 Date:  3/28/22   Activity/Exercise Parameters Parameters      NuStep   5 min 6 min 5 min   Supine clams  2 x 15 GTB 2 x 15 GTB Sidelying 3 x 10  2 x 15 GTB   Straight leg raise     3 x 10 attempted   Bridges  With ball squeeze 3 x 10 2 x 15  2 x 15   Seated - hip flexion         Prone quad stretch 30sec x 2 bilat       Hamstring stretch 30 sec x 2  reviewed     Gastroc/soleus stretch 30sec x 2    30sec x 2   Resistance   2 x 10 bilat each  Hip abd/ext RTB     STS   2 x 10 With        HEP: bridges, SLR flxn, clamshells; added prone quad stretch; added clams    MANUAL THERAPY: grade 3 L hip lateral and inferior mobilizations    MODALITIES: Not performed. TREATMENT/SESSION ASSESSMENT: Pt fatigued quickly. She had discomfort with straight leg raises. Education: HEP review. Pt education for HEP safety, exercise frequency and duration, symptom control, mechanics, fall risk, and anatomy and physiology of present condition/healing time. RECOMMENDATIONS/INTENT FOR NEXT TREATMENT SESSION: Next visit will focus on advancements to more challenging activities.      PAIN: Initial: 4/10 Post Session:  3/10           Total Treatment Billable Duration: 40 minutes      Manual Therapy: (16 minutes)     Ther-Ex: (15 minutes)     Ther-Act: (9 minutes)     Neuro Re-ed (0 minutes)     Modalities: (0 minutes)  PT Patient Time In/Time Out  Time In: 1302  Time Out: 1342  Edwige Cashelor, PT, DPT    Future Appointments   Date Time Provider Julia Thibodeaux   2022  2:20 PM Altha Apgar, MD [facility] POA   2022  9:20 AM Katerina Guido MD [facility]   2022  3:40 PM Ailin Butts NP [facility] PP       Visit Approval Visit # Therapist initials Date A NS / Cx < 24 hr >24 hr Cx Comments    1  22 [x]  [] [] Initial evaluation    2 PDE 2022 [x] [] []     3 RH 22 [x] [] []     4 RH 22 [x] [] [] 15 minutes late    5  22 [x] [] []     n/a [facility] 22 [] [x] [] Same day cancel; not feeling well    n/a [facility] 3/3/22 [] [x] [] NO SHOW; has diverticulitis    6  3/21/22 [x] [] []     7 RH 3/28/22 [x] [] []        [] [] []        [] [] []        [] [] []        [] [] []        [] [] []        [] [] [] [] [] []        [] [] []        [] [] []

## 2022-04-04 ENCOUNTER — HOSPITAL ENCOUNTER (OUTPATIENT)
Dept: PHYSICAL THERAPY | Age: 67
Discharge: HOME | End: 2022-04-04
Attending: ORTHOPAEDIC SURGERY
Payer: MEDICARE

## 2022-04-04 PROCEDURE — 97140 MANUAL THERAPY 1/> REGIONS: CPT

## 2022-04-04 PROCEDURE — 97110 THERAPEUTIC EXERCISES: CPT

## 2022-04-04 PROCEDURE — 97530 THERAPEUTIC ACTIVITIES: CPT

## 2022-04-04 NOTE — PROGRESS NOTES
Bryan Soto  : 1955  [place] / Product Type: Medicare /  [address]  at [address] [facility] [address], [address] [facility], [place] [address] [address] [facility]  [phone]   [phone]      OUTPATIENT PHYSICAL THERAPY: Daily Treatment Note 2022  Visit Count:  8    ICD-10: Treatment Diagnosis:   M25.552 Pain in Left Hip  M25.652 Stiffness in Left Hip  R26.2 Difficulty in Walking, Not elsewhere classified    Precautions/Allergies:   Latex, Sulfa (sulfonamide antibiotics), and Other medication     TREATMENT PLAN:  Effective Dates: 2022 TO 2022 (90 days). Frequency/Duration: 1 time a week for 90 Days      REASON FOR TREATMENT:  L hip OA. Note, she also has back, R hip, and bilateral knee pain. Functional limitations reported at initial Eval: Standing (30-45 min), walking (15 min), and stairs (2STE home and 1 flight inside she rarely uses). Daily Note Subjective: Pt says her left hip is pretty sore today. She is partially compliant with her HEP. She says she has been walking a lot. MEDICATIONS REVIEWED:  2022   TREATMENT:   (In addition to Assessment/Re-Assessment sessions the following treatments were rendered)    TREATMENT GRID: Exercises and activities per grid below to improve mobility, strength, balance, and overall function. Required minimal visual and verbal cues to promote proper body alignment and promote proper body posture. Progressed resistance, range and complexity of movement as indicated.     Dx: L hip pain Date:  22 Date:  22 Date:  3/21/22 Date:  3/28/22 Date:  22   Activity/Exercise Parameters       NuStep  5 min 6 min 5 min 5 min   Supine clams 2 x 15 GTB 2 x 15 GTB Sidelying 3 x 10  2 x 15 GTB    Straight leg raise    3 x 10 attempted    Bridges With ball squeeze 3 x 10 2 x 15  2 x 15    Seated - hip flexion         Prone quad stretch        Hamstring stretch  reviewed      Gastroc/soleus stretch    30sec x 2 30sec x 2   Resistance  2 x 10 bilat each  Hip abd/ext RTB      STS  2 x 10 With march between      Piriformis stretch     30sec x 2   Heel floats     2 x 15   Crab walks     50ft x 4 RTB   Monster walks     50ft x 4 RTB   Ball on wall squats          HEP: bridges, SLR flxn, clamshells; added prone quad stretch; added clams    MANUAL THERAPY: grade 3 L hip lateral and inferior mobilizations    MODALITIES: Not performed. TREATMENT/SESSION ASSESSMENT: Pt fatigued quickly but was able to tolerate progression well. Education: HEP review. Pt education for HEP safety, exercise frequency and duration, symptom control, mechanics, fall risk, and anatomy and physiology of present condition/healing time. RECOMMENDATIONS/INTENT FOR NEXT TREATMENT SESSION: Next visit will focus on advancements to more challenging activities.      PAIN: Initial: 4/10 Post Session:  3/10           Total Treatment Billable Duration: 40 minutes      Manual Therapy: (10 minutes)     Ther-Ex: (18 minutes)     Ther-Act: (12 minutes)     Neuro Re-ed (0 minutes)     Modalities: (0 minutes)  PT Patient Time In/Time Out  Time In: 1055  Time Out: 1135  Hansel Yoon, PT, DPT    Future Appointments   Date Time Provider Julia Gomezi   4/7/2022  2:20 PM Nara Oates MD [facility]   4/11/2022 11:00 AM Rachel Baires PT [facility]   4/19/2022 11:00 AM Rachel Baires PT [facility]   7/6/2022  9:20 AM Maurisio Olivo MD [facility]   9/19/2022  3:40 PM Claudeen Held, NP [facility] PP       Visit Approval Visit # Therapist initials Date A NS / Cx < 24 hr >24 hr Cx Comments    1 RH 1/28/22 [x]  [] [] Initial evaluation    2 PDE 1- [x] [] []     3 RH 2/7/22 [x] [] []     4 RH 2/14/22 [x] [] [] 15 minutes late    5 RH 2/21/22 [x] [] []     n/a [facility] 2/28/22 [] [x] [] Same day cancel; not feeling well    [facility] 3/3/22 [] [x] [] NO SHOW; has diverticulitis    6  3/21/22 [x] [] []     7 [facility] 3/28/22 [x] [] []     8 RH 4/4/22 [x] [] []        [] [] []        [] [] [] PROGRESS NOTE       [] [] []        [] [] []        [] [] []        [] [] []        [] [] []        [] [] []

## 2022-04-11 ENCOUNTER — HOSPITAL ENCOUNTER (OUTPATIENT)
Dept: PHYSICAL THERAPY | Age: 67
Discharge: HOME | End: 2022-04-11
Attending: ORTHOPAEDIC SURGERY
Payer: MEDICARE

## 2022-04-11 NOTE — PROGRESS NOTES
Ryedr Malhotra  : 1955  [place] / Product Type: Medicare /  [address]  at [facility] [address], [address] [facility], [address]  [phone]   [phone]          CANCELLATION NOTE    Ms. Markos Syed was a same-day cancellation for today's appointment. She is babysitting.      # Recent No Shows/Same-Day Cancellations: 3    2022  Jarrod Ahmadi, PT, DPT          Visit Approval Visit # Therapist initials Date A NS / Cx < 24 hr >24 hr Cx Comments    1 RH 22 [x]  [] [] Initial evaluation    2 PDE 2022 [x] [] []     3 RH 22 [x] [] []     4 RH 22 [x] [] [] 15 minutes late    5  22 [x] [] []     n/a [facility] 22 [] [x] [] Same day cancel; not feeling well    n/a [facility] 3/3/22 [] [x] [] NO SHOW; has diverticulitis    6  3/21/22 [x] [] []     7 RH 3/28/22 [x] [] []     8 RH 22 [x] [] []     n/a [facility] 22 [] [x] [] NO SHOW; babysitting       [] [] []        [] [] [] PROGRESS NOTE       [] [] []        [] [] []        [] [] []        [] [] []        [] [] []

## 2022-04-19 ENCOUNTER — HOSPITAL ENCOUNTER (OUTPATIENT)
Dept: PHYSICAL THERAPY | Age: 67
Discharge: HOME | End: 2022-04-19
Attending: ORTHOPAEDIC SURGERY
Payer: MEDICARE

## 2022-04-19 PROCEDURE — 97140 MANUAL THERAPY 1/> REGIONS: CPT

## 2022-04-19 PROCEDURE — 97530 THERAPEUTIC ACTIVITIES: CPT

## 2022-04-19 PROCEDURE — 97110 THERAPEUTIC EXERCISES: CPT

## 2022-04-19 NOTE — PROGRESS NOTES
Bina Palma  : 1955  [place] / Product Type: Medicare /  [address]  at [facility]  11 [facility], [address] [place] [address]  [phone]   [phone]      OUTPATIENT PHYSICAL THERAPY: Daily Treatment Note 2022  Visit Count:  9    ICD-10: Treatment Diagnosis:   M25.552 Pain in Left Hip  M25.652 Stiffness in Left Hip  R26.2 Difficulty in Walking, Not elsewhere classified    Precautions/Allergies:   Latex, Sulfa (sulfonamide antibiotics), and Other medication     TREATMENT PLAN:  Effective Dates: 2022 TO 2022 (90 days). Frequency/Duration: 1 time a week for 90 Days      REASON FOR TREATMENT:  L hip OA. Note, she also has back, R hip, and bilateral knee pain. Functional limitations reported at initial Eval: Standing (30-45 min), walking (15 min), and stairs (2STE home and 1 flight inside she rarely uses). Daily Note Subjective: Pt says her left hip is a little sore today. She is partially compliant with her HEP. She says she has an appointment to review her lumbar MRI results. She says she is hoping to have her lumbar spine evaluated for PT.       MEDICATIONS REVIEWED:  2022   TREATMENT:   (In addition to Assessment/Re-Assessment sessions the following treatments were rendered)    TREATMENT GRID: Exercises and activities per grid below to improve mobility, strength, balance, and overall function. Required minimal visual and verbal cues to promote proper body alignment and promote proper body posture. Progressed resistance, range and complexity of movement as indicated.     Dx: L hip pain Date:  22 Date:  3/21/22 Date:  3/28/22 Date:  22 Date:  22   Activity/Exercise        NuStep 5 min 6 min 5 min 5 min 5 min   Supine clams 2 x 15 GTB Sidelying 3 x 10  2 x 15 GTB  Regular 3 x 10 bilat   Straight leg raise   3 x 10 attempted     Bridges 2 x 15  2 x 15  3 x 10   Seated - hip flexion         Prone quad stretch        Hamstring stretch reviewed       Gastroc/soleus stretch   30sec x 2 30sec x 2    Resistance 2 x 10 bilat each  Hip abd/ext RTB       STS 2 x 10 With march between       Piriformis stretch    30sec x 2    Heel floats    2 x 15 3 x 10    Crab walks    50ft x 4 RTB    Monster walks    50ft x 4 RTB    Ball on wall squats        Sciatic nerve glides     3 x 10    Donkey kicks     2 x 10      HEP: bridges, SLR flxn, clamshells; added prone quad stretch; added clams    MANUAL THERAPY: grade 3 L hip lateral and inferior mobilizations    MODALITIES: Not performed. TREATMENT/SESSION ASSESSMENT: Pt fatigued quickly but was able to tolerate progression well. She requires verbal and manual cues to remember exercises between sets at times. Education: HEP review. Pt education for HEP safety, exercise frequency and duration, symptom control, mechanics, fall risk, and anatomy and physiology of present condition/healing time. RECOMMENDATIONS/INTENT FOR NEXT TREATMENT SESSION: Next visit will focus on advancements to more challenging activities.      PAIN: Initial: 4/10 Post Session:  3/10           Total Treatment Billable Duration: 40 minutes      Manual Therapy: (10 minutes)     Ther-Ex: (19 minutes)     Ther-Act: (11 minutes)     Neuro Re-ed (0 minutes)     Modalities: (0 minutes)  PT Patient Time In/Time Out  Time In: 1103  Time Out: 1148  Yasmany Sportsman, PT, DPT    Future Appointments   Date Time Provider Julia Thibodeaux   4/19/2022  1:30 PM RoAbrazo [address] Samira Lucas POAG POA   7/6/2022  9:20 AM Ilana Betancourt MD [facility] EMG EMG   9/19/2022  3:40 PM Sherry Donnelly NP [facility] PSCD PP       Visit Approval Visit # Therapist initials Date A NS / Cx < 24 hr >24 hr Cx Comments    1 RH 1/28/22 [x]  [] [] Initial evaluation    2 PDE 1- [x] [] []     3 RH 2/7/22 [x] [] []     4 RH 2/14/22 [x] [] [] 15 minutes late    5 [facility] 2/21/22 [x] [] []     n/a [facility] 2/28/22 [] [x] [] Same day cancel; not feeling well    n[facility] 3/3/22 [] [x] [] NO SHOW; has diverticulitis    6  3/21/22 [x] [] []     7 RH 3/28/22 [x] [] []     8  4/4/22 [x] [] []     n/a [facility] 4/11/22 [] [x] [] NO SHOW; babysitting    9  4/19/22 [x] [] []        [] [] [] PROGRESS NOTE       [] [] []        [] [] []        [] [] []        [] [] []        [] [] []

## 2022-04-25 ENCOUNTER — HOSPITAL ENCOUNTER (OUTPATIENT)
Dept: PHYSICAL THERAPY | Age: 67
Discharge: HOME | End: 2022-04-25
Attending: ORTHOPAEDIC SURGERY
Payer: MEDICARE

## 2022-04-25 PROCEDURE — 97140 MANUAL THERAPY 1/> REGIONS: CPT

## 2022-04-25 PROCEDURE — 97530 THERAPEUTIC ACTIVITIES: CPT

## 2022-04-25 PROCEDURE — 97110 THERAPEUTIC EXERCISES: CPT

## 2022-04-25 NOTE — PROGRESS NOTES
Jere Smoker  : 1955  [place] / Product Type: Medicare /  [address]  at [facility]  [address], [address] \[facility]", [address]  [phone]   [phone]      OUTPATIENT PHYSICAL THERAPY: Daily Treatment Note 2022  Visit Count:  10    ICD-10: Treatment Diagnosis:   M25.552 Pain in Left Hip  M25.652 Stiffness in Left Hip  R26.2 Difficulty in Walking, Not elsewhere classified    Precautions/Allergies:   Latex, Sulfa (sulfonamide antibiotics), and Other medication     TREATMENT PLAN:  Effective Dates: 2022 TO 2022. Frequency/Duration: 1 time a week for 60 Days      REASON FOR TREATMENT:  L hip OA. Note, she also has back, R hip, and bilateral knee pain. Functional limitations reported at initial Eval: Standing (30-45 min), walking (15 min), and stairs (2STE home and 1 flight inside she rarely uses). Daily Note Subjective: Pt says her left hip is a little sore again today and stiff from sitting. She is trying to walk more. She is partially compliant with her HEP. MEDICATIONS REVIEWED:  2022   TREATMENT:   (In addition to Assessment/Re-Assessment sessions the following treatments were rendered)    TREATMENT GRID: Exercises and activities per grid below to improve mobility, strength, balance, and overall function. Required minimal visual and verbal cues to promote proper body alignment and promote proper body posture. Progressed resistance, range and complexity of movement as indicated.     Dx: L hip pain Date:  3/21/22 Date:  3/28/22 Date:  22 Date:  22 Date:  22   Activity/Exercise        NuStep 6 min 5 min 5 min 5 min  5min   Supine clams Sidelying 3 x 10  2 x 15 GTB  Regular 3 x 10 bilat    Straight leg raise  3 x 10 attempted   reviewed   Bridges  2 x 15  3 x 10 3 x 10    Seated - hip flexion         Prone quad stretch        Hamstring stretch     reviewed   Gastroc/soleus stretch  30sec x 2 30sec x 2     Resistance        STS        Piriformis stretch   30sec x 2  30sec x 2   Heel floats   2 x 15 3 x 10  3 x 10    Crab walks   50ft x 4 RTB     Monster walks   50ft x 4 RTB     Ball on wall squats        Sciatic nerve glides    3 x 10     Donkey kicks    2 x 10       HEP: bridges, SLR flxn, clamshells; added prone quad stretch; added heel floats, TA sets, and PPT and gave pt a print out. MANUAL THERAPY: grade 3 L hip lateral and inferior mobilizations    MODALITIES: Not performed. TREATMENT/SESSION ASSESSMENT: Pt had difficulty remembering how to do heel floats between sets. Will review. Education: HEP review. Pt education for HEP safety, exercise frequency and duration, symptom control, mechanics, fall risk, and anatomy and physiology of present condition/healing time. RECOMMENDATIONS/INTENT FOR NEXT TREATMENT SESSION: Next visit will focus on advancements to more challenging activities.      PAIN: Initial: 4/10 Post Session:  3/10           Total Treatment Billable Duration: 40 minutes      Manual Therapy: (10 minutes)     Ther-Ex: (20 minutes)     Ther-Act: (10 minutes)     Neuro Re-ed (0 minutes)     Modalities: (0 minutes)  PT Patient Time In/Time Out  Time In: 1101  Time Out: 1141  Edwin Soto, PT, DPT    Future Appointments   Date Time Provider Julia Thibodeaux   4/26/2022  1:00 PM Jin Ackermanma POAG POA   5/2/2022 11:00 AM Tamara Grace, [facility]   7/6/2022  9:20 AM Nikos Castellanos MD [facility] EMG EMG   9/19/2022  3:40 PM ANDREA Harden PSCD PP       Visit Approval Visit # Therapist initials Date A NS / Cx < 24 hr >24 hr Cx Comments    1 RH 1/28/22 [x]  [] [] Initial evaluation    2 PDE 1- [x] [] []     3 RH 2/7/22 [x] [] []     4 RH 2/14/22 [x] [] [] 15 minutes late    5 RH 2/21/22 [x] [] []     n/a [facility] 2/28/22 [] [x] [] Same day cancel; not feeling well    [facility] 3/3/22 [] [x] [] NO SHOW; has diverticulitis    6 RH 3/21/22 [x] [] []     7 RH 3/28/22 [x] [] []     8 RH 4/4/22 [x] [] []     [facility] 4/11[facility]22 [] [x] [] NO SHOW; babysitting    9  4/19/22 [x] [] []     10 RH 1/19/08 [x] [] [] RECERTIFICATION       [] [] []        [] [] []        [] [] []        [] [] []        [] [] []

## 2022-04-25 NOTE — THERAPY EVALUATION
Micky Landaverde  : 1955  [place] / Product Type: Medicare /  [address]  at [facility]  [address], [address] [facility], [address]  [phone]   [phone]        OUTPATIENT PHYSICAL THERAPY:Recertification [id number]    ICD-10: Treatment Diagnosis:   M25.552 Pain in Left Hip  M25.652 Stiffness in Left Hip  R26.2 Difficulty in Walking, Not elsewhere classified    Precautions/Allergies:   Latex, Sulfa (sulfonamide antibiotics), and Other medication   Fall Risk Score: 0 (? 5 = High Risk)  MD Orders:  MEDICAL/REFERRING DIAGNOSIS:  Pain in left hip [M25.552]   DATE OF ONSET:   REFERRING PHYSICIAN: Vani Figueroa MD  RETURN PHYSICIAN APPOINTMENT:      RECERTIFICATION 67: Pt states she feels about 50% improved since beginning physical therapy. She returns to her physician tomorrow to discuss her lumbar MRI. She primarily has pain in her L glutes after prolonged sitting. Standing has gotten easier. She is hoping to continue physical therapy at this time. She says she would actually like to have physical therapy for her lumbar spine instead, but would like to discuss this with her physician tomorrow. Her pain intensity has also improved. Pt has had some improvement in strength and states function and pain intensity are improving also. Skilled intervention is required to address the listed impairments and functional limitations to meet the patient's set goals. INITIAL ASSESSMENT:  Ms. Reddy Coleman has attended 1 physical therapy session including the initial evaluation. Patient presents with signs and symptoms that are consistent with: L hip OA. Note, she also has back, R hip, and bilateral knee pain. The current impairments identified include: Decreased strength, dec ROM, muscle coordination, pain, abnormal posture.    The functional deficits/aggravating factors are as follows: Standing (30-45 min), walking (15 min), and stairs (2 WM home and 1 flight inside she rarely uses). Recommending skilled PT: manual therapeutic techniques (as appropriate), therapeutic exercises and activities, balance interventions, and a comprehensive home exercise program to address the current impairments, as listed above. Janet Salas will benefit from skilled PT (medically necessary) to address above deficits affecting participation in basic ADLs and overall functional tolerance. PROBLEM LIST (Impacting functional limitations):   1. Decreased Strength  2. Decreased ADL/Functional Activities  3. Decreased Transfer Abilities  4. Decreased Ambulation Ability/Technique  5. Decreased Balance  6. Increased Pain  7. Decreased Flexibility/Joint Mobility  8. Decreased Pitcher with Home Exercise Program INTERVENTIONS PLANNED:  1. Balance Exercise  2. Electrical Stimulation  3. Gait Training  4. Home Exercise Program (HEP)  5. Manual Therapy  6. Neuromuscular Re-education/Strengthening  7. Range of Motion (ROM)  8. Therapeutic Activites  9. Therapeutic Exercise/Strengthening  10. Transcutaneous Electrical Nerve Stimulation (TENS)  11. Transfer Training   TREATMENT PLAN:    Effective Dates: 1/28/2022 TO 6/28/2022. Frequency/Duration: 1 time a week for 60 Days    GOALS: (Goals have been discussed and agreed upon with patient.)  Short-Term Functional Goals: Time Frame: 4 weeks  1. PT will be compliant with initial HEP. (4/25/22 PROGRESSING)  2. Pt will decrease worst pain to 4/10 with functional activities. (4/25/22 PROGRESSING)  3. LEFS score equal to or more than 50/80. (4/25/22 PROGRESSING)     Discharge Goals: Time Frame: 8 weeks  1. PT will be independent with final HEP. (4/25/22 CONTINUE)  2. Pt will decrease worst pain to 1/10 with all functional activities. (4/25/22 PROGRESSING)  3. LEFS score equal to or more than 65/80. (4/25/22 PROGRESSING)  4.  Pt will be able to shower, dress, and perform household chores without limitation. (4/25/22 PROGRESSING)  5. Pt will be able to perform a 30 second chair rise 12 times or more to demonstrate a reduction in fall risk per age group. (4/25/22 CONTINUE)    Rehabilitation Potential For Stated Goals: Fair    Outcome Measure: Tool Used: Lower Extremity Functional Scale (LEFS)  Score:  Initial: 29/80 Most Recent: 35/80 (Date: 4/25/22)   Interpretation of Score: 20 questions each scored on a 5 point scale with 0 representing \"extreme difficulty or unable to perform\" and 4 representing \"no difficulty\". The lower the score, the greater the functional disability. 80/80 represents no disability. Minimal detectable change is 9 points. Medical Necessity:   · Skilled intervention continues to be required due to decreased strength, ROM, balance, and functional mobility. Reason for Services/Other Comments:  · Patient continues to require skilled intervention due to decreased strength, balance, and ROM with increased pain affecting pt functional mobility. ·   Regarding Cameron Peterimani's therapy, I certify that the treatment plan above will be carried out by a therapist or under their direction. Thank you for this referral,  Pelon Lester, PT     Referring Physician Signature: Cindy Marshall MD             The information in this section was collected on 1/28/22 (except where otherwise noted). HISTORY:   History of Present Injury/Illness (Reason for Referral): Pt is a 60 yo female referred to physical therapy due to left hip pain. She was treated here for left knee pain summer 2021 following a slip and fall at [facility] 4/3/22. She was referred to aquatic PT due to low tolerance for on land PT but did not go. She eventually went to [facility] and had PT for her L hip there that ended in Dec 2021. She says she is still doing some of her exercises. She states her pain has improved some since she was in PT the last time. Note, she reports that her L knee marlo at times, but she has had no falls.    She reports her L hip also began hurting following the fall and denies any hip pain prior. At her last evaluation, she reported she did have bilateral hip and low back pain prior to her fall. Treatment Side: Left      Past Medical History/Comorbidities:   Ms. Willian Becerra  has a past medical history of Acute maxillary sinusitis, Allergic rhinitis (4/24/2015), Asthma, Atrophic vaginitis (4/24/2015), Benign hypertension (4/24/2015), Chondromalacia of right patella, Chronic anxiety (4/24/2015), Cystocele, midline (4/24/2015), Diverticulosis of colon (4/24/2015), Dysmenorrhea (4/24/2015), Edema (4/24/2015), Eustachian tube dysfunction, Female stress incontinence, Gastrointestinal disorder, GERD (gastroesophageal reflux disease), Hypercholesterolemia, Hyperplastic polyps of stomach (4/24/2015), Hypertension, Influenza (1/13/2019), Internal hemorrhoids without mention of complication (3/82/0799), Obesity (BMI 30-39.9) (11/14/2017), Obstructive sleep apnea (11/14/2017), Other diseases of lung, not elsewhere classified (4/24/2015), Right upper quadrant pain, Urge incontinence, and Ventricular bigeminy (8/31/2016). She has no past medical history of Adverse effect of anesthesia, Aneurysm ([address] Autoimmune disease ([address] CAD (coronary artery disease), Cancer ([address] Chronic kidney disease, Chronic pain, Coagulation disorder ([address] COPD, Dementia, Diabetes ([address] Difficult intubation, Endocrine disease, Heart failure ([address] Ill-defined condition, Liver disease, Malignant hyperthermia due to anesthesia, Nausea & vomiting, Neurological disorder, Other ill-defined conditions(799.89), Pseudocholinesterase deficiency, Psychiatric disorder, PUD (peptic ulcer disease), Seizures ([address] Stroke ([address] Thromboembolus ([address] or Thyroid disease. Ms. Willian Becerra  has a past surgical history that includes hx cathy and bso (); hx hysterectomy; and hx knee arthroscopy (Left).     Social History/Living Environment:        Pain/Symptom Location: anterior left hip primarily, occasionally L lateral hip, L anterior thigh, (report of location inconsistent)    Worst Pain: 8/10  Current Pain: 4/10    Aggravating Factors: Standing (30-45 min), walking (15 min), stairs (2STE home and 1 flight inside she rarely uses)    Alleviating Factors/Positions/Motions: heat    Diagnostic Imaging: see xray in chart    Occupation: on disability due to asthma; retired from teaching    Current Medications:       Current Outpatient Medications:     amLODIPine (NORVASC) 10 mg tablet, Take 1 Tablet by mouth daily. , Disp: 30 Tablet, Rfl: 5    pantoprazole (PROTONIX) 40 mg tablet, Take 1 Tablet by mouth two (2) times a day., Disp: 60 Tablet, Rfl: 5    dicyclomine (BENTYL) 20 mg tablet, Take 1 Tablet by mouth every six (6) hours. , Disp: 120 Tablet, Rfl: 5    valsartan (DIOVAN) 160 mg tablet, Take 1 Tablet by mouth nightly., Disp: 30 Tablet, Rfl: 5    albuterol (PROAIR HFA) 90 mcg/actuation inhaler, Take 2 Puffs by inhalation every four (4) hours as needed for Wheezing. Patient instructed to take morning of surgery per anesthesia guidelines, Disp: , Rfl:     albuterol (PROVENTIL VENTOLIN) 2.5 mg /3 mL (0.083 %) nebulizer solution, , Disp: , Rfl: 5    EPINEPHrine (EPIPEN) 0.3 mg/0.3 mL (1:1,000) injection, 0.3 mg by IntraMUSCular route once as needed. , Disp: , Rfl:     Cetirizine (ZYRTEC) 10 mg cap, Take  by mouth., Disp: , Rfl:     fluticasone (FLONASE) 50 mcg/actuation nasal spray, nightly., Disp: , Rfl:    Date Last Reviewed:  4/25/2022       Ambulatory/Rehab Services H2 Model Falls Risk Assessment    Risk Factors:       No Risk Factors Identified Ability to Rise from Chair:       (0)  Ability to rise in a single movement    Falls Prevention Plan:       No modifications necessary   Total: (5 or greater = High Risk): 1    ©2010 [facility] [employer] [place]. [facility] Patent #[id number].  Federal Law prohibits the replication, distribution or use without written permission from [facility] [employer] [facility]        Number of Personal Factors/Comorbidities that affect the Plan of Care: 1-2: MODERATE COMPLEXITY   EXAMINATION:     ______________________________________________________________________________________________  Observation    Posture: forward head/shoulers, inc knee/hip/trunk flexion             Gait: mildly antalgic, toe out bilat, dec trunk rotation and arm swing        Assistive Device: none        ________________________________________________________________________________________________  Strength            Lower Extremity  Joint:      RIGHT LEFT   Hip Flexion 4/5 4/5 4/25/22: 4/5   Hip Extension     Hip Internal Rotation     Hip External Rotation     Hip Abduction 4/5 4/5 4/25/22: 4+/5   Hip Adduction 4/5 4/5  4/25/22: 4/5   Knee Flexion 4/5 4/5 4/25/22: 4+/5   Knee Extension 4+/5 4+/5  4/25/22: 4+/5     ________________________________________________________________________________________________      Additional Comments:     [employer]: Unremarkable       ________________________________________________________________________________________________  Special Tests    30 Second Chair Rise: 7 times (<12 is an increased fall risk)    4/25/22 7 times (inc knee pain)    Scour Test: Positive on L  SHELBI: Positive bilat  FADIR: Positive bilat    Sky Test: Pt would not tolerate position. 90/90 Hamstring Test: Pt would not tolerate position. ________________________________________________________________________________________________  Palpation: No tenderness with palpation. Body Structures Involved:  1. Nerves  2. Bones  3. Joints  4. Muscles  5. Ligaments Body Functions Affected:  1. Sensory/Pain  2. Neuromusculoskeletal  3. Movement Related Activities and Participation Affected:  1. General Tasks and Demands  2. Mobility  3. Self Care  4. Domestic Life  5. Interpersonal Interactions and Relationships  6.  [employer] Social and [employer]   Number of elements (examined above) that affect the Plan of Care: 3: MODERATE COMPLEXITY   CLINICAL PRESENTATION:   Presentation: Evolving clinical presentation with changing clinical characteristics: MODERATE COMPLEXITY   CLINICAL DECISION MAKING:      Use of outcome tool(s) and clinical judgement create a POC that gives a: Questionable prediction of patient's progress: MODERATE COMPLEXITY

## 2022-04-27 NOTE — PROGRESS NOTES
I am accessing Ms. Rabago's chart as a part of our department's internal chart auditing process. I certify that Ms. Andrei Arzola is, or was, a patient in our department.   Thank you,  Sandy Arambula  4/27/2022

## 2022-05-02 ENCOUNTER — HOSPITAL ENCOUNTER (OUTPATIENT)
Dept: PHYSICAL THERAPY | Age: 67
Discharge: HOME | End: 2022-05-02
Attending: ORTHOPAEDIC SURGERY
Payer: MEDICARE

## 2022-05-02 NOTE — PROGRESS NOTES
Augusta Sal  : 1955  [place] / Product Type: Medicare /  [address]  at [facility]  11 [facility], [address] [place] [address], [address]  [phone]   [phone]          CANCELLATION NOTE    Ms. Ayana Witt was a same-day cancellation for today's appointment.      # Recent No Shows/Same-Day Cancellations: 4    2022  Deonte Gipson PT, DPT            Visit Approval Visit # Therapist initials Date A NS / Cx < 24 hr >24 hr Cx Comments    1 RH 22 [x]  [] [] Initial evaluation    2 PDE 2022 [x] [] []     3 RH 22 [x] [] []     4 RH 22 [x] [] [] 15 minutes late    5  22 [x] [] []     n[facility]a [facility] 22 [] [x] [] Same day cancel; not feeling well    n/a [facility] 3/3/22 [] [x] [] NO SHOW; has diverticulitis    6  3/21/22 [x] [] []     7 RH 3/28/22 [x] [] []     8 RH 22 [x] [] []     n/a [facility] 22 [] [x] [] NO SHOW; babysitting    9  22 [x] [] []     10  87 [x] [] [] RECERTIFICATION    n/a [facility] 22 [] [x] [] Same day cancel; did not have a sitter for her son       [] [] []        [] [] []        [] [] []        [] [] []

## 2022-05-12 ENCOUNTER — HOSPITAL ENCOUNTER (OUTPATIENT)
Dept: PHYSICAL THERAPY | Age: 67
Discharge: HOME | End: 2022-05-12
Attending: ORTHOPAEDIC SURGERY
Payer: MEDICARE

## 2022-05-12 PROCEDURE — 97530 THERAPEUTIC ACTIVITIES: CPT

## 2022-05-12 PROCEDURE — 97162 PT EVAL MOD COMPLEX 30 MIN: CPT

## 2022-05-12 NOTE — PROGRESS NOTES
Jacquie Amaya  : 1955  [place] / [place] / Product Type: Medicare /  [address]  at [facility], [address] [facility], [address]  [phone]   [phone]      OUTPATIENT PHYSICAL THERAPY: Daily Treatment Note 2022  Visit Count:  1    ICD-10: Treatment Diagnosis:   M54.5 Low Back Pain   R26.2 Difficulty in Walking, Not Elsewhere Classified  Z91.81 History of Falling     Precautions/Allergies:   Latex, Sulfa (sulfonamide antibiotics), and Other medication     TREATMENT PLAN:  Effective Dates: 2022 TO 8/10/2022 (90 days). Frequency/Duration: 2 times a week for 90 Days      REASON FOR TREATMENT:  pain in lumbar spine and LE due to degenerative changes with a flexion preference. 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 See chart for full report. Functional limitations reported at initial Eval: lying supine with her L LE crossed over her right, prolonged sitting, prolonged standing; worse first thing in the morning    Daily Note Subjective:     MEDICATIONS REVIEWED:  2022   TREATMENT:   (In addition to Assessment/Re-Assessment sessions the following treatments were rendered)    TREATMENT GRID: Exercises and activities per grid below to improve mobility, strength, balance, and overall function. Required minimal visual and verbal cues to promote proper body alignment and promote proper body posture. Progressed resistance, range and complexity of movement as indicated. Date:   Date:   Date:     Activity/Exercise Parameters Parameters Parameters                                                                 HEP: TA setting, PPT, SKTC    MANUAL THERAPY: Not performed. MODALITIES: Not performed. TREATMENT/SESSION ASSESSMENT:  Jacquie Amaya verbalized understanding of role of PT and POC.     Education: Pt education for initial HEP safety, exercise frequency and duration, symptom control, mechanics, fall risk, and anatomy and physiology of present condition/healing time. RECOMMENDATIONS/INTENT FOR NEXT TREATMENT SESSION: Next visit will focus on advancements to more challenging activities.      PAIN: Initial: 5/10 Post Session:  5/10         Total Treatment Billable Duration: 10 min plus eval (pt was 10 min late)     Manual Therapy: (0 minutes)     Ther-Ex: (0 minutes)     Ther-Act: (0 minutes)     Neuro Re-ed (0 minutes)     Modalities: (0 minutes)  PT Patient Time In/Time Out  Time In: 0361  Time Out: 0920  Dennise Baig, PT, DPT    Future Appointments   Date Time Provider Julia Thibodeaux   5/16/2022 11:00 AM Ewa Chadwick, VIGNESH [facility]   5/17/2022 11:00 AM Clearnce Jeans, Christean Ruse, PT [facility]       Visit Approval Visit # Therapist initials Date A NS / Cx < 24 hr >24 hr Cx Comments    1 RH 5/12/22 [x]  [] [] Initial evaluation       [] [] []        [] [] []        [] [] []        [] [] []        [] [] []        [] [] []        [] [] []        [] [] []        [] [] []        [] [] []        [] [] []        [] [] []        [] [] []        [] [] []        [] [] []        [] [] []        [] [] []

## 2022-05-12 NOTE — THERAPY DISCHARGE
Bina Palma  : 1955  [place] / Product Type: Medicare /  [address]  at [facility]  [address], [address], [place] [address] [address] [facility]  [phone]   [phone]        OUTPATIENT PHYSICAL [facility]     ICD-10: Treatment Diagnosis:   M25.552 Pain in Left Hip  M25.652 Stiffness in Left Hip  R26.2 Difficulty in Walking, Not elsewhere classified    Precautions/Allergies:   Latex, Sulfa (sulfonamide antibiotics), and Other medication   Fall Risk Score: 0 (? 5 = High Risk)  MD Orders:  MEDICAL/REFERRING DIAGNOSIS:  Pain in left hip [M25.552]   DATE OF ONSET:   REFERRING PHYSICIAN: Jen Denson MD      DISCHARGE SUMMARY 22: Pt is prefers for her L hip case to be discharged and was evaluated for low back pain this date. RECERTIFICATION : Pt states she feels about 50% improved since beginning physical therapy. She returns to her physician tomorrow to discuss her lumbar MRI. She primarily has pain in her L glutes after prolonged sitting. Standing has gotten easier. She is hoping to continue physical therapy at this time. She says she would actually like to have physical therapy for her lumbar spine instead, but would like to discuss this with her physician tomorrow. Her pain intensity has also improved. Pt has had some improvement in strength and states function and pain intensity are improving also. Skilled intervention is required to address the listed impairments and functional limitations to meet the patient's set goals. INITIAL ASSESSMENT:  Ms. Eladio Fontanez has attended 1 physical therapy session including the initial evaluation. Patient presents with signs and symptoms that are consistent with: L hip OA. Note, she also has back, R hip, and bilateral knee pain. The current impairments identified include: Decreased strength, dec ROM, muscle coordination, pain, abnormal posture.    The functional deficits/aggravating factors are as follows: Standing (30-45 min), walking (15 min), and stairs (2 WM home and 1 flight inside she rarely uses). Recommending skilled PT: manual therapeutic techniques (as appropriate), therapeutic exercises and activities, balance interventions, and a comprehensive home exercise program to address the current impairments, as listed above. Federico España will benefit from skilled PT (medically necessary) to address above deficits affecting participation in basic ADLs and overall functional tolerance. PROBLEM LIST (Impacting functional limitations):   1. Decreased Strength  2. Decreased ADL/Functional Activities  3. Decreased Transfer Abilities  4. Decreased Ambulation Ability/Technique  5. Decreased Balance  6. Increased Pain  7. Decreased Flexibility/Joint Mobility  8. Decreased [place] with Home Exercise Program INTERVENTIONS PLANNED:  1. Balance Exercise  2. Electrical Stimulation  3. Gait Training  4. Home Exercise Program (HEP)  5. Manual Therapy  6. Neuromuscular Re-education/Strengthening  7. Range of Motion (ROM)  8. Therapeutic Activites  9. Therapeutic Exercise/Strengthening  10. Transcutaneous Electrical Nerve Stimulation (TENS)  11. Transfer Training   TREATMENT PLAN:    Effective Dates: 1/28/2022 TO 6/28/2022. Frequency/Duration: 1 time a week for 60 Days    GOALS: (Goals have been discussed and agreed upon with patient.)  Short-Term Functional Goals: Time Frame: 4 weeks  1. PT will be compliant with initial HEP. (4/25/22 PROGRESSING) (5/12/22 PROGRESSED)  2. Pt will decrease worst pain to 4/10 with functional activities. (4/25/22 PROGRESSING)  (5/12/22 PROGRESSED)  3. LEFS score equal to or more than 50/80. (4/25/22 PROGRESSING)  (5/12/22 PROGRESSED)     Discharge Goals: Time Frame: 8 weeks  1. PT will be independent with final HEP. (4/25/22 CONTINUE)  (5/12/22 PARTIALLY MET)  2.  Pt will decrease worst pain to 1/10 with all functional activities. (4/25/22 PROGRESSING) (5/12/22 PROGRESSED)  3. LEFS score equal to or more than 65/80. (4/25/22 PROGRESSING) (5/12/22 PROGRESSED)  4. Pt will be able to shower, dress, and perform household chores without limitation. (4/25/22 PROGRESSING) (5/12/22 PROGRESSED)  5. Pt will be able to perform a 30 second chair rise 12 times or more to demonstrate a reduction in fall risk per age group. (4/25/22 CONTINUE)  (5/12/22 NOT MET)    Rehabilitation Potential For Stated Goals: Fair    Outcome Measure: Tool Used: Lower Extremity Functional Scale (LEFS)  Score:  Initial: 29/80 35/80 (Date: 4/25/22) Most Recent: 38/80 (Date: 5/12/22)   Interpretation of Score: 20 questions each scored on a 5 point scale with 0 representing \"extreme difficulty or unable to perform\" and 4 representing \"no difficulty\". The lower the score, the greater the functional disability. 80/80 represents no disability. Minimal detectable change is 9 points. Thank you for this referral,  Dennise Baig, PT, DPT     Referring Physician Signature: BLANKA Recinos             The information in this section was collected on 1/28/22 (except where otherwise noted). HISTORY:   History of Present Injury/Illness (Reason for Referral): Pt is a 60 yo female referred to physical therapy due to left hip pain. She was treated here for left knee pain summer 2021 following a slip and fall at [facility] 4/3/22. She was referred to aquatic PT due to low tolerance for on land PT but did not go. She eventually went to [facility] and had PT for her L hip there that ended in Dec 2021. She says she is still doing some of her exercises. She states her pain has improved some since she was in PT the last time. Note, she reports that her L knee marlo at times, but she has had no falls. She reports her L hip also began hurting following the fall and denies any hip pain prior.  At her last evaluation, she reported she did have bilateral hip and low back pain prior to her fall. Treatment Side: Left      Past Medical History/Comorbidities:   Ms. Bernadine Woodard  has a past medical history of Acute maxillary sinusitis, Allergic rhinitis (4/24/2015), Asthma, Atrophic vaginitis (4/24/2015), Benign hypertension (4/24/2015), Chondromalacia of right patella, Chronic anxiety (4/24/2015), Cystocele, midline (4/24/2015), Diverticulosis of colon (4/24/2015), Dysmenorrhea (4/24/2015), Edema (4/24/2015), Eustachian tube dysfunction, Female stress incontinence, Gastrointestinal disorder, GERD (gastroesophageal reflux disease), Hypercholesterolemia, Hyperplastic polyps of stomach (4/24/2015), Hypertension, Influenza (1/13/2019), Internal hemorrhoids without mention of complication (5/01/1403), Obesity (BMI 30-39.9) (11/14/2017), Obstructive sleep apnea (11/14/2017), Other diseases of lung, not elsewhere classified (4/24/2015), Right upper quadrant pain, Urge incontinence, and Ventricular bigeminy (8/31/2016). She has no past medical history of Adverse effect of anesthesia, Aneurysm ([address] Autoimmune disease ([address] CAD (coronary artery disease), Cancer ([address] Chronic kidney disease, Chronic pain, Coagulation disorder ([address] COPD, Dementia, Diabetes ([address] Difficult intubation, Endocrine disease, Heart failure ([address] Ill-defined condition, Liver disease, Malignant hyperthermia due to anesthesia, Nausea & vomiting, Neurological disorder, Other ill-defined conditions(799.89), Pseudocholinesterase deficiency, Psychiatric disorder, PUD (peptic ulcer disease), Seizures ([address] Stroke ([address] Thromboembolus ([address] or Thyroid disease. Ms. Bernadine Woodard  has a past surgical history that includes hx cathy and bso (); hx hysterectomy; and hx knee arthroscopy (Left).     Social History/Living Environment:        Pain/Symptom Location: anterior left hip primarily, occasionally L lateral hip, L anterior thigh, (report of location inconsistent)    Worst Pain: 8/10  Current Pain: 4/10    Aggravating Factors: Standing (30-45 min), walking (15 min), stairs (2STE home and 1 flight inside she rarely uses)    Alleviating Factors/Positions/Motions: heat    Diagnostic Imaging: see xray in chart    Occupation: on disability due to asthma; retired from teaching    Current Medications:       Current Outpatient Medications:     amLODIPine (NORVASC) 10 mg tablet, Take 1 Tablet by mouth daily. , Disp: 30 Tablet, Rfl: 5    pantoprazole (PROTONIX) 40 mg tablet, Take 1 Tablet by mouth two (2) times a day., Disp: 60 Tablet, Rfl: 5    dicyclomine (BENTYL) 20 mg tablet, Take 1 Tablet by mouth every six (6) hours. , Disp: 120 Tablet, Rfl: 5    valsartan (DIOVAN) 160 mg tablet, Take 1 Tablet by mouth nightly., Disp: 30 Tablet, Rfl: 5    albuterol (PROAIR HFA) 90 mcg/actuation inhaler, Take 2 Puffs by inhalation every four (4) hours as needed for Wheezing. Patient instructed to take morning of surgery per anesthesia guidelines, Disp: , Rfl:     albuterol (PROVENTIL VENTOLIN) 2.5 mg /3 mL (0.083 %) nebulizer solution, , Disp: , Rfl: 5    EPINEPHrine (EPIPEN) 0.3 mg/0.3 mL (1:1,000) injection, 0.3 mg by IntraMUSCular route once as needed. , Disp: , Rfl:     Cetirizine (ZYRTEC) 10 mg cap, Take  by mouth., Disp: , Rfl:     fluticasone (FLONASE) 50 mcg/actuation nasal spray, nightly., Disp: , Rfl:    Date Last Reviewed:  5/12/2022       Ambulatory/Rehab Services H2 Model Falls Risk Assessment    Risk Factors:       No Risk Factors Identified Ability to Rise from Chair:       (0)  Ability to rise in a single movement    Falls Prevention Plan:       No modifications necessary   Total: (5 or greater = High Risk): 1    ©2010 [facility] . [facility] Patent #[id number].  Federal Law prohibits the replication, distribution or use without written permission from [facility]        Number of Personal Factors/Comorbidities that affect the Plan of Care: 1-2: MODERATE COMPLEXITY   EXAMINATION: ______________________________________________________________________________________________  Observation    Posture: forward head/shoulers, inc knee/hip/trunk flexion             Gait: mildly antalgic, toe out bilat, dec trunk rotation and arm swing        Assistive Device: none        ________________________________________________________________________________________________  Strength            Lower Extremity  Joint:      RIGHT LEFT   Hip Flexion 4/5 4/5  4/25/22: 4/5   Hip Extension     Hip Internal Rotation     Hip External Rotation     Hip Abduction 4/5 4/5  4/25/22: 4+/5   Hip Adduction 4/5 4/5  4/25/22: 4/5   Knee Flexion 4/5 4/5  4/25/22: 4+/5   Knee Extension 4+/5 4+/5  4/25/22: 4+/5     ________________________________________________________________________________________________      Additional Comments:     [facility]: Unremarkable       ________________________________________________________________________________________________  Special Tests    30 Second Chair Rise: 7 times (<12 is an increased fall risk)    4/25/22 7 times (inc knee pain)    Scour Test: Positive on L  SHELBI: Positive bilat  FADIR: Positive bilat    Sky Test: Pt would not tolerate position. 90/90 Hamstring Test: Pt would not tolerate position. ________________________________________________________________________________________________  Palpation: No tenderness with palpation. Body Structures Involved:  1. Nerves  2. Bones  3. Joints  4. Muscles  5. Ligaments Body Functions Affected:  1. Sensory/Pain  2. Neuromusculoskeletal  3. Movement Related Activities and Participation Affected:  1. General Tasks and Demands  2. Mobility  3. Self Care  4. Domestic Life  5. Interpersonal Interactions and Relationships  6.  Community, Social and [place]   Number of elements (examined above) that affect the Plan of Care: 3: MODERATE COMPLEXITY   CLINICAL PRESENTATION:   Presentation: Evolving clinical presentation with changing clinical characteristics: MODERATE COMPLEXITY   CLINICAL DECISION MAKING:      Use of outcome tool(s) and clinical judgement create a POC that gives a: Questionable prediction of patient's progress: MODERATE COMPLEXITY

## 2022-05-12 NOTE — THERAPY EVALUATION
Roberto Later  : 1955  [place] / [place] / Product Type: Medicare /  [address]  at [facility]  [address], [address] [facility], [address]  [phone]   [phone]        OUTPATIENT PHYSICAL [place] Assessment 2022    ICD-10: Treatment Diagnosis:   M54.5 Low Back Pain   R26.2 Difficulty in Walking, Not Elsewhere Classified  Z91.81 History of Falling   Precautions/Allergies:   Latex, Sulfa (sulfonamide antibiotics), and Other medication   Fall Risk Score: (? 5 = High Risk)  MD Orders: Eval and treat MEDICAL/REFERRING DIAGNOSIS:  Sciatica of left side [M54.32]  Facet arthropathy, lumbar [M47.816]  Bulge of lumbar disc without myelopathy [M51.26]   DATE OF ONSET:   REFERRING PHYSICIAN: Alana Cannon PA  RETURN PHYSICIAN APPOINTMENT:      ASSESSMENT:  Ms. Hailey Headley has attended 1 physical therapy session including the initial evaluation. Patient presents with signs and symptoms that are consistent with: pain in lumbar spine and LE due to degenerative changes with a flexion preference. 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 See chart for full report. The current impairments identified include: Decreased strength, dec ROM, muscle coordination, pain, abnormal posture, impaired balance, and impaired gait. The functional deficits are as follows: lying supine with her L LE crossed over her right, prolonged sitting, prolonged standing; worse first thing in the morning    Recommending skilled PT: manual therapeutic techniques (as appropriate), therapeutic exercises and activities, balance interventions, and a comprehensive home exercise  program to address the current impairments, as listed above. Tobias Later will benefit from skilled PT (medically necessary) to address above deficits affecting participation in basic ADLs and overall functional tolerance.       PROBLEM LIST (Impacting functional limitations):   1. Decreased Strength  2. Decreased ADL/Functional Activities  3. Decreased Transfer Abilities  4. Decreased Ambulation Ability/Technique  5. Decreased Balance  6. Increased Pain  7. Decreased Flexibility/Joint Mobility  8. Decreased [place] with Home Exercise Program INTERVENTIONS PLANNED:   1. Balance Exercise  2. Cold  3. Electrical Stimulation  4. Family Education  5. Gait Training  6. Heat  7. Home Exercise Program (HEP)  8. Manual Therapy  9. Neuromuscular Re-education/Strengthening  10. Range of Motion (ROM)  11. Therapeutic Activites  12. Therapeutic Exercise/Strengthening  13. Transcutaneous Electrical Nerve Stimulation (TENS)  14. Transfer Training   TREATMENT PLAN:    Effective Dates: 5/12/2022 TO 8/10/2022 (90 days). Frequency/Duration: 2 times a week for 90 Days  Short-Term Functional Goals: Time Frame: 4 weeks  1. Pt will be compliant with initial HEP. 2. Pt will decreased worst pain to 3/10 or less with functional activities. 3. Pt will decreased DUONG to 40 or less. 4.     Discharge Goals: Time Frame: 12 weeks  1. Pt will be independent with final HEP. 2. Pt will decreased worst pain to 1/10 or less with all functional activities. 3. Pt will decreased DUONG to 30 or less. 4. Pt will be able sleep through the night without waking due to back pain. 5. Pt will increase 30 Second Chair Rise to 12 or more to indicate a reduction in fall risk. Rehabilitation Potential For Stated Goals: Fair    Outcome Measure: Tool Used: Modified Oswestry Low Back Pain Questionnaire  Score:  Initial: 25/50 (50%)  Most Recent: X/50 (Date: -- )   Interpretation of Score: Each section is scored on a 0-5 scale, 5 representing the greatest disability. The scores of each section are added together for a total score of 50. Medical Necessity:   · Skilled intervention continues to be required due to decreased strength, ROM, balance, and functional mobility.   Reason for Services/Other Comments:  · Patient continues to require skilled intervention due to decreased strength, balance, and ROM with increased pain affecting pt functional mobility. Regarding Handy Rabago's therapy, I certify that the treatment plan above will be carried out by a therapist or under their direction. Thank you for this referral,  Thelma Brush, PT, DPT     Referring Physician Signature: BLANKA Agudelo              Date                    The information in this section was collected on 5/12/22 (except where otherwise noted). HISTORY:   History of Present Injury/Illness (Reason for Referral): Pt is a 62 yo female referred to physical therapy due to low back pain. Pt was treated here for left knee pain summer 2021 following a near and fall at [facility] 7/3/21. She was referred to aquatic PT due to low tolerance for on land PT but did not go. She eventually went to [facility] and had PT for her L hip there that ended in Dec 2021. She was then treated for left hip pain in early 2022. Pt states her low back pain started prior to her accident but worsened after it.      Past Medical History/Comorbidities:   Ms. Franky Kat  has a past medical history of Acute maxillary sinusitis, Allergic rhinitis (4/24/2015), Asthma, Atrophic vaginitis (4/24/2015), Benign hypertension (4/24/2015), Chondromalacia of right patella, Chronic anxiety (4/24/2015), Cystocele, midline (4/24/2015), Diverticulosis of colon (4/24/2015), Dysmenorrhea (4/24/2015), Edema (4/24/2015), Eustachian tube dysfunction, Female stress incontinence, Gastrointestinal disorder, GERD (gastroesophageal reflux disease), Hypercholesterolemia, Hyperplastic polyps of stomach (4/24/2015), Hypertension, Influenza (1/13/2019), Internal hemorrhoids without mention of complication (5/52/3968), Obesity (BMI 30-39.9) (11/14/2017), Obstructive sleep apnea (11/14/2017), Other diseases of lung, not elsewhere classified (4/24/2015), Right upper quadrant pain, Urge incontinence, and Ventricular bigeminy (8/31/2016). She has no past medical history of Adverse effect of anesthesia, Aneurysm ([address] Autoimmune disease ([address] CAD (coronary artery disease), Cancer ([address] Chronic kidney disease, Chronic pain, Coagulation disorder ([address] COPD, Dementia, Diabetes ([address] Difficult intubation, Endocrine disease, Heart failure ([address] Ill-defined condition, Liver disease, Malignant hyperthermia due to anesthesia, Nausea & vomiting, Neurological disorder, Other ill-defined conditions(799.89), Pseudocholinesterase deficiency, Psychiatric disorder, PUD (peptic ulcer disease), Seizures ([address] Stroke ([address] Thromboembolus ([address] or Thyroid disease. Ms. Ivory Boeck  has a past surgical history that includes hx cathy and bso (); hx hysterectomy; and hx knee arthroscopy (Left). Pain/Symptom Location: lumbar spine, L anterior hip, anterior L LE to below her knee at times         Worst Pain: 8/10;   Current Pain: 5/10         Aggravating Factors: lying supine with her L LE crossed over her right, prolonged sitting, prolonged standing, worse first thing in the morning,          Alleviating Factors/Positions/Motions: walking      Diagnostic Imaging:   Lumbar MRI IMPRESSION  Mild multilevel degenerative disc disease and facet arthropathy notable for mild  bilateral neuroforaminal stenoses at L4-L5. Otherwise, no spinal canal or  neuroforaminal stenoses. Occupation: retired ; on disability due to asthma; retired from teaching    History of falls: one in the past year    Current Medications:       Current Outpatient Medications:     amLODIPine (NORVASC) 10 mg tablet, Take 1 Tablet by mouth daily. , Disp: 30 Tablet, Rfl: 5    pantoprazole (PROTONIX) 40 mg tablet, Take 1 Tablet by mouth two (2) times a day., Disp: 60 Tablet, Rfl: 5    dicyclomine (BENTYL) 20 mg tablet, Take 1 Tablet by mouth every six (6) hours. , Disp: 120 Tablet, Rfl: 5    valsartan (DIOVAN) 160 mg tablet, Take 1 Tablet by mouth nightly., Disp: 30 Tablet, Rfl: 5    albuterol (PROAIR HFA) 90 mcg/actuation inhaler, Take 2 Puffs by inhalation every four (4) hours as needed for Wheezing. Patient instructed to take morning of surgery per anesthesia guidelines, Disp: , Rfl:     albuterol (PROVENTIL VENTOLIN) 2.5 mg /3 mL (0.083 %) nebulizer solution, , Disp: , Rfl: 5    EPINEPHrine (EPIPEN) 0.3 mg/0.3 mL (1:1,000) injection, 0.3 mg by IntraMUSCular route once as needed. , Disp: , Rfl:     Cetirizine (ZYRTEC) 10 mg cap, Take  by mouth., Disp: , Rfl:     fluticasone (FLONASE) 50 mcg/actuation nasal spray, nightly., Disp: , Rfl:    Date Last Reviewed:  5/12/2022       Ambulatory/Rehab Services H2 Model Falls Risk Assessment    Risk Factors:       No Risk Factors Identified Ability to Rise from Chair:       (0)  Ability to rise in a single movement    Falls Prevention Plan:       No modifications necessary   Total: (5 or greater = High Risk): 0    ©2010 [facility] of [place] [place] Patent #[id number].  Federal Law prohibits the replication, distribution or use without written permission from [facility] of [address] [place] [address]        Number of Personal Factors/Comorbidities that affect the Plan of Care: 1-2: MODERATE COMPLEXITY   EXAMINATION:     Observation        Posture: APT, inc toe out bilat, genu varum        Gait: mildly antalgic, toe out bilat, dec trunk rotation and arm swing        Assistive Device: none             Transfers: painful but able to rise without using hands    ________________________________________________________________________________________________  Range of Motion    Lumbar  Flexion Fingertips to 4 in below joint line (inc pain in L lumbar spine)   Extension Moderately limited with inc pain in L LE   Right side bending  Fingertips to knee joint line (more increased pain in L LE)   Left side bending Fingertips  to knee joint line increased pain   Right rotation    Left rotation LE  Joint: Passive Passive Active Active    Right (Degrees) Left (Degrees) Right (Degrees) Left (Degrees)   Hip Flexion       Hip Extension       Hip Abduction       Hip Internal Rotation        Hip External Rotation         ________________________________________________________________________________________________  Strength          Lower Extremity  Joint:      LEFT RIGHT   Hip Flexion 4/5 4-/5 (inc pain in L glutes)   Hip Extension     Hip Internal Rotation     Hip External Rotation     Hip Abduction 4/5 4/5   Knee flexion 5/5 5/5   Knee extension 5/5 5/5     _______________________________________________________________________  Balance: dnt at initial eval     Left Right   Rhomberg (FT)    SemiTandem     Tandem     Single Limb Stance       ________________________________________________________________________________________________  Neuro-Vascular        Sensation: unremarkable        Patellar reflexes: 2+        C/O Radicular Symptoms: Yes    ________________________________________________________________________________________________  Special Tests        Sky Test: dnt    Hamstring 90/90: lacking 50 deg bilat      30 Second Chair Stand: 6 times (<12 is an increased fall risk for her age and gender)      SHELBI: Positive bilat    FADIR: Positive bilat     ________________________________________________________________________________________________  Palpation: TTP L glutes    Joint mobilization: unable to assess; does not tolerate prone       Body Structures Involved:  1. Nerves  2. Bones  3. Joints  4. Muscles  5. Ligaments Body Functions Affected:  1. Sensory/Pain  2. Neuromusculoskeletal  3. Movement Related Activities and Participation Affected:  1. General Tasks and Demands  2. Mobility  3. Self Care  4. Domestic Life  5. Interpersonal Interactions and Relationships  6.  Community, Social and [place]   Number of elements (examined above) that affect the Plan of Care: 3: MODERATE COMPLEXITY   CLINICAL PRESENTATION:   Presentation: Evolving clinical presentation with changing clinical characteristics: MODERATE COMPLEXITY   CLINICAL DECISION MAKING:      Use of outcome tool(s) and clinical judgement create a POC that gives a: Questionable prediction of patient's progress: MODERATE COMPLEXITY

## 2022-05-16 ENCOUNTER — HOSPITAL ENCOUNTER (OUTPATIENT)
Dept: PHYSICAL THERAPY | Age: 67
Discharge: HOME | End: 2022-05-16
Attending: ORTHOPAEDIC SURGERY
Payer: MEDICARE

## 2022-05-16 NOTE — PROGRESS NOTES
Thomas Precise  : 1955  [place]  Secondary: [facility], [address] [facility], [address]  [phone]   [phone]        CANCELLATION NOTE    Ms. Brii Baker was a same-day cancellation for today's appointment. Patient reports the hospital called her in to see her son in ICU.       # Recent No Shows/Same-Day Cancellations: 1    2022  Alondra Marie, PTA

## 2022-06-08 ENCOUNTER — HOSPITAL ENCOUNTER (OUTPATIENT)
Dept: NUCLEAR MEDICINE | Age: 67
Discharge: HOME | End: 2022-06-11
Payer: MEDICARE

## 2022-06-08 ENCOUNTER — HOSPITAL ENCOUNTER (OUTPATIENT)
Dept: ULTRASOUND IMAGING | Age: 67
Discharge: HOME | End: 2022-06-11
Payer: MEDICARE

## 2022-06-08 DIAGNOSIS — R10.11 RUQ PAIN: ICD-10-CM

## 2022-06-08 PROCEDURE — 76705 ECHO EXAM OF ABDOMEN: CPT

## 2022-06-23 ENCOUNTER — TELEPHONE (OUTPATIENT)
Dept: FAMILY MEDICINE CLINIC | Facility: CLINIC | Age: 67
End: 2022-06-23

## 2022-06-23 NOTE — TELEPHONE ENCOUNTER
Pt would like to get a new referral to Dr. Bismark Langston on [address] the oncologist. They are wanting a new referral since she has not been seen in over 2 years.

## 2022-06-24 DIAGNOSIS — R91.1 PULMONARY NODULE: Primary | ICD-10-CM

## 2022-06-30 ENCOUNTER — OFFICE VISIT (OUTPATIENT)
Dept: SURGERY | Age: 67
End: 2022-06-30
Payer: MEDICARE

## 2022-06-30 ENCOUNTER — PREP FOR PROCEDURE (OUTPATIENT)
Dept: SURGERY | Age: 67
End: 2022-06-30

## 2022-06-30 VITALS
HEART RATE: 71 BPM | DIASTOLIC BLOOD PRESSURE: 82 MMHG | BODY MASS INDEX: 35.52 KG/M2 | HEIGHT: 66 IN | WEIGHT: 221 LBS | SYSTOLIC BLOOD PRESSURE: 130 MMHG | OXYGEN SATURATION: 100 %

## 2022-06-30 DIAGNOSIS — K80.00 CALCULUS OF GALLBLADDER WITH ACUTE CHOLECYSTITIS WITHOUT OBSTRUCTION: ICD-10-CM

## 2022-06-30 DIAGNOSIS — E66.01 SEVERE OBESITY (BMI 35.0-39.9) WITH COMORBIDITY (HCC): ICD-10-CM

## 2022-06-30 PROBLEM — K81.9 CHOLECYSTITIS: Status: ACTIVE | Noted: 2022-06-30

## 2022-06-30 PROCEDURE — 99203 OFFICE O/P NEW LOW 30 MIN: CPT | Performed by: SURGERY

## 2022-06-30 PROCEDURE — 1036F TOBACCO NON-USER: CPT | Performed by: SURGERY

## 2022-06-30 PROCEDURE — G8427 DOCREV CUR MEDS BY ELIG CLIN: HCPCS | Performed by: SURGERY

## 2022-06-30 PROCEDURE — 3017F COLORECTAL CA SCREEN DOC REV: CPT | Performed by: SURGERY

## 2022-06-30 PROCEDURE — 1123F ACP DISCUSS/DSCN MKR DOCD: CPT | Performed by: SURGERY

## 2022-06-30 PROCEDURE — G8400 PT W/DXA NO RESULTS DOC: HCPCS | Performed by: SURGERY

## 2022-06-30 PROCEDURE — G8417 CALC BMI ABV UP PARAM F/U: HCPCS | Performed by: SURGERY

## 2022-06-30 PROCEDURE — 1090F PRES/ABSN URINE INCON ASSESS: CPT | Performed by: SURGERY

## 2022-06-30 NOTE — PROGRESS NOTES
blood test). 2014 slight increased size compared to 2013, but not 2013. In 2014 report is recc to repeat 10/2014     Right upper quadrant pain     Urge incontinence     Ventricular bigeminy 2016     Past Surgical History:   Procedure Laterality Date    HYSTERECTOMY (CERVIX STATUS UNKNOWN)      KNEE ARTHROSCOPY Left     PANCHO AND BSO (CERVIX REMOVED)      ,PANCHO,BSO     Current Outpatient Medications   Medication Sig    albuterol sulfate  (90 Base) MCG/ACT inhaler Inhale 2 puffs into the lungs every 4 hours as needed    amLODIPine (NORVASC) 10 MG tablet Take 10 mg by mouth daily    Cetirizine HCl 10 MG CAPS Take by mouth    dicyclomine (BENTYL) 20 MG tablet Take 20 mg by mouth every 6 hours    EPINEPHrine (EPIPEN) 0.3 MG/0.3ML SOAJ injection Inject 0.3 mg into the muscle once as needed    fluticasone (FLONASE) 50 MCG/ACT nasal spray Nightly.  pantoprazole (PROTONIX) 40 MG tablet Take 40 mg by mouth 2 times daily    valsartan (DIOVAN) 160 MG tablet Take 160 mg by mouth     No current facility-administered medications for this visit.      ALLERGIES:  Latex and Sulfa antibiotics    Social History     Socioeconomic History    Marital status:      Spouse name: None    Number of children: None    Years of education: None    Highest education level: None   Occupational History    None   Tobacco Use    Smoking status: Former Smoker     Packs/day: 0.25     Quit date: 10/16/1996     Years since quittin.7    Smokeless tobacco: Never Used   Substance and Sexual Activity    Alcohol use: No    Drug use: No    Sexual activity: None   Other Topics Concern    None   Social History Narrative    None     Social Determinants of Health     Financial Resource Strain:     Difficulty of Paying Living Expenses: Not on file   Food Insecurity:     Worried About Running Out of Food in the Last Year: Not on file    Nikc of Food in the Last Year: Not on FELICITAS Muller Needs:     Lack of Transportation (Medical): Not on file    Lack of Transportation (Non-Medical): Not on file   Physical Activity:     Days of Exercise per Week: Not on file    Minutes of Exercise per Session: Not on file   Stress:     Feeling of Stress : Not on file   Social Connections:     Frequency of Communication with Friends and Family: Not on file    Frequency of Social Gatherings with Friends and Family: Not on file    Attends Mandaen Services: Not on file    Active Member of [address] [place] [address] or Organizations: Not on file    Attends Club or Organization Meetings: Not on file    Marital Status: Not on file   Intimate Partner Violence:     Fear of Current or Ex-Partner: Not on file    Emotionally Abused: Not on file    Physically Abused: Not on file    Sexually Abused: Not on file   Housing Stability:     Unable to Pay for Housing in the Last Year: Not on file    Number of [place] in the Last Year: Not on file    Unstable Housing in the Last Year: Not on file     Social History     Tobacco Use   Smoking Status Former Smoker    Packs/day: 0.25    Quit date: 10/16/1996    Years since quittin.7   Smokeless Tobacco Never Used     Family History   Problem Relation Age of Onset    Asthma Mother     Hypertension Sister     Breast Cancer Neg Hx     Other Neg Hx     Post-op Cognitive Dysfunction Neg Hx     Emergence Delirium Neg Hx     Post-op Nausea/Vomiting Neg Hx     Delayed Awakening Neg Hx     Pseudochol. Deficiency Neg Hx     Malig Hypertherm Neg Hx     Cancer Other         Lung    Diabetes Sister        ROS: The patient has no difficulty with chest pain or shortness of breath. No fever or chills. Comprehensive review of systems was otherwise unremarkable except as noted above. Physical Exam:   Constitutional: Alert oriented cooperative patient in no acute distress.    /82   Pulse 71   Ht 5' 6\" (1.676 m)   Wt 221 lb (100.2 kg)   SpO2 100%   BMI 35.67 kg/m² Eyes:Sclera are clear, pupils symmetric   ENMT: ears have no obvious external lesions; no obvious neck masses; no lip lesions  CV: RRR. Normal perfusion; no embolic signs  Resp: No JVD. Breathing is  non-labored. No audible wheezing   GI: soft and non-distended   mildly tender right upper quadrant without rebound or guarding. Musculoskeletal: no significant asymmetry; normal tone; unremarkable with normal function. Neuro:  No obvious focal deficits; moves all 4; A&O x3  Psychiatric: normal affect and mood, no memory impairment      Labs:  Lab Results   Component Value Date/Time    WBC 9.1 03/02/2022 01:14 PM    HGB 12.6 03/02/2022 01:14 PM     03/02/2022 01:14 PM     03/02/2022 01:14 PM    K 3.5 03/02/2022 01:14 PM     03/02/2022 01:14 PM    CO2 29 03/02/2022 01:14 PM    BUN 8 03/02/2022 01:14 PM    ALT 21 03/02/2022 01:14 PM       No results found for this or any previous visit. I reviewed patient's medications, labs, and independently reviewed relevant radiologic studies if available. Labs/radiology studies as ordered in connect care or in scanned in media tab if pt is pre-op. Amt of data reviewed moderate-extensive          Assessment/Plan:     )Gray Parsons is a 74 y.o. female who has signs and symptoms consistent with the below issues:  Cholecystitis with cholelithiasis. I spent 30 minutes with her this morning greater than 50% of this time was spent in counseling and reviewing her chart, imaging studies and previous physician notes. I have gone over the procedure of robotic single site cholecystectomy with her. Risk discussed include, bile duct injury, bile leak, postoperative bowel habit changes. Also the possibility of an open procedure. All questions answered. She would like to go forward. Proceed with robotic single site cholecystectomy.           Delicia Platt MD,  FACS

## 2022-07-08 ENCOUNTER — OFFICE VISIT (OUTPATIENT)
Dept: FAMILY MEDICINE CLINIC | Facility: CLINIC | Age: 67
End: 2022-07-08
Payer: MEDICARE

## 2022-07-08 VITALS
OXYGEN SATURATION: 98 % | HEIGHT: 66 IN | DIASTOLIC BLOOD PRESSURE: 72 MMHG | BODY MASS INDEX: 35.1 KG/M2 | WEIGHT: 218.4 LBS | HEART RATE: 82 BPM | SYSTOLIC BLOOD PRESSURE: 124 MMHG

## 2022-07-08 DIAGNOSIS — R73.03 PREDIABETES: ICD-10-CM

## 2022-07-08 DIAGNOSIS — E78.00 HYPERCHOLESTEREMIA: ICD-10-CM

## 2022-07-08 DIAGNOSIS — I10 ESSENTIAL HYPERTENSION: Primary | ICD-10-CM

## 2022-07-08 DIAGNOSIS — I10 ESSENTIAL HYPERTENSION: ICD-10-CM

## 2022-07-08 LAB
ANION GAP SERPL CALC-SCNC: 6 MMOL/L (ref 7–16)
BUN SERPL-MCNC: 9 MG/DL (ref 8–23)
CALCIUM SERPL-MCNC: 9.1 MG/DL (ref 8.3–10.4)
CHLORIDE SERPL-SCNC: 111 MMOL/L (ref 98–107)
CHOLEST SERPL-MCNC: 217 MG/DL
CO2 SERPL-SCNC: 27 MMOL/L (ref 21–32)
CREAT SERPL-MCNC: 0.7 MG/DL (ref 0.6–1)
GLUCOSE SERPL-MCNC: 97 MG/DL (ref 65–100)
HDLC SERPL-MCNC: 67 MG/DL (ref 40–60)
HDLC SERPL: 3.2 {RATIO}
LDLC SERPL CALC-MCNC: 136.8 MG/DL
POTASSIUM SERPL-SCNC: 3.8 MMOL/L (ref 3.5–5.1)
SODIUM SERPL-SCNC: 144 MMOL/L (ref 136–145)
TRIGL SERPL-MCNC: 66 MG/DL (ref 35–150)
VLDLC SERPL CALC-MCNC: 13.2 MG/DL (ref 6–23)

## 2022-07-08 PROCEDURE — 99214 OFFICE O/P EST MOD 30 MIN: CPT | Performed by: FAMILY MEDICINE

## 2022-07-08 PROCEDURE — G8428 CUR MEDS NOT DOCUMENT: HCPCS | Performed by: FAMILY MEDICINE

## 2022-07-08 PROCEDURE — 1123F ACP DISCUSS/DSCN MKR DOCD: CPT | Performed by: FAMILY MEDICINE

## 2022-07-08 PROCEDURE — 3017F COLORECTAL CA SCREEN DOC REV: CPT | Performed by: FAMILY MEDICINE

## 2022-07-08 PROCEDURE — G8417 CALC BMI ABV UP PARAM F/U: HCPCS | Performed by: FAMILY MEDICINE

## 2022-07-08 PROCEDURE — 1036F TOBACCO NON-USER: CPT | Performed by: FAMILY MEDICINE

## 2022-07-08 PROCEDURE — G8400 PT W/DXA NO RESULTS DOC: HCPCS | Performed by: FAMILY MEDICINE

## 2022-07-08 PROCEDURE — 1090F PRES/ABSN URINE INCON ASSESS: CPT | Performed by: FAMILY MEDICINE

## 2022-07-08 RX ORDER — PANTOPRAZOLE SODIUM 40 MG/1
40 TABLET, DELAYED RELEASE ORAL 2 TIMES DAILY
Qty: 30 TABLET | Refills: 5 | Status: SHIPPED | OUTPATIENT
Start: 2022-07-08

## 2022-07-08 RX ORDER — DICYCLOMINE HCL 20 MG
20 TABLET ORAL EVERY 6 HOURS
Qty: 120 TABLET | Refills: 5 | Status: SHIPPED | OUTPATIENT
Start: 2022-07-08 | End: 2022-10-06

## 2022-07-08 RX ORDER — AMLODIPINE BESYLATE 10 MG/1
10 TABLET ORAL DAILY
Qty: 30 TABLET | Refills: 5 | Status: SHIPPED | OUTPATIENT
Start: 2022-07-08

## 2022-07-08 RX ORDER — VALSARTAN 160 MG/1
160 TABLET ORAL DAILY
Qty: 30 TABLET | Refills: 5 | Status: SHIPPED | OUTPATIENT
Start: 2022-07-08

## 2022-07-08 ASSESSMENT — PATIENT HEALTH QUESTIONNAIRE - PHQ9
SUM OF ALL RESPONSES TO PHQ QUESTIONS 1-9: 0
2. FEELING DOWN, DEPRESSED OR HOPELESS: 0
SUM OF ALL RESPONSES TO PHQ9 QUESTIONS 1 & 2: 0
1. LITTLE INTEREST OR PLEASURE IN DOING THINGS: 0
SUM OF ALL RESPONSES TO PHQ QUESTIONS 1-9: 0

## 2022-07-08 NOTE — PROGRESS NOTES
Subjective:  Adrianne Rhodes is a 74 y.o. female presents today for their semi-annual htn, fasting pre-d and lipids visit. They are having no side effects and are doing well. Systems review of cardiovascular and pulmonary systems reveal no complaints or pertinent positives. Patient denies any pounding heart beats or rapid heart beat intervals, flushing, panic like attacks, headaches, dizzyness or flushing. They have drug reistant hypertension, on 3 or more different medicaitons including one diuretic- No  PHQ-9 Total Score: 0 (7/8/2022 10:51 AM)  lost son to cancer about 2 months ago so still grieving  How much are you exercising? Yes  Do you smoke? No  Are you taking your medicines as directed most every day? Yes  Do you follow a low sodium DASH diet or similar high blood pressure diet? Yes  Do you check your bp at home with an automated blood pressure device? No  If you don't have a home bp machine, you should purchase one at home and begin to check your pressure at home on a regular basis. Your blood pressure goal is listed under the \"plan section\" below      Allergies   Allergen Reactions    Latex Other (See Comments)     wheezing    Sulfa Antibiotics Nausea Only      reports that she quit smoking about 25 years ago. She smoked 0.25 packs per day.  She has never used smokeless tobacco.  Current Outpatient Medications   Medication Sig Dispense Refill    amLODIPine (NORVASC) 10 MG tablet Take 1 tablet by mouth daily 30 tablet 5    dicyclomine (BENTYL) 20 MG tablet Take 1 tablet by mouth every 6 hours 120 tablet 5    pantoprazole (PROTONIX) 40 MG tablet Take 1 tablet by mouth 2 times daily 30 tablet 5    valsartan (DIOVAN) 160 MG tablet Take 1 tablet by mouth daily 30 tablet 5    albuterol sulfate  (90 Base) MCG/ACT inhaler Inhale 2 puffs into the lungs every 4 hours as needed      Cetirizine HCl 10 MG CAPS Take by mouth      EPINEPHrine (EPIPEN) 0.3 MG/0.3ML SOAJ injection Inject 0.3 mg into the muscle once as needed      fluticasone (FLONASE) 50 MCG/ACT nasal spray Nightly. No current facility-administered medications for this visit. Lab Results   Component Value Date/Time     03/02/2022 01:14 PM    K 3.5 03/02/2022 01:14 PM     03/02/2022 01:14 PM    CO2 29 03/02/2022 01:14 PM    BUN 8 03/02/2022 01:14 PM    CREATININE 0.70 03/02/2022 01:14 PM    GLUCOSE 97 03/02/2022 01:14 PM    CALCIUM 9.3 03/02/2022 01:14 PM          Objective:  Blood pressure 124/72, pulse 82, height 5' 6\" (1.676 m), weight 218 lb 6.4 oz (99.1 kg), SpO2 98 %. Body mass index is 35.25 kg/m². BP Readings from Last 3 Encounters:   07/08/22 124/72   06/30/22 130/82   03/15/22 120/78     General- Pleasant and no distress  Psych- alert and oriented to person, place and time  Mood and affect are appropriate to situation  Musculoskeletal - Gait and station examination reveals mid-position with no abnormalities. Neurological- grossly intact. rrr s mrg.   bcta  extremeties are without edema, and dp, and pt pulses are intact  No carotid bruits   Fundoscopic exam is: Fundoscopic exam is consistent with arteriolar narrowing and AV nicking as well as some rare microaneurysms     Assessment:  1. Essential hypertension    2. Hypercholesteremia    3. Prediabetes        Plan:   Prescription drug management occurs today as follows:  Because current regimen for blood pressure is now working and tolerated, will continue medications as listed    Constance Green has been given the following recommendations today due to her elevated BP reading: lifestyle modifications to include: dietary sodium restriction and lab tests ordered. Personal instruction is given. For your information, consider the following:  Eduardo Adkins is an excellent site that will give you a list of approved blood pressure devices  Rosa Gan is an excellent site that will teach you how to take accurate bp measurements at home.   Significant weight loss can result in a drop of 5-10mm blood pressure. A DASH diet (see bookstore or go to [email or web address] and print DASH diet) will lower 8-14mm blood pressure. Salt restriction will lower your bp 2-8mm. Lowering alcohol consumption to moderate use will lower your pressure 2-4mm. Continue efforts to maintain an exercise regimen. Normal blood pressure target is now 120/80. Stage 1 or \"pre-hypertension\" or \"high normal hypertension\" is 130-139/80-89. We sometimes begin treatment with medications. Stage 2 is >140/90 which is when we always begin treatment with medications. For high risk patients such as those with heart disease, a history of stents, angioplasty, heart attacks, strokes, diabetes and chronic kidney disease,your blood pressure goal is 130/80. For lower risk patients without the high risk categories, your goal for blood pressure is 139/89. Unless you are older than 72years old we may try for a systolic bp of 388 or we will accept higher pressures if the lower pressures are not tolerated. 1. Essential hypertension  -     amLODIPine (NORVASC) 10 MG tablet; Take 1 tablet by mouth daily, Disp-30 tablet, R-5Normal  -     valsartan (DIOVAN) 160 MG tablet; Take 1 tablet by mouth daily, Disp-30 tablet, R-5Normal  -     Basic Metabolic Panel; Future  2. Hypercholesteremia  -     Lipid Panel; Future  3. Prediabetes  -     Basic Metabolic Panel; Future  -     Hemoglobin A1C; Future      Followup:  Return for 6 mo for bp recheck.

## 2022-07-09 LAB
EST. AVERAGE GLUCOSE BLD GHB EST-MCNC: 128 MG/DL
HBA1C MFR BLD: 6.1 % (ref 4.8–5.6)

## 2022-07-19 ENCOUNTER — PREP FOR PROCEDURE (OUTPATIENT)
Dept: SURGERY | Age: 67
End: 2022-07-19

## 2022-07-19 NOTE — PROGRESS NOTES
New Patient Abstract    Reason for Referral: Pulmonary nodule    Referring Provider:  Kinsey Kat MD    Primary Care Provider: Kinsey Kat MD    Family History of Cancer/Hematologic Disorders: none on file    Presenting Symptoms: none noted    Narrative with recent with Results/Procedures/Biopsies and Dates completed:   Ms. Natty Bernal is a 51-year-old [facility] [employer] female who reports to be a former smoker of 0.25 pack per day of cigarettes that quit in 1996. She denies use of alcohol or drug substances. Her medical history reports as asthma, atrophic vaginitis, HTN, anxiety, cystocele, diverticulosis, dysmenorrhea, edema, stress incontinence, GERD, gastric polyps, internal hemorrhoids, obesity, REANNA, pulmonary nodule, and ventricular bigeminy. Her surgical history reports as hysterectomy with BSO and knee. Ms. Natty Bernal has a history of a pulmonary nodule that she was last seen by Dr Zoya Gonzalez in November of 2015. At that appointment, per imaging her lung nodule had been stable for 2 years. She was discharged from oncology to have follow up with her PCP (no further recommendation for imaging) and anemia. In March 2022 she had complaints of abdominal pain and presented to [facility] ED. Her CT scan of abdomen and pelvis reported diverticulitis. She was placed on oral antibiotics, discharged home to have PCP follow-up. She saw Dr Eunice Sunshine on 3/15/22 and they reviewed scan from ED. The scan reported limited view of chest and noted a stable 0.3 cm LLL nodule. In June 2022 she had complaints of RUQ abdominal pain. On /8/22 she had an abdominal ultrasound that reported Cholelithiasis. She saw Dr Anjelica Griffith on 6/30/22 with plans for a robotic cholecystectomy on 8/12/22. In June 2022, Ms. Rob request a new referral to Dr Zoya Gonzalez since they had not seen him in a while. A referral was placed to medical oncology for her stable pulmonary nodule.   Her 3/2022 CBC reported WNL except for a decreased MCHC of 31.3.     4/1/2014 CT Chest  Findings:   CT Chest:   The base of the neck is unremarkable in appearance. No axillary, or   mediastinal lymphadenopathy is seen. Evaluation of the alexander is limited due to   noncontrast technique although no obvious bulky hilar changes are seen. The   thoracic aorta is normal in caliber. Evaluation with lung windows demonstrates a 5 mm noncalcified nodule in the   left lower lobe seen on image 36 which a stable when compared to the recent CT   scan of the chest although appears slightly increased in size when compared to   the CT abdomen and pelvis from April 2013. An additional 2 mm pleural based   nodule is seen on image 37 which is stable. No pleural effusion is seen. Lungs   are expanded without evidence for pneumothorax. Limited evaluation of the upper abdomen demonstrates no significant   abnormality. The adrenal glands are unremarkable in appearance. IMPRESSION:   1. Suggestion of slight increased size of the left lower lobe pulmonary nodule   compared to the earliest study at this institution although this is stable when   compared to the more recent CT scan of the chest dated 11/23/2013. Recommend   continued followup with a noncontrast CT scan of the chest in 6 months. 11/11/2015 CT Chest  FINDINGS: The 4 mm subpleural pulmonary nodule is again noted in the left lower   lobe and is stable. This has been stable for two years and should be considered   benign. No new suspicious pulmonary nodules identified. The lungs are well   expanded and otherwise clear. No pleural effusion or pneumothorax evident. Calcified subcarinal lymph nodes are appreciated in keeping with old   granulomatous disease. There is a calcified pulmonary nodule in the lingula. Limited evaluation of the upper abdomen is unremarkable. No aggressive osseous lesions identified. IMPRESSION:   1. The left lower lobe pulmonary nodule remains stable at 4 mm and has been   stable for two years.  This should be considered benign. No further CT imaging   surveillance warranted. 2. Sequela of old granulomatous disease    3/2/2022 CT Abdomen and pelvis (abdominal pain)  FINDINGS:   LIMITED THORAX:The included portions of the heart and pericardium are   unremarkable. Stable 0.3 cm left lower lobe nodule, likely benign (axial image   10). PERITONEUM/MESENTERY: No pneumoperitoneum. No lymphadenopathy. GI TRACT: There is wall thickening, diverticulosis, surrounding mesenteric   stranding, and surrounding free fluid involving the sigmoid colon. This is an   acute diverticulitis. There is no adjacent extraluminal gas or well-formed fluid   collection. Anterior abdominal wall ventral aeration with aperture measuring 4.4 cm. The   appendix is normal. The terminal ileum is unremarkable. There is no evidence of   small bowel obstruction. The stomach is unremarkable. SPLEEN: Normal   ADRENALS: Normal   PANCREAS: Normal   LIVER: Normal   GALLBLADDER: Normal   KIDNEYS: Normal   BLADDER/: There is no gas in the urinary bladder. Prior hysterectomy. VESSELS: The main portal vein and major tributary branches are patent. There is   atherosclerosis of the abdominal aorta and branch vessels. BONES/SOFT TISSUES: Lumbar spine spondylosis without suspicious lytic or blastic   bony lesions. IMPRESSION   1. Acute sigmoid colonic diverticulitis. 2. No evidence of colonic perforation or pericolonic abscess. 3. Chronic findings as above. 6/8/22 Abdominal Ultrasound  Impression   Cholelithiasis. No sonographic features to suggest cholecystitis. Hepatic steatosis.      Labs        Notes from Referring Provider: n/a    Other Pertinent Information: n/a    Presented at Tumor Board: n/a

## 2022-07-20 ENCOUNTER — OFFICE VISIT (OUTPATIENT)
Dept: ONCOLOGY | Age: 67
End: 2022-07-20
Payer: MEDICARE

## 2022-07-20 VITALS
WEIGHT: 216.5 LBS | OXYGEN SATURATION: 98 % | DIASTOLIC BLOOD PRESSURE: 78 MMHG | BODY MASS INDEX: 34.79 KG/M2 | HEIGHT: 66 IN | SYSTOLIC BLOOD PRESSURE: 140 MMHG | HEART RATE: 76 BPM | RESPIRATION RATE: 16 BRPM | TEMPERATURE: 97.8 F

## 2022-07-20 DIAGNOSIS — R91.1 PULMONARY NODULE: Primary | ICD-10-CM

## 2022-07-20 PROCEDURE — 99205 OFFICE O/P NEW HI 60 MIN: CPT | Performed by: INTERNAL MEDICINE

## 2022-07-20 PROCEDURE — G8428 CUR MEDS NOT DOCUMENT: HCPCS | Performed by: INTERNAL MEDICINE

## 2022-07-20 PROCEDURE — 3017F COLORECTAL CA SCREEN DOC REV: CPT | Performed by: INTERNAL MEDICINE

## 2022-07-20 PROCEDURE — G8400 PT W/DXA NO RESULTS DOC: HCPCS | Performed by: INTERNAL MEDICINE

## 2022-07-20 PROCEDURE — 1036F TOBACCO NON-USER: CPT | Performed by: INTERNAL MEDICINE

## 2022-07-20 PROCEDURE — G8417 CALC BMI ABV UP PARAM F/U: HCPCS | Performed by: INTERNAL MEDICINE

## 2022-07-20 PROCEDURE — 1123F ACP DISCUSS/DSCN MKR DOCD: CPT | Performed by: INTERNAL MEDICINE

## 2022-07-20 PROCEDURE — 1090F PRES/ABSN URINE INCON ASSESS: CPT | Performed by: INTERNAL MEDICINE

## 2022-07-20 ASSESSMENT — PATIENT HEALTH QUESTIONNAIRE - PHQ9
2. FEELING DOWN, DEPRESSED OR HOPELESS: 0
SUM OF ALL RESPONSES TO PHQ QUESTIONS 1-9: 0

## 2022-07-20 NOTE — PROGRESS NOTES
Outpatient Consult Note    Data Source: Patient, [facility] record. 7/20/2022  5:55 PM      Cris Phalen [id number]    74 y.o. Patient Encounter: [facility] Note    Reason for consult:  LLL pulmonary nodule      HPI:  66 F, retired , ex-smoker, h/o anxiety, diverticulosis found to have a sub centimeter pulmonary nodule while undergoing abdominal imaging (4/13). She was seen by me in past w serial imaging to monitor nodule. F/u CT chest from 11/15 had shown stability over 2 years, and as such lesion was deemed likely benign. Patient more recently was seen in [address] [place] [address] ED with abdominal discomfort 3/22. CTAP with contrast had mentioned acute sigmoid colon diverticulitis as well as a left lower lobe 0.3 cm nodule, likely benign. Patient is now referred back to us given noted nodule. Recently lost her son to metastatic pancreas cancer. Recent abdominal ultrasound with cholelithiasis, now seeing surgery with plans for cholecystectomy. Reports being up-to-date on her age-appropriate cancer screening. Past Medical History:   Diagnosis Date    Acute maxillary sinusitis     Allergic rhinitis 4/24/2015    Dr. Poe Parent    Asthma     Atrophic vaginitis 4/24/2015    Benign hypertension 4/24/2015    Chondromalacia of right patella     Chronic anxiety 4/24/2015    Cystocele, midline 4/24/2015    Diverticulosis of colon 4/24/2015    Dysmenorrhea 4/24/2015    GYN: Referred to Dr. Jenny Pan    Edema 4/24/2015    Including peripheral edema. Eustachian tube dysfunction     Female stress incontinence     Gastrointestinal disorder     diverticulitis, GERD    GERD (gastroesophageal reflux disease)     Hypercholesterolemia     Hyperplastic polyps of stomach 4/24/2015    Colonsocpy. 2013.     Hypertension     Influenza 1/13/2019    Internal hemorrhoids without mention of complication 8/82/8374    Obesity (BMI 30-39.9) 11/14/2017    Obstructive sleep apnea 11/14/2017    Other diseases of lung, not elsewhere classified 2015    Solitary nodule of lung. 3mm LLB, CT (2013) benign appearing, (recheck in 1 year if clinically indicated. IN 2014, obtaining Lung-[facility] blood test). 2014 slight increased size compared to 2013, but not 2013. In 2014 report is recc to repeat 10/2014     Right upper quadrant pain     Urge incontinence     Ventricular bigeminy 2016         Past Surgical History:   Procedure Laterality Date    HYSTERECTOMY (CERVIX STATUS UNKNOWN)      KNEE ARTHROSCOPY Left     PANCHO AND BSO (CERVIX REMOVED)      ,PANCHO,BSO         Current Outpatient Medications   Medication Sig Dispense Refill    amLODIPine (NORVASC) 10 MG tablet Take 1 tablet by mouth daily 30 tablet 5    dicyclomine (BENTYL) 20 MG tablet Take 1 tablet by mouth every 6 hours 120 tablet 5    pantoprazole (PROTONIX) 40 MG tablet Take 1 tablet by mouth 2 times daily 30 tablet 5    valsartan (DIOVAN) 160 MG tablet Take 1 tablet by mouth daily 30 tablet 5    albuterol sulfate  (90 Base) MCG/ACT inhaler Inhale 2 puffs into the lungs every 4 hours as needed      Cetirizine HCl 10 MG CAPS Take by mouth      EPINEPHrine (EPIPEN) 0.3 MG/0.3ML SOAJ injection Inject 0.3 mg into the muscle once as needed      fluticasone (FLONASE) 50 MCG/ACT nasal spray Nightly. No current facility-administered medications for this visit.          Social History     Socioeconomic History    Marital status:      Spouse name: None    Number of children: None    Years of education: None    Highest education level: None   Tobacco Use    Smoking status: Former     Packs/day: 0.25     Types: Cigarettes     Quit date: 10/16/1996     Years since quittin.7    Smokeless tobacco: Never   Substance and Sexual Activity    Alcohol use: No    Drug use: No         Family History   Problem Relation Age of Onset    Asthma Mother     Hypertension Sister     Breast Cancer Neg Hx     Other Neg Hx     Post-op Cognitive Dysfunction Neg Hx Emergence Delirium Neg Hx     Post-op Nausea/Vomiting Neg Hx     Delayed Awakening Neg Hx     Pseudochol. Deficiency Neg Hx     Malig Hypertherm Neg Hx     Cancer Other         Lung    Diabetes Sister          REVIEW OF SYSTEMS:  As mentioned above, all other systems were reviewed in full and are negative. Allergies   Allergen Reactions    Latex Other (See Comments)     wheezing    Sulfa Antibiotics Nausea Only           PHYSICAL EXAMINATION:  General Appearance: Healthy appearing patient in no acute distress  Vitals were reviewed. BP (!) 140/78   Pulse 76   Temp 97.8 °F (36.6 °C)   Resp 16   Ht 5' 6\" (1.676 m)   Wt 216 lb 8 oz (98.2 kg)   SpO2 98%   BMI 34.94 kg/m²   HEENT: No oral or pharyngeal masses, ulceration or thrush noted, no sinus tenderness. Neck is supple with no thyromegaly or JVD noted. Lymph Nodes: No lymphadenopathy noted in the occipital, pre and post auricular, cervical, supra and infraclavicular, axillary, epitrochlear, inguinal, and popliteal region. Breasts: No palpable masses, nipple discharge or skin retraction  Lungs/Thorax: Clear to auscultation, no accessory muscles of respiration being used. Heart: Regular rate and rhythm, normal S1, S2, no appreciable murmurs, rubs, gallops  Abdomen: Soft, nontender, bowel sounds present, no appreciable hepatosplenomegaly, no palpable masses  Extremeties: Good pulses bilaterally, no peripheral edema. Skin: Normal skin tone with no rash, petechiae, ecchymosis noted.   Musculoskeletal: No pain on palpation over bony prominence, no edema, no evidence of gout, no joint or bony deformity  Neurologic: Grossly intact    LABS/IMAGING:    Lab Results   Component Value Date/Time    WBC 9.1 03/02/2022 01:14 PM    HGB 12.6 03/02/2022 01:14 PM    HCT 40.2 03/02/2022 01:14 PM     03/02/2022 01:14 PM    MCV 88.7 03/02/2022 01:14 PM       Lab Results   Component Value Date/Time     07/08/2022 11:14 AM    K 3.8 07/08/2022 11:14 AM    CL

## 2022-07-21 ENCOUNTER — PREP FOR PROCEDURE (OUTPATIENT)
Dept: SURGERY | Age: 67
End: 2022-07-21

## 2022-08-03 RX ORDER — SODIUM CHLORIDE 0.9 % (FLUSH) 0.9 %
5-40 SYRINGE (ML) INJECTION EVERY 12 HOURS SCHEDULED
Status: CANCELLED | OUTPATIENT
Start: 2022-08-03

## 2022-08-03 RX ORDER — SODIUM CHLORIDE 9 MG/ML
INJECTION, SOLUTION INTRAVENOUS PRN
Status: CANCELLED | OUTPATIENT
Start: 2022-08-03

## 2022-08-03 RX ORDER — SODIUM CHLORIDE 0.9 % (FLUSH) 0.9 %
5-40 SYRINGE (ML) INJECTION PRN
Status: CANCELLED | OUTPATIENT
Start: 2022-08-03

## 2022-08-03 RX ORDER — INDOCYANINE GREEN AND WATER 25 MG
5 KIT INJECTION ONCE
Status: CANCELLED | OUTPATIENT
Start: 2022-08-03 | End: 2022-08-03

## 2022-08-09 NOTE — PROGRESS NOTES
PLEASE CONTINUE TAKING ALL PRESCRIPTION MEDICATIONS UP TO THE DAY OF SURGERY UNLESS OTHERWISE DIRECTED BELOW. DISCONTINUE all vitamins and supplements 7 days prior to surgery. DISCONTINUE Non-Steriodal Anti-Inflammatory (NSAIDS) such as Advil and Aleve 5 days prior to surgery. Home Medications to take  the day of surgery    AMLODIPINE, ZYRTEC, FLONASE, BENTYL PROTONIX--AND IF NEEDED QVAR AND BRING ALBUTEROL INHALER DOS           Home Medications   to Hold   NONE        Comments       On the day before surgery please take Acetaminophen 1000mg in the morning and then again before bed. You may substitute for Tylenol 650 mg. Please do not bring home medications with you on the day of surgery unless otherwise directed by your nurse. If you are instructed to bring home medications, please give them to your nurse as they will be administered by the nursing staff. If you have any questions, please call [facility] Emmanuelle Liriano [phone] or [facility] [phone]. A copy of this note was provided to the patient for reference.   7

## 2022-08-11 ENCOUNTER — HOSPITAL ENCOUNTER (OUTPATIENT)
Dept: CT IMAGING | Age: 67
Discharge: HOME | End: 2022-08-14
Payer: MEDICARE

## 2022-08-11 ENCOUNTER — ANESTHESIA EVENT (OUTPATIENT)
Dept: SURGERY | Age: 67
End: 2022-08-11
Payer: MEDICARE

## 2022-08-11 DIAGNOSIS — R91.1 PULMONARY NODULE: ICD-10-CM

## 2022-08-11 PROCEDURE — 71250 CT THORAX DX C-: CPT

## 2022-08-11 NOTE — PERIOP NOTE
Directly informed patient and or family member of pre op arrival time 0 on 7/12. All questions answered. Pre op instructions reviewed. Left contact information for any additional questions or needs.

## 2022-08-11 NOTE — PERIOP NOTE
Narrative   EXAMINATION: CT CHEST WITHOUT INTRAVENOUS CONTRAST 8/11/2022 8:49 AM       ACCESSION NUMBER: [id number]       INDICATION: Pulmonary nodule. COMPARISON: None available       TECHNIQUE: Multiple contiguous axial CT images of the chest were obtained from   the lung apices to the lung bases after without intravenous contrast.        Radiation dose reduction techniques were used for this study:  Our CT scanners   use one or all of the following: Automated exposure control, adjustment of the   mA and/or kVp according to patient's size, iterative reconstruction. FINDINGS:       LUNGS/PLEURA: Central airways are patent. No consolidation. 5 mm peripheral   nodule in the left lower lobe (axial image 151). There is an additional   subpleural 3 mm micronodule in the left lower lobe (axial image 154). Small   coccigranuloma in the right lower lobe. No pleural effusion or pneumothorax. MEDIASTINUM:Included thyroid is normal. Thoracic esophagus is unremarkable. Heart is normal in size without pericardial effusion. The great vessels are   normal in caliber with atherosclerotic calcifications in the aorta, branch   vessels, and coronary arteries. No mediastinal or hilar lymphadenopathy. BONES AND SOFT TISSUES: No axillary or supraclavicular lymphadenopathy. Subcutaneous soft tissues are within normal limits. No acute or aggressive   osseous abnormality. UPPER ABDOMEN: Unremarkable. Impression   1. Small pulmonary nodules measuring up to 5 mm in the left lower lobe. No   comparison imaging available to document stability. If any imaging becomes   available, an addendum can be issued discussing stability. If no comparison   imaging is available, consider a follow-up CT chest in 12 months if the patient   is considered high risk per Fleischner Society guidelines. 2. Atherosclerosis including coronary artery involvement.

## 2022-08-12 ENCOUNTER — HOSPITAL ENCOUNTER (OUTPATIENT)
Age: 67
Setting detail: OUTPATIENT SURGERY
Discharge: HOME | End: 2022-08-12
Attending: SURGERY | Admitting: SURGERY
Payer: MEDICARE

## 2022-08-12 ENCOUNTER — ANESTHESIA (OUTPATIENT)
Dept: SURGERY | Age: 67
End: 2022-08-12
Payer: MEDICARE

## 2022-08-12 ENCOUNTER — CLINICAL DOCUMENTATION (OUTPATIENT)
Dept: ONCOLOGY | Age: 67
End: 2022-08-12

## 2022-08-12 VITALS
WEIGHT: 220.4 LBS | BODY MASS INDEX: 35.42 KG/M2 | SYSTOLIC BLOOD PRESSURE: 112 MMHG | TEMPERATURE: 98 F | OXYGEN SATURATION: 96 % | HEART RATE: 93 BPM | HEIGHT: 66 IN | RESPIRATION RATE: 15 BRPM | DIASTOLIC BLOOD PRESSURE: 52 MMHG

## 2022-08-12 DIAGNOSIS — K81.9 CHOLECYSTITIS: ICD-10-CM

## 2022-08-12 DIAGNOSIS — K80.00 CALCULUS OF GALLBLADDER WITH ACUTE CHOLECYSTITIS WITHOUT OBSTRUCTION: Primary | ICD-10-CM

## 2022-08-12 LAB — HGB BLD-MCNC: 11.6 G/DL (ref 11.7–15.4)

## 2022-08-12 PROCEDURE — 6370000000 HC RX 637 (ALT 250 FOR IP): Performed by: STUDENT IN AN ORGANIZED HEALTH CARE EDUCATION/TRAINING PROGRAM

## 2022-08-12 PROCEDURE — 3600000009 HC SURGERY ROBOT BASE: Performed by: SURGERY

## 2022-08-12 PROCEDURE — 3700000001 HC ADD 15 MINUTES (ANESTHESIA): Performed by: SURGERY

## 2022-08-12 PROCEDURE — 7100000010 HC PHASE II RECOVERY - FIRST 15 MIN: Performed by: SURGERY

## 2022-08-12 PROCEDURE — 94640 AIRWAY INHALATION TREATMENT: CPT

## 2022-08-12 PROCEDURE — 2500000003 HC RX 250 WO HCPCS: Performed by: SURGERY

## 2022-08-12 PROCEDURE — 2709999900 HC NON-CHARGEABLE SUPPLY: Performed by: SURGERY

## 2022-08-12 PROCEDURE — 7100000011 HC PHASE II RECOVERY - ADDTL 15 MIN: Performed by: SURGERY

## 2022-08-12 PROCEDURE — 6360000002 HC RX W HCPCS: Performed by: STUDENT IN AN ORGANIZED HEALTH CARE EDUCATION/TRAINING PROGRAM

## 2022-08-12 PROCEDURE — 6370000000 HC RX 637 (ALT 250 FOR IP)

## 2022-08-12 PROCEDURE — 85018 HEMOGLOBIN: CPT

## 2022-08-12 PROCEDURE — 6360000002 HC RX W HCPCS: Performed by: SURGERY

## 2022-08-12 PROCEDURE — 6360000002 HC RX W HCPCS: Performed by: NURSE ANESTHETIST, CERTIFIED REGISTERED

## 2022-08-12 PROCEDURE — 7100000000 HC PACU RECOVERY - FIRST 15 MIN: Performed by: SURGERY

## 2022-08-12 PROCEDURE — 47562 LAPAROSCOPIC CHOLECYSTECTOMY: CPT | Performed by: SURGERY

## 2022-08-12 PROCEDURE — 7100000001 HC PACU RECOVERY - ADDTL 15 MIN: Performed by: SURGERY

## 2022-08-12 PROCEDURE — 2580000003 HC RX 258: Performed by: NURSE ANESTHETIST, CERTIFIED REGISTERED

## 2022-08-12 PROCEDURE — 88304 TISSUE EXAM BY PATHOLOGIST: CPT

## 2022-08-12 PROCEDURE — 2500000003 HC RX 250 WO HCPCS: Performed by: NURSE ANESTHETIST, CERTIFIED REGISTERED

## 2022-08-12 PROCEDURE — 2720000010 HC SURG SUPPLY STERILE: Performed by: SURGERY

## 2022-08-12 PROCEDURE — S2900 ROBOTIC SURGICAL SYSTEM: HCPCS | Performed by: SURGERY

## 2022-08-12 PROCEDURE — 3700000000 HC ANESTHESIA ATTENDED CARE: Performed by: SURGERY

## 2022-08-12 PROCEDURE — 2580000003 HC RX 258: Performed by: STUDENT IN AN ORGANIZED HEALTH CARE EDUCATION/TRAINING PROGRAM

## 2022-08-12 PROCEDURE — 3600000019 HC SURGERY ROBOT ADDTL 15MIN: Performed by: SURGERY

## 2022-08-12 RX ORDER — OXYCODONE HYDROCHLORIDE AND ACETAMINOPHEN 5; 325 MG/1; MG/1
1 TABLET ORAL EVERY 6 HOURS PRN
Qty: 21 TABLET | Refills: 0 | Status: ON HOLD | OUTPATIENT
Start: 2022-08-12 | End: 2022-08-18 | Stop reason: HOSPADM

## 2022-08-12 RX ORDER — HALOPERIDOL 5 MG/ML
1 INJECTION INTRAMUSCULAR
Status: DISCONTINUED | OUTPATIENT
Start: 2022-08-12 | End: 2022-08-12 | Stop reason: HOSPADM

## 2022-08-12 RX ORDER — HYDRALAZINE HYDROCHLORIDE 20 MG/ML
10 INJECTION INTRAMUSCULAR; INTRAVENOUS
Status: DISCONTINUED | OUTPATIENT
Start: 2022-08-12 | End: 2022-08-12 | Stop reason: HOSPADM

## 2022-08-12 RX ORDER — IPRATROPIUM BROMIDE AND ALBUTEROL SULFATE 2.5; .5 MG/3ML; MG/3ML
1 SOLUTION RESPIRATORY (INHALATION) ONCE
Status: COMPLETED | OUTPATIENT
Start: 2022-08-12 | End: 2022-08-12

## 2022-08-12 RX ORDER — EPHEDRINE SULFATE/0.9% NACL/PF 50 MG/5 ML
SYRINGE (ML) INTRAVENOUS PRN
Status: DISCONTINUED | OUTPATIENT
Start: 2022-08-12 | End: 2022-08-12 | Stop reason: SDUPTHER

## 2022-08-12 RX ORDER — SODIUM CHLORIDE 0.9 % (FLUSH) 0.9 %
5-40 SYRINGE (ML) INJECTION PRN
Status: DISCONTINUED | OUTPATIENT
Start: 2022-08-12 | End: 2022-08-12 | Stop reason: HOSPADM

## 2022-08-12 RX ORDER — SODIUM CHLORIDE, SODIUM LACTATE, POTASSIUM CHLORIDE, CALCIUM CHLORIDE 600; 310; 30; 20 MG/100ML; MG/100ML; MG/100ML; MG/100ML
INJECTION, SOLUTION INTRAVENOUS CONTINUOUS
Status: DISCONTINUED | OUTPATIENT
Start: 2022-08-12 | End: 2022-08-12 | Stop reason: HOSPADM

## 2022-08-12 RX ORDER — SODIUM CHLORIDE 0.9 % (FLUSH) 0.9 %
5-40 SYRINGE (ML) INJECTION EVERY 12 HOURS SCHEDULED
Status: DISCONTINUED | OUTPATIENT
Start: 2022-08-12 | End: 2022-08-12 | Stop reason: HOSPADM

## 2022-08-12 RX ORDER — DEXAMETHASONE SODIUM PHOSPHATE 10 MG/ML
INJECTION INTRAMUSCULAR; INTRAVENOUS PRN
Status: DISCONTINUED | OUTPATIENT
Start: 2022-08-12 | End: 2022-08-12 | Stop reason: SDUPTHER

## 2022-08-12 RX ORDER — ONDANSETRON 2 MG/ML
INJECTION INTRAMUSCULAR; INTRAVENOUS PRN
Status: DISCONTINUED | OUTPATIENT
Start: 2022-08-12 | End: 2022-08-12 | Stop reason: SDUPTHER

## 2022-08-12 RX ORDER — ACETAMINOPHEN 500 MG
1000 TABLET ORAL ONCE
Status: COMPLETED | OUTPATIENT
Start: 2022-08-12 | End: 2022-08-12

## 2022-08-12 RX ORDER — IPRATROPIUM BROMIDE AND ALBUTEROL SULFATE 2.5; .5 MG/3ML; MG/3ML
SOLUTION RESPIRATORY (INHALATION)
Status: COMPLETED
Start: 2022-08-12 | End: 2022-08-12

## 2022-08-12 RX ORDER — DIPHENHYDRAMINE HYDROCHLORIDE 50 MG/ML
12.5 INJECTION INTRAMUSCULAR; INTRAVENOUS
Status: DISCONTINUED | OUTPATIENT
Start: 2022-08-12 | End: 2022-08-12 | Stop reason: HOSPADM

## 2022-08-12 RX ORDER — PROCHLORPERAZINE EDISYLATE 5 MG/ML
5 INJECTION INTRAMUSCULAR; INTRAVENOUS
Status: DISCONTINUED | OUTPATIENT
Start: 2022-08-12 | End: 2022-08-12 | Stop reason: HOSPADM

## 2022-08-12 RX ORDER — IPRATROPIUM BROMIDE AND ALBUTEROL SULFATE 2.5; .5 MG/3ML; MG/3ML
1 SOLUTION RESPIRATORY (INHALATION)
Status: DISCONTINUED | OUTPATIENT
Start: 2022-08-12 | End: 2022-08-12 | Stop reason: HOSPADM

## 2022-08-12 RX ORDER — INDOCYANINE GREEN AND WATER 25 MG
5 KIT INJECTION ONCE
Status: COMPLETED | OUTPATIENT
Start: 2022-08-12 | End: 2022-08-12

## 2022-08-12 RX ORDER — PROPOFOL 10 MG/ML
INJECTION, EMULSION INTRAVENOUS PRN
Status: DISCONTINUED | OUTPATIENT
Start: 2022-08-12 | End: 2022-08-12 | Stop reason: SDUPTHER

## 2022-08-12 RX ORDER — OXYCODONE HYDROCHLORIDE 5 MG/1
5 TABLET ORAL PRN
Status: DISCONTINUED | OUTPATIENT
Start: 2022-08-12 | End: 2022-08-12 | Stop reason: HOSPADM

## 2022-08-12 RX ORDER — LIDOCAINE HYDROCHLORIDE 20 MG/ML
INJECTION, SOLUTION EPIDURAL; INFILTRATION; INTRACAUDAL; PERINEURAL PRN
Status: DISCONTINUED | OUTPATIENT
Start: 2022-08-12 | End: 2022-08-12 | Stop reason: SDUPTHER

## 2022-08-12 RX ORDER — GLYCOPYRROLATE 0.2 MG/ML
INJECTION INTRAMUSCULAR; INTRAVENOUS PRN
Status: DISCONTINUED | OUTPATIENT
Start: 2022-08-12 | End: 2022-08-12 | Stop reason: SDUPTHER

## 2022-08-12 RX ORDER — LIDOCAINE HYDROCHLORIDE 10 MG/ML
1 INJECTION, SOLUTION INFILTRATION; PERINEURAL
Status: DISCONTINUED | OUTPATIENT
Start: 2022-08-12 | End: 2022-08-12 | Stop reason: HOSPADM

## 2022-08-12 RX ORDER — MIDAZOLAM HYDROCHLORIDE 2 MG/2ML
2 INJECTION, SOLUTION INTRAMUSCULAR; INTRAVENOUS
Status: DISCONTINUED | OUTPATIENT
Start: 2022-08-12 | End: 2022-08-12 | Stop reason: HOSPADM

## 2022-08-12 RX ORDER — ALBUTEROL SULFATE 2.5 MG/3ML
SOLUTION RESPIRATORY (INHALATION) PRN
Status: DISCONTINUED | OUTPATIENT
Start: 2022-08-12 | End: 2022-08-12 | Stop reason: SDUPTHER

## 2022-08-12 RX ORDER — NEOSTIGMINE METHYLSULFATE 1 MG/ML
INJECTION, SOLUTION INTRAVENOUS PRN
Status: DISCONTINUED | OUTPATIENT
Start: 2022-08-12 | End: 2022-08-12 | Stop reason: SDUPTHER

## 2022-08-12 RX ORDER — APREPITANT 40 MG/1
40 CAPSULE ORAL ONCE
Status: COMPLETED | OUTPATIENT
Start: 2022-08-12 | End: 2022-08-12

## 2022-08-12 RX ORDER — FENTANYL CITRATE 50 UG/ML
INJECTION, SOLUTION INTRAMUSCULAR; INTRAVENOUS PRN
Status: DISCONTINUED | OUTPATIENT
Start: 2022-08-12 | End: 2022-08-12 | Stop reason: SDUPTHER

## 2022-08-12 RX ORDER — BUPIVACAINE HYDROCHLORIDE AND EPINEPHRINE 5; 5 MG/ML; UG/ML
INJECTION, SOLUTION EPIDURAL; INTRACAUDAL; PERINEURAL PRN
Status: DISCONTINUED | OUTPATIENT
Start: 2022-08-12 | End: 2022-08-12 | Stop reason: ALTCHOICE

## 2022-08-12 RX ORDER — HYDROMORPHONE HYDROCHLORIDE 2 MG/ML
0.5 INJECTION, SOLUTION INTRAMUSCULAR; INTRAVENOUS; SUBCUTANEOUS EVERY 5 MIN PRN
Status: DISCONTINUED | OUTPATIENT
Start: 2022-08-12 | End: 2022-08-12 | Stop reason: HOSPADM

## 2022-08-12 RX ORDER — ROCURONIUM BROMIDE 10 MG/ML
INJECTION, SOLUTION INTRAVENOUS PRN
Status: DISCONTINUED | OUTPATIENT
Start: 2022-08-12 | End: 2022-08-12 | Stop reason: SDUPTHER

## 2022-08-12 RX ORDER — LABETALOL HYDROCHLORIDE 5 MG/ML
10 INJECTION, SOLUTION INTRAVENOUS
Status: DISCONTINUED | OUTPATIENT
Start: 2022-08-12 | End: 2022-08-12 | Stop reason: HOSPADM

## 2022-08-12 RX ORDER — OXYCODONE HYDROCHLORIDE 5 MG/1
10 TABLET ORAL PRN
Status: DISCONTINUED | OUTPATIENT
Start: 2022-08-12 | End: 2022-08-12 | Stop reason: HOSPADM

## 2022-08-12 RX ORDER — SODIUM CHLORIDE 9 MG/ML
INJECTION, SOLUTION INTRAVENOUS PRN
Status: DISCONTINUED | OUTPATIENT
Start: 2022-08-12 | End: 2022-08-12 | Stop reason: HOSPADM

## 2022-08-12 RX ADMIN — PROPOFOL 170 MG: 10 INJECTION, EMULSION INTRAVENOUS at 07:07

## 2022-08-12 RX ADMIN — LIDOCAINE HYDROCHLORIDE 100 MG: 20 INJECTION, SOLUTION EPIDURAL; INFILTRATION; INTRACAUDAL; PERINEURAL at 07:06

## 2022-08-12 RX ADMIN — PHENYLEPHRINE HYDROCHLORIDE 100 MCG: 10 INJECTION INTRAVENOUS at 08:22

## 2022-08-12 RX ADMIN — APREPITANT 40 MG: 40 CAPSULE ORAL at 05:41

## 2022-08-12 RX ADMIN — PHENYLEPHRINE HYDROCHLORIDE 100 MCG: 10 INJECTION INTRAVENOUS at 08:03

## 2022-08-12 RX ADMIN — Medication 5 MG: at 08:28

## 2022-08-12 RX ADMIN — PROPOFOL 50 MG: 10 INJECTION, EMULSION INTRAVENOUS at 08:28

## 2022-08-12 RX ADMIN — INDOCYANINE GREEN AND WATER 5 MG: KIT at 05:43

## 2022-08-12 RX ADMIN — DEXAMETHASONE SODIUM PHOSPHATE 10 MG: 10 INJECTION INTRAMUSCULAR; INTRAVENOUS at 07:15

## 2022-08-12 RX ADMIN — ROCURONIUM BROMIDE 10 MG: 50 INJECTION, SOLUTION INTRAVENOUS at 08:01

## 2022-08-12 RX ADMIN — IPRATROPIUM BROMIDE AND ALBUTEROL SULFATE 1 AMPULE: .5; 3 SOLUTION RESPIRATORY (INHALATION) at 09:00

## 2022-08-12 RX ADMIN — SODIUM CHLORIDE, POTASSIUM CHLORIDE, SODIUM LACTATE AND CALCIUM CHLORIDE: 600; 310; 30; 20 INJECTION, SOLUTION INTRAVENOUS at 05:43

## 2022-08-12 RX ADMIN — GLYCOPYRROLATE 0.8 MG: 0.2 INJECTION, SOLUTION INTRAMUSCULAR; INTRAVENOUS at 08:28

## 2022-08-12 RX ADMIN — Medication 5 MG: at 08:03

## 2022-08-12 RX ADMIN — ALBUTEROL SULFATE 2.5 MG: 2.5 SOLUTION RESPIRATORY (INHALATION) at 08:43

## 2022-08-12 RX ADMIN — PHENYLEPHRINE HYDROCHLORIDE 100 MCG: 10 INJECTION INTRAVENOUS at 08:15

## 2022-08-12 RX ADMIN — ONDANSETRON 4 MG: 2 INJECTION INTRAMUSCULAR; INTRAVENOUS at 07:15

## 2022-08-12 RX ADMIN — HYDROMORPHONE HYDROCHLORIDE 0.5 MG: 2 INJECTION, SOLUTION INTRAMUSCULAR; INTRAVENOUS; SUBCUTANEOUS at 09:24

## 2022-08-12 RX ADMIN — IPRATROPIUM BROMIDE AND ALBUTEROL SULFATE 1 AMPULE: 2.5; .5 SOLUTION RESPIRATORY (INHALATION) at 09:00

## 2022-08-12 RX ADMIN — Medication 5 MG: at 08:02

## 2022-08-12 RX ADMIN — ACETAMINOPHEN 1000 MG: 500 TABLET, FILM COATED ORAL at 05:41

## 2022-08-12 RX ADMIN — PHENYLEPHRINE HYDROCHLORIDE 100 MCG: 10 INJECTION INTRAVENOUS at 08:21

## 2022-08-12 RX ADMIN — Medication 2 G: at 07:15

## 2022-08-12 RX ADMIN — SUGAMMADEX 200 MG: 100 INJECTION, SOLUTION INTRAVENOUS at 08:56

## 2022-08-12 RX ADMIN — ROCURONIUM BROMIDE 50 MG: 50 INJECTION, SOLUTION INTRAVENOUS at 07:07

## 2022-08-12 RX ADMIN — FENTANYL CITRATE 100 MCG: 50 INJECTION, SOLUTION INTRAMUSCULAR; INTRAVENOUS at 07:06

## 2022-08-12 RX ADMIN — PHENYLEPHRINE HYDROCHLORIDE 100 MCG: 10 INJECTION INTRAVENOUS at 08:02

## 2022-08-12 ASSESSMENT — PAIN DESCRIPTION - DESCRIPTORS: DESCRIPTORS: ACHING

## 2022-08-12 ASSESSMENT — PAIN DESCRIPTION - LOCATION: LOCATION: ABDOMEN

## 2022-08-12 ASSESSMENT — PAIN SCALES - GENERAL: PAINLEVEL_OUTOF10: 7

## 2022-08-12 ASSESSMENT — PAIN DESCRIPTION - ORIENTATION: ORIENTATION: MID

## 2022-08-12 ASSESSMENT — PAIN - FUNCTIONAL ASSESSMENT: PAIN_FUNCTIONAL_ASSESSMENT: NONE - DENIES PAIN

## 2022-08-12 NOTE — ANESTHESIA PRE PROCEDURE
Department of Anesthesiology  Preprocedure Note       Name:  Obed Bravo   Age:  74 y.o.  :  1955                                          MRN:  [id number]         Date:  2022      Surgeon: Kings Jeong):  Rock Silvestre MD    Procedure: Procedure(s):  ROBO SINGLE SITE CHOLECYSTECTOMY    Medications prior to admission:   Prior to Admission medications    Medication Sig Start Date End Date Taking? Authorizing Provider   Beclomethasone Dipropionate (QVAR IN) Inhale into the lungs 2 times daily as needed   Yes Historical Provider, MD   amLODIPine (NORVASC) 10 MG tablet Take 1 tablet by mouth daily 22   Bobbi Olvera MD   dicyclomine (BENTYL) 20 MG tablet Take 1 tablet by mouth every 6 hours  Patient taking differently: Take 20 mg by mouth in the morning and 20 mg at noon and 20 mg before bedtime.  22   Bobbi Olvera MD   pantoprazole (PROTONIX) 40 MG tablet Take 1 tablet by mouth 2 times daily 22   Bobbi Olvera MD   valsartan (DIOVAN) 160 MG tablet Take 1 tablet by mouth daily 22   Bobbi Olvera MD   albuterol sulfate  (90 Base) MCG/ACT inhaler Inhale 2 puffs into the lungs every 4 hours as needed    Ar Automatic Reconciliation   Cetirizine HCl 10 MG CAPS Take 1 tablet by mouth daily    Ar Automatic Reconciliation   EPINEPHrine (EPIPEN) 0.3 MG/0.3ML SOAJ injection Inject 0.3 mg into the muscle once as needed    Ar Automatic Reconciliation   fluticasone (FLONASE) 50 MCG/ACT nasal spray 2 sprays by Nasal route daily    Ar Automatic Reconciliation       Current medications:    Current Facility-Administered Medications   Medication Dose Route Frequency Provider Last Rate Last Admin    lidocaine 1 % injection 1 mL  1 mL IntraDERmal Once PRN Mayra Bustillo MD        lactated ringers infusion   IntraVENous Continuous Mayra Bustillo  mL/hr at 22 0635 NoRateChange at 22 0635    sodium chloride flush 0.9 % injection 5-40 mL  5-40 mL IntraVENous 2 times per day Sandra  Antoine Torres MD        sodium chloride flush 0.9 % injection 5-40 mL  5-40 mL IntraVENous PRN Timi Corona MD        0.9 % sodium chloride infusion   IntraVENous PRN Timi Corona MD        midazolam PF (VERSED) injection 2 mg  2 mg IntraVENous Once PRN Timi Corona MD        sodium chloride flush 0.9 % injection 5-40 mL  5-40 mL IntraVENous 2 times per day Oksana Murillo MD        sodium chloride flush 0.9 % injection 5-40 mL  5-40 mL IntraVENous PRN Oksana Murillo MD        0.9 % sodium chloride infusion   IntraVENous PRN Oksana Murillo MD        ceFAZolin (ANCEF) 2000 mg in sterile water 20 mL IV syringe  2,000 mg IntraVENous Once Oksana Murillo MD           Allergies: Allergies   Allergen Reactions    Latex Other (See Comments)     wheezing    Other Shortness Of Breath     PER PT-- ALL COLOGNE WILL START AN ASTHMA ATTACK    Sulfa Antibiotics Nausea Only       Problem List:    Patient Active Problem List   Diagnosis Code    Tubulovillous adenoma of colon D12.6    Ventricular bigeminy I49.8    Urge incontinence N39.41    Essential hypertension I10    Dysmenorrhea N94.6    Advanced directives, counseling/discussion Z71.89    Hypercholesteremia E78.00    Diverticulosis of colon K57.30    Antral gastritis K29.50    Moderate persistent asthma J45.40    Allergic rhinitis J30.9    Obstructive sleep apnea G47.33    Prediabetes R73.03    Chondromalacia of right patella M22.41    Atrophic vaginitis N95.2    Cystocele, midline N81.11    Chronic anxiety F41.9    Calculus of gallbladder with acute cholecystitis without obstruction K80.00    Cholecystitis K81.9       Past Medical History:        Diagnosis Date    Allergic rhinitis 4/24/2015    Dr. Katie Mcgowan Asthma     prn inhaler and neb    Cystocele, midline 4/24/2015    Diverticulosis of colon 4/24/2015    Edema 4/24/2015    Including peripheral edema. ( SOMETIMES) PER PT    Female stress incontinence     GERD (gastroesophageal reflux disease)     controlled with med    Hypercholesterolemia     no meds currently    Hyperplastic polyps of stomach 2015    Colonsocpy. 2013.  Hypertension     controlled with med    Internal hemorrhoids without mention of complication 3/50/2471    Obesity (BMI 30-39.9) 2017    BMI = 35    Obstructive sleep apnea 2017    wears cpap at hs    Other diseases of lung, not elsewhere classified 2015    being rechecked 2022-- followed by dr Norm Gonzales Right upper quadrant pain     Urge incontinence     Ventricular bigeminy 2016    per pt-- was told it was ok-- does not see a cardiologist at present       Past Surgical History:        Procedure Laterality Date    HYSTERECTOMY (CERVIX STATUS UNKNOWN)      KNEE ARTHROSCOPY Bilateral        Social History:    Social History     Tobacco Use    Smoking status: Former     Packs/day: 0.25     Years: 4.00     Pack years: 1.00     Types: Cigarettes     Quit date: 10/16/1996     Years since quittin.8    Smokeless tobacco: Never   Substance Use Topics    Alcohol use:  No                                Counseling given: Not Answered      Vital Signs (Current):   Vitals:    22 1547 22 0532   BP:  (!) 140/65   Pulse:  100   Resp:  16   Temp:  98 °F (36.7 °C)   TempSrc:  Oral   SpO2:  100%   Weight: 220 lb (99.8 kg) 220 lb 6.4 oz (100 kg)   Height: 5' 6\" (1.676 m) 5' 6\" (1.676 m)                                              BP Readings from Last 3 Encounters:   22 (!) 140/65   22 (!) 140/78   22 124/72       NPO Status: Time of last liquid consumption: 0000                        Time of last solid consumption: 0000                        Date of last liquid consumption: 22                        Date of last solid food consumption: 22    BMI:   Wt Readings from Last 3 Encounters:   22 220 lb 6.4 oz (100 kg)   22 216 lb 8 oz (98.2 kg)   22 218 lb 6.4 oz (99.1 kg)     Body mass index is 35.57 kg/m². CBC:   Lab Results   Component Value Date/Time    WBC 9.1 03/02/2022 01:14 PM    RBC 4.53 03/02/2022 01:14 PM    HGB 11.6 08/12/2022 05:30 AM    HCT 40.2 03/02/2022 01:14 PM    MCV 88.7 03/02/2022 01:14 PM    RDW 13.4 03/02/2022 01:14 PM     03/02/2022 01:14 PM       CMP:   Lab Results   Component Value Date/Time     07/08/2022 11:14 AM    K 3.8 07/08/2022 11:14 AM     07/08/2022 11:14 AM    CO2 27 07/08/2022 11:14 AM    BUN 9 07/08/2022 11:14 AM    CREATININE 0.70 07/08/2022 11:14 AM    GFRAA >60 07/08/2022 11:14 AM    AGRATIO 0.7 03/02/2022 01:14 PM    LABGLOM >60 07/08/2022 11:14 AM    GLUCOSE 97 07/08/2022 11:14 AM    PROT 8.1 03/02/2022 01:14 PM    CALCIUM 9.1 07/08/2022 11:14 AM    BILITOT 0.8 03/02/2022 01:14 PM    ALKPHOS 111 03/02/2022 01:14 PM    AST 12 03/02/2022 01:14 PM    ALT 21 03/02/2022 01:14 PM       POC Tests: No results for input(s): POCGLU, POCNA, POCK, POCCL, POCBUN, POCHEMO, POCHCT in the last 72 hours.     Coags: No results found for: PROTIME, INR, APTT    HCG (If Applicable): No results found for: PREGTESTUR, PREGSERUM, HCG, HCGQUANT     ABGs: No results found for: PHART, PO2ART, JNG4IGT, JWD4VNA, BEART, Q7DAPGRU     Type & Screen (If Applicable):  No results found for: LABABO, LABRH    Drug/Infectious Status (If Applicable):  No results found for: HIV, HEPCAB    COVID-19 Screening (If Applicable): No results found for: COVID19        Anesthesia Evaluation  Patient summary reviewed and Nursing notes reviewed no history of anesthetic complications:   Airway: Mallampati: I  TM distance: <3 FB   Neck ROM: full  Mouth opening: > = 3 FB   Dental: normal exam         Pulmonary:normal exam    (+) sleep apnea:  asthma:                            Cardiovascular:  Exercise tolerance: no interval change,   (+) hypertension:,                   Neuro/Psych:   Negative Neuro/Psych ROS              GI/Hepatic/Renal:   (+) GERD: well controlled, Endo/Other: Negative Endo/Other ROS                     ROS comment: bmi >35 Abdominal:             Vascular: negative vascular ROS. Other Findings:           Anesthesia Plan      general     ASA 2       Induction: intravenous. Anesthetic plan and risks discussed with patient and child/children.                         Leila Dunham MD   8/12/2022

## 2022-08-12 NOTE — OP NOTE
[facility]  OPERATIVE REPORT    Name:  Prasad York  MR#:  [id number]  :  1955  ACCOUNT #:  [de-identified]  DATE OF SERVICE:  2022    PREOPERATIVE DIAGNOSES:  Cholecystitis, cholelithiasis. POSTOPERATIVE DIAGNOSES:  Cholecystitis, cholelithiasis. PROCEDURE PERFORMED:  Robotic single-site cholecystectomy. SURGEON:  Alyce Betancourt. Alfredo Koroma MD    ASSISTANT:  None. ANESTHESIA:  General.    COMPLICATIONS:  None. COUNTS:  Correct. SPECIMENS REMOVED:  Gallbladder. IMPLANTS:  None. ESTIMATED BLOOD LOSS:  Minimal.    FINDINGS:  The patient had adhesions on entering and these were broken up with digital dissection allowing the port to be placed. DESCRIPTION OF PROCEDURE:  After the patient was asleep, her abdomen was prepped and draped in sterile fashion. The incision was made just above the umbilicus in the midline. The fascia was then opened and several #0 Vicryl stitches were placed. At this point, the single-site diaphragm was then inserted with a Sunrise. Insufflation was then acquired. Scope was then introduced, and it was seen that the long trocars would be utilized. The robot was then brought in and docked to the camera. The #2 port was then placed under direct visualization. This was then docked. The #1 port was also placed and then docked. The assistance port was then inserted. I then broke scrub and sat at the console. The gallbladder was grasped by the assistant and tented upwards. I began dissection and dissected out the cystic duct completely. Two Hemoclips were placed distally, one proximally, and the cystic duct divided. Bovie was then utilized to divide the tissue, used to elevate and free the gallbladder. The cystic artery was identified. This was doubly hemoclipped and then divided. Gallbladder was then removed from the gallbladder bed. The area was inspected, and there was no bleeding.   At this time, I got up from the console, went and scrubbed and came back. All trocars were removed, and the diaphragm removed with the specimen. The gallbladder was sent to Pathology. Interrupted #0 Vicryl was used to close the fascia, 4-0 subcuticular Monocryl and Dermabond were utilized to close the skin. The patient tolerated the procedure well.       Alxe Solano MD      [id number]  D:  08/12/2022 8:41  T:  08/12/2022 9:33  JOB #:  [id number]

## 2022-08-12 NOTE — ANESTHESIA PROCEDURE NOTES
Airway  Date/Time: 8/12/2022 7:09 AM  Urgency: elective    Airway not difficult    General Information and Staff    Patient location during procedure: OR    Indications and Patient Condition  Indications for airway management: anesthesia  Spontaneous Ventilation: absent  Sedation level: deep  Preoxygenated: yes  Patient position: sniffing  MILS not maintained throughout  Mask difficulty assessment: vent by bag mask + OA or adjuvant +/- NMBA    Final Airway Details  Final airway type: endotracheal airway      Successful airway: ETT  Cuffed: yes   Successful intubation technique: direct laryngoscopy  Facilitating devices/methods: intubating stylet and cricoid pressure  Endotracheal tube insertion site: oral  Blade: Annel  Blade size: #4  ETT size (mm): 7.0  Cormack-Lehane Classification: grade IIb - view of arytenoids or posterior of glottis only  Placement verified by: chest auscultation   Measured from: lips  ETT to lips (cm): 21  Number of attempts at approach: 1  Ventilation between attempts: bag mask  Number of other approaches attempted: 0    no

## 2022-08-12 NOTE — H&P
HPI: Brijesh Corrigan is a 74 y.o. female who is here  today to have her gallbladder removed. She has been having right upper quadrant pain radiating to her back for quite some time. She believes it is much longer than 6 months but really began to focus on it approximately 3 months ago. She had acute diverticulitis and after this pain had subsided she noticed that she was having chronic right upper quadrant pain radiating to her back. On a scale of 1-10 she describes it as a 5. It is episodic. She does not know what brings it on. Abdominal ultrasound shows cholelithiasis. No fever or chills. No jaundice. No change in color of urine or stool. Past Medical History        Past Medical History:   Diagnosis Date    Acute maxillary sinusitis      Allergic rhinitis 4/24/2015     Dr. Guillermo Quesada    Asthma      Atrophic vaginitis 4/24/2015    Benign hypertension 4/24/2015    Chondromalacia of right patella      Chronic anxiety 4/24/2015    Cystocele, midline 4/24/2015    Diverticulosis of colon 4/24/2015    Dysmenorrhea 4/24/2015     GYN: Referred to Dr. Santos Howell    Edema 4/24/2015     Including peripheral edema. Eustachian tube dysfunction      Female stress incontinence      Gastrointestinal disorder       diverticulitis, GERD    GERD (gastroesophageal reflux disease)      Hypercholesterolemia      Hyperplastic polyps of stomach 4/24/2015     Colonsocpy. 2013. Hypertension      Influenza 1/13/2019    Internal hemorrhoids without mention of complication 4/51/0326    Obesity (BMI 30-39.9) 11/14/2017    Obstructive sleep apnea 11/14/2017    Other diseases of lung, not elsewhere classified 4/24/2015     Solitary nodule of lung. 3mm LLB, CT (4/18/2013) benign appearing, (recheck in 1 year if clinically indicated. IN March 2014, obtaining [facility] blood test). 4/2014 slight increased size compared to 4/2013, but not 11/2013.  In 4/2014 report is recc to repeat 10/2014    Right upper quadrant pain      Urge incontinence      Ventricular bigeminy 2016         Past Surgical History         Past Surgical History:   Procedure Laterality Date    HYSTERECTOMY (CERVIX STATUS UNKNOWN)        KNEE ARTHROSCOPY Left      PANCHO AND BSO (CERVIX REMOVED)        ,PANCHO,BSO              Current Outpatient Medications   Medication Sig    albuterol sulfate  (90 Base) MCG/ACT inhaler Inhale 2 puffs into the lungs every 4 hours as needed    amLODIPine (NORVASC) 10 MG tablet Take 10 mg by mouth daily    Cetirizine HCl 10 MG CAPS Take by mouth    dicyclomine (BENTYL) 20 MG tablet Take 20 mg by mouth every 6 hours    EPINEPHrine (EPIPEN) 0.3 MG/0.3ML SOAJ injection Inject 0.3 mg into the muscle once as needed    fluticasone (FLONASE) 50 MCG/ACT nasal spray Nightly. pantoprazole (PROTONIX) 40 MG tablet Take 40 mg by mouth 2 times daily    valsartan (DIOVAN) 160 MG tablet Take 160 mg by mouth      No current facility-administered medications for this visit. ALLERGIES:  Latex and Sulfa antibiotics     Social History   Social History            Socioeconomic History    Marital status:        Spouse name: None    Number of children: None    Years of education: None    Highest education level: None   Occupational History    None   Tobacco Use    Smoking status: Former Smoker       Packs/day: 0.25       Quit date: 10/16/1996       Years since quittin.7    Smokeless tobacco: Never Used   Substance and Sexual Activity    Alcohol use: No    Drug use: No    Sexual activity: None   Other Topics Concern    None   Social History Narrative    None      Social Determinants of Health          Financial Resource Strain:    Difficulty of Paying Living Expenses: Not on file   Food Insecurity:    Worried About [address] in the Last Year: Not on file    Nick of Food in the Last Year: Not on file   Transportation Needs:    Lack of Transportation (Medical): Not on file    Lack of Transportation (Non-Medical):  Not on file   Physical Activity:    Days of Exercise per Week: Not on file    Minutes of Exercise per Session: Not on file   Stress:    Feeling of Stress : Not on file   Social Connections:    Frequency of Communication with Friends and Family: Not on file    Frequency of Social Gatherings with Friends and Family: Not on file    Attends Voodoo Services: Not on file    Active Member of Clubs or Organizations: Not on file    Attends Club or Organization Meetings: Not on file    Marital Status: Not on file   Intimate Partner Violence:    Fear of Current or Ex-Partner: Not on file    Emotionally Abused: Not on file    Physically Abused: Not on file    Sexually Abused: Not on file   Housing Stability:    Unable to Pay for Housing in the Last Year: Not on file    Number of [place] in the Last Year: Not on file    Unstable Housing in the Last Year: Not on file         Social History           Tobacco Use   Smoking Status Former Smoker    Packs/day: 0.25    Quit date: 10/16/1996    Years since quittin.7   Smokeless Tobacco Never Used      Family History         Family History   Problem Relation Age of Onset    Asthma Mother      Hypertension Sister      Breast Cancer Neg Hx      Other Neg Hx      Post-op Cognitive Dysfunction Neg Hx      Emergence Delirium Neg Hx      Post-op Nausea/Vomiting Neg Hx      Delayed Awakening Neg Hx      Pseudochol. Deficiency Neg Hx      Malig Hypertherm Neg Hx      Cancer Other           Lung    Diabetes Sister              ROS: The patient has no difficulty with chest pain or shortness of breath. No fever or chills. Comprehensive review of systems was otherwise unremarkable except as noted above. Physical Exam:  Constitutional: Alert oriented cooperative patient in no acute distress.    /82   Pulse 71   Ht 5' 6\" (1.676 m)   Wt 221 lb (100.2 kg)   SpO2 100%   BMI 35.67 kg/m²   Eyes:Sclera are clear, pupils symmetric  ENMT: ears have no obvious external lesions; no obvious neck masses; no lip lesions  CV: RRR. Normal perfusion; no embolic signs  Resp: No JVD. Breathing is  non-labored. No audible wheezing  GI: soft and non-distended   mildly tender right upper quadrant without rebound or guarding. Musculoskeletal: no significant asymmetry; normal tone; unremarkable with normal function. Neuro:  No obvious focal deficits; moves all 4; A&O x3  Psychiatric: normal affect and mood, no memory impairment        Labs:        Lab Results   Component Value Date/Time     WBC 9.1 03/02/2022 01:14 PM     HGB 12.6 03/02/2022 01:14 PM      03/02/2022 01:14 PM      03/02/2022 01:14 PM     K 3.5 03/02/2022 01:14 PM      03/02/2022 01:14 PM     CO2 29 03/02/2022 01:14 PM     BUN 8 03/02/2022 01:14 PM     ALT 21 03/02/2022 01:14 PM         No results found for this or any previous visit. I reviewed patient's medications, labs, and independently reviewed relevant radiologic studies if available. Labs/radiology studies as ordered in [facility] care or in scanned in media tab if pt is pre-op. Amt of data reviewed moderate-extensive           Assessment/Plan:     )Halima Kerr is a 74 y.o. female who has signs and symptoms consistent with the below issues:  Cholecystitis with cholelithiasis. I have gone over the procedure of robotic single site cholecystectomy with her. Risk discussed include, bile duct injury, bile leak, postoperative bowel habit changes. Also the possibility of an open procedure. All questions answered. She would like to go forward. Proceed with robotic single site cholecystectomy.               Gabino Barney MD,  FACS

## 2022-08-12 NOTE — DISCHARGE INSTRUCTIONS
Cholecystectomy: What to Expect at [address] [place] [address]  After your surgery, it is normal to feel weak and tired for several days after you return home. Your belly may be swollen. Since you had laparoscopic surgery, you may also have pain in your shoulder for about 24 hours. You may have gas or need to burp a lot at first, and a few people get diarrhea. The diarrhea usually goes away in 2 to 4 weeks, but it may last longer. For a laparoscopic surgery, most people can go back to work or their normal routine in 1 to 2 weeks, but it may take longer, depending on the type of work you do. This care sheet gives you a general idea about how long it will take for you to recover. However, each person recovers at a different pace. Follow the steps below to get better as quickly as possible. How can you care for yourself at home? Activity  Rest when you feel tired. Getting enough sleep will help you recover. Try to walk each day. Start out by walking a little more than you did the day before. Gradually increase the amount you walk. Walking boosts blood flow and helps prevent pneumonia and constipation. For about 2 to 4 weeks, avoid lifting anything that would make you strain or anything over 10 pounds. Avoid strenuous activities, such as biking, jogging, weightlifting, and aerobic exercise, until your doctor says it is okay. You may shower 24 hours after surgery, if your doctor okays it. Pat the cut (incision) dry. Do not take a bath until your doctor tells you it is okay. You may drive when you are no longer taking pain medicine and can quickly move your foot from the gas pedal to the brake. You must also be able to sit comfortably for a long period of time, even if you do not plan to go far. You might get caught in traffic. Your doctor will tell you when you can have sex again. Diet  Eat smaller meals more often instead of fewer larger meals.  You can eat a normal diet, but avoid eating fatty foods for about 1 month. Fatty foods include hamburger, whole milk, cheese, and many snack foods. If your stomach is upset, try bland, low-fat foods like plain rice, broiled chicken, toast, and yogurt. Drink plenty of fluids (unless your doctor tells you not to). If you have diarrhea, try avoiding spicy foods, dairy products, fatty foods, and alcohol. You can also watch to see if specific foods cause it, and stop eating them. If the diarrhea continues for more than 2 weeks, talk to your doctor. You may notice that your bowel movements are not regular right after your surgery. This is common. Try to avoid constipation and straining with bowel movements. You may want to take a fiber supplement every day. If you have not had a bowel movement after a couple of days, ask your doctor about taking a mild laxative. Medicines  Take pain medicines exactly as directed. If the doctor gave you a prescription medicine for pain, take it as prescribed. If you are not taking a prescription pain medicine, take an over-the-counter medicine such as acetaminophen (Tylenol), ibuprofen (Advil, Motrin), or naproxen (Aleve). Read and follow all instructions on the label. Do not take two or more pain medicines at the same time unless the doctor told you to. Many pain medicines contain acetaminophen, which is Tylenol. Too much Tylenol can be harmful. If you think your pain medicine is making you sick to your stomach: Take your medicine after meals (unless your doctor tells you not to). Ask your doctor for a different pain medicine. If your doctor prescribed antibiotics, take them as directed. Do not stop taking them just because you feel better. You need to take the full course of antibiotics. Incision care  If you have strips of tape or glue on the incision, or cut, leave the tape/glue on for a week or until it falls off. After 24 hours wash the area daily with warm, soapy water, and pat it dry. You may have staples to hold the cut together. Keep them dry until your doctor takes them out. This is usually in 7 to 10 days. Keep the area clean and dry. You may cover it with a gauze bandage if it weeps or rubs against clothing. Change the bandage every day. Ice   To reduce swelling and pain, put ice or a cold pack on your belly for 10 to 20 minutes at a time. Do this every 1 to 2 hours. Put a thin cloth between the ice and your skin. Follow-up care is a key part of your treatment and safety. Be sure to make and go to all appointments, and call your doctor if you are having problems. Its also a good idea to know your test results and keep a list of the medicines you take. When should you call for help? Call 911 anytime you think you may need emergency care. For example, call if:  You passed out (lost consciousness). You have severe trouble breathing. You have sudden chest pain and shortness of breath, or you cough up blood. Call your doctor now or seek immediate medical care if:  You are sick to your stomach and cannot drink fluids. You have pain that does not get better when you take your pain medicine. You have signs of infection, such as: Increased pain, swelling, warmth, or redness. Red streaks leading from the incision. Pus draining from the incision. Swollen lymph nodes in your neck, armpits, or groin. A fever. Your urine turns dark brown or your stool is light-colored or karen-colored. Your skin or the whites of your eyes turn yellow. Bright red blood has soaked through a large bandage over your incision. You have signs of a blood clot, such as:  Pain in your calf, back of knee, thigh, or groin. Redness and swelling in your leg or groin. You have trouble passing urine or stool, especially if you have mild pain or swelling in your lower belly. You had a laparoscopic surgery and your shoulder pain lasts more than 24 hours or if you do not have a bowel movement after taking a laxative.     After general anesthesia or intravenous sedation, for 24 hours or while taking prescription Narcotics:  Limit your activities  A responsible adult needs to be with you for the next 24 hours  Do not drive and operate hazardous machinery  Do not make important personal or business decisions  Do not drink alcoholic beverages  If you have not urinated within 8 hours after discharge, and you are experiencing discomfort from urinary retention, please go to the nearest ED. If you have sleep apnea and have a CPAP machine, please use it for all naps and sleeping. Please use caution when taking narcotics and any of your home medications that may cause drowsiness. *  Please give a list of your current medications to your Primary Care Provider. *  Please update this list whenever your medications are discontinued, doses are      changed, or new medications (including over-the-counter products) are added. *  Please carry medication information at all times in case of emergency situations. These are general instructions for a healthy lifestyle:  No smoking/ No tobacco products/ Avoid exposure to second hand smoke  Surgeon General's Warning:  Quitting smoking now greatly reduces serious risk to your health. Obesity, smoking, and sedentary lifestyle greatly increases your risk for illness  A healthy diet, regular physical exercise & weight monitoring are important for maintaining a healthy lifestyle    You may be retaining fluid if you have a history of heart failure or if you experience any of the following symptoms:  Weight gain of 3 pounds or more overnight or 5 pounds in a week, increased swelling in our hands or feet or shortness of breath while lying flat in bed. Please call your doctor as soon as you notice any of these symptoms; do not wait until your next office visit. Awake

## 2022-08-12 NOTE — PROGRESS NOTES
Left message with radiology department asking if radiologist could compare most recent CT chest to CT chest from 3/24/15 or 9/9/14.

## 2022-08-12 NOTE — OP NOTE
Operative Note      Patient: Elile Erickson  YOB: 1955  MRN: [id number]    Date of Procedure: 8/12/2022    Pre-Op Diagnosis: Cholecystitis [K81.9] Cholelithiasis    Post-Op Diagnosis: Same       Procedure(s):  ROBO SINGLE SITE CHOLECYSTECTOMY    Surgeon(s):  Stephen Kaur MD    Assistant:   * No surgical staff found *    Anesthesia: General    Estimated Blood Loss (mL): Minimal    Complications: None    Specimens:   ID Type Source Tests Collected by Time Destination   A : GALLBLADDER Tissue Gallbladder SURGICAL PATHOLOGY Stephen Kaur MD 8/12/2022 5634        Implants:  * No implants in log *      Drains: * No LDAs found *    Findings: see op note    Detailed Description of Procedure:   [facility]   EY#[id number]    Electronically signed by Nicolette Christensen MD on 8/12/2022 at 8:38 AM

## 2022-08-12 NOTE — ANESTHESIA POSTPROCEDURE EVALUATION
Department of Anesthesiology  Postprocedure Note    Patient: Sirena Villalpando  MRN: [id number]  YOB: 1955  Date of evaluation: 8/12/2022      Procedure Summary     Date: 08/12/22 Room / Location: [facility] MAIN OR 6 / [facility] MAIN OR    Anesthesia Start: 5904 Anesthesia Stop: Danielle Cavazos    Procedure: UCNT SINGLE SITE CHOLECYSTECTOMY (Abdomen) Diagnosis:       Cholecystitis      (Cholecystitis [K81.9])    Providers: Kyleigh Lim MD Responsible Provider: Edouard Alonzo MD    Anesthesia Type: General ASA Status: 2          Anesthesia Type: General    Caden Phase I: Caden Score: 9    Caden Phase II:        Anesthesia Post Evaluation    Patient location during evaluation: bedside  Patient participation: complete - patient participated  Level of consciousness: awake and alert  Pain score: 1  Airway patency: patent  Nausea & Vomiting: no vomiting  Complications: no  Cardiovascular status: hemodynamically stable  Respiratory status: acceptable  Hydration status: euvolemic  Comments: On arrival patient with some expiratory wheezes. Alb neb given with significant improvement. satting upper 90s on room air. Complains of a vague pain in her abdomen non radiating. Discussed possibly 2/2 pneumoperitoneum and she also states she has had similar pain before with reflux. The pain gradually started to improve in PACU. Discussed if any worsening of symptoms or radiation into arm/neck pressure intensified to come back to ER. Patient and family voiced understanding. [id number] Viridiana Kay for discharge.

## 2022-08-12 NOTE — BRIEF OP NOTE
Brief Postoperative Note      Patient: Cristobal Found  YOB: 1955  MRN: [id number]    Date of Procedure: 8/12/2022    Pre-Op Diagnosis: Cholecystitis [R09. 9]Cholelithiasis    Post-Op Diagnosis: Same       Procedure(s):  ROBO SINGLE SITE CHOLECYSTECTOMY    Surgeon(s):  Tio Riley MD    Assistant:  * No surgical staff found *    Anesthesia: General    Estimated Blood Loss (mL): Minimal    Complications: None    Specimens:   ID Type Source Tests Collected by Time Destination   A : GALLBLADDER Tissue Gallbladder SURGICAL PATHOLOGY Tio Riley MD 8/12/2022 4134        Implants:  * No implants in log *      Drains: * No LDAs found *    Findings: see op note    Electronically signed by Satnam Nieto MD on 8/12/2022 at 8:37 AM

## 2022-08-15 ENCOUNTER — TELEPHONE (OUTPATIENT)
Dept: ONCOLOGY | Age: 67
End: 2022-08-15

## 2022-08-15 NOTE — TELEPHONE ENCOUNTER
Pt NEED TO RS HER APPT 8/15.       PT stated if she can Change the appt into a VV instead of a OV for tomorrow would be fine if possible

## 2022-08-16 ENCOUNTER — HOSPITAL ENCOUNTER (INPATIENT)
Age: 67
LOS: 2 days | Discharge: HOME | DRG: 199 | End: 2022-08-18
Attending: EMERGENCY MEDICINE | Admitting: FAMILY MEDICINE
Payer: MEDICARE

## 2022-08-16 ENCOUNTER — HOSPITAL ENCOUNTER (EMERGENCY)
Dept: GENERAL RADIOLOGY | Age: 67
Discharge: HOME | DRG: 199 | End: 2022-08-19
Payer: MEDICARE

## 2022-08-16 DIAGNOSIS — J95.811 POSTPROCEDURAL PNEUMOTHORAX: Primary | ICD-10-CM

## 2022-08-16 PROBLEM — E66.01 CLASS 2 SEVERE OBESITY DUE TO EXCESS CALORIES WITH SERIOUS COMORBIDITY AND BODY MASS INDEX (BMI) OF 35.0 TO 35.9 IN ADULT (HCC): Chronic | Status: ACTIVE | Noted: 2022-08-16

## 2022-08-16 PROBLEM — G47.33 OBSTRUCTIVE SLEEP APNEA: Status: ACTIVE | Noted: 2017-11-14

## 2022-08-16 PROBLEM — J30.1 ALLERGIC RHINITIS DUE TO POLLEN: Status: ACTIVE | Noted: 2022-08-16

## 2022-08-16 PROBLEM — K80.00 CALCULUS OF GALLBLADDER WITH ACUTE CHOLECYSTITIS WITHOUT OBSTRUCTION: Status: RESOLVED | Noted: 2022-06-30 | Resolved: 2022-08-16

## 2022-08-16 PROBLEM — J93.9 PNEUMOTHORAX, RIGHT: Status: ACTIVE | Noted: 2022-08-16

## 2022-08-16 PROBLEM — J30.81 ALLERGIC RHINITIS DUE TO ANIMAL HAIR AND DANDER: Status: ACTIVE | Noted: 2022-08-16

## 2022-08-16 PROBLEM — K81.9 CHOLECYSTITIS: Status: RESOLVED | Noted: 2022-06-30 | Resolved: 2022-08-16

## 2022-08-16 PROBLEM — I10 ESSENTIAL HYPERTENSION: Chronic | Status: ACTIVE | Noted: 2017-11-14

## 2022-08-16 PROBLEM — Z90.49 S/P LAPAROSCOPIC CHOLECYSTECTOMY: Status: ACTIVE | Noted: 2022-08-16

## 2022-08-16 PROBLEM — E78.2 MIXED HYPERLIPIDEMIA: Chronic | Status: ACTIVE | Noted: 2022-08-16

## 2022-08-16 PROBLEM — E66.812 CLASS 2 SEVERE OBESITY DUE TO EXCESS CALORIES WITH SERIOUS COMORBIDITY AND BODY MASS INDEX (BMI) OF 35.0 TO 35.9 IN ADULT: Chronic | Status: ACTIVE | Noted: 2022-08-16

## 2022-08-16 PROBLEM — J93.9 PNEUMOTHORAX ON RIGHT: Status: ACTIVE | Noted: 2022-08-16

## 2022-08-16 LAB
ALBUMIN SERPL-MCNC: 3.1 G/DL (ref 3.2–4.6)
ALBUMIN/GLOB SERPL: 0.7 {RATIO} (ref 1.2–3.5)
ALP SERPL-CCNC: 97 U/L (ref 50–136)
ALT SERPL-CCNC: 30 U/L (ref 12–65)
ANION GAP SERPL CALC-SCNC: 2 MMOL/L (ref 7–16)
AST SERPL-CCNC: 16 U/L (ref 15–37)
BASOPHILS # BLD: 0 K/UL (ref 0–0.2)
BASOPHILS NFR BLD: 0 % (ref 0–2)
BILIRUB SERPL-MCNC: 0.6 MG/DL (ref 0.2–1.1)
BUN SERPL-MCNC: 11 MG/DL (ref 8–23)
CALCIUM SERPL-MCNC: 9 MG/DL (ref 8.3–10.4)
CHLORIDE SERPL-SCNC: 108 MMOL/L (ref 98–107)
CO2 SERPL-SCNC: 27 MMOL/L (ref 21–32)
CREAT SERPL-MCNC: 0.7 MG/DL (ref 0.6–1)
DIFFERENTIAL METHOD BLD: ABNORMAL
EOSINOPHIL # BLD: 0.1 K/UL (ref 0–0.8)
EOSINOPHIL NFR BLD: 2 % (ref 0.5–7.8)
ERYTHROCYTE [DISTWIDTH] IN BLOOD BY AUTOMATED COUNT: 13.4 % (ref 11.9–14.6)
GLOBULIN SER CALC-MCNC: 4.4 G/DL (ref 2.3–3.5)
GLUCOSE SERPL-MCNC: 109 MG/DL (ref 65–100)
HCT VFR BLD AUTO: 40.9 % (ref 35.8–46.3)
HGB BLD-MCNC: 12.8 G/DL (ref 11.7–15.4)
IMM GRANULOCYTES # BLD AUTO: 0 K/UL (ref 0–0.5)
IMM GRANULOCYTES NFR BLD AUTO: 0 % (ref 0–5)
LYMPHOCYTES # BLD: 2.7 K/UL (ref 0.5–4.6)
LYMPHOCYTES NFR BLD: 35 % (ref 13–44)
MCH RBC QN AUTO: 28.4 PG (ref 26.1–32.9)
MCHC RBC AUTO-ENTMCNC: 31.3 G/DL (ref 31.4–35)
MCV RBC AUTO: 90.7 FL (ref 79.6–97.8)
MONOCYTES # BLD: 0.3 K/UL (ref 0.1–1.3)
MONOCYTES NFR BLD: 5 % (ref 4–12)
NEUTS SEG # BLD: 4.4 K/UL (ref 1.7–8.2)
NEUTS SEG NFR BLD: 58 % (ref 43–78)
NRBC # BLD: 0 K/UL (ref 0–0.2)
PLATELET # BLD AUTO: 260 K/UL (ref 150–450)
PMV BLD AUTO: 10.8 FL (ref 9.4–12.3)
POTASSIUM SERPL-SCNC: 3.9 MMOL/L (ref 3.5–5.1)
PROT SERPL-MCNC: 7.5 G/DL (ref 6.3–8.2)
RBC # BLD AUTO: 4.51 M/UL (ref 4.05–5.2)
SODIUM SERPL-SCNC: 137 MMOL/L (ref 136–145)
WBC # BLD AUTO: 7.6 K/UL (ref 4.3–11.1)

## 2022-08-16 PROCEDURE — 99223 1ST HOSP IP/OBS HIGH 75: CPT | Performed by: INTERNAL MEDICINE

## 2022-08-16 PROCEDURE — 85025 COMPLETE CBC W/AUTO DIFF WBC: CPT

## 2022-08-16 PROCEDURE — 6370000000 HC RX 637 (ALT 250 FOR IP): Performed by: FAMILY MEDICINE

## 2022-08-16 PROCEDURE — 71046 X-RAY EXAM CHEST 2 VIEWS: CPT

## 2022-08-16 PROCEDURE — 6360000002 HC RX W HCPCS: Performed by: FAMILY MEDICINE

## 2022-08-16 PROCEDURE — 1100000003 HC PRIVATE W/ TELEMETRY

## 2022-08-16 PROCEDURE — 99285 EMERGENCY DEPT VISIT HI MDM: CPT

## 2022-08-16 PROCEDURE — 80053 COMPREHEN METABOLIC PANEL: CPT

## 2022-08-16 RX ORDER — HYDROCODONE BITARTRATE AND ACETAMINOPHEN 7.5; 325 MG/1; MG/1
1 TABLET ORAL EVERY 6 HOURS PRN
Status: DISCONTINUED | OUTPATIENT
Start: 2022-08-16 | End: 2022-08-16

## 2022-08-16 RX ORDER — FLUTICASONE PROPIONATE 50 MCG
1 SPRAY, SUSPENSION (ML) NASAL DAILY
Status: DISCONTINUED | OUTPATIENT
Start: 2022-08-17 | End: 2022-08-18 | Stop reason: HOSPADM

## 2022-08-16 RX ORDER — IBUPROFEN 600 MG/1
600 TABLET ORAL EVERY 6 HOURS PRN
Status: DISCONTINUED | OUTPATIENT
Start: 2022-08-16 | End: 2022-08-16 | Stop reason: SDUPTHER

## 2022-08-16 RX ORDER — ARFORMOTEROL TARTRATE 15 UG/2ML
15 SOLUTION RESPIRATORY (INHALATION) 2 TIMES DAILY
Status: DISCONTINUED | OUTPATIENT
Start: 2022-08-16 | End: 2022-08-18 | Stop reason: HOSPADM

## 2022-08-16 RX ORDER — SODIUM CHLORIDE 9 MG/ML
INJECTION, SOLUTION INTRAVENOUS PRN
Status: DISCONTINUED | OUTPATIENT
Start: 2022-08-16 | End: 2022-08-18 | Stop reason: HOSPADM

## 2022-08-16 RX ORDER — BUDESONIDE 0.5 MG/2ML
0.5 INHALANT ORAL 2 TIMES DAILY
Status: DISCONTINUED | OUTPATIENT
Start: 2022-08-16 | End: 2022-08-18 | Stop reason: HOSPADM

## 2022-08-16 RX ORDER — LIDOCAINE 4 G/G
1 PATCH TOPICAL DAILY
Status: DISCONTINUED | OUTPATIENT
Start: 2022-08-16 | End: 2022-08-18 | Stop reason: HOSPADM

## 2022-08-16 RX ORDER — MORPHINE SULFATE 2 MG/ML
2 INJECTION, SOLUTION INTRAMUSCULAR; INTRAVENOUS EVERY 4 HOURS PRN
Status: DISCONTINUED | OUTPATIENT
Start: 2022-08-16 | End: 2022-08-18 | Stop reason: HOSPADM

## 2022-08-16 RX ORDER — CETIRIZINE HYDROCHLORIDE 10 MG/1
10 TABLET ORAL DAILY
Status: DISCONTINUED | OUTPATIENT
Start: 2022-08-17 | End: 2022-08-18 | Stop reason: HOSPADM

## 2022-08-16 RX ORDER — LEVALBUTEROL INHALATION SOLUTION 1.25 MG/3ML
1.25 SOLUTION RESPIRATORY (INHALATION) EVERY 8 HOURS PRN
Status: DISCONTINUED | OUTPATIENT
Start: 2022-08-16 | End: 2022-08-18 | Stop reason: HOSPADM

## 2022-08-16 RX ORDER — ENOXAPARIN SODIUM 100 MG/ML
40 INJECTION SUBCUTANEOUS DAILY
Status: DISCONTINUED | OUTPATIENT
Start: 2022-08-16 | End: 2022-08-18 | Stop reason: HOSPADM

## 2022-08-16 RX ORDER — ATORVASTATIN CALCIUM 40 MG/1
40 TABLET, FILM COATED ORAL NIGHTLY
Status: DISCONTINUED | OUTPATIENT
Start: 2022-08-17 | End: 2022-08-18 | Stop reason: HOSPADM

## 2022-08-16 RX ORDER — SODIUM CHLORIDE 0.9 % (FLUSH) 0.9 %
5-40 SYRINGE (ML) INJECTION PRN
Status: DISCONTINUED | OUTPATIENT
Start: 2022-08-16 | End: 2022-08-18 | Stop reason: HOSPADM

## 2022-08-16 RX ORDER — SODIUM CHLORIDE 0.9 % (FLUSH) 0.9 %
5-40 SYRINGE (ML) INJECTION EVERY 12 HOURS SCHEDULED
Status: DISCONTINUED | OUTPATIENT
Start: 2022-08-16 | End: 2022-08-18 | Stop reason: HOSPADM

## 2022-08-16 RX ORDER — IBUPROFEN 400 MG/1
400 TABLET ORAL EVERY 6 HOURS PRN
Status: DISCONTINUED | OUTPATIENT
Start: 2022-08-16 | End: 2022-08-18 | Stop reason: HOSPADM

## 2022-08-16 RX ORDER — MONTELUKAST SODIUM 10 MG/1
10 TABLET ORAL NIGHTLY
Status: DISCONTINUED | OUTPATIENT
Start: 2022-08-16 | End: 2022-08-18 | Stop reason: HOSPADM

## 2022-08-16 RX ORDER — POLYETHYLENE GLYCOL 3350 17 G/17G
17 POWDER, FOR SOLUTION ORAL DAILY PRN
Status: DISCONTINUED | OUTPATIENT
Start: 2022-08-16 | End: 2022-08-18 | Stop reason: HOSPADM

## 2022-08-16 RX ORDER — ACETAMINOPHEN 325 MG/1
650 TABLET ORAL EVERY 6 HOURS PRN
Status: DISCONTINUED | OUTPATIENT
Start: 2022-08-16 | End: 2022-08-18 | Stop reason: HOSPADM

## 2022-08-16 RX ORDER — DICYCLOMINE HYDROCHLORIDE 10 MG/1
10 CAPSULE ORAL
Status: DISCONTINUED | OUTPATIENT
Start: 2022-08-16 | End: 2022-08-18 | Stop reason: HOSPADM

## 2022-08-16 RX ORDER — ONDANSETRON 4 MG/1
4 TABLET, ORALLY DISINTEGRATING ORAL EVERY 8 HOURS PRN
Status: DISCONTINUED | OUTPATIENT
Start: 2022-08-16 | End: 2022-08-18 | Stop reason: HOSPADM

## 2022-08-16 RX ORDER — VALSARTAN 160 MG/1
160 TABLET ORAL DAILY
Status: DISCONTINUED | OUTPATIENT
Start: 2022-08-17 | End: 2022-08-18 | Stop reason: HOSPADM

## 2022-08-16 RX ORDER — KETOROLAC TROMETHAMINE 15 MG/ML
15 INJECTION, SOLUTION INTRAMUSCULAR; INTRAVENOUS ONCE
Status: COMPLETED | OUTPATIENT
Start: 2022-08-16 | End: 2022-08-16

## 2022-08-16 RX ORDER — OXYCODONE HYDROCHLORIDE AND ACETAMINOPHEN 5; 325 MG/1; MG/1
1 TABLET ORAL EVERY 4 HOURS PRN
Status: DISCONTINUED | OUTPATIENT
Start: 2022-08-16 | End: 2022-08-18 | Stop reason: HOSPADM

## 2022-08-16 RX ORDER — PANTOPRAZOLE SODIUM 40 MG/1
40 TABLET, DELAYED RELEASE ORAL
Status: DISCONTINUED | OUTPATIENT
Start: 2022-08-16 | End: 2022-08-18 | Stop reason: HOSPADM

## 2022-08-16 RX ORDER — ONDANSETRON 2 MG/ML
4 INJECTION INTRAMUSCULAR; INTRAVENOUS EVERY 6 HOURS PRN
Status: DISCONTINUED | OUTPATIENT
Start: 2022-08-16 | End: 2022-08-18 | Stop reason: HOSPADM

## 2022-08-16 RX ORDER — AMLODIPINE BESYLATE 10 MG/1
10 TABLET ORAL DAILY
Status: DISCONTINUED | OUTPATIENT
Start: 2022-08-16 | End: 2022-08-18 | Stop reason: HOSPADM

## 2022-08-16 RX ORDER — ACETAMINOPHEN 650 MG/1
650 SUPPOSITORY RECTAL EVERY 6 HOURS PRN
Status: DISCONTINUED | OUTPATIENT
Start: 2022-08-16 | End: 2022-08-18 | Stop reason: HOSPADM

## 2022-08-16 RX ADMIN — ENOXAPARIN SODIUM 40 MG: 100 INJECTION SUBCUTANEOUS at 15:58

## 2022-08-16 RX ADMIN — DICYCLOMINE HYDROCHLORIDE 10 MG: 10 CAPSULE ORAL at 16:24

## 2022-08-16 RX ADMIN — AMLODIPINE BESYLATE 10 MG: 10 TABLET ORAL at 15:56

## 2022-08-16 RX ADMIN — KETOROLAC TROMETHAMINE 15 MG: 15 INJECTION, SOLUTION INTRAMUSCULAR; INTRAVENOUS at 16:26

## 2022-08-16 RX ADMIN — PANTOPRAZOLE SODIUM 40 MG: 40 TABLET, DELAYED RELEASE ORAL at 15:56

## 2022-08-16 RX ADMIN — ACETAMINOPHEN 650 MG: 325 TABLET, FILM COATED ORAL at 22:54

## 2022-08-16 RX ADMIN — BUDESONIDE 500 MCG: 0.5 INHALANT RESPIRATORY (INHALATION) at 23:16

## 2022-08-16 ASSESSMENT — ENCOUNTER SYMPTOMS
SHORTNESS OF BREATH: 1
EYE REDNESS: 0
BACK PAIN: 0
FACIAL SWELLING: 0
EYE DISCHARGE: 0
ABDOMINAL PAIN: 0
NAUSEA: 0
VOMITING: 0
COLOR CHANGE: 0
COUGH: 0
RHINORRHEA: 0

## 2022-08-16 ASSESSMENT — PAIN SCALES - GENERAL
PAINLEVEL_OUTOF10: 2
PAINLEVEL_OUTOF10: 6
PAINLEVEL_OUTOF10: 7
PAINLEVEL_OUTOF10: 3

## 2022-08-16 ASSESSMENT — PAIN DESCRIPTION - LOCATION: LOCATION: CHEST

## 2022-08-16 NOTE — ED TRIAGE NOTES
Patient ambulatory to triage with mask in place. Patient reports she had her gallbladder removed on Friday. Pt reports pain with inspiration to her right side.

## 2022-08-16 NOTE — CONSULTS
pain and shortness of breath    Current Outpatient Medications   Medication Instructions    albuterol sulfate  (90 Base) MCG/ACT inhaler 2 puffs, Inhalation, EVERY 4 HOURS PRN    amLODIPine (NORVASC) 10 mg, Oral, DAILY    Beclomethasone Dipropionate (QVAR IN) Inhalation, 2 TIMES DAILY PRN    Cetirizine HCl 10 MG CAPS 1 tablet, Oral, DAILY    dicyclomine (BENTYL) 20 mg, Oral, EVERY 6 HOURS    EPINEPHrine (EPIPEN) 0.3 mg, IntraMUSCular, ONCE PRN    fluticasone (FLONASE) 50 MCG/ACT nasal spray 2 sprays, Nasal, DAILY    oxyCODONE-acetaminophen (PERCOCET) 5-325 MG per tablet 1 tablet, Oral, EVERY 6 HOURS PRN, Intended supply: 3 days. Take lowest dose possible to manage pain    pantoprazole (PROTONIX) 40 mg, Oral, 2 TIMES DAILY    valsartan (DIOVAN) 160 mg, Oral, DAILY      Past Medical History:   Diagnosis Date    Allergic rhinitis 4/24/2015    Dr. Prentis Libman    Asthma     prn inhaler and neb    Calculus of gallbladder with acute cholecystitis without obstruction 6/30/2022    Cholecystitis 6/30/2022    Added automatically from request for surgery [id number]    Cystocele, midline 4/24/2015    Diverticulosis of colon 4/24/2015    Edema 4/24/2015    Including peripheral edema. ( SOMETIMES) PER PT    Female stress incontinence     GERD (gastroesophageal reflux disease)     controlled with med    Hypercholesterolemia     no meds currently    Hyperplastic polyps of stomach 4/24/2015    Colonsocpy. 2013.     Hypertension     controlled with med    Internal hemorrhoids without mention of complication 6/24/3747    Mixed hyperlipidemia 8/16/2022    Obesity (BMI 30-39.9) 11/14/2017    BMI = 35    Obstructive sleep apnea 11/14/2017    wears cpap at hs    Other diseases of lung, not elsewhere classified 4/24/2015    being rechecked 8/2022-- followed by dr Mitchell Last    Right upper quadrant pain     Urge incontinence     Ventricular bigeminy 8/31/2016    per pt-- was told it was ok-- does not see a cardiologist at present     Past Surgical History:   Procedure Laterality Date    CHOLECYSTECTOMY, LAPAROSCOPIC N/A 2022    ROBO SINGLE SITE CHOLECYSTECTOMY performed by Erika Gibson MD at [facility] MAIN OR    HYSTERECTOMY (CERVIX STATUS UNKNOWN)      KNEE ARTHROSCOPY Bilateral      Social History     Socioeconomic History    Marital status:      Spouse name: Not on file    Number of children: Not on file    Years of education: Not on file    Highest education level: Not on file   Occupational History    Not on file   Tobacco Use    Smoking status: Former     Packs/day: 0.25     Years: 4.00     Pack years: 1.00     Types: Cigarettes     Quit date: 10/16/1996     Years since quittin.8    Smokeless tobacco: Never   Vaping Use    Vaping Use: Never used   Substance and Sexual Activity    Alcohol use: No    Drug use: No    Sexual activity: Not on file   Other Topics Concern    Not on file   Social History Narrative    Not on file     Social Determinants of Health     Financial Resource Strain: Not on file   Food Insecurity: Not on file   Transportation Needs: Not on file   Physical Activity: Not on file   Stress: Not on file   Social Connections: Not on file   Intimate Partner Violence: Not on file   Housing Stability: Not on file     Family History   Problem Relation Age of Onset    Asthma Mother     Hypertension Sister     Diabetes Sister     Cancer Other         Lung    Breast Cancer Neg Hx     Other Neg Hx     Post-op Cognitive Dysfunction Neg Hx     Emergence Delirium Neg Hx     Post-op Nausea/Vomiting Neg Hx     Delayed Awakening Neg Hx     Pseudochol. Deficiency Neg Hx     Malig Hypertherm Neg Hx      Allergies   Allergen Reactions    Latex Other (See Comments)     wheezing    Other Shortness Of Breath     PER PT-- ALL COLOGNE WILL START AN ASTHMA ATTACK    Sulfa Antibiotics Nausea Only     Objective:   Blood pressure (!) 163/92, pulse 88, temperature 100.1 °F (37.8 °C), temperature source Oral, resp.  rate 20, height 5' 6\" (1.676 m), weight 220 lb (99.8 kg), SpO2 98 %. No intake or output data in the 24 hours ending 08/16/22 1601  PHYSICAL EXAM   Constitutional:  the patient is well developed and in pain  EENMT:  Sclera clear, pupils equal, oral mucosa moist  Respiratory: shallow respirations, clear but diminished mostly on right  Cardiovascular:  RRR without M,G,R, SR rate 84  Gastrointestinal: soft and non-tender; with positive bowel sounds. Musculoskeletal: warm without cyanosis. There is no lower extremity edema. Skin:  no jaundice or rashes, umbilical incision CDI   Neurologic: no gross neuro deficits     Psychiatric:  alert and oriented x 3    CXR:      CT Chest:     Recent Labs     08/16/22  1127   WBC 7.6   HGB 12.8   HCT 40.9         K 3.9   *   CO2 27   BUN 11   BILITOT 0.6   AST 16   ALT 30   ALKPHOS 97     ECHO: No results found for this or any previous visit. MICRO: No results for input(s): CULTURE in the last 72 hours. Assessment and Plan:  (Medical Decision Making)   Active Problems:    Pneumothorax, right  Plan: Nitrogen washout overnight with 6 lpm, check CXR in am and re-evaluate need for chest tube or needle decompression. Pain control with NSAIDS. S/P laparoscopic cholecystectomy  Plan: will notify general surgery of patient's admission, surgical site stable, clean, dry, intact with no signs of infection. Essential hypertension  Plan: home meds; per primary      Obstructive sleep apnea  Plan: Son will bring patient's home CPAP. Patient reports compliance, just received new device. Has an appointment to follow up with us in sleep clinic soon. Full Code    Thank you very much for this referral.  We appreciate the opportunity to participate in this patient's care. Will follow along with above stated plan.     In this split/shared evaluation I performed performed a medically appropriate history and exam, counseled and educated the patient and/or family member, ordered medications, tests or procedures, documented information in EMR, and coordinated care. which accounted for 18 clinical time. YUNIEL Yun - NP    In this split/shared evaluation I performed reviewed the patients's H&P, available images, labs, cultures. , discussed case in detail with NPP, performed a medically appropriate history and exam, counseled and educated the patient and/or family member, ordered and/or reviewed medications, tests or procedures, documented information in EMR, independently interpreted images, and coordinated care. which accounted for 35 minutes clinical time. Impression: Mrs. Donna Gallardo has significant asthma on xolair who had a robotic/lap viki yesterday and presents with worsening dyspnea, pleuritic R chest wall pain and R pneumothorax today. There has been no excessive coughing, wheezing or other obvious etiology beyond GETA and the procedure. Pneumothorax, right  Will monitor with N2 washout tonight. If not improving by morning or worsening, would consider apical catheter aspiration with pneumothorax kit (8Fr). Will notify Dr. Jose D Gracia of patient's hospitalization      S/P laparoscopic cholecystectomy      Class 2 severe obesity due to excess calories with serious comorbidity and body mass index (BMI) of 35.0 to 35.9 in adult [facility])      Allergic rhinitis due to pollen      Allergic rhinitis due to animal hair and dander      *Pneumothorax on right      Mixed hyperlipidemia      Essential hypertension      Obstructive sleep apnea  No CPAP with pneumo tonight      Moderate persistent asthma without complication  Bronchodilators will be arranged to mimic home regimen. Chest wall pain  Would use primarily anti-inflammatories.      Bridger Rdz MD

## 2022-08-16 NOTE — H&P
Hospitalist History and Physical   Admit Date:  2022  2:02 PM   Name:  Es Marr   Age:  74 y.o. Sex:  female  :  1955   MRN:  [id number]   [facility]    Presenting Complaint: Post-op Problem     Reason(s) for Admission: Pneumothorax on right [J93.9]     History of Present Illness:   Es Marr is a 74 y.o. female with medical history of obesity, HTN, mod-persistent asthma on Xolair, allergic rhinitis to animal dander/pollen, REANNA  who presented from home with right sided pain with inspiration. Recently had lap CYY and has had this pain since day of surgery. Pain is severe and worse with laughing or coughing. Refractory to percocet prescribed for post-op pain. In ED, she was hypertensive and non hypoxic on RA. CXR showed small right apical pneumothorax measuring approximately 1.3 cm from the apex of the right hemithorax to the pleural surface. Pulmonology was consulted and recommends HFNC 6 L for co2 wash out and consideration of chest tube vs FNA decompression pending clinical progression. Seen patient with pulmonology at bedside. She recently got a new CPAP machine as hers was recalled. Did not bring to ED but son may be able to bring it to her later this evening. Unfortantly, she lost her son to pancreatic cancer within the last year. She was seen by Dr. Jimbo Tate on 22 for sub cm LLL pulmonary nodule who suspects likely benign. Review of Systems:  10 systems reviewed and negative except as noted in HPI. Assessment & Plan: Active Problems:    Pneumothorax, right  Etiology uncertain, probably related to operation. 6 L via HFNC for co2 wash out. Repeat CXR in AM.   Continuous puls ox and remote tele   Admin toradol 15 mg IV now, add lidocaine patch with prn IBU, percocet or morphine for breakthrough. S/P laparoscopic cholecystectomy  POD#4. Incision clean, dry, intact. No signs of infection.        Essential hypertension  Plan: bp high likely physiologic 2/2 stress and pain. Resume home regimen. Monitor for symptomatic htn. Moderate-persistant asthma, allergic phenotype on Xolair  Allergic Rhinitis to animal dander and hair, pollen   Brovana/pulmicort, prn short acting bronchodilators  Resume flonase, cetirizine, singulair. Avoid exposure to perfumes, reported as sig asthma ex trigger       Obstructive sleep apnea  f/b NP robson mckee with [facility] sleep center. Resume home settings CPAP qhs. Will provide CPAP if son unable to bring hers. Mixed HLD - resume statin     GERD - PPI      Obesity - adds to complexity     Dispo/Discharge Planning:     Admit, home in 1-3 days     Diet: ADULT DIET; Regular; Low Fat/Low Chol/High Fiber/ARIEL  VTE ppx: lovenox   Code status: Full Code    Hospital Problems:  Principal Problem:    Pneumothorax on right  Active Problems:    Pneumothorax, right    S/P laparoscopic cholecystectomy    Class 2 severe obesity due to excess calories with serious comorbidity and body mass index (BMI) of 35.0 to 35.9 in adult [facility])    Allergic rhinitis due to animal hair and dander    Allergic rhinitis due to pollen    Mixed hyperlipidemia    Essential hypertension    Moderate persistent asthma without complication    Obstructive sleep apnea  Resolved Problems:    * No resolved hospital problems. *       Past History:     Past Medical History:   Diagnosis Date    Allergic rhinitis 4/24/2015    Dr. Faraz Valentine    Asthma     prn inhaler and neb    Calculus of gallbladder with acute cholecystitis without obstruction 6/30/2022    Cholecystitis 6/30/2022    Added automatically from request for surgery [id number]    Cystocele, midline 4/24/2015    Diverticulosis of colon 4/24/2015    Edema 4/24/2015    Including peripheral edema. ( SOMETIMES) PER PT    Female stress incontinence     GERD (gastroesophageal reflux disease)     controlled with med    Hypercholesterolemia     no meds currently    Hyperplastic polyps of stomach 4/24/2015    Colonsocpy. 2013. 10/23/2019, 01/04/2022    Pneumococcal Conjugate 13-valent ([id number]) 05/18/2016    Pneumococcal Polysaccharide ([id number]) 10/20/2016    Pneumococcal Vaccine 11/01/2008    Tdap (Boostrix, Adacel) 08/19/2021     Allergies   Allergen Reactions    Latex Other (See Comments)     wheezing    Other Shortness Of Breath     PER PT-- ALL COLOGNE WILL START AN ASTHMA ATTACK    Sulfa Antibiotics Nausea Only     Prior to Admit Medications:  Current Outpatient Medications   Medication Instructions    albuterol sulfate  (90 Base) MCG/ACT inhaler 2 puffs, Inhalation, EVERY 4 HOURS PRN    amLODIPine (NORVASC) 10 mg, Oral, DAILY    Beclomethasone Dipropionate (QVAR IN) Inhalation, 2 TIMES DAILY PRN    Cetirizine HCl 10 MG CAPS 1 tablet, Oral, DAILY    dicyclomine (BENTYL) 20 mg, Oral, EVERY 6 HOURS    EPINEPHrine (EPIPEN) 0.3 mg, IntraMUSCular, ONCE PRN    fluticasone (FLONASE) 50 MCG/ACT nasal spray 2 sprays, Nasal, DAILY    oxyCODONE-acetaminophen (PERCOCET) 5-325 MG per tablet 1 tablet, Oral, EVERY 6 HOURS PRN, Intended supply: 3 days. Take lowest dose possible to manage pain    pantoprazole (PROTONIX) 40 mg, Oral, 2 TIMES DAILY    valsartan (DIOVAN) 160 mg, Oral, DAILY         Objective:   Patient Vitals for the past 24 hrs:   Temp Pulse Resp BP SpO2   08/16/22 1500 -- 88 20 (!) 163/92 98 %   08/16/22 1430 -- 85 16 (!) 149/76 98 %   08/16/22 1415 -- 80 16 (!) 149/84 98 %   08/16/22 1115 100.1 °F (37.8 °C) 91 16 (!) 137/93 98 %       Oxygen Therapy  SpO2: 98 %  Pulse via Oximetry: 79 beats per minute    Estimated body mass index is 35.51 kg/m² as calculated from the following:    Height as of this encounter: 5' 6\" (1.676 m). Weight as of this encounter: 220 lb (99.8 kg). No intake or output data in the 24 hours ending 08/16/22 1602      Physical Exam:    Blood pressure (!) 163/92, pulse 88, temperature 100.1 °F (37.8 °C), temperature source Oral, resp.  rate 20, height 5' 6\" (1.676 m), weight 220 lb (99.8 kg), SpO2 98 %.  General:    Pleasant. Obese. Non toxic. No acute distress    Head:  Normocephalic, atraumatic  Eyes:  Sclerae appear normal.  Pupils equally round. ENT:  Nares appear normal, no drainage. Moist oral mucosa  Neck:  No restricted ROM. Trachea midline   CV:   RRR. No m/r/g. No jugular venous distension. Lungs:   Unlabored respirations. Speaking in full sentences. CTAB. No wheezing, rhonchi, or rales. Symmetric expansion. Abdomen: Bowel sounds present. Soft, nontender, nondistended. Extremities: MS 5/5 grossly, no peripheral edema  Skin:     Surigical site clean, dry, intact. No signs of infection. Neuro:  CN II-XII grossly intact. Sensation intact. A&Ox3  Psych:  Normal mood and affect.       I have personally reviewed labs and tests showing:  Recent Labs:  Recent Results (from the past 24 hour(s))   CBC with Auto Differential    Collection Time: 08/16/22 11:27 AM   Result Value Ref Range    WBC 7.6 4.3 - 11.1 K/uL    RBC 4.51 4.05 - 5.2 M/uL    Hemoglobin 12.8 11.7 - 15.4 g/dL    Hematocrit 40.9 35.8 - 46.3 %    MCV 90.7 79.6 - 97.8 FL    MCH 28.4 26.1 - 32.9 PG    MCHC 31.3 (L) 31.4 - 35.0 g/dL    RDW 13.4 11.9 - 14.6 %    Platelets 187 214 - 420 K/uL    MPV 10.8 9.4 - 12.3 FL    nRBC 0.00 0.0 - 0.2 K/uL    Differential Type AUTOMATED      Seg Neutrophils 58 43 - 78 %    Lymphocytes 35 13 - 44 %    Monocytes 5 4.0 - 12.0 %    Eosinophils % 2 0.5 - 7.8 %    Basophils 0 0.0 - 2.0 %    Immature Granulocytes 0 0.0 - 5.0 %    Segs Absolute 4.4 1.7 - 8.2 K/UL    Absolute Lymph # 2.7 0.5 - 4.6 K/UL    Absolute Mono # 0.3 0.1 - 1.3 K/UL    Absolute Eos # 0.1 0.0 - 0.8 K/UL    Basophils Absolute 0.0 0.0 - 0.2 K/UL    Absolute Immature Granulocyte 0.0 0.0 - 0.5 K/UL   Comprehensive Metabolic Panel    Collection Time: 08/16/22 11:27 AM   Result Value Ref Range    Sodium 137 136 - 145 mmol/L    Potassium 3.9 3.5 - 5.1 mmol/L    Chloride 108 (H) 98 - 107 mmol/L    CO2 27 21 - 32 mmol/L    Anion Gap 2 (L) 7 - 16 mmol/L    Glucose 109 (H) 65 - 100 mg/dL    BUN 11 8 - 23 MG/DL    Creatinine 0.70 0.6 - 1.0 MG/DL    GFR African American >60 >60 ml/min/1.73m2    GFR Non- >60 >60 ml/min/1.73m2    Calcium 9.0 8.3 - 10.4 MG/DL    Total Bilirubin 0.6 0.2 - 1.1 MG/DL    ALT 30 12 - 65 U/L    AST 16 15 - 37 U/L    Alk Phosphatase 97 50 - 136 U/L    Total Protein 7.5 6.3 - 8.2 g/dL    Albumin 3.1 (L) 3.2 - 4.6 g/dL    Globulin 4.4 (H) 2.3 - 3.5 g/dL    Albumin/Globulin Ratio 0.7 (L) 1.2 - 3.5         I have personally reviewed imaging studies showing:  XR CHEST (2 VW)    Result Date: 8/16/2022  XR CHEST (2 VW) 8/16/2022 11:58 AM HISTORY: Painful respiration right chest. Recent cholecystectomy. COMPARISON: None. AP and lateral views of the chest were obtained. Lungs are clear. The heart size is normal in size. There is a small right apical pneumothorax measuring approximately 1.3 cm from the apex of the right hemithorax to the pleural surface. No evidence of left pneumothorax. No evidence of mediastinal shift. No pleural effusions. Echocardiogram:  No results found for this or any previous visit.       Meds previously ordered:  Orders Placed This Encounter   Medications    sodium chloride flush 0.9 % injection 5-40 mL    sodium chloride flush 0.9 % injection 5-40 mL    0.9 % sodium chloride infusion    enoxaparin (LOVENOX) injection 40 mg     Order Specific Question:   Indication of Use     Answer:   Prophylaxis-DVT/PE    OR Linked Order Group     ondansetron (ZOFRAN-ODT) disintegrating tablet 4 mg     ondansetron (ZOFRAN) injection 4 mg    polyethylene glycol (GLYCOLAX) packet 17 g    OR Linked Order Group     acetaminophen (TYLENOL) tablet 650 mg     acetaminophen (TYLENOL) suppository 650 mg    atorvastatin (LIPITOR) tablet 40 mg    levalbuterol (XOPENEX) nebulizer solution 1.25 mg    montelukast (SINGULAIR) tablet 10 mg    fluticasone (FLONASE) 50 MCG/ACT nasal spray 1 spray    cetirizine (ZYRTEC) tablet 10 mg    pantoprazole (PROTONIX) tablet 40 mg    valsartan (DIOVAN) tablet 160 mg    amLODIPine (NORVASC) tablet 10 mg    dicyclomine (BENTYL) capsule 10 mg    DISCONTD: HYDROcodone-acetaminophen (NORCO) 7.5-325 MG per tablet 1 tablet    oxyCODONE-acetaminophen (PERCOCET) 5-325 MG per tablet 1 tablet    morphine injection 2 mg    budesonide (PULMICORT) nebulizer suspension 500 mcg    Arformoterol Tartrate (BROVANA) nebulizer solution 15 mcg    lidocaine 4 % external patch 1 patch    ketorolac (TORADOL) injection 15 mg    DISCONTD: ibuprofen (ADVIL;MOTRIN) tablet 600 mg         Signed:  Jacquelin Mccarty DO, DO    Part of this note may have been written by using a voice dictation software. The note has been proof read but may still contain some grammatical/other typographical errors.

## 2022-08-16 NOTE — ED PROVIDER NOTES
[facility] Emergency Department Provider Note                   PCP:                Alison Muñiz MD               Age: 74 y.o. Sex: female       ICD-10-CM    1. Postprocedural pneumothorax  J95.811           DISPOSITION Admitted 08/16/2022 03:34:07 PM       New Prescriptions    No medications on file       Orders Placed This Encounter   Procedures    XR CHEST (2 VW)    XR CHEST PORTABLE    CBC with Auto Differential    Comprehensive Metabolic Panel    ADULT DIET; Regular; Low Fat/Low Chol/High Fiber/ARIEL    Vital signs per unit routine    Telemetry monitoring - 24 hour duration    Continuous Pulse Oximetry    Up as tolerated    Notify physician    Full code    Nasal Cannula Oxygen    Initiate Oxygen Therapy Protocol    ADMIT TO INPATIENT         Walls Madisyn is a 74 y.o. female who presents to the Emergency Department with chief complaint of    Chief Complaint   Patient presents with    Post-op Problem      Chief complaint : Chest pain with dyspnea    HISTORY OF PRESENT ILLNESS :  Location : Right-sided    Quality : Pleuritic    Quantity : Constant    Timing : 5 days now,    Severity : Mild to moderate and worsening steadily    Context : Started Friday and recovering after laparoscopic cholecystectomy presumably underwent GETA    Alleviating / exacerbating factors : Worse with moving and breathing    Associated Symptoms : No abdominal pain, nausea, vomiting    -------------------------------    SOCIAL HISTORY : , non-smoker, nondrinker        Review of Systems   Constitutional:  Negative for chills and fever. HENT:  Negative for facial swelling and rhinorrhea. Eyes:  Negative for discharge and redness. Respiratory:  Positive for shortness of breath. Negative for cough. Cardiovascular:  Positive for chest pain. Negative for palpitations. Gastrointestinal:  Negative for abdominal pain, nausea and vomiting. Endocrine: Negative for polydipsia and polyuria.    Genitourinary:  Negative for Delirium Neg Hx     Post-op Nausea/Vomiting Neg Hx     Delayed Awakening Neg Hx     Pseudochol. Deficiency Neg Hx     Malig Hypertherm Neg Hx         Social Connections: Not on file        Allergies   Allergen Reactions    Latex Other (See Comments)     wheezing    Other Shortness Of Breath     PER PT-- ALL COLOGNE WILL START AN ASTHMA ATTACK    Sulfa Antibiotics Nausea Only        Vitals signs and nursing note reviewed. Patient Vitals for the past 4 hrs:   Pulse Resp BP SpO2   08/16/22 1500 88 20 (!) 163/92 98 %   08/16/22 1430 85 16 (!) 149/76 98 %   08/16/22 1415 80 16 (!) 149/84 98 %          Physical Exam  Vitals and nursing note reviewed. Constitutional:       General: She is not in acute distress. Appearance: Normal appearance. She is not ill-appearing, toxic-appearing or diaphoretic. HENT:      Head: Normocephalic and atraumatic. Right Ear: External ear normal.      Left Ear: External ear normal.      Nose: No rhinorrhea. Eyes:      General: No scleral icterus. Right eye: No discharge. Left eye: No discharge. Conjunctiva/sclera: Conjunctivae normal.   Cardiovascular:      Rate and Rhythm: Normal rate and regular rhythm. Pulmonary:      Effort: Pulmonary effort is normal. No respiratory distress. Breath sounds: Normal breath sounds. No stridor. No wheezing, rhonchi or rales. Chest:      Chest wall: No tenderness. Abdominal:      General: Abdomen is flat. Bowel sounds are normal.      Palpations: Abdomen is soft. There is no fluid wave or pulsatile mass. Tenderness: There is no abdominal tenderness. There is no guarding or rebound. Musculoskeletal:         General: No deformity or signs of injury. Normal range of motion. Cervical back: Normal range of motion and neck supple. No rigidity. Skin:     General: Skin is warm and dry. Coloration: Skin is not jaundiced or pale. Neurological:      General: No focal deficit present.       Mental Status: She is alert and oriented to person, place, and time. Psychiatric:         Mood and Affect: Mood normal.         Behavior: Behavior normal.         Thought Content: Thought content normal.        MDM  Number of Diagnoses or Management Options  Postprocedural pneumothorax: new, needed workup  Diagnosis management comments: Pleuritic chest pain with dyspnea, chest x-ray reveals small apical pneumothorax, discussed with pulmonology, will admit to the hospitalist service on 6 L nasal cannula for nitrogen washout reimage in the morning and make chest tube decision at that point. Amount and/or Complexity of Data Reviewed  Clinical lab tests: reviewed and ordered  Tests in the radiology section of CPT®: reviewed and ordered  Decide to obtain previous medical records or to obtain history from someone other than the patient: yes  Discuss the patient with other providers: yes (Pulmonary critical care, then the hospitalist service)    Risk of Complications, Morbidity, and/or Mortality  Presenting problems: moderate  Diagnostic procedures: low  Management options: low    Patient Progress  Patient progress: improved      Procedures    Labs Reviewed   CBC WITH AUTO DIFFERENTIAL - Abnormal; Notable for the following components:       Result Value    MCHC 31.3 (*)     All other components within normal limits   COMPREHENSIVE METABOLIC PANEL - Abnormal; Notable for the following components:    Chloride 108 (*)     Anion Gap 2 (*)     Glucose 109 (*)     Albumin 3.1 (*)     Globulin 4.4 (*)     Albumin/Globulin Ratio 0.7 (*)     All other components within normal limits        XR CHEST (2 VW)   Final Result   Lungs are clear. The heart size is normal in size. There is a small   right apical pneumothorax measuring approximately 1.3 cm from the apex of the   right hemithorax to the pleural surface. No evidence of left pneumothorax. No   evidence of mediastinal shift. No pleural effusions.         XR CHEST PORTABLE    (Results Pending)            [place] Coma Scale  Eye Opening: Spontaneous  Best Verbal Response: Oriented  Best Motor Response: Obeys commands  [place] Coma Scale Score: 15                     Voice dictation software was used during the making of this note. This software is not perfect and grammatical and other typographical errors may be present. This note has not been completely proofread for errors.         Ellie Marrero MD  08/16/22 0868

## 2022-08-17 ENCOUNTER — APPOINTMENT (OUTPATIENT)
Dept: GENERAL RADIOLOGY | Age: 67
DRG: 199 | End: 2022-08-17
Payer: MEDICARE

## 2022-08-17 ENCOUNTER — APPOINTMENT (OUTPATIENT)
Dept: CT IMAGING | Age: 67
DRG: 199 | End: 2022-08-17
Payer: MEDICARE

## 2022-08-17 PROBLEM — G47.33 OBSTRUCTIVE SLEEP APNEA: Chronic | Status: ACTIVE | Noted: 2017-11-14

## 2022-08-17 PROBLEM — J95.811 POSTPROCEDURAL PNEUMOTHORAX: Status: ACTIVE | Noted: 2022-08-17

## 2022-08-17 PROBLEM — J30.81 ALLERGIC RHINITIS DUE TO ANIMAL HAIR AND DANDER: Chronic | Status: ACTIVE | Noted: 2022-08-16

## 2022-08-17 PROBLEM — J30.1 ALLERGIC RHINITIS DUE TO POLLEN: Chronic | Status: ACTIVE | Noted: 2022-08-16

## 2022-08-17 LAB
ANION GAP SERPL CALC-SCNC: 6 MMOL/L (ref 7–16)
BUN SERPL-MCNC: 12 MG/DL (ref 8–23)
CALCIUM SERPL-MCNC: 9 MG/DL (ref 8.3–10.4)
CHLORIDE SERPL-SCNC: 107 MMOL/L (ref 98–107)
CO2 SERPL-SCNC: 25 MMOL/L (ref 21–32)
CREAT SERPL-MCNC: 0.7 MG/DL (ref 0.6–1)
ERYTHROCYTE [DISTWIDTH] IN BLOOD BY AUTOMATED COUNT: 13.2 % (ref 11.9–14.6)
GLUCOSE SERPL-MCNC: 116 MG/DL (ref 65–100)
HCT VFR BLD AUTO: 40 % (ref 35.8–46.3)
HGB BLD-MCNC: 12.6 G/DL (ref 11.7–15.4)
MAGNESIUM SERPL-MCNC: 2.4 MG/DL (ref 1.8–2.4)
MCH RBC QN AUTO: 28.4 PG (ref 26.1–32.9)
MCHC RBC AUTO-ENTMCNC: 31.5 G/DL (ref 31.4–35)
MCV RBC AUTO: 90.1 FL (ref 79.6–97.8)
NRBC # BLD: 0 K/UL (ref 0–0.2)
PLATELET # BLD AUTO: 263 K/UL (ref 150–450)
PMV BLD AUTO: 10.9 FL (ref 9.4–12.3)
POTASSIUM SERPL-SCNC: 3.8 MMOL/L (ref 3.5–5.1)
RBC # BLD AUTO: 4.44 M/UL (ref 4.05–5.2)
SODIUM SERPL-SCNC: 138 MMOL/L (ref 136–145)
WBC # BLD AUTO: 6.7 K/UL (ref 4.3–11.1)

## 2022-08-17 PROCEDURE — 99232 SBSQ HOSP IP/OBS MODERATE 35: CPT | Performed by: INTERNAL MEDICINE

## 2022-08-17 PROCEDURE — 83735 ASSAY OF MAGNESIUM: CPT

## 2022-08-17 PROCEDURE — 36415 COLL VENOUS BLD VENIPUNCTURE: CPT

## 2022-08-17 PROCEDURE — 2580000003 HC RX 258: Performed by: FAMILY MEDICINE

## 2022-08-17 PROCEDURE — 71045 X-RAY EXAM CHEST 1 VIEW: CPT

## 2022-08-17 PROCEDURE — 94760 N-INVAS EAR/PLS OXIMETRY 1: CPT

## 2022-08-17 PROCEDURE — 85027 COMPLETE CBC AUTOMATED: CPT

## 2022-08-17 PROCEDURE — 6360000002 HC RX W HCPCS: Performed by: FAMILY MEDICINE

## 2022-08-17 PROCEDURE — 94640 AIRWAY INHALATION TREATMENT: CPT

## 2022-08-17 PROCEDURE — 1100000000 HC RM PRIVATE

## 2022-08-17 PROCEDURE — 6370000000 HC RX 637 (ALT 250 FOR IP): Performed by: FAMILY MEDICINE

## 2022-08-17 PROCEDURE — 80048 BASIC METABOLIC PNL TOTAL CA: CPT

## 2022-08-17 PROCEDURE — 71250 CT THORAX DX C-: CPT

## 2022-08-17 PROCEDURE — 6370000000 HC RX 637 (ALT 250 FOR IP): Performed by: INTERNAL MEDICINE

## 2022-08-17 RX ORDER — SODIUM CHLORIDE/ALOE VERA
GEL (GRAM) NASAL PRN
Status: DISCONTINUED | OUTPATIENT
Start: 2022-08-17 | End: 2022-08-18 | Stop reason: HOSPADM

## 2022-08-17 RX ADMIN — DICYCLOMINE HYDROCHLORIDE 10 MG: 10 CAPSULE ORAL at 15:23

## 2022-08-17 RX ADMIN — OXYCODONE AND ACETAMINOPHEN 1 TABLET: 5; 325 TABLET ORAL at 21:52

## 2022-08-17 RX ADMIN — Medication: at 18:51

## 2022-08-17 RX ADMIN — BUDESONIDE 500 MCG: 0.5 INHALANT RESPIRATORY (INHALATION) at 20:09

## 2022-08-17 RX ADMIN — CETIRIZINE HYDROCHLORIDE 10 MG: 10 TABLET ORAL at 09:53

## 2022-08-17 RX ADMIN — BUDESONIDE 500 MCG: 0.5 INHALANT RESPIRATORY (INHALATION) at 09:45

## 2022-08-17 RX ADMIN — MONTELUKAST 10 MG: 10 TABLET, FILM COATED ORAL at 21:02

## 2022-08-17 RX ADMIN — SODIUM CHLORIDE, PRESERVATIVE FREE 5 ML: 5 INJECTION INTRAVENOUS at 09:55

## 2022-08-17 RX ADMIN — ARFORMOTEROL TARTRATE 15 MCG: 15 SOLUTION RESPIRATORY (INHALATION) at 09:45

## 2022-08-17 RX ADMIN — DICYCLOMINE HYDROCHLORIDE 10 MG: 10 CAPSULE ORAL at 13:36

## 2022-08-17 RX ADMIN — PANTOPRAZOLE SODIUM 40 MG: 40 TABLET, DELAYED RELEASE ORAL at 09:54

## 2022-08-17 RX ADMIN — ENOXAPARIN SODIUM 40 MG: 100 INJECTION SUBCUTANEOUS at 15:23

## 2022-08-17 RX ADMIN — ATORVASTATIN CALCIUM 40 MG: 40 TABLET, FILM COATED ORAL at 21:02

## 2022-08-17 RX ADMIN — PANTOPRAZOLE SODIUM 40 MG: 40 TABLET, DELAYED RELEASE ORAL at 15:24

## 2022-08-17 RX ADMIN — AMLODIPINE BESYLATE 10 MG: 10 TABLET ORAL at 09:54

## 2022-08-17 RX ADMIN — ACETAMINOPHEN 650 MG: 325 TABLET, FILM COATED ORAL at 18:07

## 2022-08-17 RX ADMIN — DICYCLOMINE HYDROCHLORIDE 10 MG: 10 CAPSULE ORAL at 09:54

## 2022-08-17 RX ADMIN — SODIUM CHLORIDE, PRESERVATIVE FREE 10 ML: 5 INJECTION INTRAVENOUS at 21:03

## 2022-08-17 RX ADMIN — VALSARTAN 160 MG: 160 TABLET, FILM COATED ORAL at 13:36

## 2022-08-17 RX ADMIN — ARFORMOTEROL TARTRATE 15 MCG: 15 SOLUTION RESPIRATORY (INHALATION) at 20:09

## 2022-08-17 ASSESSMENT — PAIN DESCRIPTION - ORIENTATION
ORIENTATION: RIGHT
ORIENTATION: ANTERIOR

## 2022-08-17 ASSESSMENT — PAIN DESCRIPTION - DESCRIPTORS
DESCRIPTORS: BURNING;ACHING
DESCRIPTORS: ACHING;DISCOMFORT

## 2022-08-17 ASSESSMENT — PAIN SCALES - GENERAL
PAINLEVEL_OUTOF10: 0
PAINLEVEL_OUTOF10: 0
PAINLEVEL_OUTOF10: 5
PAINLEVEL_OUTOF10: 3

## 2022-08-17 ASSESSMENT — PAIN DESCRIPTION - LOCATION
LOCATION: HEAD
LOCATION: CHEST

## 2022-08-17 NOTE — PROGRESS NOTES
Hospitalist Progress Note   Admit Date:  2022  2:02 PM   Name:  Samaria Median   Age:  74 y.o. Sex:  female  :  1955   MRN:  [id number]   Room:  [facility]    Presenting Complaint: Post-op Problem     Reason(s) for Admission: Pneumothorax on right [J93.9]  Postprocedural pneumothorax [facility] Course & Interval History:   72-year-old female history of hypertension, obesity, moderate persistent asthma, REANNA, allergic rhinitis presented with right-sided pain with inspiration. Patient recently had lap CYY and has had this pain since the day of surgery. Pain is now worse with laughing or coughing. Refractory to Percocet. Patient was hypertensive and nonhypoxic on room air in the emergency department. Chest x-ray showed small right apical pneumothorax measuring approximately 1.3 cm from the apex of the right hemithorax to the pleural surface. Pulmonology consulted recommended high flow nasal cannula 6 L for CO2 washout and consideration of chest tube versus FNA decompression pending clinical progression. Recently got new CPAP machine. Patient was just seen by oncology for left lower lobe pulmonary nodule with a suspect is likely benign. Subjective/24hr Events (22): Patient was seen and examined at the bedside. No overnight events. Patient still on 5 L nasal cannula while lying in bed. Patient stated she felt about the same as she did on admission. Denies any cardiac chest pain, abdominal pain, fever/chills. Assessment & Plan:   Right pneumothorax  - Likely secondary to previous operation  - On admission was on 6 L high flow nasal cannula for CO2 washout, currently on 5 L nasal cannula  - Repeat chest x-ray in a.m. showed a small right apical pneumothorax decreased only trace amount on the left.   - Continuous pulse ox and remote telemetry  - Lidocaine patch as needed  - CT chest showed new right basilar pneumothorax, right pleural effusion with right basilar atelectasis  - pulmonology consulted    Status post laparoscopic cholecystectomy  - Postoperative day #5  - Incision clean dry and intact  - No signs of infection    Essential hypertension  - Blood pressure elevated likely physiologic secondary to stress and pain  - Continue home regimen  - Continue to monitor for symptomatic hypotension    Moderate persistent asthma  - Continue Brovana/Pulmicort, as needed short acting bronchodilator  - Resume Flonase, sertraline, Singulair    Obstructive sleep apnea  - Resume CPAP nightly    Hyperlipidemia  - Resume statin    GERD  - Continue home PPI      Discharge Planning:    Dispo pending clinical course. Possible discharge next 24 hours    Diet:  ADULT DIET; Regular; Low Fat/Low Chol/High Fiber/ARIEL  DVT PPx: Lovenox  Code status: Full Code    Hospital Problems:  Active Problems:    Pneumothorax, right    S/P laparoscopic cholecystectomy    Class 2 severe obesity due to excess calories with serious comorbidity and body mass index (BMI) of 35.0 to 35.9 in adult [facility])    Allergic rhinitis due to animal hair and dander    Allergic rhinitis due to pollen    Mixed hyperlipidemia    Postprocedural pneumothorax    Essential hypertension    Moderate persistent asthma without complication    Obstructive sleep apnea  Resolved Problems:    * No resolved hospital problems.  *      Objective:   Patient Vitals for the past 24 hrs:   Temp Pulse Resp BP SpO2   08/17/22 1434 98.1 °F (36.7 °C) 79 19 (!) 141/69 99 %   08/17/22 0830 98 °F (36.7 °C) 74 16 120/63 95 %   08/16/22 2317 -- 72 16 -- 100 %   08/16/22 2254 -- 68 20 (!) 146/71 --   08/16/22 1615 -- 81 -- (!) 158/78 100 %   08/16/22 1600 -- 85 15 (!) 151/96 100 %       Oxygen Therapy  SpO2: 99 %  Pulse via Oximetry: 79 beats per minute  Pulse Oximeter Device Mode: Continuous  Pulse Oximeter Device Location: Finger  O2 Device: High flow nasal cannula  Skin Assessment: Clean, dry, & intact  Skin Protection for O2 Device: N/A  O2 Flow Rate (L/min): 5 L/min    Estimated body mass index is 35.51 kg/m² as calculated from the following:    Height as of this encounter: 5' 6\" (1.676 m). Weight as of this encounter: 220 lb (99.8 kg). No intake or output data in the 24 hours ending 08/17/22 5354      Physical Exam:   Blood pressure (!) 141/69, pulse 79, temperature 98.1 °F (36.7 °C), temperature source Oral, resp. rate 19, height 5' 6\" (1.676 m), weight 220 lb (99.8 kg), SpO2 99 %. General:    Well nourished. Head:  Normocephalic, atraumatic  Eyes:  Sclerae appear normal.  Pupils equally round. ENT:  Nares appear normal, no drainage. Moist oral mucosa  Neck:  No restricted ROM. Trachea midline   CV:   RRR. No m/r/g. No jugular venous distension. Lungs:   CTAB. No wheezing, rhonchi, or rales. Symmetric expansion. Abdomen: Bowel sounds present. Soft, nontender, nondistended. Extremities: No cyanosis or clubbing. No edema  Skin:     No rashes and normal coloration. Warm and dry. Neuro:  CN II-XII grossly intact. Sensation intact. A&Ox3  Psych:  Normal mood and affect.       I have personally reviewed labs and tests showing:  Recent Labs:  Recent Results (from the past 48 hour(s))   CBC with Auto Differential    Collection Time: 08/16/22 11:27 AM   Result Value Ref Range    WBC 7.6 4.3 - 11.1 K/uL    RBC 4.51 4.05 - 5.2 M/uL    Hemoglobin 12.8 11.7 - 15.4 g/dL    Hematocrit 40.9 35.8 - 46.3 %    MCV 90.7 79.6 - 97.8 FL    MCH 28.4 26.1 - 32.9 PG    MCHC 31.3 (L) 31.4 - 35.0 g/dL    RDW 13.4 11.9 - 14.6 %    Platelets 809 659 - 453 K/uL    MPV 10.8 9.4 - 12.3 FL    nRBC 0.00 0.0 - 0.2 K/uL    Differential Type AUTOMATED      Seg Neutrophils 58 43 - 78 %    Lymphocytes 35 13 - 44 %    Monocytes 5 4.0 - 12.0 %    Eosinophils % 2 0.5 - 7.8 %    Basophils 0 0.0 - 2.0 %    Immature Granulocytes 0 0.0 - 5.0 %    Segs Absolute 4.4 1.7 - 8.2 K/UL    Absolute Lymph # 2.7 0.5 - 4.6 K/UL    Absolute Mono # 0.3 0.1 - 1.3 K/UL    Absolute Eos # 0.1 0.0 - 0.8 K/UL    Basophils Absolute 0.0 0.0 - 0.2 K/UL    Absolute Immature Granulocyte 0.0 0.0 - 0.5 K/UL   Comprehensive Metabolic Panel    Collection Time: 08/16/22 11:27 AM   Result Value Ref Range    Sodium 137 136 - 145 mmol/L    Potassium 3.9 3.5 - 5.1 mmol/L    Chloride 108 (H) 98 - 107 mmol/L    CO2 27 21 - 32 mmol/L    Anion Gap 2 (L) 7 - 16 mmol/L    Glucose 109 (H) 65 - 100 mg/dL    BUN 11 8 - 23 MG/DL    Creatinine 0.70 0.6 - 1.0 MG/DL    GFR African American >60 >60 ml/min/1.73m2    GFR Non- >60 >60 ml/min/1.73m2    Calcium 9.0 8.3 - 10.4 MG/DL    Total Bilirubin 0.6 0.2 - 1.1 MG/DL    ALT 30 12 - 65 U/L    AST 16 15 - 37 U/L    Alk Phosphatase 97 50 - 136 U/L    Total Protein 7.5 6.3 - 8.2 g/dL    Albumin 3.1 (L) 3.2 - 4.6 g/dL    Globulin 4.4 (H) 2.3 - 3.5 g/dL    Albumin/Globulin Ratio 0.7 (L) 1.2 - 3.5         I have personally reviewed imaging studies showing: Other Studies:  CT CHEST WO CONTRAST   Final Result   1. New right basilar pneumothorax. 2. New right pleural effusion with right basilar atelectasis. [facility]   The critical results contained in this report were communicated to Dr. Savanah Stout by   myself on 8/17/2022 at 8:55 AM. Critical results were communicated as outlined   in Section II.C.2.a.i of the ACR Practice Guideline for Communication of   Diagnostic Imaging Findings. XR CHEST PORTABLE   Final Result      1. Small right apical pneumothorax noted on the prior exam has decreased. Only   trace residual right apical pneumothorax is present on today's study. XR CHEST (2 VW)   Final Result   Lungs are clear. The heart size is normal in size. There is a small   right apical pneumothorax measuring approximately 1.3 cm from the apex of the   right hemithorax to the pleural surface. No evidence of left pneumothorax. No   evidence of mediastinal shift. No pleural effusions.             Current Meds:  Current Facility-Administered Medications   Medication Dose Route Frequency    sodium chloride flush 0.9 % injection 5-40 mL  5-40 mL IntraVENous 2 times per day    sodium chloride flush 0.9 % injection 5-40 mL  5-40 mL IntraVENous PRN    0.9 % sodium chloride infusion   IntraVENous PRN    enoxaparin (LOVENOX) injection 40 mg  40 mg SubCUTAneous Daily    ondansetron (ZOFRAN-ODT) disintegrating tablet 4 mg  4 mg Oral Q8H PRN    Or    ondansetron (ZOFRAN) injection 4 mg  4 mg IntraVENous Q6H PRN    polyethylene glycol (GLYCOLAX) packet 17 g  17 g Oral Daily PRN    acetaminophen (TYLENOL) tablet 650 mg  650 mg Oral Q6H PRN    Or    acetaminophen (TYLENOL) suppository 650 mg  650 mg Rectal Q6H PRN    atorvastatin (LIPITOR) tablet 40 mg  40 mg Oral Nightly    levalbuterol (XOPENEX) nebulizer solution 1.25 mg  1.25 mg Nebulization Q8H PRN    montelukast (SINGULAIR) tablet 10 mg  10 mg Oral Nightly    fluticasone (FLONASE) 50 MCG/ACT nasal spray 1 spray  1 spray Each Nostril Daily    cetirizine (ZYRTEC) tablet 10 mg  10 mg Oral Daily    pantoprazole (PROTONIX) tablet 40 mg  40 mg Oral BID AC    valsartan (DIOVAN) tablet 160 mg  160 mg Oral Daily    amLODIPine (NORVASC) tablet 10 mg  10 mg Oral Daily    dicyclomine (BENTYL) capsule 10 mg  10 mg Oral TID AC    oxyCODONE-acetaminophen (PERCOCET) 5-325 MG per tablet 1 tablet  1 tablet Oral Q4H PRN    morphine injection 2 mg  2 mg IntraVENous Q4H PRN    budesonide (PULMICORT) nebulizer suspension 500 mcg  0.5 mg Nebulization BID    Arformoterol Tartrate (BROVANA) nebulizer solution 15 mcg  15 mcg Nebulization BID    lidocaine 4 % external patch 1 patch  1 patch TransDERmal Daily    ibuprofen (ADVIL;MOTRIN) tablet 400 mg  400 mg Oral Q6H PRN       Signed:  Leydi Saleem MD    Part of this note may have been written by using a voice dictation software. The note has been proof read but may still contain some grammatical/other typographical errors.

## 2022-08-17 NOTE — PROGRESS NOTES
Daily Progress Note: 8/17/2022    [facility] Ontiveros [facility]  Admission Date: 8/16/2022     Length of Stay: 1 days      Background:  Patient is a 74 y.o.  female seen and evaluated at the request of Dr. Jasper Jacobs in ED For small apical pneumothorax. Patient has a PMH of Cholecystitis s/p laparoscopic cholecystectomy on 8/12, asthma, environmental allergies, REANNA, HTN, HLD, diverticulitis. Patient reports that she started having sharp pain associated with breathing on right chest after becoming alert after anesthesia on 8/12. She has been taking the prescribed Norco for the pain at home and trying to use her albuterol inhaler for relief but nothing has been successful. She decided to come to the ER today when nothing would relieve the pain. She has a small umbilical incision from cholecystectomy with no signs on infection and reports no pain from the incision. She has a history of severe asthma and allergies for which she is on Xolair injections (due tomorrow), qvar inhaler PRN, albuterol inhaler PRN, flonase, certizine, and has epipen if needed. Patient wears CPAP at home for REANNA but does not wear O2 at home. She is a known patient to our practice in the sleep clinic. Patient is a former smoker 0.25 pack per day x 10 years. Is fully vaccinated for COVID-19, influenza, and PNA. Also has known lung nodules that have been stable. Subjective:     Resting with minimal pain in right chest today. CT chest completed this am. On 4 lpm but increased to 6 lpm for nitrogen washout.      Review of Systems  Constitutional: negative for fever, chills, sweats, +dry cough  Cardiovascular: + right sided pleuritic pain; negative for palpitations, syncope, edema  Gastrointestinal:  negative for dysphagia, reflux, vomiting, diarrhea, abdominal pain, or melena  Neurologic:  negative for focal weakness, numbness, headache    Current Facility-Administered Medications   Medication Dose Route Frequency    sodium chloride flush 0.9 % injection 5-40 mL  5-40 mL IntraVENous 2 times per day    sodium chloride flush 0.9 % injection 5-40 mL  5-40 mL IntraVENous PRN    0.9 % sodium chloride infusion   IntraVENous PRN    enoxaparin (LOVENOX) injection 40 mg  40 mg SubCUTAneous Daily    ondansetron (ZOFRAN-ODT) disintegrating tablet 4 mg  4 mg Oral Q8H PRN    Or    ondansetron (ZOFRAN) injection 4 mg  4 mg IntraVENous Q6H PRN    polyethylene glycol (GLYCOLAX) packet 17 g  17 g Oral Daily PRN    acetaminophen (TYLENOL) tablet 650 mg  650 mg Oral Q6H PRN    Or    acetaminophen (TYLENOL) suppository 650 mg  650 mg Rectal Q6H PRN    atorvastatin (LIPITOR) tablet 40 mg  40 mg Oral Nightly    levalbuterol (XOPENEX) nebulizer solution 1.25 mg  1.25 mg Nebulization Q8H PRN    montelukast (SINGULAIR) tablet 10 mg  10 mg Oral Nightly    fluticasone (FLONASE) 50 MCG/ACT nasal spray 1 spray  1 spray Each Nostril Daily    cetirizine (ZYRTEC) tablet 10 mg  10 mg Oral Daily    pantoprazole (PROTONIX) tablet 40 mg  40 mg Oral BID AC    valsartan (DIOVAN) tablet 160 mg  160 mg Oral Daily    amLODIPine (NORVASC) tablet 10 mg  10 mg Oral Daily    dicyclomine (BENTYL) capsule 10 mg  10 mg Oral TID AC    oxyCODONE-acetaminophen (PERCOCET) 5-325 MG per tablet 1 tablet  1 tablet Oral Q4H PRN    morphine injection 2 mg  2 mg IntraVENous Q4H PRN    budesonide (PULMICORT) nebulizer suspension 500 mcg  0.5 mg Nebulization BID    Arformoterol Tartrate (BROVANA) nebulizer solution 15 mcg  15 mcg Nebulization BID    lidocaine 4 % external patch 1 patch  1 patch TransDERmal Daily    ibuprofen (ADVIL;MOTRIN) tablet 400 mg  400 mg Oral Q6H PRN     Current Outpatient Medications   Medication Sig    oxyCODONE-acetaminophen (PERCOCET) 5-325 MG per tablet Take 1 tablet by mouth every 6 hours as needed for Pain for up to 5 days. Intended supply: 3 days.  Take lowest dose possible to manage pain    Beclomethasone Dipropionate (QVAR IN) Inhale into the lungs 2 times daily as needed amLODIPine (NORVASC) 10 MG tablet Take 1 tablet by mouth daily    dicyclomine (BENTYL) 20 MG tablet Take 1 tablet by mouth every 6 hours (Patient taking differently: Take 20 mg by mouth in the morning and 20 mg at noon and 20 mg before bedtime.)    pantoprazole (PROTONIX) 40 MG tablet Take 1 tablet by mouth 2 times daily    valsartan (DIOVAN) 160 MG tablet Take 1 tablet by mouth daily    albuterol sulfate  (90 Base) MCG/ACT inhaler Inhale 2 puffs into the lungs every 4 hours as needed    Cetirizine HCl 10 MG CAPS Take 1 tablet by mouth daily    EPINEPHrine (EPIPEN) 0.3 MG/0.3ML SOAJ injection Inject 0.3 mg into the muscle once as needed    fluticasone (FLONASE) 50 MCG/ACT nasal spray 2 sprays by Nasal route daily     Objective:     Vitals:    08/16/22 1615 08/16/22 2254 08/16/22 2317 08/17/22 0830   BP: (!) 158/78 (!) 146/71  120/63   Pulse: 81 68 72 74   Resp:  20 16 16   Temp:    98 °F (36.7 °C)   TempSrc:       SpO2: 100%  100% 95%   Weight:       Height:         Intake/Output:    Physical Exam:          GEN: well developed and in no acute distress  HEENT:no alar flaring or epistaxis, oral mucosa moist without cyanosis,   NECK:  no JVD, no retractions, no thyromegaly or masses,   LUNGS:  clear but diminished on right posterior, on 6 lpm for nitrogen washout  HEART:  RRR with no M,G,R; SR rate 84  ABDOMEN:  soft with no tenderness; positive bowel sounds present  EXTREMITIES:  warm with no cyanosis, no lower leg edema  SKIN:  no jaundice or ecchymosis   NEURO: no gross deficits; alert and oriented x3    ACTIVITY: limited  NUTRITION: regular    IMAGES:  CXR   8/17 8/16        1. Small right apical pneumothorax noted on the prior exam has decreased. Only   trace residual right apical pneumothorax is present on today's study.      CT CHEST 8/17       LAB  Recent Labs     08/16/22  1127   WBC 7.6   HGB 12.8   HCT 40.9        Recent Labs     08/16/22  1127      K 3.9   *   CO2 27   BUN 11   CREATININE 0.70   BILITOT 0.6   AST 16   ALT 30   ALKPHOS 97     No results for input(s): TROPHS, NTPROBNP, CRP, ESR in the last 72 hours. Recent Labs     08/16/22  1127   GLUCOSE 109*     No results for input(s): LCAD in the last 72 hours. Invalid input(s): LAC  ABG:   No results for input(s): PH, PCO2, PO2, HCO3 in the last 72 hours. Invalid input(s): PHI, PCO2I, PO2I, HCO3I, [place]ip[place] 71  Microbiology:   No results for input(s): CULTURE in the last 72 hours. ECHO: No results found for this or any previous visit. Assessment/Plan:       Pneumothorax on right  No chest tube for now; too small. Risk of chest tube or decompression outweighs benefits  Also Right effusion, small. Will US to evaluate amount and if need for thoracentesis. Continue Oxygen 6L for Nitrogen washout. Pain better controlled today. Has lidocaine patch on right chest, continue tylenol and ibuprofen as needed      S/P laparoscopic cholecystectomy  Umbilical incision stable, CDI, no signs of infection      Class 2 severe obesity due to excess calories with serious comorbidity and body mass index (BMI) of 35.0 to 35.9 in adult [facility])    Obstructive sleep apnea  Home CPAP, holding for now d/t PTX      Allergic rhinitis due to animal hair and dander    Allergic rhinitis due to pollen    Moderate persistent asthma without complication  Has home inhalers, on xolair (planned for today)    Full Code    In this split/shared evaluation I performed performed a medically appropriate history and exam, counseled and educated the patient and/or family member, documented information in EMR, and coordinated care. which accounted for 8 clinical time. YUNIEL Victoria - NP     In this split/shared evaluation I performed reviewed the patients's H&P, available images, labs, cultures. , discussed case in detail with NPP, performed a medically appropriate history and exam, counseled and educated the patient and/or family member, ordered and/or reviewed medications, tests or procedures, documented information in EMR, independently interpreted images, and coordinated care. which accounted for 15 minutes clinical time. Impression: Mrs. Veronika Campos has significant asthma on xolair who had a robotic/lap viki yesterday and presents with worsening dyspnea, pleuritic R chest wall pain and R pneumothorax today. There has been no excessive coughing, wheezing or other obvious etiology beyond GETA and the procedure. Active Problems:    Pneumothorax, right  Plan: Tiny and improved. I ultrasounded her at bedside and there was only a very small/trivial effusion seen with some local atelectasis. Not enough effusion/and/or PTX to warrant drainage. We took her off O2 and sats are still 100%. She does not require ongoing admission/observation for pneumothorax. Would recommend close follow up with CXR in 1-2 weeks. If any worsening effusion we could see her in the pulmonary clinic. S/P laparoscopic cholecystectomy  Plan: sore, but improved    Moderate persistent asthma without complication  Plan: no wheezing, on home inhalers and Xolair. No evidence for exacerbation. Obstructive sleep apnea  Plan: Okay to use CPAP tonight. Does not require ongoing Nitrogen O2 washout.      Cathy Peralta MD

## 2022-08-17 NOTE — PLAN OF CARE
Problem: Discharge Planning  Goal: Discharge to home or other facility with appropriate resources  Outcome: Progressing  Flowsheets (Taken 8/17/2022 3905)  Discharge to home or other facility with appropriate resources:   Refer to discharge planning if patient needs post-hospital services based on physician order or complex needs related to functional status, cognitive ability or social support system   Identify barriers to discharge with patient and caregiver     Problem: Safety - Adult  Goal: Free from fall injury  Outcome: Progressing     Problem: ABCDS Injury Assessment  Goal: Absence of physical injury  Outcome: Progressing

## 2022-08-17 NOTE — ED NOTES
TRANSFER - OUT REPORT:    Verbal report given to STEFFANIE Abbott on Obed Bravo  being transferred to  for routine progression of patient care       Report consisted of patient's Situation, Background, Assessment and   Recommendations(SBAR). Information from the following report(s) Nurse Handoff Report, Index, ED SBAR, MAR and Recent Results was reviewed with the receiving nurse. Lines:   Peripheral IV 08/16/22 Left Antecubital (Active)   Site Assessment Clean, dry & intact 08/17/22 0845   Line Status Capped 08/17/22 0845   Line Care Cap changed 08/17/22 0845   Phlebitis Assessment No symptoms 08/17/22 0845   Infiltration Assessment 0 08/17/22 0845   Alcohol Cap Used Yes 08/17/22 0845   Dressing Status Clean, dry & intact 08/17/22 0845   Dressing Type Transparent 08/17/22 0845        Opportunity for questions and clarification was provided.       Patient transported with:  Travis Arthur RN  08/17/22 5513

## 2022-08-17 NOTE — PROGRESS NOTES
Pt sitting up in chair. No s/sx of distress noted. Call light within reach. Will continue to monitor.

## 2022-08-17 NOTE — PROGRESS NOTES
TRANSFER - IN REPORT:    Verbal report received from vincent(name) on Sue Foreman  being received from er(unit) for routine progression of patient care      Report consisted of patients Situation, Background, Assessment and   Recommendations(SBAR). Information from the following report(s) ED SBAR was reviewed with the receiving nurse. Opportunity for questions and clarification was provided.       Assessment completed upon patients arrival to unit and care assume      Awaiting arrival, report to adrian kay

## 2022-08-17 NOTE — PROGRESS NOTES
Physician Progress Note      Ashish Kruse  CSN #:                  [id number]  :                       1955  ADMIT DATE:       2022 2:02 PM  [address] [place] DATE:  RESPONDING  PROVIDER #:        Edward FARFAN DO          QUERY TEXT:    Pt admitted with pneumothorax. Pt noted to have required supplemental oxtgen. If possible, please document in the progress notes and discharge summary if   you are evaluating and/or treating any of the following: The medical record reflects the following:  Risk Factors: pneumothorax  Clinical Indicators:  presents with worsening dyspnea, pleuritic R chest wall pain and R   pneumothorax today  Resting with minimal pain in right chest today. CT chest completed this am. On   4 lpm but increased to 6 lpm for nitrogen washout. Treatment: supplemental O2, pulm consult  Options provided:  -- Acute respiratory failure with hypoxia  -- Acute respiratory failure with hypercapnia  -- Acute respiratory failure with hypoxia and hypercapnia  -- Other - I will add my own diagnosis  -- Disagree - Not applicable / Not valid  -- Disagree - Clinically unable to determine / Unknown  -- Refer to Clinical Documentation Reviewer    PROVIDER RESPONSE TEXT:    This patient is in acute respiratory failure with hypoxia.     Query created by: Lars العراقي on 2022 11:48 AM      Electronically signed by:  Bridget Johnson DO 2022 11:53 AM

## 2022-08-18 VITALS
BODY MASS INDEX: 35.29 KG/M2 | DIASTOLIC BLOOD PRESSURE: 60 MMHG | SYSTOLIC BLOOD PRESSURE: 120 MMHG | HEART RATE: 82 BPM | OXYGEN SATURATION: 96 % | TEMPERATURE: 98.6 F | HEIGHT: 66 IN | WEIGHT: 219.6 LBS | RESPIRATION RATE: 18 BRPM

## 2022-08-18 PROBLEM — J90 PLEURAL EFFUSION: Status: ACTIVE | Noted: 2022-08-18

## 2022-08-18 LAB
ANION GAP SERPL CALC-SCNC: ABNORMAL MMOL/L (ref 7–16)
BUN SERPL-MCNC: 15 MG/DL (ref 8–23)
CALCIUM SERPL-MCNC: 8.8 MG/DL (ref 8.3–10.4)
CHLORIDE SERPL-SCNC: 111 MMOL/L (ref 98–107)
CO2 SERPL-SCNC: 28 MMOL/L (ref 21–32)
CREAT SERPL-MCNC: 0.6 MG/DL (ref 0.6–1)
ERYTHROCYTE [DISTWIDTH] IN BLOOD BY AUTOMATED COUNT: 13.2 % (ref 11.9–14.6)
GLUCOSE SERPL-MCNC: 107 MG/DL (ref 65–100)
HCT VFR BLD AUTO: 36.3 % (ref 35.8–46.3)
HGB BLD-MCNC: 11.3 G/DL (ref 11.7–15.4)
MCH RBC QN AUTO: 28 PG (ref 26.1–32.9)
MCHC RBC AUTO-ENTMCNC: 31.1 G/DL (ref 31.4–35)
MCV RBC AUTO: 90.1 FL (ref 79.6–97.8)
NRBC # BLD: 0 K/UL (ref 0–0.2)
PLATELET # BLD AUTO: 265 K/UL (ref 150–450)
PMV BLD AUTO: 11 FL (ref 9.4–12.3)
POTASSIUM SERPL-SCNC: 3.5 MMOL/L (ref 3.5–5.1)
RBC # BLD AUTO: 4.03 M/UL (ref 4.05–5.2)
SODIUM SERPL-SCNC: 135 MMOL/L (ref 136–145)
WBC # BLD AUTO: 7.1 K/UL (ref 4.3–11.1)

## 2022-08-18 PROCEDURE — 80048 BASIC METABOLIC PNL TOTAL CA: CPT

## 2022-08-18 PROCEDURE — 6360000002 HC RX W HCPCS: Performed by: FAMILY MEDICINE

## 2022-08-18 PROCEDURE — 2580000003 HC RX 258: Performed by: FAMILY MEDICINE

## 2022-08-18 PROCEDURE — 85027 COMPLETE CBC AUTOMATED: CPT

## 2022-08-18 PROCEDURE — 36415 COLL VENOUS BLD VENIPUNCTURE: CPT

## 2022-08-18 PROCEDURE — 6370000000 HC RX 637 (ALT 250 FOR IP): Performed by: STUDENT IN AN ORGANIZED HEALTH CARE EDUCATION/TRAINING PROGRAM

## 2022-08-18 PROCEDURE — 94640 AIRWAY INHALATION TREATMENT: CPT

## 2022-08-18 PROCEDURE — 94760 N-INVAS EAR/PLS OXIMETRY 1: CPT

## 2022-08-18 PROCEDURE — 99232 SBSQ HOSP IP/OBS MODERATE 35: CPT | Performed by: INTERNAL MEDICINE

## 2022-08-18 PROCEDURE — 6370000000 HC RX 637 (ALT 250 FOR IP): Performed by: FAMILY MEDICINE

## 2022-08-18 RX ORDER — ATORVASTATIN CALCIUM 40 MG/1
40 TABLET, FILM COATED ORAL NIGHTLY
Qty: 30 TABLET | Refills: 3 | Status: SHIPPED | OUTPATIENT
Start: 2022-08-18

## 2022-08-18 RX ORDER — MONTELUKAST SODIUM 10 MG/1
10 TABLET ORAL NIGHTLY
Qty: 30 TABLET | Refills: 3 | Status: SHIPPED | OUTPATIENT
Start: 2022-08-18

## 2022-08-18 RX ADMIN — DICYCLOMINE HYDROCHLORIDE 10 MG: 10 CAPSULE ORAL at 11:15

## 2022-08-18 RX ADMIN — VALSARTAN 160 MG: 160 TABLET, FILM COATED ORAL at 08:55

## 2022-08-18 RX ADMIN — DICYCLOMINE HYDROCHLORIDE 10 MG: 10 CAPSULE ORAL at 15:00

## 2022-08-18 RX ADMIN — PANTOPRAZOLE SODIUM 40 MG: 40 TABLET, DELAYED RELEASE ORAL at 05:28

## 2022-08-18 RX ADMIN — PANTOPRAZOLE SODIUM 40 MG: 40 TABLET, DELAYED RELEASE ORAL at 15:01

## 2022-08-18 RX ADMIN — CETIRIZINE HYDROCHLORIDE 10 MG: 10 TABLET ORAL at 08:55

## 2022-08-18 RX ADMIN — SODIUM CHLORIDE, PRESERVATIVE FREE 10 ML: 5 INJECTION INTRAVENOUS at 08:56

## 2022-08-18 RX ADMIN — OXYCODONE AND ACETAMINOPHEN 1 TABLET: 5; 325 TABLET ORAL at 14:57

## 2022-08-18 RX ADMIN — DICYCLOMINE HYDROCHLORIDE 10 MG: 10 CAPSULE ORAL at 05:27

## 2022-08-18 RX ADMIN — LEVALBUTEROL HYDROCHLORIDE 1.25 MG: 1.25 SOLUTION RESPIRATORY (INHALATION) at 05:14

## 2022-08-18 RX ADMIN — FLUTICASONE PROPIONATE 1 SPRAY: 50 SPRAY, METERED NASAL at 08:56

## 2022-08-18 RX ADMIN — BUDESONIDE 500 MCG: 0.5 INHALANT RESPIRATORY (INHALATION) at 08:30

## 2022-08-18 RX ADMIN — ARFORMOTEROL TARTRATE 15 MCG: 15 SOLUTION RESPIRATORY (INHALATION) at 08:30

## 2022-08-18 RX ADMIN — IBUPROFEN 400 MG: 400 TABLET, FILM COATED ORAL at 05:27

## 2022-08-18 RX ADMIN — AMLODIPINE BESYLATE 10 MG: 10 TABLET ORAL at 08:55

## 2022-08-18 ASSESSMENT — PAIN SCALES - GENERAL
PAINLEVEL_OUTOF10: 0
PAINLEVEL_OUTOF10: 5
PAINLEVEL_OUTOF10: 5
PAINLEVEL_OUTOF10: 0

## 2022-08-18 ASSESSMENT — PAIN DESCRIPTION - ORIENTATION
ORIENTATION: RIGHT
ORIENTATION: RIGHT

## 2022-08-18 ASSESSMENT — PAIN DESCRIPTION - DESCRIPTORS
DESCRIPTORS: DISCOMFORT
DESCRIPTORS: SHARP

## 2022-08-18 ASSESSMENT — PAIN DESCRIPTION - LOCATION
LOCATION: CHEST
LOCATION: CHEST

## 2022-08-18 NOTE — DISCHARGE SUMMARY
Hospitalist Discharge Summary   Admit Date:  2022  2:02 PM   DC Note date: 2022  Name:  Aure Humphrey   Age:  74 y.o. Sex:  female  :  1955   MRN:  [id number]   Room:  [facility]  PCP:  Alison Muñiz MD    Presenting Complaint: Post-op Problem     Initial Admission Diagnosis: Pneumothorax on right [J93.9]  Postprocedural pneumothorax [J95.811]     Problem List for this Hospitalization (present on admission): Active Problems:    Pneumothorax, right    S/P laparoscopic cholecystectomy    Class 2 severe obesity due to excess calories with serious comorbidity and body mass index (BMI) of 35.0 to 35.9 in [facility])    Allergic rhinitis due to animal hair and dander    Allergic rhinitis due to pollen    Mixed hyperlipidemia    Postprocedural pneumothorax    Pleural effusion    Essential hypertension    Moderate persistent asthma without complication    Obstructive sleep apnea  Resolved Problems:    * No resolved hospital problems. [facility] Course:  Please refer to the admission H&P for details of presentation. In summary, Aure Humphrey is a 74 y.o. female with past medical history significant for hypertension, obesity, moderate persistent asthma, REANNA, allergic rhinitis presented with right-sided pain with inspiration. Patient recently had lap CYY and has had this pain since the day of surgery. Patient was hypertensive and nonhypoxic on room air in the emergency department. Chest x-ray showed small right apical pneumothorax measuring approximately 1.3 cm from the apex of the right hemithorax to the pleural surface. Pulmonology consulted recommended high flow nasal cannula 6 L for CO2 washout. Showed very minimal effusion. Patient saturating well on room air after CO2 washout. Patient will need to follow up with Pulmonary in 1-2weeks with repeat CXR. Patient is medically stable for discharge.  Patient is to continue taking medications as prescribed and to follow up with PCP on discharge. Patient is instructed to to call a physician or return to ED if any concerns/symptoms worsened. Discharge summary and encounter summary was sent to PCP electronically via \"Comm Mgt\" link in [facility], if possible. Disposition: Home  Diet: ADULT DIET; Regular; Low Fat/Low Chol/High Fiber/ARIEL  Code Status: Full Code    Follow Ups:   Follow-up Information     Joey Carrion MD Follow up on 8/23/2022. Specialty: Family Medicine  Why: [id number] [facility] information:  [address] [place]  [phone]             [facility] Follow up on 9/7/2022. Why: 11:50 for xray appointment to follow with Daria Schmitt  Contact information:  Giuseppe Monreal [address]  [phone]                   Time spent in patient discharge and coordination 35 minutes. Plan was discussed with patient. All questions answered. Patient was stable at time of discharge. Instructions given to call a physician or return if any concerns.     Current Discharge Medication List        START taking these medications    Details   montelukast (SINGULAIR) 10 MG tablet Take 1 tablet by mouth nightly  Qty: 30 tablet, Refills: 3      atorvastatin (LIPITOR) 40 MG tablet Take 1 tablet by mouth at bedtime  Qty: 30 tablet, Refills: 3           CONTINUE these medications which have NOT CHANGED    Details   Beclomethasone Dipropionate (QVAR IN) Inhale into the lungs 2 times daily as needed      amLODIPine (NORVASC) 10 MG tablet Take 1 tablet by mouth daily  Qty: 30 tablet, Refills: 5    Associated Diagnoses: Essential hypertension      dicyclomine (BENTYL) 20 MG tablet Take 1 tablet by mouth every 6 hours  Qty: 120 tablet, Refills: 5      pantoprazole (PROTONIX) 40 MG tablet Take 1 tablet by mouth 2 times daily  Qty: 30 tablet, Refills: 5      valsartan (DIOVAN) 160 MG tablet Take 1 tablet by mouth daily  Qty: 30 tablet, Refills: 5    Associated Diagnoses: Essential hypertension      albuterol sulfate  (90 Base) MCG/ACT inhaler Inhale 2 puffs into the lungs every 4 hours as needed      Cetirizine HCl 10 MG CAPS Take 1 tablet by mouth daily      EPINEPHrine (EPIPEN) 0.3 MG/0.3ML SOAJ injection Inject 0.3 mg into the muscle once as needed      fluticasone (FLONASE) 50 MCG/ACT nasal spray 2 sprays by Nasal route daily           STOP taking these medications       oxyCODONE-acetaminophen (PERCOCET) 5-325 MG per tablet Comments:   Reason for Stopping:               Procedures done this admission:  * No surgery found *    Consults this admission:  None    Echocardiogram results:  No results found for this or any previous visit. Diagnostic Imaging/Tests:   XR CHEST (2 VW)    Result Date: 8/16/2022  Lungs are clear. The heart size is normal in size. There is a small right apical pneumothorax measuring approximately 1.3 cm from the apex of the right hemithorax to the pleural surface. No evidence of left pneumothorax. No evidence of mediastinal shift. No pleural effusions. CT CHEST WO CONTRAST    Addendum Date: 8/18/2022    Addendum: Addendum: Comparison imaging from 2014 was located (charts need to be merged). The small pulmonary nodules measuring up to 5 mm in the left lower lobe are stable and benign. Result Date: 8/18/2022  1. Small pulmonary nodules measuring up to 5 mm in the left lower lobe. No comparison imaging available to document stability. If any imaging becomes available, an addendum can be issued discussing stability. If no comparison imaging is available, consider a follow-up CT chest in 12 months if the patient is considered high risk per Fleischner Society guidelines. 2. Atherosclerosis including coronary artery involvement. CT CHEST WO CONTRAST    Result Date: 8/17/2022  1. New right basilar pneumothorax. 2. New right pleural effusion with right basilar atelectasis.  [facility] The critical results contained in this report were communicated to Dr. Kat Frye by myself on 8/17/2022 at 8:55 AM. Critical results were communicated as outlined in Section II.C.2.a.i of the ACR Practice Guideline for Communication of Diagnostic Imaging Findings. XR CHEST PORTABLE    Result Date: 8/17/2022  1. Small right apical pneumothorax noted on the prior exam has decreased. Only trace residual right apical pneumothorax is present on today's study. No results for input(s): CULTURE in the last 720 hours.     Labs: Results:       BMP, Mg, Phos Recent Labs     08/16/22 1127 08/17/22  1603 08/18/22  0414    138 135*   K 3.9 3.8 3.5   * 107 111*   CO2 27 25 28   ANIONGAP 2* 6* Cannot be calculated   BUN 11 12 15   CREATININE 0.70 0.70 0.60   LABGLOM >60 >60 >60   GFRAA >60 >60 >60   CALCIUM 9.0 9.0 8.8   GLUCOSE 109* 116* 107*   MG  --  2.4  --       CBC Recent Labs     08/16/22 1127 08/17/22  1603 08/18/22  0414   WBC 7.6 6.7 7.1   RBC 4.51 4.44 4.03*   HGB 12.8 12.6 11.3*   HCT 40.9 40.0 36.3   MCV 90.7 90.1 90.1   MCH 28.4 28.4 28.0   MCHC 31.3* 31.5 31.1*   RDW 13.4 13.2 13.2    263 265   MPV 10.8 10.9 11.0   NRBC 0.00 0.00 0.00   SEGS 58  --   --    LYMPHOPCT 35  --   --    EOSRELPCT 2  --   --    MONOPCT 5  --   --    BASOPCT 0  --   --    IMMGRAN 0  --   --    SEGSABS 4.4  --   --    LYMPHSABS 2.7  --   --    EOSABS 0.1  --   --    MONOSABS 0.3  --   --    BASOSABS 0.0  --   --    ABSIMMGRAN 0.0  --   --       LFT Recent Labs     08/16/22  1127   BILITOT 0.6   ALKPHOS 97   AST 16   ALT 30   PROT 7.5   LABALBU 3.1*   GLOB 4.4*      Cardiac  No results found for: NTPROBNP, TROPHS   Coags No results found for: PROTIME, INR, APTT   A1c Lab Results   Component Value Date/Time    LABA1C 6.1 07/08/2022 11:14 AM    LABA1C 6.5 01/04/2022 10:47 AM    LABA1C 6.4 07/15/2021 11:00 AM     07/08/2022 11:14 AM     01/04/2022 10:47 AM     07/15/2021 11:00 AM      Lipids Lab Results   Component Value Date/Time    CHOL 217 07/08/2022 11:14 AM    LDLCALC 136.8 07/08/2022 11:14 AM    LABVLDL 13.2 07/08/2022 11:14 AM    HDL 67 07/08/2022 11:14 AM    CHOLHDLRATIO 3.2 07/08/2022 11:14 AM    TRIG 66 07/08/2022 11:14 AM      Thyroid  No results found for: Bernell Meckel     Most Recent UA Lab Results   Component Value Date/Time    WBCUA 0-3 03/02/2022 01:17 PM    RBCUA 5-10 03/02/2022 01:17 PM    BACTERIA 0 03/02/2022 01:17 PM    MUCUS 0 03/02/2022 01:17 PM          All Labs from Last 24 Hrs:  Recent Results (from the past 24 hour(s))   CBC    Collection Time: 08/17/22  4:03 PM   Result Value Ref Range    WBC 6.7 4.3 - 11.1 K/uL    RBC 4.44 4.05 - 5.2 M/uL    Hemoglobin 12.6 11.7 - 15.4 g/dL    Hematocrit 40.0 35.8 - 46.3 %    MCV 90.1 79.6 - 97.8 FL    MCH 28.4 26.1 - 32.9 PG    MCHC 31.5 31.4 - 35.0 g/dL    RDW 13.2 11.9 - 14.6 %    Platelets 964 826 - 900 K/uL    MPV 10.9 9.4 - 12.3 FL    nRBC 0.00 0.0 - 0.2 K/uL   Basic Metabolic Panel    Collection Time: 08/17/22  4:03 PM   Result Value Ref Range    Sodium 138 136 - 145 mmol/L    Potassium 3.8 3.5 - 5.1 mmol/L    Chloride 107 98 - 107 mmol/L    CO2 25 21 - 32 mmol/L    Anion Gap 6 (L) 7 - 16 mmol/L    Glucose 116 (H) 65 - 100 mg/dL    BUN 12 8 - 23 MG/DL    Creatinine 0.70 0.6 - 1.0 MG/DL    GFR African American >60 >60 ml/min/1.73m2    GFR Non- >60 >60 ml/min/1.73m2    Calcium 9.0 8.3 - 10.4 MG/DL   Magnesium    Collection Time: 08/17/22  4:03 PM   Result Value Ref Range    Magnesium 2.4 1.8 - 2.4 mg/dL   CBC    Collection Time: 08/18/22  4:14 AM   Result Value Ref Range    WBC 7.1 4.3 - 11.1 K/uL    RBC 4.03 (L) 4.05 - 5.2 M/uL    Hemoglobin 11.3 (L) 11.7 - 15.4 g/dL    Hematocrit 36.3 35.8 - 46.3 %    MCV 90.1 79.6 - 97.8 FL    MCH 28.0 26.1 - 32.9 PG    MCHC 31.1 (L) 31.4 - 35.0 g/dL    RDW 13.2 11.9 - 14.6 %    Platelets 057 468 - 625 K/uL    MPV 11.0 9.4 - 12.3 FL    nRBC 0.00 0.0 - 0.2 K/uL   Basic Metabolic Panel    Collection Time: 08/18/22  4:14 AM   Result Value Ref Range    Sodium 135 (L) 136 - 145 mmol/L    Potassium 3.5 3.5 - 5.1 mmol/L    Chloride 111 (H) 98 - 107 mmol/L    CO2 28 21 - 32 mmol/L    Anion Gap Cannot be calculated 7 - 16 mmol/L    Glucose 107 (H) 65 - 100 mg/dL    BUN 15 8 - 23 MG/DL    Creatinine 0.60 0.6 - 1.0 MG/DL    GFR African American >60 >60 ml/min/1.73m2    GFR Non- >60 >60 ml/min/1.73m2    Calcium 8.8 8.3 - 10.4 MG/DL       Allergies   Allergen Reactions    Latex Other (See Comments)     wheezing    Other Shortness Of Breath     PER PT-- ALL COLOGNE WILL START AN ASTHMA ATTACK    Sulfa Antibiotics Nausea Only     Immunization History   Administered Date(s) Administered    COVID-19, MODERNA BLUE border, Primary or Immunocompromised, (age 12y+), IM, 100 mcg/0.5mL 03/23/2021, 04/18/2021    COVID-19, PFIZER PURPLE top, DILUTE for use, (age 15 y+), 30mcg/0.3mL 03/23/2021, 04/19/2021    Influenza Trivalent 10/12/2016, 10/01/2019, 10/21/2019, 01/11/2022    Influenza Virus Vaccine 10/01/2011    Influenza, FLUCELVAX, (age 10 mo+), MDCK, PF 10/23/2019, 01/04/2022    Pneumococcal Conjugate 13-valent ([id number]) 05/18/2016    Pneumococcal Polysaccharide ([id number]) 10/20/2016    Pneumococcal Vaccine 11/01/2008    Tdap (Boostrix, Adacel) 08/19/2021       Recent Vital Data:  Patient Vitals for the past 24 hrs:   Temp Pulse Resp BP SpO2   08/18/22 1122 97.2 °F (36.2 °C) 74 19 119/78 97 %   08/18/22 0830 -- 70 17 -- 98 %   08/18/22 0714 97.8 °F (36.6 °C) 72 18 122/77 100 %   08/18/22 0515 -- -- -- -- 97 %   08/18/22 0315 97.9 °F (36.6 °C) 65 16 112/60 99 %   08/17/22 2247 98.1 °F (36.7 °C) 84 16 139/65 99 %   08/17/22 2152 -- -- 18 -- --   08/17/22 2009 -- 85 16 -- 97 %   08/17/22 1938 97.9 °F (36.6 °C) 90 18 132/75 100 %   08/17/22 1434 98.1 °F (36.7 °C) 79 19 (!) 141/69 99 %       Oxygen Therapy  SpO2: 97 %  Pulse via Oximetry: 79 beats per minute  Pulse Oximeter Device Mode: Intermittent  Pulse Oximeter Device Location: Finger  O2 Device: None (Room air)  Skin Assessment: Clean, dry, & intact  Skin Protection for O2 Device: N/A  FiO2 : 21 %  O2 Flow Rate (L/min): 5 L/min    Estimated body mass index is 35.44 kg/m² as calculated from the following:    Height as of this encounter: 5' 6\" (1.676 m). Weight as of this encounter: 219 lb 9.6 oz (99.6 kg). Intake/Output Summary (Last 24 hours) at 8/18/2022 1431  Last data filed at 8/18/2022 0714  Gross per 24 hour   Intake --   Output 650 ml   Net -650 ml         Physical Exam:    General:    Well nourished. No overt distress  Head:  Normocephalic, atraumatic  Eyes:  Sclerae appear normal.  Pupils equally round. HENT:  Nares appear normal, no drainage. Moist mucous membranes  Neck:  No restricted ROM. Trachea midline  CV:   RRR. No m/r/g. No JVD  Lungs:   CTAB. No wheezing. Respirations even, unlabored  Abdomen:   Soft, nontender, nondistended. Extremities: Warm and dry. No cyanosis or clubbing. No edema. Skin:     No rashes. Normal coloration  Neuro:  CN II-XII grossly intact. Psych:  Normal mood and affect. Signed:  Jaswinder Dye MD    Part of this note may have been written by using a voice dictation software. The note has been proof read but may still contain some grammatical/other typographical errors.

## 2022-08-18 NOTE — FLOWSHEET NOTE
08/17/22 2152   Vital Signs   Resp 18   Pain Assessment   Pain Assessment 0-10   Pain Level 5   Patient's Stated Pain Goal 0 - No pain   Pain Location Chest   Pain Orientation Right   Pain Descriptors Aching;Discomfort   Opioid-Induced Sedation   POSS Score 1     Percocet 5mg PO given

## 2022-08-18 NOTE — PROGRESS NOTES
Discharge instructions and paperwork reviewed with patient. Meds reviewed. Pt voiced understanding. Prescriptions sent to patient's regular pharmacy. IV removed. Paperwork signed and copy placed in chart. Pt to be discharged when ride arrives.

## 2022-08-18 NOTE — PROGRESS NOTES
Daily Progress Note: 8/18/2022    Juliette Garcia [facility]  Admission Date: 8/16/2022     Length of Stay: 2 days      Background:  Patient is a 74 y.o.  female seen and evaluated at the request of Dr. Randa Cortez in ED For small apical pneumothorax. Patient has a PMH of Cholecystitis s/p laparoscopic cholecystectomy on 8/12, asthma, environmental allergies, REANNA, HTN, HLD, diverticulitis. Patient reports that she started having sharp pain associated with breathing on right chest after becoming alert after anesthesia on 8/12. She has been taking the prescribed Norco for the pain at home and trying to use her albuterol inhaler for relief but nothing has been successful. She decided to come to the ER today when nothing would relieve the pain. She has a small umbilical incision from cholecystectomy with no signs on infection and reports no pain from the incision. She has a history of severe asthma and allergies for which she is on Xolair injections (due tomorrow), qvar inhaler PRN, albuterol inhaler PRN, flonase, certizine, and has epipen if needed. Patient wears CPAP at home for REANNA but does not wear O2 at home. She is a known patient to our practice in the sleep clinic. Patient is a former smoker 0.25 pack per day x 10 years. Is fully vaccinated for COVID-19, influenza, and PNA. Also has known lung nodules that have been stable. Subjective:     Resting with minimal pain. On RA. Is hopeful to go home today.      Review of Systems  Constitutional: negative for fever, chills, sweats,  cough  Cardiovascular:  negative for chest pain, palpitations, syncope, edema  Gastrointestinal:  negative for dysphagia, reflux, vomiting, diarrhea, abdominal pain, or melena  Neurologic:  negative for focal weakness, numbness, headache    Current Facility-Administered Medications   Medication Dose Route Frequency    saline nasal gel (AYR)   Nasal PRN    sodium chloride flush 0.9 % injection 5-40 mL  5-40 mL IntraVENous 2 epistaxis, oral mucosa moist without cyanosis,   NECK:  no JVD, no retractions, no thyromegaly or masses,   LUNGS:  CTA all fields; on RA  HEART:  RRR with no M,G,R  ABDOMEN:  soft with no tenderness; positive bowel sounds present  EXTREMITIES:  warm with no cyanosis, no lower leg edema  SKIN:  no jaundice or ecchymosis, stable umbilical incision CDI  NEURO: no gross deficits; alert and oriented x3      IMAGES:  CXR   8/17 8/16        1. Small right apical pneumothorax noted on the prior exam has decreased. Only   trace residual right apical pneumothorax is present on today's study. CT CHEST 8/17       LAB  Recent Labs     08/16/22  1127 08/17/22  1603 08/18/22  0414   WBC 7.6 6.7 7.1   HGB 12.8 12.6 11.3*   HCT 40.9 40.0 36.3    263 265       Recent Labs     08/16/22  1127 08/17/22  1603 08/18/22  0414    138 135*   K 3.9 3.8 3.5   * 107 111*   CO2 27 25 28   BUN 11 12 15   CREATININE 0.70 0.70 0.60   MG  --  2.4  --    BILITOT 0.6  --   --    AST 16  --   --    ALT 30  --   --    ALKPHOS 97  --   --        No results for input(s): TROPHS, NTPROBNP, CRP, ESR in the last 72 hours. Recent Labs     08/16/22  1127 08/17/22  1603 08/18/22  0414   GLUCOSE 109* 116* 107*       No results for input(s): LCAD in the last 72 hours. Invalid input(s): LAC  ABG:   No results for input(s): PH, PCO2, PO2, HCO3 in the last 72 hours. Invalid input(s): PHI, PCO2I, PO2I, HCO3I, Slipager 71  Microbiology:   No results for input(s): CULTURE in the last 72 hours. ECHO: No results found for this or any previous visit. Assessment/Plan:   Impression: 73 yo female with significant asthma on xolair who had a robotic/lap viki and presented with worsening dyspnea, pleuritic R chest wall pain and R pneumothorax on 8/16. Pneumothorax on right  Very small. Pleural US performed yesterday with very minimal effusion. On RA now with sats %. Follow up with CXR in 1-2 weeks.  Follow up in pulmonary clinic if PTX persists. S/P laparoscopic cholecystectomy  Umbilical incision stable, CDI, no signs of infection      Class 2 severe obesity due to excess calories with serious comorbidity and body mass index (BMI) of 35.0 to 35.9 in adult [facility])    Obstructive sleep apnea  Home CPAP; pt compliant. Has an established appt with Eliot Carmona in sleep clinic      Allergic rhinitis due to animal hair and dander    Allergic rhinitis due to pollen    Moderate persistent asthma without complication  Has home inhalers, on xolair - missed dose yesterday. Patient states she will reschedule. No exacerbations currently. Full Code    In this split/shared evaluation I performed performed a medically appropriate history and exam, counseled and educated the patient and/or family member, documented information in EMR, and coordinated care. which accounted for 8 clinical time. YUNIEL Augustine NP   In this split/shared evaluation I performed reviewed the patients's H&P, available images, labs, cultures. , discussed case in detail with NPP, performed a medically appropriate history and exam, counseled and educated the patient and/or family member, ordered and/or reviewed medications, tests or procedures, documented information in EMR, independently interpreted images, and coordinated care. which accounted for 14 minutes clinical time. Impression: Mrs. Desi Ochoa has significant asthma on xolair who had a robotic/lap viki yesterday and presents with worsening dyspnea, pleuritic R chest wall pain and R pneumothorax today. There has been no excessive coughing, wheezing or other obvious etiology beyond GETA and the procedure. Active Problems:    Pneumothorax, right  Plan: Tiny and improved. I ultrasounded her at bedside and there was only a very small/trivial effusion seen with some local atelectasis. Not enough effusion/and/or PTX to warrant drainage. We took her off O2 and sats are still 100%.  She does not require ongoing admission/observation for pneumothorax. To go home today- have arranged to follow up In 2 weeks with cxr    S/P laparoscopic cholecystectomy  Plan: sore, but improved    Moderate persistent asthma without complication  Plan: no wheezing, on home inhalers and Xolair. No evidence for exacerbation. Obstructive sleep apnea  Plan: Okay to use CPAP tonight. Does not require ongoing Nitrogen O2 washout.    Pleural Effsuion- small      Alphonse Lloyd MD

## 2022-08-18 NOTE — DISCHARGE INSTRUCTIONS
or if you experience any of the following symptoms:  Weight gain of 3 pounds or more overnight or 5 pounds in a week, increased swelling in our hands or feet or shortness of breath while lying flat in bed. Please call your doctor as soon as you notice any of these symptoms; do not wait until your next office visit. The discharge information has been reviewed with the patient. The patient verbalized understanding. Discharge medications reviewed with the patient and appropriate educational materials and side effects teaching were provided.   ___________________________________________________________________________________________________________________________________

## 2022-08-18 NOTE — CARE COORDINATION
08/18/22 1121   Service Assessment   Patient Orientation Alert and Oriented   Cognition Alert   History Provided By Patient   Primary Caregiver Self   Support Systems Children;Family Members   Patient's Healthcare Decision Maker is: Legal Next of Kin  (Christopher Key - Son)   PCP Verified by CM Yes   Last Visit to PCP Within last 3 months   Prior Functional Level Independent in ADLs/IADLs   Current Functional Level Independent in ADLs/IADLs   Can patient return to prior living arrangement Yes   Ability to make needs known: Good   Family able to assist with home care needs: Yes   Would you like for me to discuss the discharge plan with any other family members/significant others, and if so, who? No   Social/Functional History   Lives With Family   Type of [address] Two [address]   [facility] [address] [facility] [address] [facility] [address]   Entrance Stairs - Number of Steps 2   [address]   Homemaking Responsibilities Yes   Ambulation Assistance Independent   Transfer Assistance Independent   Active  Yes   Mode of Transportation Car   Occupation Retired   Discharge Planning   Type of [address]; Alone   Current Services Prior To Admission C-pap   DME Ordered? No   Potential Assistance Purchasing Medications No   Type of Home Care Services None   Patient expects to be discharged to: House   One/Two Story Residence Two story, live on first floor   # of Interior Steps 15   History of falls? 0   Services At/After Discharge   Mode of Transport at Discharge Other (see comment)  (family to transport)   Condition of Participation: Discharge Planning   The Patient and/or Patient Representative was provided with a Choice of Provider? Patient   The Patient and/Or Patient Representative agree with the Discharge Plan?  Yes

## 2022-08-18 NOTE — PROGRESS NOTES
Patient states  wheezing is better after treatment but is c/o right sided chest pain when taking a deep breath, rates 5/10.  Ibuprofen 400mg PO given for pain

## 2022-08-18 NOTE — CARE COORDINATION
MSN, CM:  spoke with patient this AM about discharge planning. Patient lives in own home with family support  patient is independent with all ADL's. Patient has no discharge needs. Patient will discharge home with no services ordered or requested. Patient agrees with this discharge plan and has met all milestones for this admission. Family to transport patient home. 08/18/22 1121   Service Assessment   Patient Orientation Alert and Oriented   Cognition Alert   History Provided By Patient   Primary Caregiver Self   Support Systems Children;Family Members   Patient's Healthcare Decision Maker is: Legal Next of Kin  (More Pereira - Son)   PCP Verified by CM Yes   Last Visit to PCP Within last 3 months   Prior Functional Level Independent in ADLs/IADLs   Current Functional Level Independent in ADLs/IADLs   Can patient return to prior living arrangement Yes   Ability to make needs known: Good   Family able to assist with home care needs: Yes   Would you like for me to discuss the discharge plan with any other family members/significant others, and if so, who? No   Social/Functional History   Lives With Family   Type of [address] Two [address]   [facility] 46 to enter without rails   Entrance Stairs - Number of Steps 2   [address]   Homemaking Responsibilities Yes   Ambulation Assistance Independent   Transfer Assistance Independent   Active  Yes   Mode of Transportation Car   Occupation Retired   Discharge Planning   Type of [address]; Alone   Current Services Prior To Admission C-pap   DME Ordered?  No   Potential Assistance Purchasing Medications No   Type of Home Care Services None   Patient expects to be discharged to: House   One/Two Story Residence Two story, live on first floor   # of Interior Steps 15   History of falls? 0

## 2022-08-18 NOTE — PROGRESS NOTES
Pt resting in bed awake. Alert and oriented times 3 at this time. On RA. No s/sx of distress noted. Reports pain is better after pain meds given on night shift. Encouraged to call for assistance as needed. Call light within reach. Will continue to monitor.

## 2022-08-18 NOTE — PROGRESS NOTES
Patient resting quietly in bed. Respirations even and unlabored on room air. States relief of pain. No further wheezing.

## 2022-08-18 NOTE — PROGRESS NOTES
Patient c/o wheezing. O2 sat 94% on room air. Audible wheezing heard without stethoscope.  RT called for treatment

## 2022-08-19 ENCOUNTER — CARE COORDINATION (OUTPATIENT)
Dept: CARE COORDINATION | Facility: CLINIC | Age: 67
End: 2022-08-19

## 2022-08-22 ENCOUNTER — CARE COORDINATION (OUTPATIENT)
Dept: CARE COORDINATION | Facility: CLINIC | Age: 67
End: 2022-08-22

## 2022-08-22 NOTE — CARE COORDINATION
[facility] Transitions Initial Follow Up Call    Call within 2 business days of discharge: Yes    Patient: Adam Honeycutt Patient : 1955   MRN: [id number]  Reason for Admission: pneumothorax  Discharge Date: 22 RARS: Readmission Risk Score: 7.3      Last Discharge [facility]       Date Complaint Diagnosis Description Type Department Provider    22 Post-op Problem Postprocedural pneumothorax ED to Hosp-Admission (Discharged) (ADMITTED) [id number] Robert Mercedes MD; Wisam Dhaliwal . .. Spoke with: patient     Facility: sfd  Non-face-to-face services provided:  Scheduled appointment with Specialist-gen surgery  Obtained and reviewed discharge summary and/or continuity of care documents    Transitions of Care Initial Call    Was this an external facility discharge? No Discharge Facility:     Challenges to be reviewed by the provider   Additional needs identified to be addressed with provider: No  none             Method of communication with provider : none    Advance Care Planning:   Does patient have an Advance Directive: not on file. Care Transition Nurse contacted the patient by telephone to perform post hospital discharge assessment. Verified name and  with patient as identifiers. Provided introduction to self, and explanation of the CTN role. CTN reviewed discharge instructions, medical action plan and red flags with patient who verbalized understanding. Patient given an opportunity to ask questions and does not have any further questions or concerns at this time. Were discharge instructions available to patient? Yes. Reviewed appropriate site of care based on symptoms and resources available to patient including: Specialist. The patient agrees to contact the PCP office for questions related to their healthcare. Medication reconciliation was performed with patient, who verbalizes understanding of administration of home medications.  Was patient discharged with a pulse oximeter? no    CTN provided contact information. Plan for follow-up call in 5-7 days based on severity of symptoms and risk factors.   Plan for next call: symptom management-assess for new or worsening s/s to report  follow up appointment-Gen Surgery, 8/23       Care Transitions 24 Hour Call    Do you have a copy of your discharge instructions?: Yes  Do you have all of your prescriptions and are they filled?: Yes  Have you been contacted by a [employer] [address]?: No  Have you scheduled your follow up appointment?: Yes  How are you going to get to your appointment?: Car - family or friend to transport  Do you feel like you have everything you need to keep you well at home?: Yes  Care Transitions Interventions         Follow Up  Future Appointments   Date Time Provider Wayne Lindquist   8/23/2022  8:45 AM Tyrone Marshall MD [facility] AMB   8/26/2022  9:40 AM Edgar Negron MD [facility] AM[facility]   8/29/2022  1:30 PM Stacy Leyva MD [facility]   9/7/2022  1:00 PM Feliciano Dorsey APRN - CNP [facility] AM[facility]   9/19/2022  3:40 PM Toshia Lazcano APRN - CNP [facility]   1/4/2023 11:00 AM Edgar Negron MD [facility]       eNd Cunningham RN

## 2022-08-23 ENCOUNTER — OFFICE VISIT (OUTPATIENT)
Dept: SURGERY | Age: 67
End: 2022-08-23

## 2022-08-23 VITALS — BODY MASS INDEX: 35.2 KG/M2 | HEIGHT: 66 IN | WEIGHT: 219 LBS

## 2022-08-23 DIAGNOSIS — Z48.89 POSTOPERATIVE VISIT: ICD-10-CM

## 2022-08-23 PROBLEM — I10 ESSENTIAL HYPERTENSION: Status: ACTIVE | Noted: 2017-11-14

## 2022-08-23 PROBLEM — E66.01 MORBID OBESITY (HCC): Status: ACTIVE | Noted: 2022-08-16

## 2022-08-23 PROBLEM — E78.2 MIXED HYPERLIPIDEMIA: Status: ACTIVE | Noted: 2022-08-16

## 2022-08-23 PROCEDURE — 99024 POSTOP FOLLOW-UP VISIT: CPT | Performed by: SURGERY

## 2022-08-23 NOTE — PROGRESS NOTES
Franko Lloyd is back today after having a robotic site cholecystectomy. Pathology is as follows. DIAGNOSIS        \"GALLBLADDER\":               CHRONIC CHOLECYSTITIS. CHOLELITHIASIS. She has done well. No problems. Incision is healed beautifully. We have talked about postoperative activity. All questions were answered. I will see her as needed. tls/8/15/2022   Electronically Signed Out         Sign Out Date: 8/15/2022  aRni Ramires MD  single site cholecystectomy on 8/12/2022.   Pathology is as follows

## 2022-08-26 ENCOUNTER — OFFICE VISIT (OUTPATIENT)
Dept: FAMILY MEDICINE CLINIC | Facility: CLINIC | Age: 67
End: 2022-08-26
Payer: MEDICARE

## 2022-08-26 VITALS
BODY MASS INDEX: 35.2 KG/M2 | HEIGHT: 66 IN | WEIGHT: 219 LBS | DIASTOLIC BLOOD PRESSURE: 68 MMHG | SYSTOLIC BLOOD PRESSURE: 120 MMHG

## 2022-08-26 DIAGNOSIS — Z90.49 S/P LAPAROSCOPIC CHOLECYSTECTOMY: ICD-10-CM

## 2022-08-26 DIAGNOSIS — J93.9 PNEUMOTHORAX, RIGHT: Primary | ICD-10-CM

## 2022-08-26 DIAGNOSIS — J45.40 MODERATE PERSISTENT ASTHMA WITHOUT COMPLICATION: ICD-10-CM

## 2022-08-26 PROBLEM — Z48.89 POSTOPERATIVE VISIT: Status: RESOLVED | Noted: 2022-08-23 | Resolved: 2022-08-26

## 2022-08-26 PROBLEM — J90 PLEURAL EFFUSION: Status: RESOLVED | Noted: 2022-08-18 | Resolved: 2022-08-26

## 2022-08-26 PROBLEM — J30.81 ALLERGIC RHINITIS DUE TO ANIMAL HAIR AND DANDER: Status: RESOLVED | Noted: 2022-08-16 | Resolved: 2022-08-26

## 2022-08-26 PROCEDURE — G8417 CALC BMI ABV UP PARAM F/U: HCPCS | Performed by: FAMILY MEDICINE

## 2022-08-26 PROCEDURE — G8400 PT W/DXA NO RESULTS DOC: HCPCS | Performed by: FAMILY MEDICINE

## 2022-08-26 PROCEDURE — 1123F ACP DISCUSS/DSCN MKR DOCD: CPT | Performed by: FAMILY MEDICINE

## 2022-08-26 PROCEDURE — 3017F COLORECTAL CA SCREEN DOC REV: CPT | Performed by: FAMILY MEDICINE

## 2022-08-26 PROCEDURE — 1036F TOBACCO NON-USER: CPT | Performed by: FAMILY MEDICINE

## 2022-08-26 PROCEDURE — 1111F DSCHRG MED/CURRENT MED MERGE: CPT | Performed by: FAMILY MEDICINE

## 2022-08-26 PROCEDURE — G8428 CUR MEDS NOT DOCUMENT: HCPCS | Performed by: FAMILY MEDICINE

## 2022-08-26 PROCEDURE — 99213 OFFICE O/P EST LOW 20 MIN: CPT | Performed by: FAMILY MEDICINE

## 2022-08-26 PROCEDURE — 1090F PRES/ABSN URINE INCON ASSESS: CPT | Performed by: FAMILY MEDICINE

## 2022-08-26 ASSESSMENT — PATIENT HEALTH QUESTIONNAIRE - PHQ9
SUM OF ALL RESPONSES TO PHQ9 QUESTIONS 1 & 2: 1
1. LITTLE INTEREST OR PLEASURE IN DOING THINGS: 1
2. FEELING DOWN, DEPRESSED OR HOPELESS: 0
SUM OF ALL RESPONSES TO PHQ QUESTIONS 1-9: 1

## 2022-08-26 NOTE — PROGRESS NOTES
Subjective:   Cristobal Davis is a 74 y.o. female presents today for hospital follow-up. She was admitted on 816 for lap cholecystectomy and ended up returning about 5 days later for spontaneous pneumothorax. And did not require chest tube, it resolved with high flow nasal cannula CO2. Went to the allergist this week and was diagnosed with a sinus infection. She is currently on medications. Overall feeling better, still try to get some of her energy back. Was placed on atorvastatin during her hospitalization  Consultation notes reviewed from allergy and asthma center from visit yesterday. Hospital notes reviewed regarding her lap cholecystectomy as well as her readmission for pneumothorax. Allergies   Allergen Reactions    Latex Other (See Comments)     wheezing    Other Shortness Of Breath     PER PT-- ALL COLOGNE WILL START AN ASTHMA ATTACK    Sulfa Antibiotics Nausea Only     PMH, MEDS reviewed     Objective:   Blood pressure 120/68, height 5' 6\" (1.676 m), weight 219 lb (99.3 kg). General- Pleasant and no distress  Psych- alert and oriented to person, place and time  Mood and affect are appropriate to situation  Musculoskeletal - Gait and station examination reveals mid-position with no abnormalities. Neurological- grossly intact. rrr s mrg.   bcta    Assessment/Plan:     1. Pneumothorax, right    2. S/P laparoscopic cholecystectomy    3. Moderate persistent asthma without complication         1. Pneumothorax, right  2. S/P laparoscopic cholecystectomy  3. Moderate persistent asthma without complication    Followup:   Return soon for [facility].

## 2022-08-29 ENCOUNTER — CARE COORDINATION (OUTPATIENT)
Dept: CARE COORDINATION | Facility: CLINIC | Age: 67
End: 2022-08-29

## 2022-08-29 ENCOUNTER — OFFICE VISIT (OUTPATIENT)
Dept: ONCOLOGY | Age: 67
End: 2022-08-29
Payer: MEDICARE

## 2022-08-29 VITALS
SYSTOLIC BLOOD PRESSURE: 134 MMHG | HEIGHT: 66 IN | WEIGHT: 219.1 LBS | OXYGEN SATURATION: 99 % | DIASTOLIC BLOOD PRESSURE: 76 MMHG | BODY MASS INDEX: 35.21 KG/M2 | RESPIRATION RATE: 22 BRPM | TEMPERATURE: 98.3 F | HEART RATE: 75 BPM

## 2022-08-29 DIAGNOSIS — R91.1 PULMONARY NODULE: Primary | ICD-10-CM

## 2022-08-29 PROCEDURE — G8428 CUR MEDS NOT DOCUMENT: HCPCS | Performed by: INTERNAL MEDICINE

## 2022-08-29 PROCEDURE — 1090F PRES/ABSN URINE INCON ASSESS: CPT | Performed by: INTERNAL MEDICINE

## 2022-08-29 PROCEDURE — 99214 OFFICE O/P EST MOD 30 MIN: CPT | Performed by: INTERNAL MEDICINE

## 2022-08-29 PROCEDURE — 1036F TOBACCO NON-USER: CPT | Performed by: INTERNAL MEDICINE

## 2022-08-29 PROCEDURE — 1123F ACP DISCUSS/DSCN MKR DOCD: CPT | Performed by: INTERNAL MEDICINE

## 2022-08-29 PROCEDURE — 1111F DSCHRG MED/CURRENT MED MERGE: CPT | Performed by: INTERNAL MEDICINE

## 2022-08-29 PROCEDURE — G8400 PT W/DXA NO RESULTS DOC: HCPCS | Performed by: INTERNAL MEDICINE

## 2022-08-29 PROCEDURE — 3017F COLORECTAL CA SCREEN DOC REV: CPT | Performed by: INTERNAL MEDICINE

## 2022-08-29 PROCEDURE — G8417 CALC BMI ABV UP PARAM F/U: HCPCS | Performed by: INTERNAL MEDICINE

## 2022-08-29 RX ORDER — CIPROFLOXACIN 500 MG/1
TABLET, FILM COATED ORAL
COMMUNITY
Start: 2022-08-25

## 2022-08-29 ASSESSMENT — PATIENT HEALTH QUESTIONNAIRE - PHQ9
1. LITTLE INTEREST OR PLEASURE IN DOING THINGS: 0
SUM OF ALL RESPONSES TO PHQ QUESTIONS 1-9: 0
SUM OF ALL RESPONSES TO PHQ9 QUESTIONS 1 & 2: 0
2. FEELING DOWN, DEPRESSED OR HOPELESS: 0
SUM OF ALL RESPONSES TO PHQ QUESTIONS 1-9: 0

## 2022-08-29 NOTE — PATIENT INSTRUCTIONS
Patient Instructions from Today's Visit    Reason for Visit:  Follow up    Diagnosis Information:  https://www.[employer]/. net/about-us/asco-answers-patient-education-materials/wmxe-ujnsxpk-ejgx-sheets  Patient was educated and given handouts published by [employer] entitled [employer] Answers Fact Sheets about their diagnosis of during todays office visit. Plan:  Ct Shows that spot in your lung has been stable for many years. We do not need to do further scans. We will discharge you from our office    Follow Up: Follow up if needed    Recent Lab Results:  No visits with results within 3 Day(s) from this visit.    Latest known visit with results is:   Admission on 08/16/2022, Discharged on 08/18/2022   Component Date Value Ref Range Status    WBC 08/16/2022 7.6  4.3 - 11.1 K/uL Final    RBC 08/16/2022 4.51  4.05 - 5.2 M/uL Final    Hemoglobin 08/16/2022 12.8  11.7 - 15.4 g/dL Final    Hematocrit 08/16/2022 40.9  35.8 - 46.3 % Final    MCV 08/16/2022 90.7  79.6 - 97.8 FL Final    MCH 08/16/2022 28.4  26.1 - 32.9 PG Final    MCHC 08/16/2022 31.3 (A) 31.4 - 35.0 g/dL Final    RDW 08/16/2022 13.4  11.9 - 14.6 % Final    Platelets 83/80/2262 260  150 - 450 K/uL Final    MPV 08/16/2022 10.8  9.4 - 12.3 FL Final    nRBC 08/16/2022 0.00  0.0 - 0.2 K/uL Final    **Note: Absolute NRBC parameter is now reported with Hemogram**    Differential Type 08/16/2022 AUTOMATED    Final    Seg Neutrophils 08/16/2022 58  43 - 78 % Final    Lymphocytes 08/16/2022 35  13 - 44 % Final    Monocytes 08/16/2022 5  4.0 - 12.0 % Final    Eosinophils % 08/16/2022 2  0.5 - 7.8 % Final    Basophils 08/16/2022 0  0.0 - 2.0 % Final    Immature Granulocytes 08/16/2022 0  0.0 - 5.0 % Final    Segs Absolute 08/16/2022 4.4  1.7 - 8.2 K/UL Final    Absolute Lymph # 08/16/2022 2.7  0.5 - 4.6 K/UL Final    Absolute Mono # 08/16/2022 0.3  0.1 - 1.3 K/UL Final    Absolute Eos # 08/16/2022 0.1  0.0 - 0.8 K/UL Final    Basophils Absolute 08/16/2022 0.0  0.0 - 0.2 K/UL Final    Absolute Immature Granulocyte 08/16/2022 0.0  0.0 - 0.5 K/UL Final    Sodium 08/16/2022 137  136 - 145 mmol/L Final    Potassium 08/16/2022 3.9  3.5 - 5.1 mmol/L Final    Chloride 08/16/2022 108 (A) 98 - 107 mmol/L Final    CO2 08/16/2022 27  21 - 32 mmol/L Final    Anion Gap 08/16/2022 2 (A) 7 - 16 mmol/L Final    Glucose 08/16/2022 109 (A) 65 - 100 mg/dL Final    BUN 08/16/2022 11  8 - 23 MG/DL Final    Creatinine 08/16/2022 0.70  0.6 - 1.0 MG/DL Final    GFR  08/16/2022 >60  >60 ml/min/1.73m2 Final    GFR Non- 08/16/2022 >60  >60 ml/min/1.73m2 Final    Comment:      Estimated GFR is calculated using the Modification of Diet in Renal Disease (MDRD) Study equation, reported for both  Americans (GFRAA) and non- Americans (GFRNA), and normalized to 1.73m2 body surface area. The physician must decide which value applies to the patient. The MDRD study equation should only be used in individuals age 25 or older. It has not been validated for the following: pregnant women, patients with serious comorbid conditions,or on certain medications, or persons with extremes of body size, muscle mass, or nutritional status.       Calcium 08/16/2022 9.0  8.3 - 10.4 MG/DL Final    Total Bilirubin 08/16/2022 0.6  0.2 - 1.1 MG/DL Final    ALT 08/16/2022 30  12 - 65 U/L Final    AST 08/16/2022 16  15 - 37 U/L Final    Alk Phosphatase 08/16/2022 97  50 - 136 U/L Final    Total Protein 08/16/2022 7.5  6.3 - 8.2 g/dL Final    Albumin 08/16/2022 3.1 (A) 3.2 - 4.6 g/dL Final    Globulin 08/16/2022 4.4 (A) 2.3 - 3.5 g/dL Final    Albumin/Globulin Ratio 08/16/2022 0.7 (A) 1.2 - 3.5   Final    WBC 08/17/2022 6.7  4.3 - 11.1 K/uL Final    RBC 08/17/2022 4.44  4.05 - 5.2 M/uL Final    Hemoglobin 08/17/2022 12.6  11.7 - 15.4 g/dL Final    Hematocrit 08/17/2022 40.0  35.8 - 46.3 % Final    MCV 08/17/2022 90.1  79.6 - 97.8 FL Final    MCH 08/17/2022 28.4  26.1 - 32.9 PG Final    MCHC 08/17/2022 31.5  31.4 - 35.0 g/dL Final    RDW 08/17/2022 13.2  11.9 - 14.6 % Final    Platelets 84/40/2339 263  150 - 450 K/uL Final    MPV 08/17/2022 10.9  9.4 - 12.3 FL Final    nRBC 08/17/2022 0.00  0.0 - 0.2 K/uL Final    **Note: Absolute NRBC parameter is now reported with Hemogram**    Sodium 08/17/2022 138  136 - 145 mmol/L Final    Potassium 08/17/2022 3.8  3.5 - 5.1 mmol/L Final    Chloride 08/17/2022 107  98 - 107 mmol/L Final    CO2 08/17/2022 25  21 - 32 mmol/L Final    Anion Gap 08/17/2022 6 (A) 7 - 16 mmol/L Final    Glucose 08/17/2022 116 (A) 65 - 100 mg/dL Final    BUN 08/17/2022 12  8 - 23 MG/DL Final    Creatinine 08/17/2022 0.70  0.6 - 1.0 MG/DL Final    GFR  08/17/2022 >60  >60 ml/min/1.73m2 Final    GFR Non- 08/17/2022 >60  >60 ml/min/1.73m2 Final    Comment:      Estimated GFR is calculated using the Modification of Diet in Renal Disease (MDRD) Study equation, reported for both  Americans (GFRAA) and non- Americans (GFRNA), and normalized to 1.73m2 body surface area. The physician must decide which value applies to the patient. The MDRD study equation should only be used in individuals age 25 or older. It has not been validated for the following: pregnant women, patients with serious comorbid conditions,or on certain medications, or persons with extremes of body size, muscle mass, or nutritional status.       Calcium 08/17/2022 9.0  8.3 - 10.4 MG/DL Final    Magnesium 08/17/2022 2.4  1.8 - 2.4 mg/dL Final    WBC 08/18/2022 7.1  4.3 - 11.1 K/uL Final    RBC 08/18/2022 4.03 (A) 4.05 - 5.2 M/uL Final    Hemoglobin 08/18/2022 11.3 (A) 11.7 - 15.4 g/dL Final    Hematocrit 08/18/2022 36.3  35.8 - 46.3 % Final    MCV 08/18/2022 90.1  79.6 - 97.8 FL Final    MCH 08/18/2022 28.0  26.1 - 32.9 PG Final    MCHC 08/18/2022 31.1 (A) 31.4 - 35.0 g/dL Final    RDW 08/18/2022 13.2  11.9 - 14.6 % Final    Platelets 95/69/6581 265  150 - 450 K/uL Final    MPV 08/18/2022 11.0 9.4 - 12.3 FL Final    nRBC 08/18/2022 0.00  0.0 - 0.2 K/uL Final    **Note: Absolute NRBC parameter is now reported with Hemogram**    Sodium 08/18/2022 135 (A) 136 - 145 mmol/L Final    Potassium 08/18/2022 3.5  3.5 - 5.1 mmol/L Final    Chloride 08/18/2022 111 (A) 98 - 107 mmol/L Final    CO2 08/18/2022 28  21 - 32 mmol/L Final    Anion Gap 08/18/2022 Cannot be calculated  7 - 16 mmol/L Final    Glucose 08/18/2022 107 (A) 65 - 100 mg/dL Final    BUN 08/18/2022 15  8 - 23 MG/DL Final    Creatinine 08/18/2022 0.60  0.6 - 1.0 MG/DL Final    GFR  08/18/2022 >60  >60 ml/min/1.73m2 Final    GFR Non- 08/18/2022 >60  >60 ml/min/1.73m2 Final    Comment:      Estimated GFR is calculated using the Modification of Diet in Renal Disease (MDRD) Study equation, reported for both  Americans (GFRAA) and non- Americans (GFRNA), and normalized to 1.73m2 body surface area. The physician must decide which value applies to the patient. The MDRD study equation should only be used in individuals age 25 or older. It has not been validated for the following: pregnant women, patients with serious comorbid conditions,or on certain medications, or persons with extremes of body size, muscle mass, or nutritional status. Calcium 08/18/2022 8.8  8.3 - 10.4 MG/DL Final        Treatment Summary has been discussed and given to patient: n/a        -------------------------------------------------------------------------------------------------------------------  Please call our office at [phone] if you have any  of the following symptoms:   Fever of 100.5 or greater  Chills  Shortness of breath  Swelling or pain in one leg    After office hours an answering service is available and will contact a provider for emergencies or if you are experiencing any of the above symptoms. Patient did express an interest in My Chart.   My Chart log in information explained on the after visit summary printout at the [facility].     Bere Bonds RN

## 2022-08-29 NOTE — CARE COORDINATION
CTN opened outreach for follow up after recent JIGNESH call. Patient currently at appt. CTN to attempt again at a later time.

## 2022-08-29 NOTE — PROGRESS NOTES
Data Source: Patient, [facility] record. 8/29/2022    2:40 PM    Live Lin [id number]    74 y.o. Patient Encounter: [address] Visit    Diagnosis:  LLL pulmonary nodule  History (Copied from prior):  66 F, retired , ex-smoker, h/o anxiety, diverticulosis found to have a sub centimeter pulmonary nodule while undergoing abdominal imaging (4/13). She was seen by me in past w serial imaging to monitor nodule. F/u CT chest from 11/15 had shown stability over 2 years, and as such lesion was deemed likely benign. Patient more recently was seen in [address] [place] [address] ED with abdominal discomfort 3/22. CTAP with contrast had mentioned acute sigmoid colon diverticulitis as well as a left lower lobe 0.3 cm nodule, likely benign. Patient is now referred back to us given noted nodule. Recently lost her son to metastatic pancreas cancer. Recent abdominal ultrasound with cholelithiasis, now seeing surgery with plans for cholecystectomy. Reports being up-to-date on her age-appropriate cancer screening. Interval History:  8/29/2022: Reports doing well today. Since last visit has undergone robotic cholecystectomy with benign final pathology. Has seen pulmonology for tiny right-sided pneumothorax, being managed conservatively. Recent CT chest with stable left lower lobe subcentimeter pulmonary nodules (felt stable since 2014), and therefore benign. No further serial imaging for this needed. Patient will continue with primary care for routine age-appropriate cancer screening. REVIEW OF SYSTEMS:  As mentioned above, all other systems were reviewed in full and are negative.     Past Medical History:   Diagnosis Date    Allergic rhinitis 4/24/2015    Dr. Benjie Deleon    Asthma     prn inhaler and neb    Calculus of gallbladder with acute cholecystitis without obstruction 6/30/2022    Cholecystitis 6/30/2022    Added automatically from request for surgery [id number]    Cystocele, midline 4/24/2015    Diverticulosis of colon 4/24/2015    Edema 4/24/2015    Including peripheral edema. ( SOMETIMES) PER PT    Female stress incontinence     GERD (gastroesophageal reflux disease)     controlled with med    Hypercholesterolemia     no meds currently    Hyperplastic polyps of stomach 4/24/2015    Colonsocpy. 2013.     Hypertension     controlled with med    Internal hemorrhoids without mention of complication 7/76/8706    Mixed hyperlipidemia 8/16/2022    Obesity (BMI 30-39.9) 11/14/2017    BMI = 35    Obstructive sleep apnea 11/14/2017    wears cpap at hs    Other diseases of lung, not elsewhere classified 4/24/2015    being rechecked 8/2022-- followed by dr Siddhartha Luciano    Right upper quadrant pain     Urge incontinence     Ventricular bigeminy 8/31/2016    per pt-- was told it was ok-- does not see a cardiologist at present       Past Surgical History:   Procedure Laterality Date    CHOLECYSTECTOMY, LAPAROSCOPIC N/A 8/12/2022    [address] performed by Mauri Montalvo MD at [facility] MAIN OR    HYSTERECTOMY (CERVIX STATUS UNKNOWN)      KNEE ARTHROSCOPY Bilateral        Current Outpatient Medications   Medication Sig Dispense Refill    ciprofloxacin (CIPRO) 500 MG tablet       montelukast (SINGULAIR) 10 MG tablet Take 1 tablet by mouth nightly 30 tablet 3    atorvastatin (LIPITOR) 40 MG tablet Take 1 tablet by mouth at bedtime 30 tablet 3    Beclomethasone Dipropionate (QVAR IN) Inhale into the lungs 2 times daily as needed      amLODIPine (NORVASC) 10 MG tablet Take 1 tablet by mouth daily 30 tablet 5    dicyclomine (BENTYL) 20 MG tablet Take 1 tablet by mouth every 6 hours (Patient taking differently: Take 20 mg by mouth 3 times daily) 120 tablet 5    pantoprazole (PROTONIX) 40 MG tablet Take 1 tablet by mouth 2 times daily 30 tablet 5    valsartan (DIOVAN) 160 MG tablet Take 1 tablet by mouth daily 30 tablet 5    albuterol sulfate  (90 Base) MCG/ACT inhaler Inhale 2 puffs into the lungs every 4 hours as needed      Cetirizine HCl 10 MG CAPS Take 1 tablet by mouth daily      EPINEPHrine (EPIPEN) 0.3 MG/0.3ML SOAJ injection Inject 0.3 mg into the muscle once as needed      fluticasone (FLONASE) 50 MCG/ACT nasal spray 2 sprays by Nasal route daily       No current facility-administered medications for this visit. Social History     Socioeconomic History    Marital status:      Spouse name: None    Number of children: None    Years of education: None    Highest education level: None   Tobacco Use    Smoking status: Former     Packs/day: 0.25     Years: 4.00     Pack years: 1.00     Types: Cigarettes     Quit date: 10/16/1996     Years since quittin.8    Smokeless tobacco: Never   Vaping Use    Vaping Use: Never used   Substance and Sexual Activity    Alcohol use: No    Drug use: No       Family History   Problem Relation Age of Onset    Asthma Mother     Hypertension Sister     Diabetes Sister     Cancer Other         Lung    Breast Cancer Neg Hx     Other Neg Hx     Post-op Cognitive Dysfunction Neg Hx     Emergence Delirium Neg Hx     Post-op Nausea/Vomiting Neg Hx     Delayed Awakening Neg Hx     Pseudochol. Deficiency Neg Hx     Malig Hypertherm Neg Hx        Allergies   Allergen Reactions    Latex Other (See Comments)     wheezing    Other Shortness Of Breath     PER PT-- ALL COLOGNE WILL START AN ASTHMA ATTACK    Sulfa Antibiotics Nausea Only       PHYSICAL EXAMINATION:  General Appearance: Healthy appearing patient in no acute distress  Vitals reviewed. /76 (Site: Right Upper Arm, Position: Sitting, Cuff Size: Large Adult)   Pulse 75   Temp 98.3 °F (36.8 °C) (Oral)   Resp 22   Ht 5' 6\" (1.676 m)   Wt 219 lb 1.6 oz (99.4 kg)   SpO2 99%   BMI 35.36 kg/m²   HEENT: No oral or pharyngeal masses, ulceration or thrush noted, no sinus tenderness. Neck is supple with no thyromegaly or JVD noted.   Lymph Nodes: No lymphadenopathy noted in the occipital, pre and post auricular, cervical, supra and infraclavicular, axillary, epitrochlear, inguinal, and popliteal region. Breasts: No palpable masses, nipple discharge or skin retraction  Lungs/Thorax: Clear to auscultation, no accessory muscles of respiration being used. Heart: Regular rate and rhythm, normal S1, S2, no appreciable murmurs, rubs, gallops  Abdomen: Soft, nontender, bowel sounds present, no appreciable hepatosplenomegaly, no palpable masses  Extremeties: Good pulses bilaterally, no peripheral edema. Skin: Normal skin tone with no rash, petechiae, ecchymosis noted. Musculoskeletal: No pain on palpation over bony prominence, no edema, no evidence of gout, no joint or bony deformity  Neurologic: Grossly intact    LABS/IMAGING:    Lab Results   Component Value Date/Time    WBC 7.1 08/18/2022 04:14 AM    HGB 11.3 08/18/2022 04:14 AM    HCT 36.3 08/18/2022 04:14 AM     08/18/2022 04:14 AM    MCV 90.1 08/18/2022 04:14 AM       Lab Results   Component Value Date/Time     08/18/2022 04:14 AM    K 3.5 08/18/2022 04:14 AM     08/18/2022 04:14 AM    CO2 28 08/18/2022 04:14 AM    BUN 15 08/18/2022 04:14 AM    GFRAA >60 08/18/2022 04:14 AM    GLOB 4.4 08/16/2022 11:27 AM    ALT 30 08/16/2022 11:27 AM         Above results reviewed with patient. ASSESSMENT:  66 F, retired , ex-smoker, h/o anxiety, diverticulosis found to have a sub centimeter pulmonary nodule while undergoing abdominal imaging (4/13). She was seen by me in past w serial imaging to monitor nodule. F/u CT chest from 11/15 had shown stability over 2 years, and as such lesion was deemed likely benign. Patient more recently was seen in [facility] ED with abdominal discomfort 3/22. CTAP with contrast had mentioned acute sigmoid colon diverticulitis as well as a left lower lobe 0.3 cm nodule, likely benign. Patient is now referred back to us given noted nodule. Recently lost her son to metastatic pancreas cancer.   Recent abdominal ultrasound with cholelithiasis, now seeing surgery with plans for cholecystectomy. Reports being up-to-date on her age-appropriate cancer screening. Imaging personally reviewed and the left lower lobe 3 mm pulmonary nodule appears stable from imaging over the years. Nevertheless we will get a dedicated CT chest for completion. This is likely benign. 8/29/2022: Reports doing well today. Since last visit has undergone robotic cholecystectomy with benign final pathology. Has seen pulmonology for tiny right-sided pneumothorax, being managed conservatively. Recent CT chest with stable left lower lobe subcentimeter pulmonary nodules (felt stable since 2014), and therefore benign. No further serial imaging for this needed. Patient will continue with primary care for routine age-appropriate cancer screening. She understands we will not be making any follow-up appointment but would be happy to see her again should the need arise. PLAN:  - As above. LLL pulmonary nodule stable over years. - Age approriate cancer screening per PCP    Patient discharged from clinic. She understands we will not be making any follow up appointments but would be happy to see her again in future should the need arise. Advised continued follow up w PCP for health needs. Thank you Dr Rocio Mondragon for allowing us to paticipate in care of this pleasant patient.  Please call w/ any quesions      Luis Scott MD  [facility]  Hematology Oncology  [address] [place] [address]  Office : [phone]  Fax : [phone]

## 2022-08-30 ENCOUNTER — CARE COORDINATION (OUTPATIENT)
Dept: CARE COORDINATION | Facility: CLINIC | Age: 67
End: 2022-08-30

## 2022-08-30 NOTE — CARE COORDINATION
Care Transitions Outreach Attempt    Call within 2 business days of discharge: Yes   Attempted to reach patient for transitions of care follow up. Unable to reach patient. Patient: Hilton Poole Patient : 1955 MRN: [id number]    Last Discharge [facility]       Date Complaint Diagnosis Description Type Department Provider    22 Post-op Problem Postprocedural pneumothorax ED to Hosp-Admission (Discharged) (ADMITTED) [id number] Jaswinder Dye MD; Bro Rainey, . .. Was this an external facility discharge? No Discharge Facility: [facility]    Noted following upcoming appointments from discharge chart review:   [facility] follow up appointment(s):   Future Appointments   Date Time Provider Wayne Lindquist   2022  1:00 PM YUNIEL Stevens CNP PPS [facility] AMB   2022  3:40 PM YUNIEL Mart CNP PSCD [facility] AMB   2022 10:20 AM Eugene Saldana MD EMG [facility] AMB   2023 11:00 AM Eugene Saldana MD EMG [facility] AMB     Non-[facility] follow up appointment(s): na    CTN with unsuccessful attempt at follow up after recent JIGNESH call. CTN to attempt again at a later time.

## 2022-08-31 ENCOUNTER — TELEPHONE (OUTPATIENT)
Dept: FAMILY MEDICINE CLINIC | Facility: CLINIC | Age: 67
End: 2022-08-31

## 2022-08-31 NOTE — TELEPHONE ENCOUNTER
----- Message from Tho Faye sent at 8/31/2022 11:32 AM EDT -----  Subject: Message to Provider    QUESTIONS  Information for Provider? Patient would like to know if she should still   be having Yearly paps done due to her have a complete hysterectomy. Dr Stacy Leyva, has been asking her when she had the last Pap. She is just   checking if she needs to have one done and if she should be referred to a   gynocologist. Please call Pt to advise  ---------------------------------------------------------------------------  --------------  Omelia Counter INFO  [phone]; OK to leave message on voicemail  ---------------------------------------------------------------------------  --------------  SCRIPT ANSWERS  Relationship to Patient?  Self

## 2022-09-02 ENCOUNTER — CARE COORDINATION (OUTPATIENT)
Dept: CARE COORDINATION | Facility: CLINIC | Age: 67
End: 2022-09-02

## 2022-09-02 NOTE — CARE COORDINATION
[facility] Transitions Follow Up Call    2022    Patient: Hilton Poole  Patient : 1955   MRN: [id number]  Reason for Admission: Pneumothorax  Discharge Date: 22 RARS: Readmission Risk Score: 7.3         Spoke with: patient    Care Transitions Follow Up Call    Needs to be reviewed by the provider   Additional needs identified to be addressed with provider: No  none             Method of communication with provider : none      Care Transition Nurse contacted the patient by telephone to follow up after admission on . Verified name and  with patient as identifiers. Addressed changes since last contact:  assess for new or worsening s/s to report. Discussed follow-up appointments. If no appointment was previously scheduled, appointment scheduling offered: Yes. Is follow up appointment scheduled within 7 days of discharge? Yes. Advance Care Planning:   Does patient have an Advance Directive: not on file. CTN reviewed discharge instructions, medical action plan and red flags with patient and discussed any barriers to care and/or understanding of plan of care after discharge. Discussed appropriate site of care based on symptoms and resources available to patient including: PCP  Specialist  CTN . The patient agrees to contact the PCP office for questions related to their healthcare. Patients top risk factors for readmission: medical condition-Pneumothorax, asthma    Non-BS follow up appointment(s): na    CTN provided contact information for future needs. Plan for follow-up call in 10-14 days based on severity of symptoms and risk factors. Plan for next call: symptom management-assess for improvement in URI symptoms after anbx completed. Care Transitions Subsequent and Final Call    Schedule Follow Up Appointment with PCP: Completed  Subsequent and Final Calls  Do you have any ongoing symptoms?: Yes  Onset of Patient-reported symptoms:  In the past 7 days  Interventions for patient-reported symptoms: Notified PCP/Physician  Have your medications changed?: Yes  Patient Reports: Added CIpro for URI  Do you have any questions related to your medications?: No  Do you currently have any active services?: No  Do you have any needs or concerns that I can assist you with?: No  Identified Barriers: Financial  Care Transitions Interventions  Other Interventions:              Follow Up  Future Appointments   Date Time Provider Wayne Lindquist   9/7/2022  1:00 PM YUNIEL Doran CNP PPS [facility] AMB   9/19/2022  3:40 PM YUNIEL Son CNP PSCD [facility] AMB   9/29/2022 10:20 AM Lona Acevedo MD EM[facility] AMB   1/4/2023 11:00 AM Lona Acevedo MD EM[facility] AMB       Elvin Banda RN

## 2022-09-06 DIAGNOSIS — J93.9 PNEUMOTHORAX, RIGHT: Primary | ICD-10-CM

## 2022-09-09 ENCOUNTER — HOSPITAL ENCOUNTER (OUTPATIENT)
Dept: GENERAL RADIOLOGY | Age: 67
Discharge: HOME | End: 2022-09-12
Payer: MEDICARE

## 2022-09-09 DIAGNOSIS — J93.9 PNEUMOTHORAX, RIGHT: ICD-10-CM

## 2022-09-09 PROCEDURE — 71046 X-RAY EXAM CHEST 2 VIEWS: CPT

## 2022-09-14 ENCOUNTER — CARE COORDINATION (OUTPATIENT)
Dept: CARE COORDINATION | Facility: CLINIC | Age: 67
End: 2022-09-14

## 2022-09-14 NOTE — CARE COORDINATION
[facility] Transitions Follow Up Call    2022    Patient: Cindy Tapia  Patient : 1955   MRN: [id number]  Reason for Admission: pneumothorax  Discharge Date: 22 RARS: Readmission Risk Score: 7.3         Spoke with: patient    Patient has graduated from the Care Transitions program on . Patient/family has the ability to self-manage at this time. Patient has no further care management needs, no referral to the [facility] team for further management. Patient has Care Transition Nurse's contact information for any further questions, concerns, or needs. Patients upcoming visits:    Future Appointments   Date Time Provider Wayne Lindquist   2022  3:20 PM YUNIEL Du CNP [facility] AMB   2022 10:20 AM Carlyle Lyon MD EMG [facility] AMB   2023 11:00 AM Carlyle Lyon MD EMG [facility] AMB         Care Transitions Subsequent and Final Call    Subsequent and Final Calls  Do you have any ongoing symptoms?: Yes  Onset of Patient-reported symptoms: In the past 7 days  Interventions for patient-reported symptoms: Notified PCP/Physician  Have your medications changed?: Yes  Patient Reports: CIpro -> pmacrobid due to URIand UTI  Do you have any questions related to your medications?: No  Do you currently have any active services?: No  Do you have any needs or concerns that I can assist you with?: No  Identified Barriers: None  Care Transitions Interventions  Other Interventions:              Follow Up  Future Appointments   Date Time Provider Wayne Lindquist   2022  3:20 PM YUNIEL Du CNP PSCLUCIA [facility] AMB   2022 10:20 AM Carlyle Lyon MD EM[facility] AMB   2023 11:00 AM Carlyle Lyon MD EM[facility] AMB       Sofiya Kyle RN

## 2022-09-20 ENCOUNTER — TRANSCRIBE ORDERS (OUTPATIENT)
Dept: SCHEDULING | Age: 67
End: 2022-09-20

## 2022-09-20 DIAGNOSIS — Z12.31 VISIT FOR SCREENING MAMMOGRAM: Primary | ICD-10-CM

## 2022-09-29 ENCOUNTER — OFFICE VISIT (OUTPATIENT)
Dept: FAMILY MEDICINE CLINIC | Facility: CLINIC | Age: 67
End: 2022-09-29
Payer: MEDICARE

## 2022-09-29 VITALS
BODY MASS INDEX: 35.52 KG/M2 | SYSTOLIC BLOOD PRESSURE: 126 MMHG | DIASTOLIC BLOOD PRESSURE: 70 MMHG | HEIGHT: 66 IN | WEIGHT: 221 LBS

## 2022-09-29 DIAGNOSIS — Z71.89 ADVANCED DIRECTIVES, COUNSELING/DISCUSSION: Chronic | ICD-10-CM

## 2022-09-29 DIAGNOSIS — Z00.00 MEDICARE ANNUAL WELLNESS VISIT, SUBSEQUENT: Chronic | ICD-10-CM

## 2022-09-29 DIAGNOSIS — Z78.0 MENOPAUSE: ICD-10-CM

## 2022-09-29 DIAGNOSIS — N39.0 URINARY TRACT INFECTION WITHOUT HEMATURIA, SITE UNSPECIFIED: Primary | ICD-10-CM

## 2022-09-29 DIAGNOSIS — R35.0 FREQUENT URINATION: ICD-10-CM

## 2022-09-29 LAB
BILIRUBIN, URINE, POC: NEGATIVE
BLOOD URINE, POC: ABNORMAL
GLUCOSE URINE, POC: NEGATIVE
KETONES, URINE, POC: NEGATIVE
LEUKOCYTE ESTERASE, URINE, POC: NEGATIVE
NITRITE, URINE, POC: NEGATIVE
PH, URINE, POC: 7 (ref 4.6–8)
PROTEIN,URINE, POC: NEGATIVE
SPECIFIC GRAVITY, URINE, POC: 1.02 (ref 1–1.03)
URINALYSIS CLARITY, POC: CLEAR
URINALYSIS COLOR, POC: YELLOW
UROBILINOGEN, POC: 3.5

## 2022-09-29 PROCEDURE — 1123F ACP DISCUSS/DSCN MKR DOCD: CPT | Performed by: FAMILY MEDICINE

## 2022-09-29 PROCEDURE — 99497 ADVNCD CARE PLAN 30 MIN: CPT | Performed by: FAMILY MEDICINE

## 2022-09-29 PROCEDURE — 81003 URINALYSIS AUTO W/O SCOPE: CPT | Performed by: FAMILY MEDICINE

## 2022-09-29 PROCEDURE — 3017F COLORECTAL CA SCREEN DOC REV: CPT | Performed by: FAMILY MEDICINE

## 2022-09-29 PROCEDURE — G0439 PPPS, SUBSEQ VISIT: HCPCS | Performed by: FAMILY MEDICINE

## 2022-09-29 ASSESSMENT — PATIENT HEALTH QUESTIONNAIRE - PHQ9
SUM OF ALL RESPONSES TO PHQ9 QUESTIONS 1 & 2: 0
SUM OF ALL RESPONSES TO PHQ QUESTIONS 1-9: 0
1. LITTLE INTEREST OR PLEASURE IN DOING THINGS: 0
2. FEELING DOWN, DEPRESSED OR HOPELESS: 0

## 2022-09-29 ASSESSMENT — LIFESTYLE VARIABLES
HOW MANY STANDARD DRINKS CONTAINING ALCOHOL DO YOU HAVE ON A TYPICAL DAY: PATIENT DECLINED
HOW OFTEN DO YOU HAVE A DRINK CONTAINING ALCOHOL: NEVER

## 2022-09-29 NOTE — PROGRESS NOTES
Medicare Annual Wellness Visit    Melissa Tapia is here for Medicare AWV    Assessment & Plan   Urinary tract infection without hematuria, site unspecified  -     AMB POC URINALYSIS DIP STICK AUTO W/O MICRO  Medicare annual wellness visit, subsequent  Advanced directives, counseling/discussion  Frequent urination  -     Ambulatory referral to Urology  Menopause  -     Ambulatory referral to Urology    Recommendations for Preventive Services Due: see orders and patient instructions/AVS.  Recommended screening schedule for the next 5-10 years is provided to the patient in written form: see Patient Instructions/AVS.  I also wanted to take advantage of their presence here today to complete an   [employer] [address] which is voluntary (at discretion of the patient but I have informed them that this service has been reccommended as a worthwhile service). No follow-ups on file. Subjective       Patient's complete Health Risk Assessment and screening values have been reviewed and are found in Flowsheets. The following problems were reviewed today and where indicated follow up appointments were made and/or referrals ordered. Positive Risk Factor Screenings with Interventions:    Fall Risk:  Do you feel unsteady or are you worried about falling? : (!) yes  2 or more falls in past year?: no  Fall with injury in past year?: no   Fall Risk Interventions:     Will discuss fall prevention class            General Health and ACP:  General  In general, how would you say your health is?: Good  In the past 7 days, have you experienced any of the following: New or Increased Pain, New or Increased Fatigue, Loneliness, Social Isolation, Stress or Anger?: No  Do you get the social and emotional support that you need?: Yes  Do you have a Living Will?: (!) No    Advance Directives       Power of [address] [place] [address] Will ACP-Advance Directive ACP-Power of     Not on File Not on File Not on File Not on File          General Health Risk Interventions: Will be giving adv care direction today and printed info    Health Habits/Nutrition:  Physical Activity: Insufficiently Active    Days of Exercise per Week: 3 days    Minutes of Exercise per Session: 30 min     Have you lost any weight without trying in the past 3 months?: (!) Yes  Body mass index: (!) 35.67  Have you seen the dentist within the past year?: Yes  Health Habits/Nutrition Interventions:  Contine exercise effort if possible             Objective   Vitals:    09/29/22 1030   BP: 126/70   Site: Left Upper Arm   Position: Sitting   Weight: 221 lb (100.2 kg)   Height: 5' 6\" (1.676 m)      Body mass index is 35.67 kg/m². Allergies   Allergen Reactions    Latex Other (See Comments)     wheezing    Other Shortness Of Breath     PER PT-- ALL COLOGNE WILL START AN ASTHMA ATTACK    Sulfa Antibiotics Nausea Only     Prior to Visit Medications    Medication Sig Taking?  Authorizing Provider   ciprofloxacin (CIPRO) 500 MG tablet   Historical Provider, MD   montelukast (SINGULAIR) 10 MG tablet Take 1 tablet by mouth nightly  Claudius Kanner, MD   atorvastatin (LIPITOR) 40 MG tablet Take 1 tablet by mouth at bedtime  Claudius Kanner, MD   Beclomethasone Dipropionate (QVAR IN) Inhale into the lungs 2 times daily as needed  Historical Provider, MD   amLODIPine (NORVASC) 10 MG tablet Take 1 tablet by mouth daily  Maty Bullock MD   dicyclomine (BENTYL) 20 MG tablet Take 1 tablet by mouth every 6 hours  Patient taking differently: Take 20 mg by mouth 3 times daily  Maty Bullock MD   pantoprazole (PROTONIX) 40 MG tablet Take 1 tablet by mouth 2 times daily  Maty Bullock MD   valsartan (DIOVAN) 160 MG tablet Take 1 tablet by mouth daily  Maty Bullock MD   albuterol sulfate  (90 Base) MCG/ACT inhaler Inhale 2 puffs into the lungs every 4 hours as needed  Ar Automatic Reconciliation   Cetirizine HCl 10 MG CAPS Take 1 tablet by mouth daily  Ar Automatic Reconciliation   EPINEPHrine (EPIPEN) 0.3 MG/0.3ML SOAJ injection Inject 0.3 mg into the muscle once as needed  Ar Automatic Reconciliation   fluticasone (FLONASE) 50 MCG/ACT nasal spray 2 sprays by Nasal route daily  Ar Automatic Reconciliation       Patient is the designated person who is capable of making the decisions we will discuss  Some of today's visit spent counseling for [address] We mainly discuss future care decisions that need to be made or will soon need to be made. I want to emphasize that you need to let others know about your care preferences including your family members and maybe even a close friend or neighbor you trust.  I have explained what an advanced care directive is and will give you more information at your exit today on your out-processing paper. Most of these wishes will require completion of standard forms so be prepared to sit down and fill those out. You will then want to keep copies in your home, probably a copy with a close relative, and one in your lock-box. You will want to include your short term philosophy of treatment options that you are comfortable with as well as the more important preferences for long-term goals and choices for care  It is important that you include the types of care that you will prefer such as aggressive surgeries or radiation/chemotherapy should you contract a cancer; whether you want to be hospitalized for inoperable cancers; whether you decide to be a \"no code\"or similar decision to avoid \"heroic\" measures such as cpr, intubation, feeding tubes etc.    Also your feelings about Palliative Care in your home should you have a terminal disease OR Palliative Care in a separate facility of your choice. Comfort level is something you must decide on and inform a family member your desires for that. The absolute bare minimum of what you need to have is a Living Will and a [address]     A \"health care power of \" is a document that allows you to appoint someone to make your health care decisions in the event that you are unable to to so. You should also appoint a backup person in the event your first choice is not available. This document can be printed from the following website and completed free of charge without the services of an :  http://aging.[place].gov/SiteCollectionDocuments/S/[facility] Emergency [email or web address]. pdf    A \"Living Will\" is a document that gives you the ability to state your wishes in writing about end of life health care decisions. It allows you to choose if you will receive CPR in the event of a life threatening emergency. It also allows you to choose or refuse specific emergency treatments such as chest compressions, artificial respirations, emergency medications, a mechanical ventilator and/or a tube feeding. This document can be prepared in the privacy of your own home and notarized at your bank. The Living Will form can be printed out at this website: http://aging.[place].gov/SiteCollectionDocuments/L/FzhgkxGrds4500%20%28Bar%29. .pdf    Because of the [place] pandemic, physicians are learning of the importance of speaking with our patients regarding their preferences for end-of-life medical care and other measures aimed at prolonging health and extending life. The importance of understanding your values and goals if you were to develop a serious illness requiring hospitalization, mechanical ventilation, or CPR not limited to something like COVID-19, but influenza, and other serious illnesses is important so I want to document your preferences for the following:    Who is your Medical Decision Maker should you become incapacitated?son [address]  CPR preference?yes, do  Ventilator preference? yes do  Hospitalization preference? either [facility] or [facility]    You may bring these completed forms to the office to be scanned into your medical record, but it is more important that you give a copy to each of your family members, discuss them with your chosen representative, and let them know where the original is kept. Face to Face time pertaining to the Advanced Care dialogue only (not the other portions of the office visit related to evaluation and management of medical problems) with the patient,  first 30 minutes; specifically, the approximate time I spent on this today was right at 16-17minutes. Patient also recently was at urgent care where she was diagnosed with a UTI. Was told to follow-up UA here. Is asymptomatic. UA with viewed dip today-negative except for +/- RBCs  Assessment/Plan:     1. Urinary tract infection without hematuria, site unspecified    2. Medicare annual wellness visit, subsequent    3. Advanced directives, counseling/discussion    4. Frequent urination    5.  Menopause        CareTeam (Including outside providers/suppliers regularly involved in providing care):   Patient Care Team:  Josep Griffith MD as PCP - Reagan Bah MD as PCP - [facility] Empaneled Provider     Reviewed and updated this visit:

## 2022-09-29 NOTE — PATIENT INSTRUCTIONS
Personalized Preventive Plan for Adrianne Rhodes - 9/29/2022  Medicare offers a range of preventive health benefits. Some of the tests and screenings are paid in full while other may be subject to a deductible, co-insurance, and/or copay. Some of these benefits include a comprehensive review of your medical history including lifestyle, illnesses that may run in your family, and various assessments and screenings as appropriate. After reviewing your medical record and screening and assessments performed today your provider may have ordered immunizations, labs, imaging, and/or referrals for you. A list of these orders (if applicable) as well as your Preventive Care list are included within your After Visit Summary for your review. Other Preventive Recommendations:    A preventive eye exam performed by an eye specialist is recommended every 1-2 years to screen for glaucoma; cataracts, macular degeneration, and other eye disorders. A preventive dental visit is recommended every 6 months. Try to get at least 150 minutes of exercise per week or 10,000 steps per day on a pedometer . Order or download the FREE \"Exercise & Physical Activity: Your Everyday Guide\" from [employer]. Call 0-730.785.3798 or search [employer] online. You need 2737-8463 mg of calcium and 5795-2275 IU of vitamin D per day. It is possible to meet your calcium requirement with diet alone, but a vitamin D supplement is usually necessary to meet this goal.  When exposed to the sun, use a sunscreen that protects against both UVA and UVB radiation with an SPF of 30 or greater. Reapply every 2 to 3 hours or after sweating, drying off with a towel, or swimming. Always wear a seat belt when traveling in a car. Always wear a helmet when riding a bicycle or motorcycle.   I want to emphasize that you need to let others know about your care preferences including your family members and maybe even a close

## 2022-10-04 NOTE — PROGRESS NOTES
[address] [place] [address] Court, [address] [facility]  [phone]    Patient Name:  Tyson Carlos  YOB: 1955      Office Visit 10/6/2022    CHIEF COMPLAINT:      Chief Complaint   Patient presents with    Follow-up    Sleep Apnea    CPAP/BiPAP         HISTORY OF PRESENT ILLNESS:      Patient is a 62 yo [place] American female seen today for follow up of sleep apnea. Patient is prescribed BiPAP set at a pressure of 8/4 cmH2O using a full face mask. She had a replacement machine since her machine was on the recall list.  Her download indicated that her average daily use is 4 hours and 8 minutes. Her compliance is clearly intermittent and her AHI is 2.9/hour. She states she feels this is partly due to anxiety of being home alone but she after her son passed away with pancreatic cancer in March of this year. She also has underlining asthma and followed by her allergist and uses Xolair injection every 2 weeks. The [place] score today is 2 out of 24. I suggested to her that we can try melatonin to improve her sleep duration and encouraged her to put the machine on as soon as she get drowsy. Hopefully she can improve her compliance with that and will arrange close follow-up in a few months to ensure improvement in her compliance.     Sleep Medicine 10/6/2022 9/23/2021   Sitting and reading 0 0   Watching TV 1 1   Sitting, inactive in a public place (e.g. a theatre or a meeting) 0 0   As a passenger in a car for an hour without a break 0 0   Lying down to rest in the afternoon when circumstances permit 1 1   Sitting and talking to someone 0 0   Sitting quietly after a lunch without alcohol 0 0   In a car, while stopped for a few minutes in traffic 0 0   [place] Sleepiness Score 2 2                 Past Medical History:   Diagnosis Date    Allergic rhinitis 4/24/2015    Dr. Charles Thurman    Asthma     prn inhaler and neb    Calculus of gallbladder with acute cholecystitis without obstruction 6/30/2022    Cholecystitis 6/30/2022    Added automatically from request for surgery [id number]    Cystocele, midline 4/24/2015    Diverticulosis of colon 4/24/2015    Edema 4/24/2015    Including peripheral edema. ( SOMETIMES) PER PT    Female stress incontinence     GERD (gastroesophageal reflux disease)     controlled with med    Hypercholesterolemia     no meds currently    Hyperplastic polyps of stomach 4/24/2015    Colonsocpy. 2013.     Hypertension     controlled with med    Internal hemorrhoids without mention of complication 3/35/6074    Mixed hyperlipidemia 8/16/2022    Obesity (BMI 30-39.9) 11/14/2017    BMI = 35    Obstructive sleep apnea 11/14/2017    wears cpap at hs    Other diseases of lung, not elsewhere classified 4/24/2015    being rechecked 8/2022-- followed by dr Jose Slade    Right upper quadrant pain     Urge incontinence     Ventricular bigeminy 8/31/2016    per pt-- was told it was ok-- does not see a cardiologist at present         Patient Active Problem List   Diagnosis    Tubulovillous adenoma of colon    Ventricular bigeminy    Urge incontinence    Essential hypertension    Dysmenorrhea    Advanced directives, counseling/discussion    Hypercholesteremia    Diverticulosis of colon    Antral gastritis    Moderate persistent asthma without complication    Obstructive sleep apnea    Prediabetes    Chondromalacia of right patella    Medicare annual wellness visit, subsequent    Atrophic vaginitis    Cystocele, midline    Chronic anxiety    Pneumothorax, right    S/P laparoscopic cholecystectomy    Morbid obesity ([address]    Allergic rhinitis due to pollen    Mixed hyperlipidemia    Postprocedural pneumothorax    Inadequate sleep hygiene          Past Surgical History:   Procedure Laterality Date    CHOLECYSTECTOMY, LAPAROSCOPIC N/A 8/12/2022    ROBO SINGLE SITE CHOLECYSTECTOMY performed by Natty Esquivel MD at [facility] MAIN OR    HYSTERECTOMY (CERVIX STATUS UNKNOWN)      KNEE ARTHROSCOPY Bilateral        [unfilled]        Social History     Socioeconomic History    Marital status:      Spouse name: Not on file    Number of children: Not on file    Years of education: Not on file    Highest education level: Not on file   Occupational History    Not on file   Tobacco Use    Smoking status: Former     Packs/day: 0.25     Years: 4.00     Pack years: 1.00     Types: Cigarettes     Quit date: 10/16/1996     Years since quittin.9    Smokeless tobacco: Never   Vaping Use    Vaping Use: Never used   Substance and Sexual Activity    Alcohol use: No    Drug use: No    Sexual activity: Not on file   Other Topics Concern    Not on file   Social History Narrative    Not on file     Social Determinants of Health     Financial Resource Strain: Not on file   Food Insecurity: Not on file   Transportation Needs: Not on file   Physical Activity: Insufficiently Active    Days of Exercise per Week: 3 days    Minutes of Exercise per Session: 30 min   Stress: Not on file   Social Connections: Not on file   Intimate Partner Violence: Not on file   Housing Stability: Not on file         Family History   Problem Relation Age of Onset    Asthma Mother     Hypertension Sister     Diabetes Sister     Cancer Other         Lung    Breast Cancer Neg Hx     Other Neg Hx     Post-op Cognitive Dysfunction Neg Hx     Emergence Delirium Neg Hx     Post-op Nausea/Vomiting Neg Hx     Delayed Awakening Neg Hx     Pseudochol.  Deficiency Neg Hx     Malig Hypertherm Neg Hx          Allergies   Allergen Reactions    Latex Other (See Comments)     wheezing    Other Shortness Of Breath     PER PT-- ALL COLOGNE WILL START AN ASTHMA ATTACK    Sulfa Antibiotics Nausea Only         Current Outpatient Medications   Medication Sig    melatonin 3 MG TABS tablet Take 1 tablet by mouth daily    ciprofloxacin (CIPRO) 500 MG tablet     montelukast (SINGULAIR) 10 MG tablet Take 1 tablet by mouth nightly    atorvastatin (LIPITOR) 40 MG tablet Take 1 tablet by mouth at bedtime    Beclomethasone Dipropionate (QVAR IN) Inhale into the lungs 2 times daily as needed    amLODIPine (NORVASC) 10 MG tablet Take 1 tablet by mouth daily    pantoprazole (PROTONIX) 40 MG tablet Take 1 tablet by mouth 2 times daily    valsartan (DIOVAN) 160 MG tablet Take 1 tablet by mouth daily    albuterol sulfate  (90 Base) MCG/ACT inhaler Inhale 2 puffs into the lungs every 4 hours as needed    Cetirizine HCl 10 MG CAPS Take 1 tablet by mouth daily    EPINEPHrine (EPIPEN) 0.3 MG/0.3ML SOAJ injection Inject 0.3 mg into the muscle once as needed    fluticasone (FLONASE) 50 MCG/ACT nasal spray 2 sprays by Nasal route daily     No current facility-administered medications for this visit. REVIEW OF SYSTEMS:   CONSTITUTIONAL:   There is no history of fever, chills, night sweats, weight loss, weight gain, persistent fatigue, or lethargy/hypersomnolence. CARDIAC:   No chest pain, pressure, discomfort, palpitations, orthopnea, murmurs, or edema. GI:   No dysphagia, heartburn reflux, nausea/vomiting, diarrhea, abdominal pain, or bleeding. NEURO:   There is no history of AMS, persistent headache, decreased level of consciousness, seizures, or motor or sensory deficits. PHYSICAL EXAM:    Vitals:    10/06/22 1124   BP: 118/84   Pulse: 72   Resp: 14   Temp: (!) 96.7 °F (35.9 °C)   SpO2: 99%        GENERAL APPEARANCE:   The patient is normal weight and in no respiratory distress. HEENT:   PERRL. Conjunctivae unremarkable. Nasal mucosa is without epistaxis, exudate, or polyps. Gums and dentition are unremarkable. There is oropharyngeal narrowing. TMs are clear. NECK/LYMPHATIC:   Symmetrical with no elevation of jugular venous pulsation. Trachea midline. No thyroid enlargement. No cervical adenopathy. LUNGS:   Normal respiratory effort with symmetrical lung expansion. Breath sounds are diminished in the bases.    HEART:   There is a regular rate and rhythm. No murmur, rub, or gallop. There is no edema in the lower extremities. ABDOMEN:   Soft and non-tender. No hepatosplenomegaly. Bowel sounds are normal.     NEURO:   The patient is alert and oriented to person, place, and time. Memory appears intact and mood is normal.  No gross sensorimotor deficits are present. ASSESSMENT:  (Medical Decision Making)      Diagnosis Orders   1. Obstructive sleep apnea, this being treated with BiPAP 8/4 cm with intermittent compliance. 2. Chronic anxiety, this is aggravated by her recent loss of her son       3. Inadequate sleep hygiene, proper sleep hygiene are strongly encouraged to improve her treatment of sleep apnea            PLAN:    Continue BiPAP at 8/4 cm with humidity and C-Flex of 2    Encouraged the patient to follow proper sleep hygiene and positional therapy    The patient will start melatonin 3 mg nightly    Maintain her follow-up with primary care physician    Return to sleep center in 3 months or sooner if needed    All questions and concerns were addressed properly    No orders of the defined types were placed in this encounter. Orders Placed This Encounter   Medications    melatonin 3 MG TABS tablet     Sig: Take 1 tablet by mouth daily     Dispense:  30 tablet     Refill:  3          Over 50% of today's office visit was spent in face to face time reviewing test results, prognosis, importance of compliance, education about disease process, benefits of medications, instructions for management of acute flare-ups, and follow up plans. Total face to face time spent with patient and charting was 30 minutes.         Maximo Olvera MD  Electronically signed

## 2022-10-06 ENCOUNTER — OFFICE VISIT (OUTPATIENT)
Dept: SLEEP MEDICINE | Age: 67
End: 2022-10-06
Payer: MEDICARE

## 2022-10-06 VITALS
BODY MASS INDEX: 35.52 KG/M2 | SYSTOLIC BLOOD PRESSURE: 118 MMHG | RESPIRATION RATE: 14 BRPM | DIASTOLIC BLOOD PRESSURE: 84 MMHG | HEIGHT: 66 IN | WEIGHT: 221 LBS | TEMPERATURE: 96.7 F | HEART RATE: 72 BPM | OXYGEN SATURATION: 99 %

## 2022-10-06 DIAGNOSIS — F41.9 CHRONIC ANXIETY: ICD-10-CM

## 2022-10-06 DIAGNOSIS — G47.33 OBSTRUCTIVE SLEEP APNEA: Primary | ICD-10-CM

## 2022-10-06 DIAGNOSIS — Z72.821 INADEQUATE SLEEP HYGIENE: ICD-10-CM

## 2022-10-06 PROCEDURE — 1090F PRES/ABSN URINE INCON ASSESS: CPT | Performed by: INTERNAL MEDICINE

## 2022-10-06 PROCEDURE — 99214 OFFICE O/P EST MOD 30 MIN: CPT | Performed by: INTERNAL MEDICINE

## 2022-10-06 PROCEDURE — G8417 CALC BMI ABV UP PARAM F/U: HCPCS | Performed by: INTERNAL MEDICINE

## 2022-10-06 PROCEDURE — G8427 DOCREV CUR MEDS BY ELIG CLIN: HCPCS | Performed by: INTERNAL MEDICINE

## 2022-10-06 PROCEDURE — 1036F TOBACCO NON-USER: CPT | Performed by: INTERNAL MEDICINE

## 2022-10-06 PROCEDURE — G8400 PT W/DXA NO RESULTS DOC: HCPCS | Performed by: INTERNAL MEDICINE

## 2022-10-06 PROCEDURE — 1123F ACP DISCUSS/DSCN MKR DOCD: CPT | Performed by: INTERNAL MEDICINE

## 2022-10-06 PROCEDURE — 3017F COLORECTAL CA SCREEN DOC REV: CPT | Performed by: INTERNAL MEDICINE

## 2022-10-06 PROCEDURE — G8484 FLU IMMUNIZE NO ADMIN: HCPCS | Performed by: INTERNAL MEDICINE

## 2022-10-06 RX ORDER — LANOLIN ALCOHOL/MO/W.PET/CERES
3 CREAM (GRAM) TOPICAL DAILY
Qty: 30 TABLET | Refills: 3 | Status: SHIPPED | OUTPATIENT
Start: 2022-10-06

## 2022-10-06 ASSESSMENT — SLEEP AND FATIGUE QUESTIONNAIRES
HOW LIKELY ARE YOU TO NOD OFF OR FALL ASLEEP WHILE LYING DOWN TO REST IN THE AFTERNOON WHEN CIRCUMSTANCES PERMIT: 1
HOW LIKELY ARE YOU TO NOD OFF OR FALL ASLEEP WHILE SITTING INACTIVE IN A PUBLIC PLACE: 0
ESS TOTAL SCORE: 2
HOW LIKELY ARE YOU TO NOD OFF OR FALL ASLEEP WHILE SITTING AND READING: 0
HOW LIKELY ARE YOU TO NOD OFF OR FALL ASLEEP WHEN YOU ARE A PASSENGER IN A CAR FOR AN HOUR WITHOUT A BREAK: 0
HOW LIKELY ARE YOU TO NOD OFF OR FALL ASLEEP WHILE WATCHING TV: 1
HOW LIKELY ARE YOU TO NOD OFF OR FALL ASLEEP IN A CAR, WHILE STOPPED FOR A FEW MINUTES IN TRAFFIC: 0
HOW LIKELY ARE YOU TO NOD OFF OR FALL ASLEEP WHILE SITTING AND TALKING TO SOMEONE: 0
HOW LIKELY ARE YOU TO NOD OFF OR FALL ASLEEP WHILE SITTING QUIETLY AFTER LUNCH WITHOUT ALCOHOL: 0

## 2022-10-07 ENCOUNTER — HOSPITAL ENCOUNTER (OUTPATIENT)
Dept: MAMMOGRAPHY | Age: 67
Discharge: HOME | End: 2022-10-10
Payer: MEDICARE

## 2022-10-07 ENCOUNTER — TELEPHONE (OUTPATIENT)
Dept: SLEEP MEDICINE | Age: 67
End: 2022-10-07

## 2022-10-07 DIAGNOSIS — Z12.31 VISIT FOR SCREENING MAMMOGRAM: ICD-10-CM

## 2022-10-07 PROCEDURE — 77063 BREAST TOMOSYNTHESIS BI: CPT

## 2022-10-07 NOTE — TELEPHONE ENCOUNTER
Patient says when she was in for her appt , she brought her cpap . And she said when she got it out last night her power cord was not with it. Karan Weir said that he had to us one here in the office .  But if patient does not find it he will replace it

## 2022-10-17 ENCOUNTER — OFFICE VISIT (OUTPATIENT)
Dept: UROLOGY | Age: 67
End: 2022-10-17
Payer: MEDICARE

## 2022-10-17 DIAGNOSIS — N39.0 ACUTE UTI: ICD-10-CM

## 2022-10-17 DIAGNOSIS — R31.29 MICROHEMATURIA: ICD-10-CM

## 2022-10-17 DIAGNOSIS — N32.81 OAB (OVERACTIVE BLADDER): Primary | ICD-10-CM

## 2022-10-17 LAB
BILIRUBIN, URINE, POC: NEGATIVE
BLOOD URINE, POC: NORMAL
GLUCOSE URINE, POC: NEGATIVE
KETONES, URINE, POC: NORMAL
LEUKOCYTE ESTERASE, URINE, POC: NEGATIVE
NITRITE, URINE, POC: NEGATIVE
PH, URINE, POC: 6 (ref 4.6–8)
PROTEIN,URINE, POC: NEGATIVE
SPECIFIC GRAVITY, URINE, POC: 1.02 (ref 1–1.03)
URINALYSIS CLARITY, POC: NORMAL
URINALYSIS COLOR, POC: NORMAL
UROBILINOGEN, POC: NORMAL

## 2022-10-17 PROCEDURE — 81003 URINALYSIS AUTO W/O SCOPE: CPT | Performed by: NURSE PRACTITIONER

## 2022-10-17 PROCEDURE — G8484 FLU IMMUNIZE NO ADMIN: HCPCS | Performed by: NURSE PRACTITIONER

## 2022-10-17 PROCEDURE — G8400 PT W/DXA NO RESULTS DOC: HCPCS | Performed by: NURSE PRACTITIONER

## 2022-10-17 PROCEDURE — 99204 OFFICE O/P NEW MOD 45 MIN: CPT | Performed by: NURSE PRACTITIONER

## 2022-10-17 PROCEDURE — G8427 DOCREV CUR MEDS BY ELIG CLIN: HCPCS | Performed by: NURSE PRACTITIONER

## 2022-10-17 PROCEDURE — G8417 CALC BMI ABV UP PARAM F/U: HCPCS | Performed by: NURSE PRACTITIONER

## 2022-10-17 PROCEDURE — 1123F ACP DISCUSS/DSCN MKR DOCD: CPT | Performed by: NURSE PRACTITIONER

## 2022-10-17 PROCEDURE — 1090F PRES/ABSN URINE INCON ASSESS: CPT | Performed by: NURSE PRACTITIONER

## 2022-10-17 PROCEDURE — 3017F COLORECTAL CA SCREEN DOC REV: CPT | Performed by: NURSE PRACTITIONER

## 2022-10-17 PROCEDURE — 1036F TOBACCO NON-USER: CPT | Performed by: NURSE PRACTITIONER

## 2022-10-17 ASSESSMENT — ENCOUNTER SYMPTOMS
HEARTBURN: 1
EYES NEGATIVE: 1
SHORTNESS OF BREATH: 1
BACK PAIN: 1
WHEEZING: 1
ABDOMINAL PAIN: 1
INDIGESTION: 1

## 2022-10-17 NOTE — PROGRESS NOTES
[facility]rology  [address] [facility] [address], [address] [facility]  [phone]          Jimmy Garnica  : 1955    Chief Complaint   Patient presents with    New Patient     Urinary Frequency          HPI     Jimmy Garnica is a 74 y.o. female who saw Dr Izabella Wick in  for urinary frequency. Found to have microhematuria. Had negative hematuria work up in . Seen again in  by Jessica Morrow NP for ER follow up. She had CT urogram that was normal. Urine culture negative. Cont to have R flank pain. Exam was unremarkable. She was referred back today for urinary frequency. She reports daytime frequency of every 1-1.5 hr. Nocturia x 3/night. Sign urgency and UUI. Having to wear a pad at all times. She has NOT tried any tx for urinary sx. She had tried to reduce her intake of liquid prior to bed. She drinks minimal caffeine. .  x 4. All babies under 10 lb. Had hysterectomy for endometriosis in . No h/o urological surgery. She denies vaginal pain/bleeding or POP. She recently had UTI . No issues since. No sign h/o recurrent UTI. No h/o renal stones. She reports several members of her family have h/o bladder CA. No other personal or family h/o urological CA. Former smoker. NO gross hematuria. PVR 1 cc via US in office today    Recently diagnosed w early stage of glaucoma and dry eye. Past Medical History:   Diagnosis Date    Allergic rhinitis 2015    Dr. Marah Chamorro Asthma     prn inhaler and neb    Calculus of gallbladder with acute cholecystitis without obstruction 2022    Cholecystitis 2022    Added automatically from request for surgery [id number]    Cystocele, midline 2015    Diverticulosis of colon 2015    Edema 2015    Including peripheral edema. ( SOMETIMES) PER PT    Female stress incontinence     GERD (gastroesophageal reflux disease)     controlled with med    Hypercholesterolemia     no meds currently    Hyperplastic polyps of stomach 4/24/2015    Colonsocpy. 2013.     Hypertension     controlled with med    Internal hemorrhoids without mention of complication 0/06/9054    Mixed hyperlipidemia 8/16/2022    Obesity (BMI 30-39.9) 11/14/2017    BMI = 35    Obstructive sleep apnea 11/14/2017    wears cpap at hs    Other diseases of lung, not elsewhere classified 4/24/2015    being rechecked 8/2022-- followed by dr Alina Jay Right upper quadrant pain     Urge incontinence     Ventricular bigeminy 8/31/2016    per pt-- was told it was ok-- does not see a cardiologist at present     Past Surgical History:   Procedure Laterality Date    CHOLECYSTECTOMY, LAPAROSCOPIC N/A 8/12/2022    [address] performed by Albin Zhang MD at [facility] MAIN OR    HYSTERECTOMY (CERVIX STATUS UNKNOWN)      KNEE ARTHROSCOPY Bilateral      Current Outpatient Medications   Medication Sig Dispense Refill    mirabegron (MYRBETRIQ) 25 MG TB24 Take 1 tablet by mouth daily 30 tablet 0    melatonin 3 MG TABS tablet Take 1 tablet by mouth daily 30 tablet 3    ciprofloxacin (CIPRO) 500 MG tablet       montelukast (SINGULAIR) 10 MG tablet Take 1 tablet by mouth nightly 30 tablet 3    atorvastatin (LIPITOR) 40 MG tablet Take 1 tablet by mouth at bedtime 30 tablet 3    Beclomethasone Dipropionate (QVAR IN) Inhale into the lungs 2 times daily as needed      amLODIPine (NORVASC) 10 MG tablet Take 1 tablet by mouth daily 30 tablet 5    pantoprazole (PROTONIX) 40 MG tablet Take 1 tablet by mouth 2 times daily 30 tablet 5    valsartan (DIOVAN) 160 MG tablet Take 1 tablet by mouth daily 30 tablet 5    albuterol sulfate  (90 Base) MCG/ACT inhaler Inhale 2 puffs into the lungs every 4 hours as needed      Cetirizine HCl 10 MG CAPS Take 1 tablet by mouth daily      EPINEPHrine (EPIPEN) 0.3 MG/0.3ML SOAJ injection Inject 0.3 mg into the muscle once as needed      fluticasone (FLONASE) 50 MCG/ACT nasal spray 2 sprays by Nasal route daily       No current facility-administered medications for this visit. Allergies   Allergen Reactions    Latex Other (See Comments)     wheezing    Other Shortness Of Breath     PER PT-- ALL COLOGNE WILL START AN ASTHMA ATTACK    Sulfa Antibiotics Nausea Only     Social History     Socioeconomic History    Marital status:      Spouse name: Not on file    Number of children: Not on file    Years of education: Not on file    Highest education level: Not on file   Occupational History    Not on file   Tobacco Use    Smoking status: Former     Packs/day: 0.25     Years: 4.00     Pack years: 1.00     Types: Cigarettes     Quit date: 10/16/1996     Years since quittin.0    Smokeless tobacco: Never   Vaping Use    Vaping Use: Never used   Substance and Sexual Activity    Alcohol use: No    Drug use: No    Sexual activity: Not on file   Other Topics Concern    Not on file   Social History Narrative    Not on file     Social Determinants of Health     Financial Resource Strain: Not on file   Food Insecurity: Not on file   Transportation Needs: Not on file   Physical Activity: Insufficiently Active    Days of Exercise per Week: 3 days    Minutes of Exercise per Session: 30 min   Stress: Not on file   Social Connections: Not on file   Intimate Partner Violence: Not on file   Housing Stability: Not on file     Family History   Problem Relation Age of Onset    Asthma Mother     Hypertension Sister     Diabetes Sister     Cancer Other         Lung    Breast Cancer Neg Hx     Other Neg Hx     Post-op Cognitive Dysfunction Neg Hx     Emergence Delirium Neg Hx     Post-op Nausea/Vomiting Neg Hx     Delayed Awakening Neg Hx     Pseudochol. Deficiency Neg Hx     Malig Hypertherm Neg Hx        Review of Systems  Constitutional: NegativePositive for headaches. Skin: Positive for itching. Eyes: Eyes negative  ENT: Positive for dry mouth.   Respiratory: Positive for shortness of to person, place, and time  Neck - supple, no significant adenopathy  Chest/Lung-  Quiet, even and easy respiratory effort without use of accessory muscles  Skin - normal coloration and turgor, no rashes      Assessment and Plan    ICD-10-CM    1. OAB (overactive bladder)  N32.81 AMB POC URINALYSIS DIP STICK AUTO W/O MICRO     AMB POC PVR, MANDEEP,POST-VOID RES,US,NON-IMAGING      2. Microhematuria  R31.29       3. Acute UTI  N39.0 AMB POC URINALYSIS DIP STICK AUTO W/O MICRO          OAB- PVR low. LUTS consistent w OAB. She opts to HOLD on pelvic exam today. May consider in the near future if sx persist/worsen. Discussed tx options including PT vs meds vs botox vs interstim. She opts for medication. I do NOT recommend anticholinergic d/t glaucoma and dry eye. Will start Myrbetriq 25 mg PO daily. Risks, benefits, and alternatives reviewed. RTO in 6 weeks for follow up with me. Advised to call sooner if sx worsen. Microhematuria- urine normal. Cont to follow. Acute UTI- urine normal. No antibiotic tx today. Advised to call this office if develops sx of UTI for specimen check. If unable to come to this office, encouraged to have UC performed and records sent to this office. [place], JASSIP, APRN - CNP  Dr. Rowdy Azar is supervising physician today and he approves plan of care.

## 2022-11-28 ENCOUNTER — OFFICE VISIT (OUTPATIENT)
Dept: UROLOGY | Age: 67
End: 2022-11-28
Payer: MEDICARE

## 2022-11-28 DIAGNOSIS — N32.81 OAB (OVERACTIVE BLADDER): Primary | ICD-10-CM

## 2022-11-28 DIAGNOSIS — N39.0 ACUTE UTI: ICD-10-CM

## 2022-11-28 DIAGNOSIS — R31.29 MICROHEMATURIA: ICD-10-CM

## 2022-11-28 LAB
BILIRUBIN, URINE, POC: NEGATIVE
BLOOD URINE, POC: NORMAL
GLUCOSE URINE, POC: NEGATIVE
KETONES, URINE, POC: NORMAL
LEUKOCYTE ESTERASE, URINE, POC: NEGATIVE
NITRITE, URINE, POC: NEGATIVE
PH, URINE, POC: 6 (ref 4.6–8)
PROTEIN,URINE, POC: NEGATIVE
SPECIFIC GRAVITY, URINE, POC: 1.03 (ref 1–1.03)
URINALYSIS CLARITY, POC: NORMAL
URINALYSIS COLOR, POC: NORMAL
UROBILINOGEN, POC: NORMAL

## 2022-11-28 PROCEDURE — 3017F COLORECTAL CA SCREEN DOC REV: CPT | Performed by: NURSE PRACTITIONER

## 2022-11-28 PROCEDURE — G8400 PT W/DXA NO RESULTS DOC: HCPCS | Performed by: NURSE PRACTITIONER

## 2022-11-28 PROCEDURE — G8427 DOCREV CUR MEDS BY ELIG CLIN: HCPCS | Performed by: NURSE PRACTITIONER

## 2022-11-28 PROCEDURE — 1123F ACP DISCUSS/DSCN MKR DOCD: CPT | Performed by: NURSE PRACTITIONER

## 2022-11-28 PROCEDURE — 1036F TOBACCO NON-USER: CPT | Performed by: NURSE PRACTITIONER

## 2022-11-28 PROCEDURE — G8417 CALC BMI ABV UP PARAM F/U: HCPCS | Performed by: NURSE PRACTITIONER

## 2022-11-28 PROCEDURE — 1090F PRES/ABSN URINE INCON ASSESS: CPT | Performed by: NURSE PRACTITIONER

## 2022-11-28 PROCEDURE — 81003 URINALYSIS AUTO W/O SCOPE: CPT | Performed by: NURSE PRACTITIONER

## 2022-11-28 PROCEDURE — 99214 OFFICE O/P EST MOD 30 MIN: CPT | Performed by: NURSE PRACTITIONER

## 2022-11-28 PROCEDURE — G8484 FLU IMMUNIZE NO ADMIN: HCPCS | Performed by: NURSE PRACTITIONER

## 2022-11-28 ASSESSMENT — ENCOUNTER SYMPTOMS
BACK PAIN: 0
ABDOMINAL PAIN: 0

## 2022-11-28 NOTE — PROGRESS NOTES
[facility]  [address] [facility] [address], [address] [facility]  [phone]          Edwar Morris  : 1955    Chief Complaint   Patient presents with    Follow-up     6 weeks-OAB/Microhematuria          HPI     Edwar Morris is a 74 y.o. female who saw Dr Darlene Tuttle in  for urinary frequency. Found to have microhematuria. Had negative hematuria work up in . Seen again in  by Denisa Cintron NP for ER follow up. She had CT urogram that was normal. Urine culture negative. Cont to have R flank pain. Exam was unremarkable. She was referred back today for urinary frequency. She reports daytime frequency of every 1-1.5 hr. Nocturia x 3/night. Sign urgency and UUI. Having to wear a pad at all times. She has NOT tried any tx for urinary sx. She had tried to reduce her intake of liquid prior to bed. She drinks minimal caffeine. .  x 4. All babies under 10 lb. Had hysterectomy for endometriosis in . No h/o urological surgery. She denies vaginal pain/bleeding or POP. She recently had UTI . No issues since. No sign h/o recurrent UTI. No h/o renal stones. She reports several members of her family have h/o bladder CA. No other personal or family h/o urological CA. Former smoker. NO gross hematuria. Started on Myrbetriq 25 mg PO daily. This has helped. PVR 1 cc via US 10/22     Recently diagnosed w early stage of glaucoma and dry eye. Past Medical History:   Diagnosis Date    Allergic rhinitis 2015    Dr. Criselda Manning    Asthma     prn inhaler and neb    Calculus of gallbladder with acute cholecystitis without obstruction 2022    Cholecystitis 2022    Added automatically from request for surgery [id number]    Cystocele, midline 2015    Diverticulosis of colon 2015    Edema 2015    Including peripheral edema. ( SOMETIMES) PER PT    Female stress incontinence     GERD (gastroesophageal reflux disease)     controlled with med Hypercholesterolemia     no meds currently    Hyperplastic polyps of stomach 4/24/2015    Colonsocpy. 2013.     Hypertension     controlled with med    Internal hemorrhoids without mention of complication 7/92/0178    Mixed hyperlipidemia 8/16/2022    Obesity (BMI 30-39.9) 11/14/2017    BMI = 35    Obstructive sleep apnea 11/14/2017    wears cpap at hs    Other diseases of lung, not elsewhere classified 4/24/2015    being rechecked 8/2022-- followed by dr Zoey Ramires    Right upper quadrant pain     Urge incontinence     Ventricular bigeminy 8/31/2016    per pt-- was told it was ok-- does not see a cardiologist at present     Past Surgical History:   Procedure Laterality Date    CHOLECYSTECTOMY, LAPAROSCOPIC N/A 8/12/2022    [address] performed by Elen Keys MD at [facility] MAIN OR    HYSTERECTOMY (CERVIX STATUS UNKNOWN)      KNEE ARTHROSCOPY Bilateral      Current Outpatient Medications   Medication Sig Dispense Refill    mirabegron (MYRBETRIQ) 25 MG TB24 Take 1 tablet by mouth daily 30 tablet 0    melatonin 3 MG TABS tablet Take 1 tablet by mouth daily 30 tablet 3    ciprofloxacin (CIPRO) 500 MG tablet       montelukast (SINGULAIR) 10 MG tablet Take 1 tablet by mouth nightly 30 tablet 3    atorvastatin (LIPITOR) 40 MG tablet Take 1 tablet by mouth at bedtime 30 tablet 3    Beclomethasone Dipropionate (QVAR IN) Inhale into the lungs 2 times daily as needed      amLODIPine (NORVASC) 10 MG tablet Take 1 tablet by mouth daily 30 tablet 5    pantoprazole (PROTONIX) 40 MG tablet Take 1 tablet by mouth 2 times daily 30 tablet 5    valsartan (DIOVAN) 160 MG tablet Take 1 tablet by mouth daily 30 tablet 5    albuterol sulfate  (90 Base) MCG/ACT inhaler Inhale 2 puffs into the lungs every 4 hours as needed      Cetirizine HCl 10 MG CAPS Take 1 tablet by mouth daily      EPINEPHrine (EPIPEN) 0.3 MG/0.3ML SOAJ injection Inject 0.3 mg into the muscle once as needed      fluticasone (FLONASE) 50 MCG/ACT nasal spray 2 sprays by Nasal route daily       No current facility-administered medications for this visit. Allergies   Allergen Reactions    Latex Other (See Comments)     wheezing    Other Shortness Of Breath     PER PT-- ALL COLOGNE WILL START AN ASTHMA ATTACK    Sulfa Antibiotics Nausea Only     Social History     Socioeconomic History    Marital status:      Spouse name: Not on file    Number of children: Not on file    Years of education: Not on file    Highest education level: Not on file   Occupational History    Not on file   Tobacco Use    Smoking status: Former     Packs/day: 0.25     Years: 4.00     Pack years: 1.00     Types: Cigarettes     Quit date: 10/16/1996     Years since quittin.1    Smokeless tobacco: Never   Vaping Use    Vaping Use: Never used   Substance and Sexual Activity    Alcohol use: No    Drug use: No    Sexual activity: Not on file   Other Topics Concern    Not on file   Social History Narrative    Not on file     Social Determinants of Health     Financial Resource Strain: Not on file   Food Insecurity: Not on file   Transportation Needs: Not on file   Physical Activity: Insufficiently Active    Days of Exercise per Week: 3 days    Minutes of Exercise per Session: 30 min   Stress: Not on file   Social Connections: Not on file   Intimate Partner Violence: Not on file   Housing Stability: Not on file     Family History   Problem Relation Age of Onset    Asthma Mother     Hypertension Sister     Diabetes Sister     Cancer Other         Lung    Breast Cancer Neg Hx     Other Neg Hx     Post-op Cognitive Dysfunction Neg Hx     Emergence Delirium Neg Hx     Post-op Nausea/Vomiting Neg Hx     Delayed Awakening Neg Hx     Pseudochol. Deficiency Neg Hx     Malig Hypertherm Neg Hx        Review of Systems  Constitutional:   Negative for fever. GI:  Negative for abdominal pain. Musculoskeletal:  Negative for back pain.     Urinalysis  UA - Dipstick  Results for orders placed or performed in visit on 11/28/22   AMB POC URINALYSIS DIP STICK AUTO W/O MICRO   Result Value Ref Range    Color (UA POC)      Clarity (UA POC)      Glucose, Urine, POC Negative Negative    Bilirubin, Urine, POC Negative Negative    KETONES, Urine, POC Trace Negative    Specific Gravity, Urine, POC 1.030 1.001 - 1.035    Blood (UA POC) Trace Negative    pH, Urine, POC 6.0 4.6 - 8.0    Protein, Urine, POC Negative Negative    Urobilinogen, POC 1 mg/dL     Nitrite, Urine, POC Negative Negative    Leukocyte Esterase, Urine, POC Negative Negative   Results for orders placed or performed in visit on 09/29/22   AMB POC URINALYSIS DIP STICK AUTO W/O MICRO   Result Value Ref Range    Color, Urine, POC yellow     Clarity, Urine, POC clear     Glucose, Urine, POC Negative Negative    Bilirubin, Urine, POC Negative Negative    Ketones, Urine, POC Negative Negative    Specific Gravity, Urine, POC 1.020 1.001 - 1.035    Blood, Urine, POC trace Negative    pH, Urine, POC 7.0 4.6 - 8.0    Protein, Urine, POC Negative Negative    Urobilinogen, POC 3.5     Nitrate, Urine, POC negative Negative    Leukocyte Esterase, Urine, POC Negative Negative       UA - Micro  WBC - 0  RBC - 0  Bacteria - 0  Epith - 0    PHYSICAL EXAM    General appearance - well appearing and in no distress  Mental status - alert, oriented to person, place, and time  Neck - supple, no significant adenopathy  Chest/Lung-  Quiet, even and easy respiratory effort without use of accessory muscles  Skin - normal coloration and turgor, no rashes        Assessment and Plan    ICD-10-CM    1. OAB (overactive bladder)  N32.81 AMB POC URINALYSIS DIP STICK AUTO W/O MICRO      2. Microhematuria  R31.29 AMB POC URINALYSIS DIP STICK AUTO W/O MICRO      3. Acute UTI  N39.0 AMB POC URINALYSIS DIP STICK AUTO W/O MICRO        OAB- sx improved w Myrbetriq 25 mg PO daily. Cont this. May increase to 50 mg PO daily in 2-3 weeks if needed.  Risks, benefits, and alternatives reviewed. Microhematuria- urine normal. Cont to follow. Acute UTI- urine normal. No antibiotic tx today. Advised to call this office if develops sx of UTI for specimen check. If unable to come to this office, encouraged to have UC performed and records sent to this office. RTO in Jan 2023 to follow up w me. Advised to call sooner if needed. Nyasia Moore, JASSIP, APRN - CNP  Dr. Anuja Harrison is supervising physician today and he approves plan of care.

## 2023-01-04 ENCOUNTER — OFFICE VISIT (OUTPATIENT)
Dept: FAMILY MEDICINE CLINIC | Facility: CLINIC | Age: 68
End: 2023-01-04
Payer: MEDICARE

## 2023-01-04 VITALS
OXYGEN SATURATION: 97 % | BODY MASS INDEX: 36.67 KG/M2 | DIASTOLIC BLOOD PRESSURE: 74 MMHG | SYSTOLIC BLOOD PRESSURE: 126 MMHG | HEART RATE: 76 BPM | WEIGHT: 227.2 LBS

## 2023-01-04 DIAGNOSIS — J45.40 MODERATE PERSISTENT ASTHMA WITHOUT COMPLICATION: ICD-10-CM

## 2023-01-04 DIAGNOSIS — I10 ESSENTIAL HYPERTENSION: Primary | ICD-10-CM

## 2023-01-04 DIAGNOSIS — E78.2 MIXED HYPERLIPIDEMIA: ICD-10-CM

## 2023-01-04 DIAGNOSIS — K29.50 ANTRAL GASTRITIS: ICD-10-CM

## 2023-01-04 DIAGNOSIS — R73.03 PREDIABETES: ICD-10-CM

## 2023-01-04 PROCEDURE — 3074F SYST BP LT 130 MM HG: CPT | Performed by: FAMILY MEDICINE

## 2023-01-04 PROCEDURE — 3017F COLORECTAL CA SCREEN DOC REV: CPT | Performed by: FAMILY MEDICINE

## 2023-01-04 PROCEDURE — 1123F ACP DISCUSS/DSCN MKR DOCD: CPT | Performed by: FAMILY MEDICINE

## 2023-01-04 PROCEDURE — G8417 CALC BMI ABV UP PARAM F/U: HCPCS | Performed by: FAMILY MEDICINE

## 2023-01-04 PROCEDURE — 99214 OFFICE O/P EST MOD 30 MIN: CPT | Performed by: FAMILY MEDICINE

## 2023-01-04 PROCEDURE — G8484 FLU IMMUNIZE NO ADMIN: HCPCS | Performed by: FAMILY MEDICINE

## 2023-01-04 PROCEDURE — G8427 DOCREV CUR MEDS BY ELIG CLIN: HCPCS | Performed by: FAMILY MEDICINE

## 2023-01-04 PROCEDURE — 1090F PRES/ABSN URINE INCON ASSESS: CPT | Performed by: FAMILY MEDICINE

## 2023-01-04 PROCEDURE — 3078F DIAST BP <80 MM HG: CPT | Performed by: FAMILY MEDICINE

## 2023-01-04 PROCEDURE — G8400 PT W/DXA NO RESULTS DOC: HCPCS | Performed by: FAMILY MEDICINE

## 2023-01-04 PROCEDURE — 1036F TOBACCO NON-USER: CPT | Performed by: FAMILY MEDICINE

## 2023-01-04 RX ORDER — BECLOMETHASONE DIPROPIONATE HFA 80 UG/1
AEROSOL, METERED RESPIRATORY (INHALATION)
COMMUNITY
Start: 2023-01-03

## 2023-01-04 RX ORDER — AZITHROMYCIN 250 MG/1
TABLET, FILM COATED ORAL
COMMUNITY
Start: 2023-01-03

## 2023-01-04 RX ORDER — PANTOPRAZOLE SODIUM 40 MG/1
40 TABLET, DELAYED RELEASE ORAL 2 TIMES DAILY
Qty: 30 TABLET | Refills: 5 | Status: SHIPPED | OUTPATIENT
Start: 2023-01-04

## 2023-01-04 RX ORDER — MONTELUKAST SODIUM 10 MG/1
10 TABLET ORAL NIGHTLY
Qty: 30 TABLET | Refills: 5 | Status: SHIPPED | OUTPATIENT
Start: 2023-01-04

## 2023-01-04 RX ORDER — ATORVASTATIN CALCIUM 40 MG/1
40 TABLET, FILM COATED ORAL NIGHTLY
Qty: 30 TABLET | Refills: 5 | Status: SHIPPED | OUTPATIENT
Start: 2023-01-04

## 2023-01-04 RX ORDER — VALSARTAN 160 MG/1
160 TABLET ORAL DAILY
Qty: 30 TABLET | Refills: 5 | Status: SHIPPED | OUTPATIENT
Start: 2023-01-04

## 2023-01-04 RX ORDER — AMLODIPINE BESYLATE 10 MG/1
10 TABLET ORAL DAILY
Qty: 30 TABLET | Refills: 5 | Status: SHIPPED | OUTPATIENT
Start: 2023-01-04

## 2023-01-04 ASSESSMENT — PATIENT HEALTH QUESTIONNAIRE - PHQ9
SUM OF ALL RESPONSES TO PHQ QUESTIONS 1-9: 0
SUM OF ALL RESPONSES TO PHQ9 QUESTIONS 1 & 2: 0
SUM OF ALL RESPONSES TO PHQ QUESTIONS 1-9: 0
SUM OF ALL RESPONSES TO PHQ QUESTIONS 1-9: 0
2. FEELING DOWN, DEPRESSED OR HOPELESS: 0
1. LITTLE INTEREST OR PLEASURE IN DOING THINGS: 0
SUM OF ALL RESPONSES TO PHQ QUESTIONS 1-9: 0

## 2023-01-04 NOTE — PROGRESS NOTES
Subjective:  Matthew Gallardo is a 74 y.o. female presents today for their semi-annual htn and pre-d visit. They are having no side effects and are doing well. Also has chronic asthma and allergic rhinitis, chronic dyslipidemia and reflux symptoms, needing refills on all of those medicines  Systems review of cardiovascular and pulmonary systems reveal no complaints or pertinent positives. Patient denies any pounding heart beats or rapid heart beat intervals, flushing, panic like attacks, headaches, dizzyness or flushing. They have drug reistant hypertension, on 3 or more different medicaitons including one diuretic- No  PHQ-9 Total Score: 0 (1/4/2023 11:12 AM)    How much are you exercising? 3 days a week. Do you smoke? No  Are you taking your medicines as directed most every day? Yes  Do you follow a low sodium DASH diet or similar high blood pressure diet? No  Do you check your bp at home with an automated blood pressure device? No  If you don't have a home bp machine, you should purchase one at home and begin to check your pressure at home on a regular basis. Your blood pressure goal is listed under the \"plan section\" below    Allergies   Allergen Reactions    Latex Other (See Comments)     wheezing    Other Shortness Of Breath     PER PT-- ALL RAJESH WILL START AN ASTHMA ATTACK    Sulfa Antibiotics Nausea Only      reports that she quit smoking about 26 years ago. Her smoking use included cigarettes. She has a 1.00 pack-year smoking history.  She has never used smokeless tobacco.  Current Outpatient Medications   Medication Sig Dispense Refill    azithromycin (ZITHROMAX) 250 MG tablet       QVAR REDIHALER 80 MCG/ACT AERB inhaler       amLODIPine (NORVASC) 10 MG tablet Take 1 tablet by mouth daily 30 tablet 5    pantoprazole (PROTONIX) 40 MG tablet Take 1 tablet by mouth 2 times daily 30 tablet 5    valsartan (DIOVAN) 160 MG tablet Take 1 tablet by mouth daily 30 tablet 5    montelukast (SINGULAIR) 10 MG tablet Take 1 tablet by mouth nightly 30 tablet 5    atorvastatin (LIPITOR) 40 MG tablet Take 1 tablet by mouth at bedtime 30 tablet 5    mirabegron (MYRBETRIQ) 25 MG TB24 Take 1 tablet by mouth daily 30 tablet 0    melatonin 3 MG TABS tablet Take 1 tablet by mouth daily 30 tablet 3    Beclomethasone Dipropionate (QVAR IN) Inhale into the lungs 2 times daily as needed      albuterol sulfate  (90 Base) MCG/ACT inhaler Inhale 2 puffs into the lungs every 4 hours as needed      Cetirizine HCl 10 MG CAPS Take 1 tablet by mouth daily      EPINEPHrine (EPIPEN) 0.3 MG/0.3ML SOAJ injection Inject 0.3 mg into the muscle once as needed      fluticasone (FLONASE) 50 MCG/ACT nasal spray 2 sprays by Nasal route daily       No current facility-administered medications for this visit. Lab Results   Component Value Date/Time     08/18/2022 04:14 AM    K 3.5 08/18/2022 04:14 AM     08/18/2022 04:14 AM    CO2 28 08/18/2022 04:14 AM    BUN 15 08/18/2022 04:14 AM    CREATININE 0.60 08/18/2022 04:14 AM    GLUCOSE 107 08/18/2022 04:14 AM    CALCIUM 8.8 08/18/2022 04:14 AM          Objective:  Blood pressure 126/74, pulse 76, weight 227 lb 3.2 oz (103.1 kg), SpO2 97 %. Body mass index is 36.67 kg/m². BP Readings from Last 3 Encounters:   01/04/23 126/74   10/06/22 118/84   09/29/22 126/70     General- Pleasant and no distress  Psych- alert and oriented to person, place and time  Mood and affect are appropriate to situation  Musculoskeletal - Gait and station examination reveals mid-position with no abnormalities. Neurological- grossly intact. rrr s mrg.   bcta  extremeties are without edema, and dp, and pt pulses are intact  No carotid bruits   Fundoscopic exam is: benign without retinopathology     Assessment:  1. Essential hypertension    2. Mixed hyperlipidemia    3. Prediabetes    4. Moderate persistent asthma without complication    5.  Antral gastritis        Plan:   Prescription drug management occurs today as follows:  Because current regimen for blood pressure is now working and tolerated, will continue medications as listed    Sara Pearce has been given the following recommendations today due to her elevated BP reading: lab tests ordered. Personal instruction is given. For your information, consider the following:  Fred Bennett is an excellent site that will give you a list of approved blood pressure devices  Shannan Harrison is an excellent site that will teach you how to take accurate bp measurements at home. Significant weight loss can result in a drop of 5-10mm blood pressure. A DASH diet (see bookstore or go to www.[employer] and print DASH diet) will lower 8-14mm blood pressure. Salt restriction will lower your bp 2-8mm. Lowering alcohol consumption to moderate use will lower your pressure 2-4mm. Continue efforts to maintain an exercise regimen. Normal blood pressure target is now 120/80. Stage 1 or \"pre-hypertension\" or \"high normal hypertension\" is 130-139/80-89. We sometimes begin treatment with medications. Stage 2 is >140/90 which is when we always begin treatment with medications. For high risk patients such as those with heart disease, a history of stents, angioplasty, heart attacks, strokes, diabetes and chronic kidney disease,your blood pressure goal is 130/80. For lower risk patients without the high risk categories, your goal for blood pressure is 139/89. Unless you are older than 72years old we may try for a systolic bp of 650 or we will accept higher pressures if the lower pressures are not tolerated. 1. Essential hypertension  -     Basic Metabolic Panel; Future  -     amLODIPine (NORVASC) 10 MG tablet; Take 1 tablet by mouth daily, Disp-30 tablet, R-5File please do not refill yet. Normal  -     valsartan (DIOVAN) 160 MG tablet; Take 1 tablet by mouth daily, Disp-30 tablet, R-5File please do not refill yet. Normal  2.  Mixed hyperlipidemia  -     atorvastatin (LIPITOR) 40 MG tablet; Take 1 tablet by mouth at bedtime, Disp-30 tablet, R-5Normal  3. Prediabetes  -     Hemoglobin A1C; Future  -     Basic Metabolic Panel; Future  4. Moderate persistent asthma without complication  -     montelukast (SINGULAIR) 10 MG tablet; Take 1 tablet by mouth nightly, Disp-30 tablet, R-5Normal  5. Antral gastritis  -     pantoprazole (PROTONIX) 40 MG tablet; Take 1 tablet by mouth 2 times daily, Disp-30 tablet, R-5File please do not refill yet. Normal      Followup:  Return for 6 mo for htn, lipids.

## 2023-01-05 PROBLEM — E11.9 TYPE 2 DIABETES MELLITUS WITHOUT COMPLICATION, WITHOUT LONG-TERM CURRENT USE OF INSULIN (HCC): Status: ACTIVE | Noted: 2021-07-16

## 2023-01-05 LAB
ANION GAP SERPL CALC-SCNC: 6 MMOL/L (ref 2–11)
BUN SERPL-MCNC: 14 MG/DL (ref 8–23)
CALCIUM SERPL-MCNC: 8.7 MG/DL (ref 8.3–10.4)
CHLORIDE SERPL-SCNC: 112 MMOL/L (ref 101–110)
CO2 SERPL-SCNC: 26 MMOL/L (ref 21–32)
CREAT SERPL-MCNC: 0.7 MG/DL (ref 0.6–1)
EST. AVERAGE GLUCOSE BLD GHB EST-MCNC: 143 MG/DL
GLUCOSE SERPL-MCNC: 131 MG/DL (ref 65–100)
HBA1C MFR BLD: 6.6 % (ref 4.8–5.6)
POTASSIUM SERPL-SCNC: 3.3 MMOL/L (ref 3.5–5.1)
SODIUM SERPL-SCNC: 144 MMOL/L (ref 133–143)

## 2023-01-09 NOTE — PROGRESS NOTES
[address] [place] [address] Court, [address] [facility]  [phone]    Patient Name:  Jj Bunch  YOB: 1955      Office Visit 1/10/2023    CHIEF COMPLAINT:    Chief Complaint   Patient presents with    Sleep Apnea         HISTORY OF PRESENT ILLNESS:  Patient is seen today for follow up of REANNA. Sleep study 1/19/14 with AHI 5/hr with desaturations to 74%. She is prescribed Bipap 8/4 cm using a full face mask. Compliance has improved over the past month since her last visit. She has used her machine 26/30 days with average nightly use 5 hrs 51 min. AHI is 1.8/hr with mask leak overall controlled. She denies any problems with the mask or pressure. She states the mask is overall comfortable. Her son passed away May 2022. Her anxiety is some better than last visit but she still has some days worse than others. Overall, she feels she is doing well. Sleep Medicine 1/10/2023 10/6/2022 9/23/2021   Sitting and reading 0 0 0   Watching TV 0 1 1   Sitting, inactive in a public place (e.g. a theatre or a meeting) 0 0 0   As a passenger in a car for an hour without a break 0 0 0   Lying down to rest in the afternoon when circumstances permit 3 1 1   Sitting and talking to someone 0 0 0   Sitting quietly after a lunch without alcohol 0 0 0   In a car, while stopped for a few minutes in traffic 0 0 0   [place] Sleepiness Score 3 2 2              Past Medical History:   Diagnosis Date    Allergic rhinitis 4/24/2015    Dr. Truman Zhao    Asthma     prn inhaler and neb    Calculus of gallbladder with acute cholecystitis without obstruction 6/30/2022    Cholecystitis 6/30/2022    Added automatically from request for surgery [id number]    Cystocele, midline 4/24/2015    Diverticulosis of colon 4/24/2015    Edema 4/24/2015    Including peripheral edema. ( SOMETIMES) PER PT    Female stress incontinence     GERD (gastroesophageal reflux disease)     controlled with med    Hypercholesterolemia no meds currently    Hyperplastic polyps of stomach 4/24/2015    Colonsocpy. 2013.     Hypertension     controlled with med    Internal hemorrhoids without mention of complication 1/00/5755    Mixed hyperlipidemia 8/16/2022    Obesity (BMI 30-39.9) 11/14/2017    BMI = 35    Obstructive sleep apnea 11/14/2017    wears cpap at hs    Other diseases of lung, not elsewhere classified 4/24/2015    being rechecked 8/2022-- followed by dr Una Rodriguez    Right upper quadrant pain     Urge incontinence     Ventricular bigeminy 8/31/2016    per pt-- was told it was ok-- does not see a cardiologist at present         Patient Active Problem List   Diagnosis    Tubulovillous adenoma of colon    Ventricular bigeminy    Urge incontinence    Essential hypertension    Dysmenorrhea    Advanced directives, counseling/discussion    Hypercholesteremia    Diverticulosis of colon    Antral gastritis    Moderate persistent asthma without complication    Obstructive sleep apnea    Type 2 diabetes mellitus without complication, without long-term current use of insulin (HCC)    Chondromalacia of right patella    Medicare annual wellness visit, subsequent    Atrophic vaginitis    Cystocele, midline    Chronic anxiety    Pneumothorax, right    S/P laparoscopic cholecystectomy    Morbid obesity ([address]    Allergic rhinitis due to pollen    Mixed hyperlipidemia    Postprocedural pneumothorax    Inadequate sleep hygiene          Past Surgical History:   Procedure Laterality Date    CHOLECYSTECTOMY, LAPAROSCOPIC N/A 8/12/2022    GEORGINA SINGLE SITE CHOLECYSTECTOMY performed by Steve Barrera MD at [facility] MAIN OR    HYSTERECTOMY (CERVIX STATUS UNKNOWN)      KNEE ARTHROSCOPY Bilateral            Social History     Socioeconomic History    Marital status:      Spouse name: Not on file    Number of children: Not on file    Years of education: Not on file    Highest education level: Not on file   Occupational History    Not on file   Tobacco Use Smoking status: Former     Packs/day: 0.25     Years: 4.00     Pack years: 1.00     Types: Cigarettes     Quit date: 10/16/1996     Years since quittin.2    Smokeless tobacco: Never   Vaping Use    Vaping Use: Never used   Substance and Sexual Activity    Alcohol use: No    Drug use: No    Sexual activity: Not on file   Other Topics Concern    Not on file   Social History Narrative    Not on file     Social Determinants of Health     Financial Resource Strain: Not on file   Food Insecurity: Not on file   Transportation Needs: Not on file   Physical Activity: Insufficiently Active    Days of Exercise per Week: 3 days    Minutes of Exercise per Session: 30 min   Stress: Not on file   Social Connections: Not on file   Intimate Partner Violence: Not on file   Housing Stability: Not on file         Family History   Problem Relation Age of Onset    Asthma Mother     Hypertension Sister     Diabetes Sister     Cancer Other         Lung    Breast Cancer Neg Hx     Other Neg Hx     Post-op Cognitive Dysfunction Neg Hx     Emergence Delirium Neg Hx     Post-op Nausea/Vomiting Neg Hx     Delayed Awakening Neg Hx     Pseudochol.  Deficiency Neg Hx     Malig Hypertherm Neg Hx          Allergies   Allergen Reactions    Latex Other (See Comments)     wheezing    Other Shortness Of Breath     PER PT-- ALL COLOGNE WILL START AN ASTHMA ATTACK    Sulfa Antibiotics Nausea Only         Current Outpatient Medications   Medication Sig    dicyclomine (BENTYL) 20 MG tablet TAKE 1 TABLET BY MOUTH EVERY 6 HOURS    QVAR REDIHALER 80 MCG/ACT AERB inhaler     amLODIPine (NORVASC) 10 MG tablet Take 1 tablet by mouth daily    pantoprazole (PROTONIX) 40 MG tablet Take 1 tablet by mouth 2 times daily    valsartan (DIOVAN) 160 MG tablet Take 1 tablet by mouth daily    montelukast (SINGULAIR) 10 MG tablet Take 1 tablet by mouth nightly    atorvastatin (LIPITOR) 40 MG tablet Take 1 tablet by mouth at bedtime    mirabegron (MYRBETRIQ) 25 MG TB24 Take 1 tablet by mouth daily    melatonin 3 MG TABS tablet Take 1 tablet by mouth daily    Beclomethasone Dipropionate (QVAR IN) Inhale into the lungs 2 times daily as needed    albuterol sulfate  (90 Base) MCG/ACT inhaler Inhale 2 puffs into the lungs every 4 hours as needed    Cetirizine HCl 10 MG CAPS Take 1 tablet by mouth daily    EPINEPHrine (EPIPEN) 0.3 MG/0.3ML SOAJ injection Inject 0.3 mg into the muscle once as needed    fluticasone (FLONASE) 50 MCG/ACT nasal spray 2 sprays by Nasal route daily    azithromycin (ZITHROMAX) 250 MG tablet  (Patient not taking: Reported on 1/10/2023)     No current facility-administered medications for this visit. REVIEW OF SYSTEMS:   CONSTITUTIONAL:   There is no history of fever, chills, night sweats, weight loss, weight gain, persistent fatigue, or lethargy/hypersomnolence. CARDIAC:   No chest pain, pressure, discomfort, palpitations, orthopnea, murmurs, or edema. GI:   No dysphagia, heartburn reflux, nausea/vomiting, diarrhea, abdominal pain, or bleeding. NEURO:   There is no history of AMS, persistent headache, decreased level of consciousness, seizures, or motor or sensory deficits. PHYSICAL EXAM:    Vitals:    01/10/23 1122   BP: 118/74   Pulse: 95   Resp: 14   Temp: 97.9 °F (36.6 °C)   SpO2: 98%   Weight: 225 lb (102.1 kg)   Height: 5' 6\" (1.676 m)        Body mass index is 36.32 kg/m². GENERAL APPEARANCE:   The patient is obese and in no respiratory distress. HEENT:   PERRL. Conjunctivae unremarkable. Nasal mucosa is without epistaxis, exudate, or polyps. Gums and dentition are unremarkable. There is oropharyngeal narrowing. NECK/LYMPHATIC:   Symmetrical with no elevation of jugular venous pulsation. Trachea midline. No thyroid enlargement. No cervical adenopathy. LUNGS:   Normal respiratory effort with symmetrical lung expansion. Breath sounds clear bilaterally. HEART:   There is a regular rate and rhythm.   No murmur, rub, or gallop. There is no edema in the lower extremities. ABDOMEN:   Soft and non-tender. No hepatosplenomegaly. Bowel sounds are normal.     NEURO:   The patient is alert and oriented to person, place, and time. Memory appears intact and mood is normal.  No gross sensorimotor deficits are present. ASSESSMENT:  (Medical Decision Making)      Diagnosis Orders   1. Obstructive sleep apnea  DME - DURABLE MEDICAL EQUIPMENT   AHI well controlled on therapy. Continue current settings with nightly compliance    2. Severe obesity (BMI 35.0-39. 9) with comorbidity ([address]  DME - DURABLE MEDICAL EQUIPMENT   BMI 36.32   3. Chronic anxiety  DME - DURABLE MEDICAL EQUIPMENT   She feels this is somewhat better         PLAN:  Continue Bipap 8/4 cm with nightly compliance  Renew supplies  Follow up in 6 months or sooner if needed     Orders Placed This Encounter   Procedures    DME - [address] [facility]  Phone: [phone] [address]  Dept: [phone]      Patient Name: Jimmy Garnica  : 1955  Gender: female  Address: [address] [place] [address]  Patient phone: [phone] (home)       Primary Insurance: Payor: MEDICARE / Plan: MEDICARE PART A AND B / Product Type: *No Product type* /   Subscriber ID: [id number] - (Medicare)      AMB Supply Order  Order Details     DME Location:   Order Date: 1/10/2023   The primary encounter diagnosis was Obstructive sleep apnea. Diagnoses of Severe obesity (BMI 35.0-39. 9) with comorbidity ([address] and Chronic anxiety were also pertinent to this visit.              (  X   )Supplies Needed       bipap Machine   (     ) CPAP Unit  (     ) Auto CPAP Unit  (     ) BiLevel Unit  (     ) Auto BiLevel Unit  (     ) ASV   (     ) Bilevel ST      Length of need: 12 months    Pressure:  8/4 cmH20  EPR: =     Starting Ramp Pressure:  = cm H20  Ramp Time: min      Patient had a diagnostic Apnea Hypopnea Index (AHI) of :    *SUPPLIES* Replace all as needed, or per coverage guidelines     Masks Type:  ( x   ) -Full Face Mask (1 per 3 mon)  (  x  ) -Full Mask (1 per month) Interface/Cushion      (  ) -Nasal Mask (1 per 3 mon)  (  ) - Nasal Mask (1 per month) Interface/Cushion  (     ) -Pillow (2 per mon)  (     ) -Xkmuqgtkz (1 per 6 mon)            Other Supplies:    (   X  )-Vxpuwukb (1 per 6 mon)  (   X  )-Njkwhc Tubing (1 per 3 mon)  (   X  )- Disposable Filter (2 per mon)  (   x  )-Wsdzqn Humidifier (1 per year)     ( x    )-Jnhplvykp (sometimes used with Full Face Mask) (1 per 6 mos)  (    )-Tubing-without heat (1 per 3 mos)  (     )-Non-Disposable Filter (1 per 6 mos)  (  x   )-Water Chamber (1 per 6 mos)  (     )-Humidifier non-heated (1 per 5 yrs)      Signed Date: 1/10/2023  Electronically Signed By: YUNIEL Garcia CNP  Electronically Dated:  1/10/2023       No orders of the defined types were placed in this encounter. Collaborating Physician: Dr. Ritesh Jimenez    Over 50% of today's office visit was spent in face to face time reviewing test results, prognosis, importance of compliance, education about disease process, benefits of medications, instructions for management of acute flare-ups, and follow up plans. Total face to face time spent with patient was 30 minutes.         YUNIEL Garcia CNP  Electronically signed

## 2023-01-10 ENCOUNTER — OFFICE VISIT (OUTPATIENT)
Dept: SLEEP MEDICINE | Age: 68
End: 2023-01-10
Payer: MEDICARE

## 2023-01-10 VITALS
WEIGHT: 225 LBS | BODY MASS INDEX: 36.16 KG/M2 | OXYGEN SATURATION: 98 % | SYSTOLIC BLOOD PRESSURE: 118 MMHG | HEIGHT: 66 IN | TEMPERATURE: 97.9 F | RESPIRATION RATE: 14 BRPM | HEART RATE: 95 BPM | DIASTOLIC BLOOD PRESSURE: 74 MMHG

## 2023-01-10 DIAGNOSIS — G47.33 OBSTRUCTIVE SLEEP APNEA: Primary | ICD-10-CM

## 2023-01-10 DIAGNOSIS — F41.9 CHRONIC ANXIETY: ICD-10-CM

## 2023-01-10 DIAGNOSIS — E66.01 SEVERE OBESITY (BMI 35.0-39.9) WITH COMORBIDITY (HCC): ICD-10-CM

## 2023-01-10 PROCEDURE — G8417 CALC BMI ABV UP PARAM F/U: HCPCS | Performed by: NURSE PRACTITIONER

## 2023-01-10 PROCEDURE — 1123F ACP DISCUSS/DSCN MKR DOCD: CPT | Performed by: NURSE PRACTITIONER

## 2023-01-10 PROCEDURE — G8400 PT W/DXA NO RESULTS DOC: HCPCS | Performed by: NURSE PRACTITIONER

## 2023-01-10 PROCEDURE — 3074F SYST BP LT 130 MM HG: CPT | Performed by: NURSE PRACTITIONER

## 2023-01-10 PROCEDURE — 1036F TOBACCO NON-USER: CPT | Performed by: NURSE PRACTITIONER

## 2023-01-10 PROCEDURE — 3017F COLORECTAL CA SCREEN DOC REV: CPT | Performed by: NURSE PRACTITIONER

## 2023-01-10 PROCEDURE — 3078F DIAST BP <80 MM HG: CPT | Performed by: NURSE PRACTITIONER

## 2023-01-10 PROCEDURE — G8484 FLU IMMUNIZE NO ADMIN: HCPCS | Performed by: NURSE PRACTITIONER

## 2023-01-10 PROCEDURE — G8427 DOCREV CUR MEDS BY ELIG CLIN: HCPCS | Performed by: NURSE PRACTITIONER

## 2023-01-10 PROCEDURE — 1090F PRES/ABSN URINE INCON ASSESS: CPT | Performed by: NURSE PRACTITIONER

## 2023-01-10 PROCEDURE — 99213 OFFICE O/P EST LOW 20 MIN: CPT | Performed by: NURSE PRACTITIONER

## 2023-01-10 RX ORDER — DICYCLOMINE HCL 20 MG
TABLET ORAL
COMMUNITY
Start: 2022-11-18

## 2023-01-10 ASSESSMENT — SLEEP AND FATIGUE QUESTIONNAIRES
HOW LIKELY ARE YOU TO NOD OFF OR FALL ASLEEP WHILE LYING DOWN TO REST IN THE AFTERNOON WHEN CIRCUMSTANCES PERMIT: 3
HOW LIKELY ARE YOU TO NOD OFF OR FALL ASLEEP WHILE SITTING AND READING: 0
HOW LIKELY ARE YOU TO NOD OFF OR FALL ASLEEP WHEN YOU ARE A PASSENGER IN A CAR FOR AN HOUR WITHOUT A BREAK: 0
HOW LIKELY ARE YOU TO NOD OFF OR FALL ASLEEP WHILE WATCHING TV: 0
HOW LIKELY ARE YOU TO NOD OFF OR FALL ASLEEP WHILE SITTING INACTIVE IN A PUBLIC PLACE: 0
HOW LIKELY ARE YOU TO NOD OFF OR FALL ASLEEP WHILE SITTING QUIETLY AFTER LUNCH WITHOUT ALCOHOL: 0
HOW LIKELY ARE YOU TO NOD OFF OR FALL ASLEEP IN A CAR, WHILE STOPPED FOR A FEW MINUTES IN TRAFFIC: 0
HOW LIKELY ARE YOU TO NOD OFF OR FALL ASLEEP WHILE SITTING AND TALKING TO SOMEONE: 0
ESS TOTAL SCORE: 3

## 2023-01-10 NOTE — PATIENT INSTRUCTIONS
Continue Bipap at current settings.   Renew supplies with [facility]  Continue nightly compliance    Follow up will be in 6 months or sooner if needed

## 2023-01-11 ENCOUNTER — OFFICE VISIT (OUTPATIENT)
Dept: UROLOGY | Age: 68
End: 2023-01-11
Payer: MEDICARE

## 2023-01-11 DIAGNOSIS — N32.81 OAB (OVERACTIVE BLADDER): Primary | ICD-10-CM

## 2023-01-11 DIAGNOSIS — R31.29 MICROHEMATURIA: ICD-10-CM

## 2023-01-11 DIAGNOSIS — N39.0 ACUTE UTI: ICD-10-CM

## 2023-01-11 LAB
BILIRUBIN, URINE, POC: NEGATIVE
BLOOD URINE, POC: ABNORMAL
GLUCOSE URINE, POC: NEGATIVE
KETONES, URINE, POC: NEGATIVE
LEUKOCYTE ESTERASE, URINE, POC: NEGATIVE
NITRITE, URINE, POC: NEGATIVE
PH, URINE, POC: 7 (ref 4.6–8)
PROTEIN,URINE, POC: NEGATIVE
SPECIFIC GRAVITY, URINE, POC: 1.02 (ref 1–1.03)
URINALYSIS CLARITY, POC: ABNORMAL
URINALYSIS COLOR, POC: ABNORMAL
UROBILINOGEN, POC: ABNORMAL

## 2023-01-11 PROCEDURE — G8427 DOCREV CUR MEDS BY ELIG CLIN: HCPCS | Performed by: NURSE PRACTITIONER

## 2023-01-11 PROCEDURE — 1123F ACP DISCUSS/DSCN MKR DOCD: CPT | Performed by: NURSE PRACTITIONER

## 2023-01-11 PROCEDURE — G8417 CALC BMI ABV UP PARAM F/U: HCPCS | Performed by: NURSE PRACTITIONER

## 2023-01-11 PROCEDURE — 1036F TOBACCO NON-USER: CPT | Performed by: NURSE PRACTITIONER

## 2023-01-11 PROCEDURE — G8484 FLU IMMUNIZE NO ADMIN: HCPCS | Performed by: NURSE PRACTITIONER

## 2023-01-11 PROCEDURE — 3017F COLORECTAL CA SCREEN DOC REV: CPT | Performed by: NURSE PRACTITIONER

## 2023-01-11 PROCEDURE — G8400 PT W/DXA NO RESULTS DOC: HCPCS | Performed by: NURSE PRACTITIONER

## 2023-01-11 PROCEDURE — 1090F PRES/ABSN URINE INCON ASSESS: CPT | Performed by: NURSE PRACTITIONER

## 2023-01-11 PROCEDURE — 81003 URINALYSIS AUTO W/O SCOPE: CPT | Performed by: NURSE PRACTITIONER

## 2023-01-11 PROCEDURE — 99214 OFFICE O/P EST MOD 30 MIN: CPT | Performed by: NURSE PRACTITIONER

## 2023-01-11 NOTE — PROGRESS NOTES
[facility]  [address] [facility] [address]    [address]  [place], [address] [facility]  [phone]          Mckayla Ospina  : 1955    Chief Complaint   Patient presents with    Follow-up     [place]           HPI     Mckayla Ospina is a 74 y.o. female female who saw Dr Jb Beltran in  for urinary frequency. Found to have microhematuria. Had negative hematuria work up in . Seen again in  by Jenny Nelson NP for ER follow up. She had CT urogram that was normal. Urine culture negative. Cont to have R flank pain. Exam was unremarkable. She was referred back today for urinary frequency. She reports daytime frequency of every 1-1.5 hr. Nocturia x 3/night. Sign urgency and UUI. Having to wear a pad at all times. She has NOT tried any tx for urinary sx. She had tried to reduce her intake of liquid prior to bed. She drinks minimal caffeine. .  x 4. All babies under 10 lb. Had hysterectomy for endometriosis in . No h/o urological surgery. She denies vaginal pain/bleeding or POP. She recently had UTI . No issues since. No sign h/o recurrent UTI. No h/o renal stones. She reports several members of her family have h/o bladder CA. No other personal or family h/o urological CA. Former smoker. NO gross hematuria. Started on Myrbetriq 25 mg PO daily. This has helped. PVR 1 cc via US 10/22     Recently diagnosed w early stage of glaucoma and dry eye. No sign relief w Myrbetriq 25 mg. Increased to 50 mg PO daily. This has helped a lot. Her son passed May 2022. She has been dealing well with this.        Past Medical History:   Diagnosis Date    Allergic rhinitis 2015    Dr. Angelia Sosa    Asthma     prn inhaler and neb    Calculus of gallbladder with acute cholecystitis without obstruction 2022    Cholecystitis 2022    Added automatically from request for surgery [id number]    Cystocele, midline 2015    Diverticulosis of colon 2015    Edema 2015    Including peripheral edema. ( SOMETIMES) PER PT    Female stress incontinence     GERD (gastroesophageal reflux disease)     controlled with med    Hypercholesterolemia     no meds currently    Hyperplastic polyps of stomach 4/24/2015    Colonsocpy. 2013.     Hypertension     controlled with med    Internal hemorrhoids without mention of complication 5/98/7819    Mixed hyperlipidemia 8/16/2022    Obesity (BMI 30-39.9) 11/14/2017    BMI = 35    Obstructive sleep apnea 11/14/2017    wears cpap at hs    Other diseases of lung, not elsewhere classified 4/24/2015    being rechecked 8/2022-- followed by dr Mukesh Mixon    Right upper quadrant pain     Urge incontinence     Ventricular bigeminy 8/31/2016    per pt-- was told it was ok-- does not see a cardiologist at present     Past Surgical History:   Procedure Laterality Date    CHOLECYSTECTOMY, LAPAROSCOPIC N/A 8/12/2022    [address] performed by Sepideh Lau MD at [facility] MAIN OR    HYSTERECTOMY (CERVIX STATUS UNKNOWN)      KNEE ARTHROSCOPY Bilateral      Current Outpatient Medications   Medication Sig Dispense Refill    dicyclomine (BENTYL) 20 MG tablet TAKE 1 TABLET BY MOUTH EVERY 6 HOURS      QVAR REDIHALER 80 MCG/ACT AERB inhaler       amLODIPine (NORVASC) 10 MG tablet Take 1 tablet by mouth daily 30 tablet 5    pantoprazole (PROTONIX) 40 MG tablet Take 1 tablet by mouth 2 times daily 30 tablet 5    valsartan (DIOVAN) 160 MG tablet Take 1 tablet by mouth daily 30 tablet 5    montelukast (SINGULAIR) 10 MG tablet Take 1 tablet by mouth nightly 30 tablet 5    atorvastatin (LIPITOR) 40 MG tablet Take 1 tablet by mouth at bedtime 30 tablet 5    melatonin 3 MG TABS tablet Take 1 tablet by mouth daily 30 tablet 3    Beclomethasone Dipropionate (QVAR IN) Inhale into the lungs 2 times daily as needed      albuterol sulfate  (90 Base) MCG/ACT inhaler Inhale 2 puffs into the lungs every 4 hours as needed      Cetirizine HCl 10 MG CAPS Take 1 tablet by mouth daily      EPINEPHrine (EPIPEN) 0.3 MG/0.3ML SOAJ injection Inject 0.3 mg into the muscle once as needed      fluticasone (FLONASE) 50 MCG/ACT nasal spray 2 sprays by Nasal route daily      azithromycin (ZITHROMAX) 250 MG tablet  (Patient not taking: No sig reported)      mirabegron (MYRBETRIQ) 25 MG TB24 Take 1 tablet by mouth daily (Patient not taking: Reported on 2023) 30 tablet 0     No current facility-administered medications for this visit.      Allergies   Allergen Reactions    Latex Other (See Comments)     wheezing    Other Shortness Of Breath     PER PT-- ALL COLOGNE WILL START AN ASTHMA ATTACK    Sulfa Antibiotics Nausea Only     Social History     Socioeconomic History    Marital status:      Spouse name: Not on file    Number of children: Not on file    Years of education: Not on file    Highest education level: Not on file   Occupational History    Not on file   Tobacco Use    Smoking status: Former     Packs/day: 0.25     Years: 4.00     Pack years: 1.00     Types: Cigarettes     Quit date: 10/16/1996     Years since quittin.2    Smokeless tobacco: Never   Vaping Use    Vaping Use: Never used   Substance and Sexual Activity    Alcohol use: No    Drug use: No    Sexual activity: Not on file   Other Topics Concern    Not on file   Social History Narrative    Not on file     Social Determinants of Health     Financial Resource Strain: Not on file   Food Insecurity: Not on file   Transportation Needs: Not on file   Physical Activity: Insufficiently Active    Days of Exercise per Week: 3 days    Minutes of Exercise per Session: 30 min   Stress: Not on file   Social Connections: Not on file   Intimate Partner Violence: Not on file   Housing Stability: Not on file     Family History   Problem Relation Age of Onset    Asthma Mother     Hypertension Sister     Diabetes Sister     Cancer Other         Lung    Breast Cancer Neg Hx     Other Neg Hx     Post-op Cognitive Dysfunction Neg Hx Emergence Delirium Neg Hx     Post-op Nausea/Vomiting Neg Hx     Delayed Awakening Neg Hx     Pseudochol.  Deficiency Neg Hx     Malig Hypertherm Neg Hx        Review of Systems  Constitutional: Negative  GI: Neg GI ROS  Genitourinary: Genitourinary negative    Urinalysis  UA - Dipstick  Results for orders placed or performed in visit on 01/11/23   AMB POC URINALYSIS DIP STICK AUTO W/O MICRO   Result Value Ref Range    Color (UA POC)      Clarity (UA POC)      Glucose, Urine, POC Negative Negative    Bilirubin, Urine, POC Negative Negative    KETONES, Urine, POC Negative Negative    Specific Gravity, Urine, POC 1.025 1.001 - 1.035    Blood (UA POC) Trace-intact Negative    pH, Urine, POC 7.0 4.6 - 8.0    Protein, Urine, POC Negative Negative    Urobilinogen, POC 2 mg/dL     Nitrite, Urine, POC Negative Negative    Leukocyte Esterase, Urine, POC Negative Negative   Results for orders placed or performed in visit on 09/29/22   AMB POC URINALYSIS DIP STICK AUTO W/O MICRO   Result Value Ref Range    Color, Urine, POC yellow     Clarity, Urine, POC clear     Glucose, Urine, POC Negative Negative    Bilirubin, Urine, POC Negative Negative    Ketones, Urine, POC Negative Negative    Specific Gravity, Urine, POC 1.020 1.001 - 1.035    Blood, Urine, POC trace Negative    pH, Urine, POC 7.0 4.6 - 8.0    Protein, Urine, POC Negative Negative    Urobilinogen, POC 3.5     Nitrate, Urine, POC negative Negative    Leukocyte Esterase, Urine, POC Negative Negative       UA - Micro  WBC - 0  RBC - 0  Bacteria - 0  Epith - 0    PHYSICAL EXAM    General appearance - well appearing and in no distress  Mental status - alert, oriented to person, place, and time  Neck - supple, no significant adenopathy  Chest/Lung-  Quiet, even and easy respiratory effort without use of accessory muscles  Skin - normal coloration and turgor, no rashes      Assessment and Plan    ICD-10-CM    1. OAB (overactive bladder)  N32.81 AMB POC URINALYSIS DIP STICK AUTO W/O MICRO      2. Microhematuria  R31.29 AMB POC URINALYSIS DIP STICK AUTO W/O MICRO      3. Acute UTI  N39.0 AMB POC URINALYSIS DIP STICK AUTO W/O MICRO          OAB- improved. Cont Myrbetriq 50 mg PO daily. Microhematuria- urine normal. Cont to follow. Acute UTI- resolved. No add tx. I have educated patient to behavioral changes that can decrease the frequency of any urinary infection. These include eliminating constipation, front to back wiping, frequent voiding to keep bladder volumes low, double voiding, avoid soaking in water (including baths, pools, hot tubs), use of cranberry products, and use of probiotics. I encouraged consuming minimum of 64 oz of water daily. I also recommend avoiding consumption of sugary beverages and other known bladder irritants. Advised to call this office if develops sx of UTI for specimen check. If unable to come to this office, encouraged to have UC performed and records sent to this office. RTO in 3 months for follow up. Advised to call sooner if sx worsen. Jas Lawrence, JASSIP, APRN - CNP  Dr. Allison Quesada is supervising physician today and he approves plan of care.

## 2023-03-02 ENCOUNTER — TRANSCRIBE ORDERS (OUTPATIENT)
Dept: SCHEDULING | Age: 68
End: 2023-03-02

## 2023-03-02 DIAGNOSIS — R10.11 RIGHT UPPER QUADRANT PAIN: Primary | ICD-10-CM

## 2023-03-02 DIAGNOSIS — R11.0 NAUSEA IN ADULT: ICD-10-CM

## 2023-03-02 DIAGNOSIS — R10.31 RIGHT LOWER QUADRANT PAIN: ICD-10-CM

## 2023-03-16 ENCOUNTER — HOSPITAL ENCOUNTER (OUTPATIENT)
Dept: CT IMAGING | Age: 68
Discharge: HOME | End: 2023-03-16
Payer: MEDICARE

## 2023-03-16 DIAGNOSIS — R11.0 NAUSEA IN ADULT: ICD-10-CM

## 2023-03-16 DIAGNOSIS — R10.31 RIGHT LOWER QUADRANT PAIN: ICD-10-CM

## 2023-03-16 DIAGNOSIS — R10.11 RIGHT UPPER QUADRANT PAIN: ICD-10-CM

## 2023-03-16 LAB — CREAT BLD-MCNC: 0.62 MG/DL (ref 0.8–1.5)

## 2023-03-16 PROCEDURE — 6360000004 HC RX CONTRAST MEDICATION: Performed by: NURSE PRACTITIONER

## 2023-03-16 PROCEDURE — 74177 CT ABD & PELVIS W/CONTRAST: CPT

## 2023-03-16 PROCEDURE — 82565 ASSAY OF CREATININE: CPT

## 2023-03-16 PROCEDURE — 2580000003 HC RX 258: Performed by: NURSE PRACTITIONER

## 2023-03-16 RX ORDER — 0.9 % SODIUM CHLORIDE 0.9 %
100 INTRAVENOUS SOLUTION INTRAVENOUS
Status: COMPLETED | OUTPATIENT
Start: 2023-03-16 | End: 2023-03-16

## 2023-03-16 RX ORDER — SODIUM CHLORIDE 0.9 % (FLUSH) 0.9 %
10 SYRINGE (ML) INJECTION
Status: COMPLETED | OUTPATIENT
Start: 2023-03-16 | End: 2023-03-16

## 2023-03-16 RX ADMIN — SODIUM CHLORIDE, PRESERVATIVE FREE 10 ML: 5 INJECTION INTRAVENOUS at 10:28

## 2023-03-16 RX ADMIN — IOPAMIDOL 100 ML: 755 INJECTION, SOLUTION INTRAVENOUS at 10:28

## 2023-03-16 RX ADMIN — DIATRIZOATE MEGLUMINE AND DIATRIZOATE SODIUM 15 ML: 660; 100 LIQUID ORAL; RECTAL at 10:26

## 2023-03-16 RX ADMIN — SODIUM CHLORIDE 100 ML: 9 INJECTION, SOLUTION INTRAVENOUS at 10:28

## 2023-06-01 ENCOUNTER — TELEPHONE (OUTPATIENT)
Dept: ORTHOPEDIC SURGERY | Age: 68
End: 2023-06-01

## 2023-06-06 ENCOUNTER — OFFICE VISIT (OUTPATIENT)
Dept: UROLOGY | Age: 68
End: 2023-06-06
Payer: MEDICARE

## 2023-06-06 DIAGNOSIS — R31.29 MICROHEMATURIA: ICD-10-CM

## 2023-06-06 DIAGNOSIS — N32.81 OAB (OVERACTIVE BLADDER): Primary | ICD-10-CM

## 2023-06-06 LAB
BILIRUBIN, URINE, POC: NEGATIVE
BLOOD URINE, POC: NORMAL
GLUCOSE URINE, POC: NEGATIVE
KETONES, URINE, POC: NEGATIVE
LEUKOCYTE ESTERASE, URINE, POC: NEGATIVE
NITRITE, URINE, POC: NEGATIVE
PH, URINE, POC: 6 (ref 4.6–8)
PROTEIN,URINE, POC: NEGATIVE
SPECIFIC GRAVITY, URINE, POC: 1.02 (ref 1–1.03)
URINALYSIS CLARITY, POC: NORMAL
URINALYSIS COLOR, POC: NORMAL
UROBILINOGEN, POC: NORMAL

## 2023-06-06 PROCEDURE — G8427 DOCREV CUR MEDS BY ELIG CLIN: HCPCS | Performed by: NURSE PRACTITIONER

## 2023-06-06 PROCEDURE — 1123F ACP DISCUSS/DSCN MKR DOCD: CPT | Performed by: NURSE PRACTITIONER

## 2023-06-06 PROCEDURE — 81003 URINALYSIS AUTO W/O SCOPE: CPT | Performed by: NURSE PRACTITIONER

## 2023-06-06 PROCEDURE — G8400 PT W/DXA NO RESULTS DOC: HCPCS | Performed by: NURSE PRACTITIONER

## 2023-06-06 PROCEDURE — 99214 OFFICE O/P EST MOD 30 MIN: CPT | Performed by: NURSE PRACTITIONER

## 2023-06-06 PROCEDURE — 1036F TOBACCO NON-USER: CPT | Performed by: NURSE PRACTITIONER

## 2023-06-06 PROCEDURE — G8417 CALC BMI ABV UP PARAM F/U: HCPCS | Performed by: NURSE PRACTITIONER

## 2023-06-06 PROCEDURE — 1090F PRES/ABSN URINE INCON ASSESS: CPT | Performed by: NURSE PRACTITIONER

## 2023-06-06 PROCEDURE — 3017F COLORECTAL CA SCREEN DOC REV: CPT | Performed by: NURSE PRACTITIONER

## 2023-06-06 ASSESSMENT — ENCOUNTER SYMPTOMS
BACK PAIN: 0
NAUSEA: 0

## 2023-06-06 NOTE — PROGRESS NOTES
Cholecystitis 6/30/2022    Added automatically from request for surgery [id number]    Cystocele, midline 4/24/2015    Diverticulosis of colon 4/24/2015    Edema 4/24/2015    Including peripheral edema. ( SOMETIMES) PER PT    Female stress incontinence     GERD (gastroesophageal reflux disease)     controlled with med    Hypercholesterolemia     no meds currently    Hyperplastic polyps of stomach 4/24/2015    Colonsocpy. 2013.     Hypertension     controlled with med    Internal hemorrhoids without mention of complication 3/19/7728    Mixed hyperlipidemia 8/16/2022    Obesity (BMI 30-39.9) 11/14/2017    BMI = 35    Obstructive sleep apnea 11/14/2017    wears cpap at hs    Other diseases of lung, not elsewhere classified 4/24/2015    being rechecked 8/2022-- followed by dr Maty Cordova    Right upper quadrant pain     Urge incontinence     Ventricular bigeminy 8/31/2016    per pt-- was told it was ok-- does not see a cardiologist at present     Past Surgical History:   Procedure Laterality Date    CHOLECYSTECTOMY, LAPAROSCOPIC N/A 8/12/2022    [address] performed by Nadia Mcrae MD at [facility] MAIN OR    HYSTERECTOMY (CERVIX STATUS UNKNOWN)      KNEE ARTHROSCOPY Bilateral      Current Outpatient Medications   Medication Sig Dispense Refill    mirabegron (MYRBETRIQ) 50 MG TB24 Take 50 mg by mouth daily 90 tablet 3    dicyclomine (BENTYL) 20 MG tablet TAKE 1 TABLET BY MOUTH EVERY 6 HOURS      azithromycin (ZITHROMAX) 250 MG tablet       QVAR REDIHALER 80 MCG/ACT AERB inhaler       amLODIPine (NORVASC) 10 MG tablet Take 1 tablet by mouth daily 30 tablet 5    pantoprazole (PROTONIX) 40 MG tablet Take 1 tablet by mouth 2 times daily 30 tablet 5    valsartan (DIOVAN) 160 MG tablet Take 1 tablet by mouth daily 30 tablet 5    montelukast (SINGULAIR) 10 MG tablet Take 1 tablet by mouth nightly 30 tablet 5    atorvastatin (LIPITOR) 40 MG tablet Take 1 tablet by mouth at bedtime 30 tablet 5    melatonin 3 MG TABS

## 2023-06-19 ENCOUNTER — TELEPHONE (OUTPATIENT)
Dept: ORTHOPEDIC SURGERY | Age: 68
End: 2023-06-19

## 2023-06-30 ENCOUNTER — CLINICAL DOCUMENTATION (OUTPATIENT)
Dept: ORTHOPEDIC SURGERY | Age: 68
End: 2023-06-30

## 2023-07-04 SDOH — ECONOMIC STABILITY: FOOD INSECURITY: WITHIN THE PAST 12 MONTHS, YOU WORRIED THAT YOUR FOOD WOULD RUN OUT BEFORE YOU GOT MONEY TO BUY MORE.: NEVER TRUE

## 2023-07-04 SDOH — ECONOMIC STABILITY: TRANSPORTATION INSECURITY
IN THE PAST 12 MONTHS, HAS LACK OF TRANSPORTATION KEPT YOU FROM MEETINGS, WORK, OR FROM GETTING THINGS NEEDED FOR DAILY LIVING?: NO

## 2023-07-04 SDOH — ECONOMIC STABILITY: FOOD INSECURITY: WITHIN THE PAST 12 MONTHS, THE FOOD YOU BOUGHT JUST DIDN'T LAST AND YOU DIDN'T HAVE MONEY TO GET MORE.: NEVER TRUE

## 2023-07-04 SDOH — ECONOMIC STABILITY: INCOME INSECURITY: HOW HARD IS IT FOR YOU TO PAY FOR THE VERY BASICS LIKE FOOD, HOUSING, MEDICAL CARE, AND HEATING?: SOMEWHAT HARD

## 2023-07-04 SDOH — ECONOMIC STABILITY: HOUSING INSECURITY
IN THE LAST 12 MONTHS, WAS THERE A TIME WHEN YOU DID NOT HAVE A STEADY PLACE TO SLEEP OR SLEPT IN A SHELTER (INCLUDING NOW)?: NO

## 2023-07-04 ASSESSMENT — PATIENT HEALTH QUESTIONNAIRE - PHQ9
1. LITTLE INTEREST OR PLEASURE IN DOING THINGS: NOT AT ALL
SUM OF ALL RESPONSES TO PHQ QUESTIONS 1-9: 0
1. LITTLE INTEREST OR PLEASURE IN DOING THINGS: 0
SUM OF ALL RESPONSES TO PHQ9 QUESTIONS 1 & 2: 0
SUM OF ALL RESPONSES TO PHQ QUESTIONS 1-9: 0
2. FEELING DOWN, DEPRESSED OR HOPELESS: NOT AT ALL
SUM OF ALL RESPONSES TO PHQ9 QUESTIONS 1 & 2: 0
2. FEELING DOWN, DEPRESSED OR HOPELESS: 0

## 2023-07-05 ENCOUNTER — OFFICE VISIT (OUTPATIENT)
Dept: FAMILY MEDICINE CLINIC | Facility: CLINIC | Age: 68
End: 2023-07-05
Payer: MEDICARE

## 2023-07-05 ENCOUNTER — TELEPHONE (OUTPATIENT)
Dept: ORTHOPEDIC SURGERY | Age: 68
End: 2023-07-05

## 2023-07-05 VITALS
WEIGHT: 226 LBS | HEIGHT: 66 IN | HEART RATE: 42 BPM | BODY MASS INDEX: 36.32 KG/M2 | OXYGEN SATURATION: 99 % | DIASTOLIC BLOOD PRESSURE: 88 MMHG | SYSTOLIC BLOOD PRESSURE: 138 MMHG

## 2023-07-05 DIAGNOSIS — J45.40 MODERATE PERSISTENT ASTHMA WITHOUT COMPLICATION: ICD-10-CM

## 2023-07-05 DIAGNOSIS — I10 ESSENTIAL HYPERTENSION: ICD-10-CM

## 2023-07-05 DIAGNOSIS — E66.01 MORBID OBESITY (HCC): ICD-10-CM

## 2023-07-05 DIAGNOSIS — K29.50 ANTRAL GASTRITIS: ICD-10-CM

## 2023-07-05 DIAGNOSIS — E78.2 MIXED HYPERLIPIDEMIA: ICD-10-CM

## 2023-07-05 DIAGNOSIS — E11.9 TYPE 2 DIABETES MELLITUS WITHOUT COMPLICATION, WITHOUT LONG-TERM CURRENT USE OF INSULIN (HCC): Primary | ICD-10-CM

## 2023-07-05 DIAGNOSIS — E78.00 HYPERCHOLESTEREMIA: ICD-10-CM

## 2023-07-05 PROBLEM — J95.811 POSTPROCEDURAL PNEUMOTHORAX: Status: RESOLVED | Noted: 2022-08-17 | Resolved: 2023-07-05

## 2023-07-05 LAB
ALBUMIN, URINE, POC: 10 MG/L
ANION GAP SERPL CALC-SCNC: 5 MMOL/L (ref 2–11)
BUN SERPL-MCNC: 9 MG/DL (ref 8–23)
CALCIUM SERPL-MCNC: 8.8 MG/DL (ref 8.3–10.4)
CHLORIDE SERPL-SCNC: 111 MMOL/L (ref 101–110)
CHOLEST SERPL-MCNC: 198 MG/DL
CO2 SERPL-SCNC: 25 MMOL/L (ref 21–32)
CREAT SERPL-MCNC: 0.7 MG/DL (ref 0.6–1)
CREATININE, URINE, POC: 300 MG/DL
GLUCOSE SERPL-MCNC: 111 MG/DL (ref 65–100)
HDLC SERPL-MCNC: 78 MG/DL (ref 40–60)
HDLC SERPL: 2.5
LDLC SERPL CALC-MCNC: 109 MG/DL
MICROALB/CREAT RATIO, POC: <30 MG/G
POTASSIUM SERPL-SCNC: 3.9 MMOL/L (ref 3.5–5.1)
SODIUM SERPL-SCNC: 141 MMOL/L (ref 133–143)
TRIGL SERPL-MCNC: 55 MG/DL (ref 35–150)
VLDLC SERPL CALC-MCNC: 11 MG/DL (ref 6–23)

## 2023-07-05 PROCEDURE — G8417 CALC BMI ABV UP PARAM F/U: HCPCS | Performed by: FAMILY MEDICINE

## 2023-07-05 PROCEDURE — G8400 PT W/DXA NO RESULTS DOC: HCPCS | Performed by: FAMILY MEDICINE

## 2023-07-05 PROCEDURE — 1090F PRES/ABSN URINE INCON ASSESS: CPT | Performed by: FAMILY MEDICINE

## 2023-07-05 PROCEDURE — 2022F DILAT RTA XM EVC RTNOPTHY: CPT | Performed by: FAMILY MEDICINE

## 2023-07-05 PROCEDURE — 1036F TOBACCO NON-USER: CPT | Performed by: FAMILY MEDICINE

## 2023-07-05 PROCEDURE — 3017F COLORECTAL CA SCREEN DOC REV: CPT | Performed by: FAMILY MEDICINE

## 2023-07-05 PROCEDURE — 82044 UR ALBUMIN SEMIQUANTITATIVE: CPT | Performed by: FAMILY MEDICINE

## 2023-07-05 PROCEDURE — 3044F HG A1C LEVEL LT 7.0%: CPT | Performed by: FAMILY MEDICINE

## 2023-07-05 PROCEDURE — G8427 DOCREV CUR MEDS BY ELIG CLIN: HCPCS | Performed by: FAMILY MEDICINE

## 2023-07-05 PROCEDURE — 3079F DIAST BP 80-89 MM HG: CPT | Performed by: FAMILY MEDICINE

## 2023-07-05 PROCEDURE — 99214 OFFICE O/P EST MOD 30 MIN: CPT | Performed by: FAMILY MEDICINE

## 2023-07-05 PROCEDURE — 1123F ACP DISCUSS/DSCN MKR DOCD: CPT | Performed by: FAMILY MEDICINE

## 2023-07-05 PROCEDURE — 3075F SYST BP GE 130 - 139MM HG: CPT | Performed by: FAMILY MEDICINE

## 2023-07-05 RX ORDER — VALSARTAN 160 MG/1
160 TABLET ORAL DAILY
Qty: 30 TABLET | Refills: 5 | Status: SHIPPED | OUTPATIENT
Start: 2023-07-05

## 2023-07-05 RX ORDER — COVID-19 ANTIGEN TEST
KIT MISCELLANEOUS
COMMUNITY
Start: 2023-03-30

## 2023-07-05 RX ORDER — ATORVASTATIN CALCIUM 40 MG/1
40 TABLET, FILM COATED ORAL NIGHTLY
Qty: 30 TABLET | Refills: 5 | Status: SHIPPED | OUTPATIENT
Start: 2023-07-05

## 2023-07-05 RX ORDER — CETIRIZINE HYDROCHLORIDE 10 MG/1
TABLET ORAL
COMMUNITY
Start: 2023-06-15

## 2023-07-05 RX ORDER — AMLODIPINE BESYLATE 10 MG/1
10 TABLET ORAL DAILY
Qty: 30 TABLET | Refills: 5 | Status: SHIPPED | OUTPATIENT
Start: 2023-07-05

## 2023-07-05 RX ORDER — DICYCLOMINE HCL 20 MG
20 TABLET ORAL EVERY 6 HOURS
Qty: 120 TABLET | Refills: 1 | Status: SHIPPED | OUTPATIENT
Start: 2023-07-05

## 2023-07-05 RX ORDER — MONTELUKAST SODIUM 10 MG/1
10 TABLET ORAL NIGHTLY
Qty: 30 TABLET | Refills: 5 | Status: SHIPPED | OUTPATIENT
Start: 2023-07-05

## 2023-07-05 RX ORDER — PANTOPRAZOLE SODIUM 40 MG/1
40 TABLET, DELAYED RELEASE ORAL 2 TIMES DAILY
Qty: 30 TABLET | Refills: 5 | Status: SHIPPED | OUTPATIENT
Start: 2023-07-05

## 2023-07-05 ASSESSMENT — PATIENT HEALTH QUESTIONNAIRE - PHQ9
SUM OF ALL RESPONSES TO PHQ QUESTIONS 1-9: 0
1. LITTLE INTEREST OR PLEASURE IN DOING THINGS: 0
SUM OF ALL RESPONSES TO PHQ QUESTIONS 1-9: 0
SUM OF ALL RESPONSES TO PHQ QUESTIONS 1-9: 0
2. FEELING DOWN, DEPRESSED OR HOPELESS: 0
SUM OF ALL RESPONSES TO PHQ QUESTIONS 1-9: 0
SUM OF ALL RESPONSES TO PHQ9 QUESTIONS 1 & 2: 0

## 2023-07-05 NOTE — PROGRESS NOTES
Subjective:  Martin Linton is a 79 y.o. female presents today for their semi-annual htn and fasting, diet-controlled diabetes, and dyslipidemia visit. They are having no side effects and are doing well. Systems review of cardiovascular and pulmonary systems reveal no complaints or pertinent postivies. They also have a diagnosis of dyslipidemia and are due for lipids, denying any current side effects, continuing diet and exercise the best they can. Patient denies any pounding heart beats or rapid heart beat intervals, flushing, panic like attacks, headaches, dizzyness or flushing. They have drug reistant hypertension, on 3 or more different medicaitons including one diuretic- No  PHQ-9 Total Score: 0 (7/5/2023 10:52 AM)      How much are you exercising? 3 days a week   Do you smoke? No   Are you taking your medicines as directed most every day? Yes  Do you follow a low sodium DASH diet or similar high blood pressure diet? Yes  Do you check your bp at home with an automated blood pressure device? No  If you don't have a home bp machine, you should purchase one at home and begin to check your pressure at home on a regular basis. Your blood pressure goal is listed under the \"plan section\" below    Allergies   Allergen Reactions    Latex Other (See Comments)     wheezing    Other Shortness Of Breath     PER PT-- ALL RAJESH WILL START AN ASTHMA ATTACK    Sulfa Antibiotics Nausea Only      reports that she quit smoking about 26 years ago. Her smoking use included cigarettes. She has a 1.00 pack-year smoking history.  She has never used smokeless tobacco.    Current Outpatient Medications   Medication Sig Dispense Refill    amLODIPine (NORVASC) 10 MG tablet Take 1 tablet by mouth daily 30 tablet 5    pantoprazole (PROTONIX) 40 MG tablet Take 1 tablet by mouth 2 times daily 30 tablet 5    valsartan (DIOVAN) 160 MG tablet Take 1 tablet by mouth daily 30 tablet 5    montelukast (SINGULAIR) 10 MG tablet Take 1 tablet

## 2023-07-05 NOTE — TELEPHONE ENCOUNTER
She is wanting to speak to someone about the pain she is having in her heel. It hurts with or without the boot on. She wants to know if there is anything else she can do. Please give her a call.

## 2023-07-06 LAB
EST. AVERAGE GLUCOSE BLD GHB EST-MCNC: 137 MG/DL
HBA1C MFR BLD: 6.4 % (ref 4.8–5.6)

## 2023-07-06 NOTE — TELEPHONE ENCOUNTER
Encouraged patient to soak in Epsom salt to help with the pain. Patient states she does stay off of her foot as much as she can however she is experiencing heel pain. Patient will ice and elevate and call back if she is not doing better.

## 2023-07-26 ENCOUNTER — APPOINTMENT (OUTPATIENT)
Dept: CT IMAGING | Age: 68
End: 2023-07-26
Payer: MEDICARE

## 2023-07-26 ENCOUNTER — HOSPITAL ENCOUNTER (EMERGENCY)
Age: 68
Discharge: HOME | End: 2023-07-26
Attending: GENERAL PRACTICE
Payer: MEDICARE

## 2023-07-26 ENCOUNTER — OFFICE VISIT (OUTPATIENT)
Dept: ORTHOPEDIC SURGERY | Age: 68
End: 2023-07-26
Payer: MEDICARE

## 2023-07-26 VITALS
WEIGHT: 229 LBS | HEART RATE: 71 BPM | BODY MASS INDEX: 36.8 KG/M2 | OXYGEN SATURATION: 96 % | TEMPERATURE: 97.9 F | HEIGHT: 66 IN | SYSTOLIC BLOOD PRESSURE: 131 MMHG | DIASTOLIC BLOOD PRESSURE: 78 MMHG | RESPIRATION RATE: 18 BRPM

## 2023-07-26 DIAGNOSIS — M54.32 SCIATICA OF LEFT SIDE: Primary | ICD-10-CM

## 2023-07-26 DIAGNOSIS — K57.90 DIVERTICULOSIS: ICD-10-CM

## 2023-07-26 DIAGNOSIS — M84.375A STRESS FRACTURE OF LEFT CALCANEUS: Primary | ICD-10-CM

## 2023-07-26 LAB
ALBUMIN SERPL-MCNC: 3.3 G/DL (ref 3.2–4.6)
ALBUMIN/GLOB SERPL: 0.8 (ref 0.4–1.6)
ALP SERPL-CCNC: 109 U/L (ref 50–136)
ALT SERPL-CCNC: 20 U/L (ref 12–65)
ANION GAP SERPL CALC-SCNC: 5 MMOL/L (ref 2–11)
AST SERPL-CCNC: 13 U/L (ref 15–37)
BASOPHILS # BLD: 0 K/UL (ref 0–0.2)
BASOPHILS NFR BLD: 0 % (ref 0–2)
BILIRUB SERPL-MCNC: 0.5 MG/DL (ref 0.2–1.1)
BILIRUB UR QL: NEGATIVE
BUN SERPL-MCNC: 15 MG/DL (ref 8–23)
CALCIUM SERPL-MCNC: 9.1 MG/DL (ref 8.3–10.4)
CHLORIDE SERPL-SCNC: 111 MMOL/L (ref 101–110)
CO2 SERPL-SCNC: 27 MMOL/L (ref 21–32)
CREAT SERPL-MCNC: 0.8 MG/DL (ref 0.6–1)
DIFFERENTIAL METHOD BLD: ABNORMAL
EOSINOPHIL # BLD: 0.1 K/UL (ref 0–0.8)
EOSINOPHIL NFR BLD: 2 % (ref 0.5–7.8)
ERYTHROCYTE [DISTWIDTH] IN BLOOD BY AUTOMATED COUNT: 13.5 % (ref 11.9–14.6)
GLOBULIN SER CALC-MCNC: 4.4 G/DL (ref 2.8–4.5)
GLUCOSE SERPL-MCNC: 130 MG/DL (ref 65–100)
GLUCOSE UR QL STRIP.AUTO: NEGATIVE MG/DL
HCT VFR BLD AUTO: 35.9 % (ref 35.8–46.3)
HGB BLD-MCNC: 11.3 G/DL (ref 11.7–15.4)
IMM GRANULOCYTES # BLD AUTO: 0 K/UL (ref 0–0.5)
IMM GRANULOCYTES NFR BLD AUTO: 0 % (ref 0–5)
KETONES UR-MCNC: 15 MG/DL
LACTATE SERPL-SCNC: 1.2 MMOL/L (ref 0.4–2)
LEUKOCYTE ESTERASE UR QL STRIP: NEGATIVE
LIPASE SERPL-CCNC: 101 U/L (ref 73–393)
LYMPHOCYTES # BLD: 3.2 K/UL (ref 0.5–4.6)
LYMPHOCYTES NFR BLD: 43 % (ref 13–44)
MCH RBC QN AUTO: 28.8 PG (ref 26.1–32.9)
MCHC RBC AUTO-ENTMCNC: 31.5 G/DL (ref 31.4–35)
MCV RBC AUTO: 91.3 FL (ref 82–102)
MONOCYTES # BLD: 0.3 K/UL (ref 0.1–1.3)
MONOCYTES NFR BLD: 4 % (ref 4–12)
NEUTS SEG # BLD: 3.8 K/UL (ref 1.7–8.2)
NEUTS SEG NFR BLD: 51 % (ref 43–78)
NITRITE UR QL: NEGATIVE
NRBC # BLD: 0 K/UL (ref 0–0.2)
PH UR: 5.5 (ref 5–9)
PLATELET # BLD AUTO: 239 K/UL (ref 150–450)
PMV BLD AUTO: 11.9 FL (ref 9.4–12.3)
POTASSIUM SERPL-SCNC: 3.9 MMOL/L (ref 3.5–5.1)
PROT SERPL-MCNC: 7.7 G/DL (ref 6.3–8.2)
PROT UR QL: NEGATIVE MG/DL
RBC # BLD AUTO: 3.93 M/UL (ref 4.05–5.2)
RBC # UR STRIP: ABNORMAL
SERVICE CMNT-IMP: ABNORMAL
SODIUM SERPL-SCNC: 143 MMOL/L (ref 133–143)
SP GR UR: 1.02 (ref 1–1.02)
UROBILINOGEN UR QL: 0.2 EU/DL (ref 0.2–1)
WBC # BLD AUTO: 7.4 K/UL (ref 4.3–11.1)

## 2023-07-26 PROCEDURE — 96375 TX/PRO/DX INJ NEW DRUG ADDON: CPT

## 2023-07-26 PROCEDURE — 1123F ACP DISCUSS/DSCN MKR DOCD: CPT | Performed by: ORTHOPAEDIC SURGERY

## 2023-07-26 PROCEDURE — G8400 PT W/DXA NO RESULTS DOC: HCPCS | Performed by: ORTHOPAEDIC SURGERY

## 2023-07-26 PROCEDURE — 99213 OFFICE O/P EST LOW 20 MIN: CPT | Performed by: ORTHOPAEDIC SURGERY

## 2023-07-26 PROCEDURE — G8428 CUR MEDS NOT DOCUMENT: HCPCS | Performed by: ORTHOPAEDIC SURGERY

## 2023-07-26 PROCEDURE — 80053 COMPREHEN METABOLIC PANEL: CPT

## 2023-07-26 PROCEDURE — 85025 COMPLETE CBC W/AUTO DIFF WBC: CPT

## 2023-07-26 PROCEDURE — G8417 CALC BMI ABV UP PARAM F/U: HCPCS | Performed by: ORTHOPAEDIC SURGERY

## 2023-07-26 PROCEDURE — 83690 ASSAY OF LIPASE: CPT

## 2023-07-26 PROCEDURE — 1090F PRES/ABSN URINE INCON ASSESS: CPT | Performed by: ORTHOPAEDIC SURGERY

## 2023-07-26 PROCEDURE — 96372 THER/PROPH/DIAG INJ SC/IM: CPT

## 2023-07-26 PROCEDURE — 6360000002 HC RX W HCPCS: Performed by: STUDENT IN AN ORGANIZED HEALTH CARE EDUCATION/TRAINING PROGRAM

## 2023-07-26 PROCEDURE — 96374 THER/PROPH/DIAG INJ IV PUSH: CPT

## 2023-07-26 PROCEDURE — 6360000004 HC RX CONTRAST MEDICATION: Performed by: STUDENT IN AN ORGANIZED HEALTH CARE EDUCATION/TRAINING PROGRAM

## 2023-07-26 PROCEDURE — 99285 EMERGENCY DEPT VISIT HI MDM: CPT

## 2023-07-26 PROCEDURE — 81003 URINALYSIS AUTO W/O SCOPE: CPT

## 2023-07-26 PROCEDURE — 3017F COLORECTAL CA SCREEN DOC REV: CPT | Performed by: ORTHOPAEDIC SURGERY

## 2023-07-26 PROCEDURE — 74177 CT ABD & PELVIS W/CONTRAST: CPT

## 2023-07-26 PROCEDURE — 83605 ASSAY OF LACTIC ACID: CPT

## 2023-07-26 PROCEDURE — 1036F TOBACCO NON-USER: CPT | Performed by: ORTHOPAEDIC SURGERY

## 2023-07-26 RX ORDER — OXYCODONE HYDROCHLORIDE 5 MG/1
5 TABLET ORAL EVERY 6 HOURS PRN
Qty: 12 TABLET | Refills: 0 | Status: SHIPPED | OUTPATIENT
Start: 2023-07-26 | End: 2023-07-29

## 2023-07-26 RX ORDER — ONDANSETRON 4 MG/1
4 TABLET, FILM COATED ORAL 3 TIMES DAILY PRN
Qty: 15 TABLET | Refills: 0 | Status: SHIPPED | OUTPATIENT
Start: 2023-07-26

## 2023-07-26 RX ORDER — DEXAMETHASONE SODIUM PHOSPHATE 10 MG/ML
10 INJECTION INTRAMUSCULAR; INTRAVENOUS ONCE
Status: COMPLETED | OUTPATIENT
Start: 2023-07-26 | End: 2023-07-26

## 2023-07-26 RX ORDER — MORPHINE SULFATE 4 MG/ML
4 INJECTION INTRAVENOUS ONCE
Status: COMPLETED | OUTPATIENT
Start: 2023-07-26 | End: 2023-07-26

## 2023-07-26 RX ORDER — AMOXICILLIN AND CLAVULANATE POTASSIUM 875; 125 MG/1; MG/1
1 TABLET, FILM COATED ORAL 2 TIMES DAILY
Qty: 14 TABLET | Refills: 0 | Status: SHIPPED | OUTPATIENT
Start: 2023-07-26 | End: 2023-08-02

## 2023-07-26 RX ORDER — KETOROLAC TROMETHAMINE 15 MG/ML
15 INJECTION, SOLUTION INTRAMUSCULAR; INTRAVENOUS ONCE
Status: COMPLETED | OUTPATIENT
Start: 2023-07-26 | End: 2023-07-26

## 2023-07-26 RX ORDER — ONDANSETRON 2 MG/ML
4 INJECTION INTRAMUSCULAR; INTRAVENOUS
Status: COMPLETED | OUTPATIENT
Start: 2023-07-26 | End: 2023-07-26

## 2023-07-26 RX ORDER — 0.9 % SODIUM CHLORIDE 0.9 %
100 INTRAVENOUS SOLUTION INTRAVENOUS
Status: DISCONTINUED | OUTPATIENT
Start: 2023-07-26 | End: 2023-07-26 | Stop reason: HOSPADM

## 2023-07-26 RX ORDER — SODIUM CHLORIDE 0.9 % (FLUSH) 0.9 %
10 SYRINGE (ML) INJECTION
Status: DISCONTINUED | OUTPATIENT
Start: 2023-07-26 | End: 2023-07-26 | Stop reason: HOSPADM

## 2023-07-26 RX ADMIN — IOPAMIDOL 100 ML: 755 INJECTION, SOLUTION INTRAVENOUS at 19:26

## 2023-07-26 RX ADMIN — MORPHINE SULFATE 4 MG: 4 INJECTION INTRAVENOUS at 18:11

## 2023-07-26 RX ADMIN — FENTANYL CITRATE 50 MCG: 50 INJECTION, SOLUTION INTRAMUSCULAR; INTRAVENOUS at 19:54

## 2023-07-26 RX ADMIN — KETOROLAC TROMETHAMINE 15 MG: 15 INJECTION, SOLUTION INTRAMUSCULAR; INTRAVENOUS at 19:54

## 2023-07-26 RX ADMIN — DEXAMETHASONE SODIUM PHOSPHATE 10 MG: 10 INJECTION INTRAMUSCULAR; INTRAVENOUS at 19:53

## 2023-07-26 RX ADMIN — ONDANSETRON 4 MG: 2 INJECTION INTRAMUSCULAR; INTRAVENOUS at 18:11

## 2023-07-26 ASSESSMENT — ENCOUNTER SYMPTOMS
PHOTOPHOBIA: 0
VOMITING: 0
COUGH: 0
ABDOMINAL PAIN: 1
SHORTNESS OF BREATH: 0
TROUBLE SWALLOWING: 0
BACK PAIN: 1
FACIAL SWELLING: 0
NAUSEA: 1

## 2023-07-26 ASSESSMENT — PAIN DESCRIPTION - PAIN TYPE: TYPE: ACUTE PAIN

## 2023-07-26 ASSESSMENT — PAIN DESCRIPTION - DESCRIPTORS: DESCRIPTORS: ACHING

## 2023-07-26 ASSESSMENT — LIFESTYLE VARIABLES
HOW MANY STANDARD DRINKS CONTAINING ALCOHOL DO YOU HAVE ON A TYPICAL DAY: PATIENT DOES NOT DRINK
HOW OFTEN DO YOU HAVE A DRINK CONTAINING ALCOHOL: NEVER

## 2023-07-26 ASSESSMENT — PAIN DESCRIPTION - DIRECTION: RADIATING_TOWARDS: L LEG

## 2023-07-26 ASSESSMENT — PAIN DESCRIPTION - ORIENTATION: ORIENTATION: LOWER

## 2023-07-26 ASSESSMENT — PAIN - FUNCTIONAL ASSESSMENT: PAIN_FUNCTIONAL_ASSESSMENT: 0-10

## 2023-07-26 ASSESSMENT — PAIN DESCRIPTION - LOCATION: LOCATION: BACK

## 2023-07-26 ASSESSMENT — PAIN SCALES - GENERAL
PAINLEVEL_OUTOF10: 7
PAINLEVEL_OUTOF10: 9

## 2023-07-26 NOTE — PROGRESS NOTES
Name: Meghann Ricci  YOB: 1955  Gender: female  MRN: [id number]    Summary:   Left stress fracture of the calcaneus       CC: Foot Pain (Left foot f/u with xray )       HPI: Meghann Ricci is a 79 y.o. female who presents with Foot Pain (Left foot f/u with xray )  . Patient presents back to the office today with continued complaints of left stress fracture of the calcaneus pain. She been wearing her walker boot. This is probably made her hip hurt some as well. History was obtained by Patient     ROS/Meds/PSH/PMH/FH/SH: I personally reviewed the patients standard intake form. Below are the pertinents    Tobacco:  reports that she quit smoking about 26 years ago. Her smoking use included cigarettes. She has a 1.00 pack-year smoking history. She has never used smokeless tobacco.  Diabetes: None      Physical Examination:  [place] of the left foot and ankle shows very little swelling although she continues to have tenderness to palpation globally at the heel. Imaging:   I independently interpreted XR taken today  Left foot XR: AP, Lateral, Oblique views     ICD-10-CM    1. Stress fracture of left calcaneus  M84.375A XR FOOT LEFT (MIN 3 VIEWS)         Report: AP, lateral, oblique x-ray of the left foot demonstrates no definite fracture    Impression: No definite fracture   Shelley Long III, MD           Assessment:   Left calcaneus stress fracture    Treatment Plan:   3 This is stable chronic illness/condition  Treatment at this time: Time with no intervention  Studies ordered: Foot XR needed @ Next Visit    Weight-bearing status: WBAT        Return to work/work restrictions: none  No medications given    She will wean out of the walker boot as tolerated and return in 6 weeks for an x-ray.

## 2023-07-26 NOTE — ED PROVIDER NOTES
into the muscle once as needed    FLOWFLEX COVID-19 AG HOME TEST KIT    USE FOR COVID TESTING AT HOME    FLUTICASONE (FLONASE) 50 MCG/ACT NASAL SPRAY    2 sprays by Nasal route daily    MELATONIN 3 MG TABS TABLET    Take 1 tablet by mouth daily    MIRABEGRON (MYRBETRIQ) 50 MG TB24    Take 50 mg by mouth daily    MONTELUKAST (SINGULAIR) 10 MG TABLET    Take 1 tablet by mouth nightly    OMALIZUMAB 150 MG INJECTION    300mg Subcutaneous Q 2 weeks    PANTOPRAZOLE (PROTONIX) 40 MG TABLET    Take 1 tablet by mouth 2 times daily    QVAR REDIHALER 80 MCG/ACT AERB INHALER        VALSARTAN (DIOVAN) 160 MG TABLET    Take 1 tablet by mouth daily        Results for orders placed or performed during the hospital encounter of 07/26/23   CT ABDOMEN PELVIS W IV CONTRAST Additional Contrast? None    Narrative    Exam: CT abdomen and pelvis with contrast    Date: July 26, 2023    History: Left lower quadrant abdominal pain    Comparison: March 16, 2023    Technique: Contiguous axial CT images were obtained of the abdomen and pelvis   following the uneventful intravenous administration of 100 mL Isovue-370   contrast. Additionally, sagittal and coronal reformatted images were obtained. CT dose lowering techniques were used, to include: automated exposure control,   adjustment for patient size, and or use of iterative reconstruction. Findings:    Lung bases: Lung bases are clear. The heart is borderline in size. Liver: Normal.    Spleen: Normal.    Pancreas: Normal.    Adrenal glands: Normal.    Gallbladder: The gallbladder is not identified. Kidneys: The kidneys demonstrate symmetric enhancement. There is no   hydronephrosis or obstructive uropathy. Abdominal mesentery: There is no pathologic intra-abdominal mass or adenopathy. Bowel loops: There is extensive colonic diverticulosis. The appendix is normal.   There is no small bowel obstruction. CT PELVIS:  The uterus is not identified.  The urinary bladder is

## 2023-07-26 NOTE — ED TRIAGE NOTES
Pt arrives in [facility] to ED w/ cc of 9/10 L lower back pain that radiates down her leg since yesterday. Pt was recently seen by [facility] for a fx L heel. Pt took 2 tylenol arthritis pills this morning w/ no relief. Pt denies CP, ShOB, N/V/D, Fever.  Pt A&O X 4

## 2023-07-27 ENCOUNTER — CARE COORDINATION (OUTPATIENT)
Dept: CARE COORDINATION | Facility: CLINIC | Age: 68
End: 2023-07-27

## 2023-07-27 NOTE — CARE COORDINATION
This patient was received as a referral from Daily assignment for case management of ed utilizer. Outreach to patient on behalf of Ambulatory Care Management RN. Patient would like to speak with our SW. Patient reports her son lives in [facility] and drives to [place] to help her when needed. She states she would like to know what might be available for her in the event she needs assistance dressing, bathing, cooking etc. Patient does have benefits through [facility] in addition to Medicare and states she will contact Sonny García to discuss as well. Referral to [facility].

## 2023-07-27 NOTE — ED NOTES
Prior to receiving pain medication, PT stated she had a ride home and would not be driving. The pt was educated on the importance of not driving after taking narcotic medications, pt voiced understanding.      Huma Zhang, SHA  29/21/63 [id number]

## 2023-07-31 ENCOUNTER — TELEPHONE (OUTPATIENT)
Dept: FAMILY MEDICINE CLINIC | Facility: CLINIC | Age: 68
End: 2023-07-31

## 2023-07-31 NOTE — TELEPHONE ENCOUNTER
Pt was in the ER last week for a fractured heel, she is now in a boot, and the pressure from walking unevenly has caused her sciatica to flare up on her left side. She says she did get some medication from the ER, and she has tried ibuprofen, but neither are working. Is there anything she can do to help relief the pain.

## 2023-08-01 ENCOUNTER — TELEPHONE (OUTPATIENT)
Dept: ORTHOPEDIC SURGERY | Age: 68
End: 2023-08-01

## 2023-08-01 NOTE — TELEPHONE ENCOUNTER
Explained to patient we do not set up transportation for patients however to check with her PCP to see if he has any suggestions.

## 2023-08-01 NOTE — TELEPHONE ENCOUNTER
The boot is causing her lt side sciatic to act up is unable to do anything, wants to talk to someone about contacting medicare about getting transportation back and forth to apts and grocery store cell# [phone]

## 2023-08-02 ENCOUNTER — CLINICAL DOCUMENTATION (OUTPATIENT)
Dept: ORTHOPEDIC SURGERY | Age: 68
End: 2023-08-02

## 2023-08-08 PROBLEM — D12.6 TUBULOVILLOUS ADENOMA OF COLON: Status: ACTIVE | Noted: 2018-08-14

## 2023-09-05 ENCOUNTER — CLINICAL DOCUMENTATION (OUTPATIENT)
Dept: ORTHOPEDIC SURGERY | Age: 68
End: 2023-09-05

## 2023-09-05 NOTE — PROGRESS NOTES
Patient asked that we refax [facility] 7-30-21 and 9-10-21 notes to Arnette Goldmann with [employer] claim services [id number].

## 2023-09-06 ENCOUNTER — OFFICE VISIT (OUTPATIENT)
Dept: ORTHOPEDIC SURGERY | Age: 68
End: 2023-09-06
Payer: MEDICARE

## 2023-09-06 DIAGNOSIS — M84.375A STRESS FRACTURE OF LEFT CALCANEUS: Primary | ICD-10-CM

## 2023-09-06 PROCEDURE — 1123F ACP DISCUSS/DSCN MKR DOCD: CPT | Performed by: ORTHOPAEDIC SURGERY

## 2023-09-06 PROCEDURE — G8417 CALC BMI ABV UP PARAM F/U: HCPCS | Performed by: ORTHOPAEDIC SURGERY

## 2023-09-06 PROCEDURE — G8427 DOCREV CUR MEDS BY ELIG CLIN: HCPCS | Performed by: ORTHOPAEDIC SURGERY

## 2023-09-06 PROCEDURE — 3017F COLORECTAL CA SCREEN DOC REV: CPT | Performed by: ORTHOPAEDIC SURGERY

## 2023-09-06 PROCEDURE — 99213 OFFICE O/P EST LOW 20 MIN: CPT | Performed by: ORTHOPAEDIC SURGERY

## 2023-09-06 PROCEDURE — 1036F TOBACCO NON-USER: CPT | Performed by: ORTHOPAEDIC SURGERY

## 2023-09-06 PROCEDURE — 1090F PRES/ABSN URINE INCON ASSESS: CPT | Performed by: ORTHOPAEDIC SURGERY

## 2023-09-06 PROCEDURE — G8400 PT W/DXA NO RESULTS DOC: HCPCS | Performed by: ORTHOPAEDIC SURGERY

## 2023-09-06 NOTE — PROGRESS NOTES
Name: Liat Calles  YOB: 1955  Gender: female  MRN: [id number]    Summary:   Left calcaneal stress fracture       CC: Follow-up (Follow up left foot  x-rays taken in the office today)       HPI: Liat Calles is a 79 y.o. female who presents with Follow-up (Follow up left foot  x-rays taken in the office today)  . This patient presents to the office today for follow-up of her left calcaneal stress fracture that started to feel better. History was obtained by Patient     ROS/Meds/PSH/PMH/FH/SH: I personally reviewed the patients standard intake form. Below are the pertinents    Tobacco:  reports that she quit smoking about 26 years ago. Her smoking use included cigarettes. She has a 1.00 pack-year smoking history. She has never used smokeless tobacco.  Diabetes: None      Physical Examination:    Exam of the left foot and ankle shows less swelling and tenderness palpation globally in the heel. Imaging:   I independently interpreted XR taken today  Left foot XR: AP, Lateral, Oblique views     ICD-10-CM    1.  Stress fracture of left calcaneus  M84.375A XR FOOT LEFT (MIN 3 VIEWS)         Report: AP, lateral, oblique x-ray of the left foot demonstrates healing calcaneus fracture    Impression:  Calcaneus fracture   Fanny Handy III, MD           Assessment:   Healing calcaneus stress fracture    Treatment Plan:   3 This is stable chronic illness/condition  Treatment at this time: Time with no intervention  Studies ordered: NO XR needed @ Next Visit    Weight-bearing status: WBAT        Return to work/work restrictions: none  No medications given

## 2023-09-18 ENCOUNTER — TELEPHONE (OUTPATIENT)
Dept: FAMILY MEDICINE CLINIC | Facility: CLINIC | Age: 68
End: 2023-09-18

## 2023-10-03 ENCOUNTER — OFFICE VISIT (OUTPATIENT)
Dept: FAMILY MEDICINE CLINIC | Facility: CLINIC | Age: 68
End: 2023-10-03
Payer: MEDICARE

## 2023-10-03 VITALS
SYSTOLIC BLOOD PRESSURE: 121 MMHG | HEIGHT: 66 IN | BODY MASS INDEX: 36 KG/M2 | DIASTOLIC BLOOD PRESSURE: 64 MMHG | HEART RATE: 67 BPM | WEIGHT: 224 LBS | RESPIRATION RATE: 16 BRPM | OXYGEN SATURATION: 99 % | TEMPERATURE: 97 F

## 2023-10-03 DIAGNOSIS — I10 ESSENTIAL HYPERTENSION: ICD-10-CM

## 2023-10-03 DIAGNOSIS — K29.50 ANTRAL GASTRITIS: ICD-10-CM

## 2023-10-03 DIAGNOSIS — Z23 FLU VACCINE NEED: Primary | ICD-10-CM

## 2023-10-03 DIAGNOSIS — E78.2 MIXED HYPERLIPIDEMIA: ICD-10-CM

## 2023-10-03 DIAGNOSIS — Z00.00 MEDICARE ANNUAL WELLNESS VISIT, SUBSEQUENT: Chronic | ICD-10-CM

## 2023-10-03 DIAGNOSIS — R26.89 BALANCE DISORDER: ICD-10-CM

## 2023-10-03 DIAGNOSIS — J45.40 MODERATE PERSISTENT ASTHMA WITHOUT COMPLICATION: ICD-10-CM

## 2023-10-03 PROCEDURE — G8484 FLU IMMUNIZE NO ADMIN: HCPCS | Performed by: FAMILY MEDICINE

## 2023-10-03 PROCEDURE — 3074F SYST BP LT 130 MM HG: CPT | Performed by: FAMILY MEDICINE

## 2023-10-03 PROCEDURE — 3017F COLORECTAL CA SCREEN DOC REV: CPT | Performed by: FAMILY MEDICINE

## 2023-10-03 PROCEDURE — G0008 ADMIN INFLUENZA VIRUS VAC: HCPCS | Performed by: FAMILY MEDICINE

## 2023-10-03 PROCEDURE — G0439 PPPS, SUBSEQ VISIT: HCPCS | Performed by: FAMILY MEDICINE

## 2023-10-03 PROCEDURE — 1123F ACP DISCUSS/DSCN MKR DOCD: CPT | Performed by: FAMILY MEDICINE

## 2023-10-03 PROCEDURE — 3078F DIAST BP <80 MM HG: CPT | Performed by: FAMILY MEDICINE

## 2023-10-03 PROCEDURE — 90694 VACC AIIV4 NO PRSRV 0.5ML IM: CPT | Performed by: FAMILY MEDICINE

## 2023-10-03 RX ORDER — PANTOPRAZOLE SODIUM 40 MG/1
40 TABLET, DELAYED RELEASE ORAL 2 TIMES DAILY
Qty: 30 TABLET | Refills: 5 | Status: CANCELLED | OUTPATIENT
Start: 2023-10-03

## 2023-10-03 RX ORDER — AMLODIPINE BESYLATE 10 MG/1
10 TABLET ORAL DAILY
Qty: 30 TABLET | Refills: 5 | Status: CANCELLED | OUTPATIENT
Start: 2023-10-03

## 2023-10-03 RX ORDER — VALSARTAN 160 MG/1
160 TABLET ORAL DAILY
Qty: 30 TABLET | Refills: 5 | Status: CANCELLED | OUTPATIENT
Start: 2023-10-03

## 2023-10-03 RX ORDER — DICYCLOMINE HCL 20 MG
20 TABLET ORAL EVERY 6 HOURS
Qty: 120 TABLET | Refills: 1 | Status: CANCELLED | OUTPATIENT
Start: 2023-10-03

## 2023-10-03 RX ORDER — ATORVASTATIN CALCIUM 40 MG/1
40 TABLET, FILM COATED ORAL NIGHTLY
Qty: 30 TABLET | Refills: 5 | Status: CANCELLED | OUTPATIENT
Start: 2023-10-03

## 2023-10-03 RX ORDER — MONTELUKAST SODIUM 10 MG/1
10 TABLET ORAL NIGHTLY
Qty: 30 TABLET | Refills: 5 | Status: CANCELLED | OUTPATIENT
Start: 2023-10-03

## 2023-10-03 ASSESSMENT — PATIENT HEALTH QUESTIONNAIRE - PHQ9
SUM OF ALL RESPONSES TO PHQ QUESTIONS 1-9: 0
1. LITTLE INTEREST OR PLEASURE IN DOING THINGS: 0
SUM OF ALL RESPONSES TO PHQ9 QUESTIONS 1 & 2: 0
SUM OF ALL RESPONSES TO PHQ QUESTIONS 1-9: 0
2. FEELING DOWN, DEPRESSED OR HOPELESS: 0

## 2023-10-03 ASSESSMENT — LIFESTYLE VARIABLES
HOW OFTEN DO YOU HAVE A DRINK CONTAINING ALCOHOL: NEVER
HOW MANY STANDARD DRINKS CONTAINING ALCOHOL DO YOU HAVE ON A TYPICAL DAY: PATIENT DOES NOT DRINK

## 2023-10-03 NOTE — PATIENT INSTRUCTIONS
visit is recommended every 6 months. Try to get at least 150 minutes of exercise per week or 10,000 steps per day on a pedometer . Order or download the FREE \"Exercise & Physical Activity: Your Everyday Guide\" from [employer]. Call 1-623.594.9217 or search [employer] online. You need 3733-4879 mg of calcium and 3106-6767 IU of vitamin D per day. It is possible to meet your calcium requirement with diet alone, but a vitamin D supplement is usually necessary to meet this goal.  When exposed to the sun, use a sunscreen that protects against both UVA and UVB radiation with an SPF of 30 or greater. Reapply every 2 to 3 hours or after sweating, drying off with a towel, or swimming. Always wear a seat belt when traveling in a car. Always wear a helmet when riding a bicycle or motorcycle.

## 2023-10-03 NOTE — PROGRESS NOTES
hours as needed Yes Ar Automatic Reconciliation   Cetirizine HCl 10 MG CAPS Take 1 tablet by mouth daily Yes Ar Automatic Reconciliation   EPINEPHrine (EPIPEN) 0.3 MG/0.3ML SOAJ injection Inject 0.3 mLs into the muscle once as needed Yes Ar Automatic Reconciliation   fluticasone (FLONASE) 50 MCG/ACT nasal spray 2 sprays by Nasal route daily Yes Ar Automatic Reconciliation       CareTeam (Including outside providers/suppliers regularly involved in providing care):   Patient Care Team:  Jeison Rizvi MD as PCP - Marcie Slater MD as PCP - Empaneled Provider     Reviewed and updated this visit:  Tobacco  Allergies  Meds  Med Hx  Surg Hx  Soc Hx  Fam Hx

## 2023-11-17 ENCOUNTER — TELEPHONE (OUTPATIENT)
Dept: FAMILY MEDICINE CLINIC | Facility: CLINIC | Age: 68
End: 2023-11-17

## 2023-11-17 RX ORDER — VALACYCLOVIR HYDROCHLORIDE 1 G/1
1000 TABLET, FILM COATED ORAL 2 TIMES DAILY
Qty: 30 TABLET | Refills: 5 | Status: SHIPPED | OUTPATIENT
Start: 2023-11-17 | End: 2023-11-22

## 2023-11-17 NOTE — TELEPHONE ENCOUNTER
states that she does not remember what Eva prescribed her for her cold sores,but she was wondering if he could send the medication in.     Pharmacy: [address] [place] [address]    LDOS:10/3/2023    FU:1/9/2024

## 2023-11-28 NOTE — PROGRESS NOTES
[address] [place] [address]  [phone]    Patient Name:  Darylene Maidens  YOB: 1955      Office Visit 11/29/2023    CHIEF COMPLAINT:    Chief Complaint   Patient presents with    Sleep Apnea         HISTORY OF PRESENT ILLNESS:  Patient is seen today for follow up of REANNA. Patient had a sleep study 1/19/14 with AHI 5/hr with desaturations to 74%. She is prescribed Bipap 8/4 cm using a full face mask. Download reveals 80% compliance over the past month with average nightly use 3.5 hours. AHI is 3.3/hr. patient denies any problems with mask or pressure. She states that she is sleeping well. She denies napping. She states that there are nights she isn't able to sleep well. Her son passed last year and she still wakes around 3:30 am when he would get home from work. Also, some nights she will fall to sleep and forget to put the mask on. She does notice improvements in feeling more rested and having energy with using pap therapy. She denies any significant medical changes over the past year. Weight is 226 lbs which is a few llbs gained over the past year.  She is receiving supplies through [id number] [facility] [address] Sleepiness Scale      11/29/2023     3:57 PM 1/10/2023    11:25 AM 10/6/2022    11:30 AM 9/23/2021     1:07 PM   Sleep Medicine   Sitting and reading 2 0 0 0   Watching TV 2 0 1 1   Sitting, inactive in a public place (e.g. a theatre or a meeting) 1 0 0 0   As a passenger in a car for an hour without a break 1 0 0 0   Lying down to rest in the afternoon when circumstances permit 1 3 1 1   Sitting and talking to someone 1 0 0 0   Sitting quietly after a lunch without alcohol 1 0 0 0   In a car, while stopped for a few minutes in traffic 1 0 0 0   [place] Sleepiness Score 10 3 2 2          Past Medical History:   Diagnosis Date    Allergic rhinitis 4/24/2015    Dr. Evelyn Mars    Asthma     prn inhaler and neb    Calculus of

## 2023-11-29 ENCOUNTER — OFFICE VISIT (OUTPATIENT)
Dept: SLEEP MEDICINE | Age: 68
End: 2023-11-29
Payer: MEDICARE

## 2023-11-29 VITALS
HEART RATE: 92 BPM | OXYGEN SATURATION: 99 % | BODY MASS INDEX: 36.32 KG/M2 | SYSTOLIC BLOOD PRESSURE: 158 MMHG | DIASTOLIC BLOOD PRESSURE: 86 MMHG | WEIGHT: 226 LBS | RESPIRATION RATE: 16 BRPM | HEIGHT: 66 IN

## 2023-11-29 DIAGNOSIS — G47.33 OBSTRUCTIVE SLEEP APNEA: Primary | ICD-10-CM

## 2023-11-29 PROCEDURE — 3079F DIAST BP 80-89 MM HG: CPT | Performed by: NURSE PRACTITIONER

## 2023-11-29 PROCEDURE — 3077F SYST BP >= 140 MM HG: CPT | Performed by: NURSE PRACTITIONER

## 2023-11-29 PROCEDURE — 1036F TOBACCO NON-USER: CPT | Performed by: NURSE PRACTITIONER

## 2023-11-29 PROCEDURE — 1123F ACP DISCUSS/DSCN MKR DOCD: CPT | Performed by: NURSE PRACTITIONER

## 2023-11-29 PROCEDURE — 1090F PRES/ABSN URINE INCON ASSESS: CPT | Performed by: NURSE PRACTITIONER

## 2023-11-29 PROCEDURE — G8484 FLU IMMUNIZE NO ADMIN: HCPCS | Performed by: NURSE PRACTITIONER

## 2023-11-29 PROCEDURE — G8417 CALC BMI ABV UP PARAM F/U: HCPCS | Performed by: NURSE PRACTITIONER

## 2023-11-29 PROCEDURE — 99213 OFFICE O/P EST LOW 20 MIN: CPT | Performed by: NURSE PRACTITIONER

## 2023-11-29 PROCEDURE — G8400 PT W/DXA NO RESULTS DOC: HCPCS | Performed by: NURSE PRACTITIONER

## 2023-11-29 PROCEDURE — 3017F COLORECTAL CA SCREEN DOC REV: CPT | Performed by: NURSE PRACTITIONER

## 2023-11-29 PROCEDURE — G8427 DOCREV CUR MEDS BY ELIG CLIN: HCPCS | Performed by: NURSE PRACTITIONER

## 2023-11-29 ASSESSMENT — SLEEP AND FATIGUE QUESTIONNAIRES
HOW LIKELY ARE YOU TO NOD OFF OR FALL ASLEEP WHILE SITTING AND READING: 2
HOW LIKELY ARE YOU TO NOD OFF OR FALL ASLEEP WHILE LYING DOWN TO REST IN THE AFTERNOON WHEN CIRCUMSTANCES PERMIT: 1
HOW LIKELY ARE YOU TO NOD OFF OR FALL ASLEEP WHILE SITTING AND TALKING TO SOMEONE: 1
HOW LIKELY ARE YOU TO NOD OFF OR FALL ASLEEP WHILE WATCHING TV: 2
HOW LIKELY ARE YOU TO NOD OFF OR FALL ASLEEP WHILE SITTING QUIETLY AFTER LUNCH WITHOUT ALCOHOL: 1
HOW LIKELY ARE YOU TO NOD OFF OR FALL ASLEEP IN A CAR, WHILE STOPPED FOR A FEW MINUTES IN TRAFFIC: 1
HOW LIKELY ARE YOU TO NOD OFF OR FALL ASLEEP WHEN YOU ARE A PASSENGER IN A CAR FOR AN HOUR WITHOUT A BREAK: 1
ESS TOTAL SCORE: 10
HOW LIKELY ARE YOU TO NOD OFF OR FALL ASLEEP WHILE SITTING INACTIVE IN A PUBLIC PLACE: 1

## 2024-01-18 ENCOUNTER — OFFICE VISIT (OUTPATIENT)
Dept: FAMILY MEDICINE CLINIC | Facility: CLINIC | Age: 69
End: 2024-01-18
Payer: MEDICARE

## 2024-01-18 VITALS
BODY MASS INDEX: 35.84 KG/M2 | DIASTOLIC BLOOD PRESSURE: 84 MMHG | HEART RATE: 77 BPM | RESPIRATION RATE: 16 BRPM | SYSTOLIC BLOOD PRESSURE: 127 MMHG | HEIGHT: 66 IN | TEMPERATURE: 97.1 F | OXYGEN SATURATION: 100 % | WEIGHT: 223 LBS

## 2024-01-18 DIAGNOSIS — J45.40 MODERATE PERSISTENT ASTHMA WITHOUT COMPLICATION: ICD-10-CM

## 2024-01-18 DIAGNOSIS — E78.2 MIXED HYPERLIPIDEMIA: ICD-10-CM

## 2024-01-18 DIAGNOSIS — K29.50 ANTRAL GASTRITIS: ICD-10-CM

## 2024-01-18 DIAGNOSIS — E78.00 HYPERCHOLESTEREMIA: ICD-10-CM

## 2024-01-18 DIAGNOSIS — E11.9 TYPE 2 DIABETES MELLITUS WITHOUT COMPLICATION, WITHOUT LONG-TERM CURRENT USE OF INSULIN (HCC): Primary | ICD-10-CM

## 2024-01-18 DIAGNOSIS — I10 ESSENTIAL HYPERTENSION: ICD-10-CM

## 2024-01-18 PROCEDURE — G8417 CALC BMI ABV UP PARAM F/U: HCPCS | Performed by: FAMILY MEDICINE

## 2024-01-18 PROCEDURE — 3046F HEMOGLOBIN A1C LEVEL >9.0%: CPT | Performed by: FAMILY MEDICINE

## 2024-01-18 PROCEDURE — G8427 DOCREV CUR MEDS BY ELIG CLIN: HCPCS | Performed by: FAMILY MEDICINE

## 2024-01-18 PROCEDURE — G8400 PT W/DXA NO RESULTS DOC: HCPCS | Performed by: FAMILY MEDICINE

## 2024-01-18 PROCEDURE — 3017F COLORECTAL CA SCREEN DOC REV: CPT | Performed by: FAMILY MEDICINE

## 2024-01-18 PROCEDURE — 2022F DILAT RTA XM EVC RTNOPTHY: CPT | Performed by: FAMILY MEDICINE

## 2024-01-18 PROCEDURE — 3074F SYST BP LT 130 MM HG: CPT | Performed by: FAMILY MEDICINE

## 2024-01-18 PROCEDURE — 1036F TOBACCO NON-USER: CPT | Performed by: FAMILY MEDICINE

## 2024-01-18 PROCEDURE — 1090F PRES/ABSN URINE INCON ASSESS: CPT | Performed by: FAMILY MEDICINE

## 2024-01-18 PROCEDURE — 99214 OFFICE O/P EST MOD 30 MIN: CPT | Performed by: FAMILY MEDICINE

## 2024-01-18 PROCEDURE — 3079F DIAST BP 80-89 MM HG: CPT | Performed by: FAMILY MEDICINE

## 2024-01-18 PROCEDURE — 1123F ACP DISCUSS/DSCN MKR DOCD: CPT | Performed by: FAMILY MEDICINE

## 2024-01-18 PROCEDURE — G8484 FLU IMMUNIZE NO ADMIN: HCPCS | Performed by: FAMILY MEDICINE

## 2024-01-18 RX ORDER — PANTOPRAZOLE SODIUM 20 MG/1
20 TABLET, DELAYED RELEASE ORAL DAILY
COMMUNITY
Start: 2024-01-07 | End: 2024-01-18 | Stop reason: DRUGHIGH

## 2024-01-18 RX ORDER — AMLODIPINE BESYLATE 10 MG/1
10 TABLET ORAL DAILY
Qty: 30 TABLET | Refills: 5 | Status: SHIPPED | OUTPATIENT
Start: 2024-01-18

## 2024-01-18 RX ORDER — VALACYCLOVIR HYDROCHLORIDE 1 G/1
TABLET, FILM COATED ORAL
COMMUNITY
Start: 2023-12-07

## 2024-01-18 RX ORDER — MONTELUKAST SODIUM 10 MG/1
10 TABLET ORAL NIGHTLY
Qty: 30 TABLET | Refills: 5 | Status: SHIPPED | OUTPATIENT
Start: 2024-01-18

## 2024-01-18 RX ORDER — PANTOPRAZOLE SODIUM 40 MG/1
40 TABLET, DELAYED RELEASE ORAL 2 TIMES DAILY
Qty: 30 TABLET | Refills: 5 | Status: SHIPPED | OUTPATIENT
Start: 2024-01-18

## 2024-01-18 RX ORDER — ATORVASTATIN CALCIUM 40 MG/1
40 TABLET, FILM COATED ORAL NIGHTLY
Qty: 30 TABLET | Refills: 5 | Status: SHIPPED | OUTPATIENT
Start: 2024-01-18

## 2024-01-18 RX ORDER — VALSARTAN 160 MG/1
160 TABLET ORAL DAILY
Qty: 30 TABLET | Refills: 5 | Status: SHIPPED | OUTPATIENT
Start: 2024-01-18

## 2024-01-18 RX ORDER — DICYCLOMINE HCL 20 MG
20 TABLET ORAL EVERY 6 HOURS
Qty: 120 TABLET | Refills: 5 | Status: SHIPPED | OUTPATIENT
Start: 2024-01-18

## 2024-01-18 ASSESSMENT — PATIENT HEALTH QUESTIONNAIRE - PHQ9
SUM OF ALL RESPONSES TO PHQ QUESTIONS 1-9: 0
SUM OF ALL RESPONSES TO PHQ QUESTIONS 1-9: 0
SUM OF ALL RESPONSES TO PHQ9 QUESTIONS 1 & 2: 0
SUM OF ALL RESPONSES TO PHQ QUESTIONS 1-9: 0
SUM OF ALL RESPONSES TO PHQ QUESTIONS 1-9: 0
1. LITTLE INTEREST OR PLEASURE IN DOING THINGS: 0
2. FEELING DOWN, DEPRESSED OR HOPELESS: 0

## 2024-01-18 NOTE — PROGRESS NOTES
Subjective:  Lilliana Rob is a 68 y.o. female presents today for their semi-annual diabetic visit.    They are also due for their semiannual hypertension visit  Systems review of cardiovascular and pulmonary systems reveal no complaints or pertinent postivies.  Allergies   Allergen Reactions    Latex Other (See Comments)     wheezing    Dog Epithelium Allergy Skin Test Shortness Of Breath    Other Shortness Of Breath     PER PT-- ALL COLOGNE WILL START AN ASTHMA ATTACK    Pollen Extract Shortness Of Breath    Powder Scent Fragrance Anaphylaxis    Sulfa Antibiotics Nausea Only and Nausea And Vomiting      reports that she quit smoking about 27 years ago. Her smoking use included cigarettes. She started smoking about 31 years ago. She has a 1.0 pack-year smoking history. She has never used smokeless tobacco.    Lab Results   Component Value Date/Time     07/26/2023 05:47 PM    K 3.9 07/26/2023 05:47 PM     07/26/2023 05:47 PM    CO2 27 07/26/2023 05:47 PM    BUN 15 07/26/2023 05:47 PM    CREATININE 0.80 07/26/2023 05:47 PM    GLUCOSE 130 07/26/2023 05:47 PM    CALCIUM 9.1 07/26/2023 05:47 PM      Lab Results   Component Value Date    LABA1C 6.4 (H) 07/05/2023     Lab Results   Component Value Date     07/05/2023   PHQ-9 Total Score: 0 (1/18/2024 11:30 AM)        BP Readings from Last 3 Encounters:   01/18/24 127/84   11/29/23 (!) 158/86   10/03/23 121/64       Objective:  Blood pressure 127/84, pulse 77, temperature 97.1 °F (36.2 °C), temperature source Temporal, resp. rate 16, height 1.676 m (5' 6\"), weight 101.2 kg (223 lb), SpO2 100 %.  Body mass index is 35.99 kg/m².   General- pleasant, no distress  Psych- alert and oriented to person, place and time  Mood and affect are appropriate to the visit  rrr s mrg.   bcta  Skin with shoes and socks off, inspection of feet under good care, no breakdown evident.  Diabetic foot exam:   Left Foot:   Visual Exam: within normal limits for age   Pulse

## 2024-01-19 LAB
ANION GAP SERPL CALC-SCNC: 4 MMOL/L (ref 2–11)
BUN SERPL-MCNC: 10 MG/DL (ref 8–23)
CALCIUM SERPL-MCNC: 9.1 MG/DL (ref 8.3–10.4)
CHLORIDE SERPL-SCNC: 110 MMOL/L (ref 103–113)
CO2 SERPL-SCNC: 27 MMOL/L (ref 21–32)
CREAT SERPL-MCNC: 0.7 MG/DL (ref 0.6–1)
EST. AVERAGE GLUCOSE BLD GHB EST-MCNC: 134 MG/DL
GLUCOSE SERPL-MCNC: 102 MG/DL (ref 65–100)
HBA1C MFR BLD: 6.3 % (ref 4.8–5.6)
POTASSIUM SERPL-SCNC: 4 MMOL/L (ref 3.5–5.1)
SODIUM SERPL-SCNC: 141 MMOL/L (ref 136–146)

## 2024-02-20 ENCOUNTER — APPOINTMENT (OUTPATIENT)
Dept: GENERAL RADIOLOGY | Age: 69
End: 2024-02-20
Payer: MEDICARE

## 2024-02-20 ENCOUNTER — HOSPITAL ENCOUNTER (EMERGENCY)
Age: 69
Discharge: HOME | End: 2024-02-20
Attending: EMERGENCY MEDICINE
Payer: MEDICARE

## 2024-02-20 VITALS
OXYGEN SATURATION: 100 % | BODY MASS INDEX: 35.84 KG/M2 | DIASTOLIC BLOOD PRESSURE: 81 MMHG | RESPIRATION RATE: 16 BRPM | HEART RATE: 77 BPM | WEIGHT: 223 LBS | SYSTOLIC BLOOD PRESSURE: 142 MMHG | HEIGHT: 66 IN | TEMPERATURE: 98.1 F

## 2024-02-20 DIAGNOSIS — R07.89 MUSCULOSKELETAL CHEST PAIN: Primary | ICD-10-CM

## 2024-02-20 LAB
ALBUMIN SERPL-MCNC: 3.3 G/DL (ref 3.2–4.6)
ALBUMIN/GLOB SERPL: 0.9 (ref 0.4–1.6)
ALP SERPL-CCNC: 106 U/L (ref 50–136)
ALT SERPL-CCNC: 18 U/L (ref 12–65)
ANION GAP SERPL CALC-SCNC: 6 MMOL/L (ref 2–11)
AST SERPL-CCNC: 7 U/L (ref 15–37)
BASOPHILS # BLD: 0 K/UL (ref 0–0.2)
BASOPHILS NFR BLD: 0 % (ref 0–2)
BILIRUB SERPL-MCNC: 0.3 MG/DL (ref 0.2–1.1)
BUN SERPL-MCNC: 14 MG/DL (ref 8–23)
CALCIUM SERPL-MCNC: 9.1 MG/DL (ref 8.3–10.4)
CHLORIDE SERPL-SCNC: 111 MMOL/L (ref 103–113)
CO2 SERPL-SCNC: 29 MMOL/L (ref 21–32)
CREAT SERPL-MCNC: 0.8 MG/DL (ref 0.6–1)
CRP SERPL-MCNC: 1.5 MG/DL (ref 0–0.9)
DIFFERENTIAL METHOD BLD: ABNORMAL
EOSINOPHIL # BLD: 0.1 K/UL (ref 0–0.8)
EOSINOPHIL NFR BLD: 2 % (ref 0.5–7.8)
ERYTHROCYTE [DISTWIDTH] IN BLOOD BY AUTOMATED COUNT: 13.3 % (ref 11.9–14.6)
GGT SERPL-CCNC: 14 U/L (ref 5–55)
GLOBULIN SER CALC-MCNC: 3.8 G/DL (ref 2.8–4.5)
GLUCOSE SERPL-MCNC: 120 MG/DL (ref 65–100)
HCT VFR BLD AUTO: 34.6 % (ref 35.8–46.3)
HGB BLD-MCNC: 10.8 G/DL (ref 11.7–15.4)
IMM GRANULOCYTES # BLD AUTO: 0 K/UL (ref 0–0.5)
IMM GRANULOCYTES NFR BLD AUTO: 0 % (ref 0–5)
LIPASE SERPL-CCNC: 125 U/L (ref 73–393)
LYMPHOCYTES # BLD: 2.9 K/UL (ref 0.5–4.6)
LYMPHOCYTES NFR BLD: 41 % (ref 13–44)
MAGNESIUM SERPL-MCNC: 1.9 MG/DL (ref 1.8–2.4)
MCH RBC QN AUTO: 28.1 PG (ref 26.1–32.9)
MCHC RBC AUTO-ENTMCNC: 31.2 G/DL (ref 31.4–35)
MCV RBC AUTO: 90.1 FL (ref 82–102)
MONOCYTES # BLD: 0.3 K/UL (ref 0.1–1.3)
MONOCYTES NFR BLD: 4 % (ref 4–12)
NEUTS SEG # BLD: 3.7 K/UL (ref 1.7–8.2)
NEUTS SEG NFR BLD: 53 % (ref 43–78)
NRBC # BLD: 0 K/UL (ref 0–0.2)
PLATELET # BLD AUTO: 205 K/UL (ref 150–450)
PMV BLD AUTO: 10.9 FL (ref 9.4–12.3)
POTASSIUM SERPL-SCNC: 3.6 MMOL/L (ref 3.5–5.1)
PROT SERPL-MCNC: 7.1 G/DL (ref 6.3–8.2)
RBC # BLD AUTO: 3.84 M/UL (ref 4.05–5.2)
SODIUM SERPL-SCNC: 146 MMOL/L (ref 136–146)
WBC # BLD AUTO: 7 K/UL (ref 4.3–11.1)

## 2024-02-20 PROCEDURE — 83735 ASSAY OF MAGNESIUM: CPT

## 2024-02-20 PROCEDURE — 80053 COMPREHEN METABOLIC PANEL: CPT

## 2024-02-20 PROCEDURE — 83690 ASSAY OF LIPASE: CPT

## 2024-02-20 PROCEDURE — 85025 COMPLETE CBC W/AUTO DIFF WBC: CPT

## 2024-02-20 PROCEDURE — 82977 ASSAY OF GGT: CPT

## 2024-02-20 PROCEDURE — 99284 EMERGENCY DEPT VISIT MOD MDM: CPT

## 2024-02-20 PROCEDURE — 71046 X-RAY EXAM CHEST 2 VIEWS: CPT

## 2024-02-20 PROCEDURE — 86140 C-REACTIVE PROTEIN: CPT

## 2024-02-20 PROCEDURE — 83625 ASSAY OF LDH ENZYMES: CPT

## 2024-02-20 PROCEDURE — 83615 LACTATE (LD) (LDH) ENZYME: CPT

## 2024-02-20 RX ORDER — DICLOFENAC SODIUM 75 MG/1
75 TABLET, DELAYED RELEASE ORAL 2 TIMES DAILY
Qty: 20 TABLET | Refills: 0 | Status: SHIPPED | OUTPATIENT
Start: 2024-02-20

## 2024-02-20 ASSESSMENT — PAIN SCALES - GENERAL: PAINLEVEL_OUTOF10: 6

## 2024-02-20 ASSESSMENT — PAIN - FUNCTIONAL ASSESSMENT: PAIN_FUNCTIONAL_ASSESSMENT: 0-10

## 2024-02-20 ASSESSMENT — PAIN DESCRIPTION - LOCATION: LOCATION: RIB CAGE

## 2024-02-20 ASSESSMENT — PAIN DESCRIPTION - DESCRIPTORS: DESCRIPTORS: SHARP

## 2024-02-20 ASSESSMENT — ENCOUNTER SYMPTOMS
SHORTNESS OF BREATH: 0
ABDOMINAL PAIN: 1

## 2024-02-20 ASSESSMENT — PAIN DESCRIPTION - ORIENTATION: ORIENTATION: RIGHT

## 2024-02-20 NOTE — ED TRIAGE NOTES
Pt ambulatory to ED w/ cc intermittent R ribcage pain that is worse w/ motion and inspiration x 6 months. Pt denies any injury. Pt endorses shortness of breath. Pt hx: Asthma. Pt was seen at urgent care for suspected UTI and lower abd pain last Thursday but was negative for UTI. Pt denies chest pain, nvd, fever. Pt A&O x 4

## 2024-02-20 NOTE — ED PROVIDER NOTES
needed    FLUTICASONE (FLONASE) 50 MCG/ACT NASAL SPRAY    2 sprays by Nasal route daily    MIRABEGRON (MYRBETRIQ) 50 MG TB24    Take 50 mg by mouth daily    MONTELUKAST (SINGULAIR) 10 MG TABLET    Take 1 tablet by mouth nightly    OMALIZUMAB (XOLAIR) 150 MG INJECTION    300mg Subcutaneous Q 2 weeks    PANTOPRAZOLE (PROTONIX) 40 MG TABLET    Take 1 tablet by mouth 2 times daily    QVAR REDIHALER 80 MCG/ACT AERB INHALER        VALACYCLOVIR (VALTREX) 1 G TABLET    TAKE 1 TABLET BY MOUTH TWICE A DAY FOR 5 DAYS    VALSARTAN (DIOVAN) 160 MG TABLET    Take 1 tablet by mouth daily        Results for orders placed or performed during the hospital encounter of 02/20/24   XR CHEST (2 VW)    Narrative    Chest X-ray    INDICATION: Thoracicoabdominal pain    COMPARISON: September 9, 2022    TECHNIQUE: PA and lateral views of the chest were obtained.    FINDINGS: The lungs are clear. There are no infiltrates or effusions.  The heart  size is normal.  The bony thorax is intact.        Impression    No acute findings in the chest     CBC with Auto Differential   Result Value Ref Range    WBC 7.0 4.3 - 11.1 K/uL    RBC 3.84 (L) 4.05 - 5.2 M/uL    Hemoglobin 10.8 (L) 11.7 - 15.4 g/dL    Hematocrit 34.6 (L) 35.8 - 46.3 %    MCV 90.1 82 - 102 FL    MCH 28.1 26.1 - 32.9 PG    MCHC 31.2 (L) 31.4 - 35.0 g/dL    RDW 13.3 11.9 - 14.6 %    Platelets 205 150 - 450 K/uL    MPV 10.9 9.4 - 12.3 FL    nRBC 0.00 0.0 - 0.2 K/uL    Differential Type AUTOMATED      Neutrophils % 53 43 - 78 %    Lymphocytes % 41 13 - 44 %    Monocytes % 4 4.0 - 12.0 %    Eosinophils % 2 0.5 - 7.8 %    Basophils % 0 0.0 - 2.0 %    Immature Granulocytes 0 0.0 - 5.0 %    Neutrophils Absolute 3.7 1.7 - 8.2 K/UL    Lymphocytes Absolute 2.9 0.5 - 4.6 K/UL    Monocytes Absolute 0.3 0.1 - 1.3 K/UL    Eosinophils Absolute 0.1 0.0 - 0.8 K/UL    Basophils Absolute 0.0 0.0 - 0.2 K/UL    Absolute Immature Granulocyte 0.0 0.0 - 0.5 K/UL   C-Reactive Protein   Result Value Ref Range

## 2024-02-21 NOTE — ED NOTES
I have reviewed discharge instructions with the patient.  The patient verbalized understanding.    Patient left ED via Discharge Method: ambulatory to Home with family.    Opportunity for questions and clarification provided.       Patient given 1 scripts.         To continue your aftercare when you leave the hospital, you may receive an automated call from our care team to check in on how you are doing.  This is a free service and part of our promise to provide the best care and service to meet your aftercare needs.” If you have questions, or wish to unsubscribe from this service please call [phone].  Thank you for Choosing our [facility] Emergency Department.        Vincenzo Adams RN  02/20/24 [id number]

## 2024-02-24 LAB
LDH SERPL-CCNC: 165 IU/L (ref 119–226)
LDH1 CFR SERPL ELPH: 20 % (ref 17–32)
LDH2 CFR SERPL ELPH: 31 % (ref 25–40)
LDH3 CFR SERPL ELPH: 25 % (ref 17–27)
LDH4 CFR SERPL ELPH: 10 % (ref 5–13)
LDH5 CFR SERPL ELPH: 14 % (ref 4–20)

## 2024-02-27 ENCOUNTER — OFFICE VISIT (OUTPATIENT)
Dept: UROLOGY | Age: 69
End: 2024-02-27
Payer: MEDICARE

## 2024-02-27 DIAGNOSIS — K57.90 DIVERTICULOSIS: ICD-10-CM

## 2024-02-27 DIAGNOSIS — R31.29 MICROHEMATURIA: ICD-10-CM

## 2024-02-27 DIAGNOSIS — R10.30 LOWER ABDOMINAL PAIN: ICD-10-CM

## 2024-02-27 DIAGNOSIS — N32.81 OAB (OVERACTIVE BLADDER): Primary | ICD-10-CM

## 2024-02-27 DIAGNOSIS — N20.0 RENAL STONE: ICD-10-CM

## 2024-02-27 LAB
BILIRUBIN, URINE, POC: NEGATIVE
BLOOD URINE, POC: NORMAL
GLUCOSE URINE, POC: NEGATIVE
KETONES, URINE, POC: NEGATIVE
LEUKOCYTE ESTERASE, URINE, POC: NEGATIVE
NITRITE, URINE, POC: NEGATIVE
PH, URINE, POC: 7 (ref 4.6–8)
PROTEIN,URINE, POC: NEGATIVE
SPECIFIC GRAVITY, URINE, POC: 1.01 (ref 1–1.03)
URINALYSIS CLARITY, POC: NORMAL
URINALYSIS COLOR, POC: NORMAL
UROBILINOGEN, POC: NORMAL

## 2024-02-27 PROCEDURE — 1123F ACP DISCUSS/DSCN MKR DOCD: CPT | Performed by: NURSE PRACTITIONER

## 2024-02-27 PROCEDURE — 1036F TOBACCO NON-USER: CPT | Performed by: NURSE PRACTITIONER

## 2024-02-27 PROCEDURE — G8484 FLU IMMUNIZE NO ADMIN: HCPCS | Performed by: NURSE PRACTITIONER

## 2024-02-27 PROCEDURE — 99214 OFFICE O/P EST MOD 30 MIN: CPT | Performed by: NURSE PRACTITIONER

## 2024-02-27 PROCEDURE — G8400 PT W/DXA NO RESULTS DOC: HCPCS | Performed by: NURSE PRACTITIONER

## 2024-02-27 PROCEDURE — 81003 URINALYSIS AUTO W/O SCOPE: CPT | Performed by: NURSE PRACTITIONER

## 2024-02-27 PROCEDURE — 1090F PRES/ABSN URINE INCON ASSESS: CPT | Performed by: NURSE PRACTITIONER

## 2024-02-27 PROCEDURE — 3017F COLORECTAL CA SCREEN DOC REV: CPT | Performed by: NURSE PRACTITIONER

## 2024-02-27 PROCEDURE — G8417 CALC BMI ABV UP PARAM F/U: HCPCS | Performed by: NURSE PRACTITIONER

## 2024-02-27 PROCEDURE — G8427 DOCREV CUR MEDS BY ELIG CLIN: HCPCS | Performed by: NURSE PRACTITIONER

## 2024-02-27 ASSESSMENT — ENCOUNTER SYMPTOMS
BACK PAIN: 0
NAUSEA: 0

## 2024-02-27 NOTE — PROGRESS NOTES
Cholecystitis 6/30/2022    Added automatically from request for surgery [id number]    Cystocele, midline 4/24/2015    Diverticulosis of colon 4/24/2015    Edema 4/24/2015    Including peripheral edema.( SOMETIMES) PER PT    Female stress incontinence     GERD (gastroesophageal reflux disease)     controlled with med    Hypercholesterolemia     no meds currently    Hyperplastic polyps of stomach 4/24/2015    Colonsocpy. 2013.    Hypertension     controlled with med    Internal hemorrhoids without mention of complication 4/24/2015    Mixed hyperlipidemia 8/16/2022    Obesity (BMI 30-39.9) 11/14/2017    BMI = 35    Obstructive sleep apnea 11/14/2017    wears cpap at hs    Other diseases of lung, not elsewhere classified 4/24/2015    being rechecked 8/2022-- followed by dr haider    Right upper quadrant pain     Urge incontinence     Ventricular bigeminy 8/31/2016    per pt-- was told it was ok-- does not see a cardiologist at present     Past Surgical History:   Procedure Laterality Date    CHOLECYSTECTOMY, LAPAROSCOPIC N/A 8/12/2022    ROBO SINGLE SITE CHOLECYSTECTOMY performed by Raphael Fan Jr., MD at [facility] MAIN OR    HYSTERECTOMY (CERVIX STATUS UNKNOWN)      KNEE ARTHROSCOPY Bilateral      Current Outpatient Medications   Medication Sig Dispense Refill    diclofenac (VOLTAREN) 75 MG EC tablet Take 1 tablet by mouth 2 times daily 20 tablet 0    valACYclovir (VALTREX) 1 g tablet TAKE 1 TABLET BY MOUTH TWICE A DAY FOR 5 DAYS      amLODIPine (NORVASC) 10 MG tablet Take 1 tablet by mouth daily 30 tablet 5    atorvastatin (LIPITOR) 40 MG tablet Take 1 tablet by mouth at bedtime 30 tablet 5    dicyclomine (BENTYL) 20 MG tablet Take 1 tablet by mouth every 6 hours 120 tablet 5    montelukast (SINGULAIR) 10 MG tablet Take 1 tablet by mouth nightly 30 tablet 5    pantoprazole (PROTONIX) 40 MG tablet Take 1 tablet by mouth 2 times daily 30 tablet 5    valsartan (DIOVAN) 160 MG tablet Take 1 tablet by mouth daily 30 tablet 5

## 2024-03-13 ENCOUNTER — HOSPITAL ENCOUNTER (OUTPATIENT)
Dept: CT IMAGING | Age: 69
Discharge: HOME | End: 2024-03-16
Payer: MEDICARE

## 2024-03-13 DIAGNOSIS — N20.0 RENAL STONE: ICD-10-CM

## 2024-03-13 DIAGNOSIS — R10.30 LOWER ABDOMINAL PAIN: ICD-10-CM

## 2024-03-13 DIAGNOSIS — K57.90 DIVERTICULOSIS: ICD-10-CM

## 2024-03-13 PROCEDURE — 74176 CT ABD & PELVIS W/O CONTRAST: CPT

## 2024-04-25 ENCOUNTER — HOSPITAL ENCOUNTER (OUTPATIENT)
Dept: MAMMOGRAPHY | Age: 69
Discharge: HOME | End: 2024-04-25
Attending: FAMILY MEDICINE
Payer: MEDICARE

## 2024-04-25 PROCEDURE — 77063 BREAST TOMOSYNTHESIS BI: CPT

## 2024-07-15 SDOH — ECONOMIC STABILITY: FOOD INSECURITY: WITHIN THE PAST 12 MONTHS, THE FOOD YOU BOUGHT JUST DIDN'T LAST AND YOU DIDN'T HAVE MONEY TO GET MORE.: NEVER TRUE

## 2024-07-15 SDOH — ECONOMIC STABILITY: FOOD INSECURITY: WITHIN THE PAST 12 MONTHS, YOU WORRIED THAT YOUR FOOD WOULD RUN OUT BEFORE YOU GOT MONEY TO BUY MORE.: NEVER TRUE

## 2024-07-15 SDOH — ECONOMIC STABILITY: INCOME INSECURITY: HOW HARD IS IT FOR YOU TO PAY FOR THE VERY BASICS LIKE FOOD, HOUSING, MEDICAL CARE, AND HEATING?: SOMEWHAT HARD

## 2024-07-16 ENCOUNTER — OFFICE VISIT (OUTPATIENT)
Dept: FAMILY MEDICINE CLINIC | Facility: CLINIC | Age: 69
End: 2024-07-16
Payer: MEDICARE

## 2024-07-16 VITALS
BODY MASS INDEX: 36.48 KG/M2 | TEMPERATURE: 97 F | HEART RATE: 79 BPM | WEIGHT: 227 LBS | SYSTOLIC BLOOD PRESSURE: 133 MMHG | DIASTOLIC BLOOD PRESSURE: 84 MMHG | OXYGEN SATURATION: 99 % | RESPIRATION RATE: 16 BRPM | HEIGHT: 66 IN

## 2024-07-16 DIAGNOSIS — I10 ESSENTIAL HYPERTENSION: ICD-10-CM

## 2024-07-16 DIAGNOSIS — K29.50 ANTRAL GASTRITIS: ICD-10-CM

## 2024-07-16 DIAGNOSIS — J45.40 MODERATE PERSISTENT ASTHMA WITHOUT COMPLICATION: ICD-10-CM

## 2024-07-16 DIAGNOSIS — E78.00 HYPERCHOLESTEREMIA: ICD-10-CM

## 2024-07-16 DIAGNOSIS — M23.52 RECURRENT LEFT KNEE INSTABILITY: ICD-10-CM

## 2024-07-16 DIAGNOSIS — E66.01 MORBID OBESITY (HCC): ICD-10-CM

## 2024-07-16 DIAGNOSIS — E11.9 TYPE 2 DIABETES MELLITUS WITHOUT COMPLICATION, WITHOUT LONG-TERM CURRENT USE OF INSULIN (HCC): Primary | ICD-10-CM

## 2024-07-16 DIAGNOSIS — M25.562 ACUTE PAIN OF LEFT KNEE: ICD-10-CM

## 2024-07-16 PROBLEM — E78.2 MIXED HYPERLIPIDEMIA: Status: RESOLVED | Noted: 2022-08-16 | Resolved: 2024-07-16

## 2024-07-16 LAB
ALBUMIN, URINE, POC: 10 MG/L
ANION GAP SERPL CALC-SCNC: 12 MMOL/L (ref 9–18)
BUN SERPL-MCNC: 10 MG/DL (ref 8–23)
CALCIUM SERPL-MCNC: 9.2 MG/DL (ref 8.8–10.2)
CHLORIDE SERPL-SCNC: 105 MMOL/L (ref 98–107)
CHOLEST SERPL-MCNC: 162 MG/DL (ref 0–200)
CO2 SERPL-SCNC: 25 MMOL/L (ref 20–28)
CREAT SERPL-MCNC: 0.63 MG/DL (ref 0.6–1.1)
CREATININE, URINE, POC: 100 MG/DL
EST. AVERAGE GLUCOSE BLD GHB EST-MCNC: 141 MG/DL
GLUCOSE SERPL-MCNC: 98 MG/DL (ref 70–99)
HBA1C MFR BLD: 6.5 % (ref 0–5.6)
HDLC SERPL-MCNC: 73 MG/DL (ref 40–60)
HDLC SERPL: 2.2 (ref 0–5)
LDLC SERPL CALC-MCNC: 80 MG/DL (ref 0–100)
MICROALB/CREAT RATIO, POC: <30 MG/G
POTASSIUM SERPL-SCNC: 4.1 MMOL/L (ref 3.5–5.1)
SODIUM SERPL-SCNC: 142 MMOL/L (ref 136–145)
TRIGL SERPL-MCNC: 43 MG/DL (ref 0–150)
VLDLC SERPL CALC-MCNC: 9 MG/DL (ref 6–23)

## 2024-07-16 PROCEDURE — G2211 COMPLEX E/M VISIT ADD ON: HCPCS | Performed by: FAMILY MEDICINE

## 2024-07-16 PROCEDURE — G8400 PT W/DXA NO RESULTS DOC: HCPCS | Performed by: FAMILY MEDICINE

## 2024-07-16 PROCEDURE — 1036F TOBACCO NON-USER: CPT | Performed by: FAMILY MEDICINE

## 2024-07-16 PROCEDURE — G8417 CALC BMI ABV UP PARAM F/U: HCPCS | Performed by: FAMILY MEDICINE

## 2024-07-16 PROCEDURE — 2022F DILAT RTA XM EVC RTNOPTHY: CPT | Performed by: FAMILY MEDICINE

## 2024-07-16 PROCEDURE — 3044F HG A1C LEVEL LT 7.0%: CPT | Performed by: FAMILY MEDICINE

## 2024-07-16 PROCEDURE — 1123F ACP DISCUSS/DSCN MKR DOCD: CPT | Performed by: FAMILY MEDICINE

## 2024-07-16 PROCEDURE — G8427 DOCREV CUR MEDS BY ELIG CLIN: HCPCS | Performed by: FAMILY MEDICINE

## 2024-07-16 PROCEDURE — 82044 UR ALBUMIN SEMIQUANTITATIVE: CPT | Performed by: FAMILY MEDICINE

## 2024-07-16 PROCEDURE — 1090F PRES/ABSN URINE INCON ASSESS: CPT | Performed by: FAMILY MEDICINE

## 2024-07-16 PROCEDURE — 3075F SYST BP GE 130 - 139MM HG: CPT | Performed by: FAMILY MEDICINE

## 2024-07-16 PROCEDURE — 3017F COLORECTAL CA SCREEN DOC REV: CPT | Performed by: FAMILY MEDICINE

## 2024-07-16 PROCEDURE — 99214 OFFICE O/P EST MOD 30 MIN: CPT | Performed by: FAMILY MEDICINE

## 2024-07-16 PROCEDURE — 3079F DIAST BP 80-89 MM HG: CPT | Performed by: FAMILY MEDICINE

## 2024-07-16 RX ORDER — DICYCLOMINE HCL 20 MG
20 TABLET ORAL EVERY 6 HOURS
Qty: 120 TABLET | Refills: 5 | Status: SHIPPED | OUTPATIENT
Start: 2024-07-16

## 2024-07-16 RX ORDER — AMLODIPINE BESYLATE 10 MG/1
10 TABLET ORAL DAILY
Qty: 30 TABLET | Refills: 5 | Status: SHIPPED | OUTPATIENT
Start: 2024-07-16

## 2024-07-16 RX ORDER — DICLOFENAC SODIUM 75 MG/1
75 TABLET, DELAYED RELEASE ORAL 2 TIMES DAILY
Qty: 60 TABLET | Refills: 2 | Status: SHIPPED | OUTPATIENT
Start: 2024-07-16

## 2024-07-16 RX ORDER — MONTELUKAST SODIUM 10 MG/1
10 TABLET ORAL NIGHTLY
Qty: 30 TABLET | Refills: 5 | Status: SHIPPED | OUTPATIENT
Start: 2024-07-16

## 2024-07-16 RX ORDER — ATORVASTATIN CALCIUM 40 MG/1
40 TABLET, FILM COATED ORAL NIGHTLY
Qty: 30 TABLET | Refills: 5 | Status: SHIPPED | OUTPATIENT
Start: 2024-07-16

## 2024-07-16 RX ORDER — VALSARTAN 160 MG/1
160 TABLET ORAL DAILY
Qty: 30 TABLET | Refills: 5 | Status: SHIPPED | OUTPATIENT
Start: 2024-07-16

## 2024-07-16 RX ORDER — PANTOPRAZOLE SODIUM 40 MG/1
40 TABLET, DELAYED RELEASE ORAL 2 TIMES DAILY
Qty: 30 TABLET | Refills: 5 | Status: SHIPPED | OUTPATIENT
Start: 2024-07-16

## 2024-07-16 ASSESSMENT — PATIENT HEALTH QUESTIONNAIRE - PHQ9
2. FEELING DOWN, DEPRESSED OR HOPELESS: NOT AT ALL
SUM OF ALL RESPONSES TO PHQ QUESTIONS 1-9: 0
1. LITTLE INTEREST OR PLEASURE IN DOING THINGS: NOT AT ALL
SUM OF ALL RESPONSES TO PHQ9 QUESTIONS 1 & 2: 0
SUM OF ALL RESPONSES TO PHQ QUESTIONS 1-9: 0

## 2024-07-16 NOTE — PROGRESS NOTES
Subjective:  Lilliana Rbo is a 69 y.o. female presents today for their semi-annual htn and dm visit. They are having no side effects and are doing well.  Several  months of left knee pain and recurring instability- feels like it will give way and she might fall  Systems review of cardiovascular and pulmonary systems reveal no complaints or pertinent postivies.  They also have a diagnosis of dyslipidemia and are due for lipids, denying any current side effects, continuing diet and exercise the best they can.  Patient denies any pounding heart beats or rapid heart beat intervals, flushing, panic like attacks, headaches, dizzyness or flushing.  They have drug reistant hypertension, on 3 or more different medicaitons including one diuretic- No  PHQ-9 Total Score: 0 (7/16/2024 10:06 AM)      How much are you exercising? Yes 3 d p w  Do you smoke? No  Are you taking your medicines as directed most every day? Yes  Do you follow a low sodium DASH diet or similar high blood pressure diet? Yes  Do you check your bp at home with an automated blood pressure device? No  If you don't have a home bp machine, you should purchase one at home and begin to check your pressure at home on a regular basis.  Your blood pressure goal is listed under the \"plan section\" below    Allergies   Allergen Reactions    Latex Other (See Comments)     wheezing    Dog Epithelium (Canis Lupus Familiaris) Shortness Of Breath    Other Shortness Of Breath     PER PT-- ALL COLOGNE WILL START AN ASTHMA ATTACK    Pollen Extract Shortness Of Breath    Powder Scent Fragrance Anaphylaxis    Sulfa Antibiotics Nausea Only and Nausea And Vomiting      reports that she quit smoking about 27 years ago. Her smoking use included cigarettes. She started smoking about 31 years ago. She has a 1.0 pack-year smoking history. She has never used smokeless tobacco.    Current Outpatient Medications   Medication Sig Dispense Refill    amLODIPine (NORVASC) 10 MG tablet

## 2024-08-09 ENCOUNTER — OFFICE VISIT (OUTPATIENT)
Dept: ORTHOPEDIC SURGERY | Age: 69
End: 2024-08-09
Payer: MEDICARE

## 2024-08-09 DIAGNOSIS — M25.562 LEFT KNEE PAIN, UNSPECIFIED CHRONICITY: Primary | ICD-10-CM

## 2024-08-09 PROCEDURE — G8400 PT W/DXA NO RESULTS DOC: HCPCS | Performed by: ORTHOPAEDIC SURGERY

## 2024-08-09 PROCEDURE — G8417 CALC BMI ABV UP PARAM F/U: HCPCS | Performed by: ORTHOPAEDIC SURGERY

## 2024-08-09 PROCEDURE — G8428 CUR MEDS NOT DOCUMENT: HCPCS | Performed by: ORTHOPAEDIC SURGERY

## 2024-08-09 PROCEDURE — 1090F PRES/ABSN URINE INCON ASSESS: CPT | Performed by: ORTHOPAEDIC SURGERY

## 2024-08-09 PROCEDURE — 1123F ACP DISCUSS/DSCN MKR DOCD: CPT | Performed by: ORTHOPAEDIC SURGERY

## 2024-08-09 PROCEDURE — 1036F TOBACCO NON-USER: CPT | Performed by: ORTHOPAEDIC SURGERY

## 2024-08-09 PROCEDURE — 3017F COLORECTAL CA SCREEN DOC REV: CPT | Performed by: ORTHOPAEDIC SURGERY

## 2024-08-09 PROCEDURE — 99213 OFFICE O/P EST LOW 20 MIN: CPT | Performed by: ORTHOPAEDIC SURGERY

## 2024-08-09 NOTE — PROGRESS NOTES
AERB inhaler  1/3/23   Provider, MD Galo   Beclomethasone Dipropionate (QVAR IN) Inhale into the lungs 2 times daily as needed    Provider, MD Galo   albuterol sulfate  (90 Base) MCG/ACT inhaler Inhale 2 puffs into the lungs every 4 hours as needed    Automatic Reconciliation, Ar   Cetirizine HCl 10 MG CAPS Take 1 tablet by mouth daily    Automatic Reconciliation, Ar   EPINEPHrine (EPIPEN) 0.3 MG/0.3ML SOAJ injection Inject 0.3 mLs into the muscle once as needed    Automatic Reconciliation, Ar   fluticasone (FLONASE) 50 MCG/ACT nasal spray 2 sprays by Nasal route daily    Automatic Reconciliation, Ar       Family History:     Family History   Problem Relation Age of Onset    Asthma Mother     Hypertension Sister     Diabetes Sister     Cancer Other         Lung    Breast Cancer Neg Hx     Other Neg Hx     Post-op Cognitive Dysfunction Neg Hx     Emergence Delirium Neg Hx     Post-op Nausea/Vomiting Neg Hx     Delayed Awakening Neg Hx     Pseudochol. Deficiency Neg Hx     Malig Hypertherm Neg Hx        Social History:      Social History     Tobacco Use    Smoking status: Former     Current packs/day: 0.00     Average packs/day: 0.3 packs/day for 4.0 years (1.0 ttl pk-yrs)     Types: Cigarettes     Start date: 10/16/1992     Quit date: 10/16/1996     Years since quittin.8    Smokeless tobacco: Never   Substance Use Topics    Alcohol use: No         Allergies:      Allergies   Allergen Reactions    Latex Other (See Comments)     wheezing    Dog Epithelium (Canis Lupus Familiaris) Shortness Of Breath    Other Shortness Of Breath     PER PT-- ALL COLOGNE WILL START AN ASTHMA ATTACK    Pollen Extract Shortness Of Breath    Powder Scent Fragrance Anaphylaxis    Sulfa Antibiotics Nausea Only and Nausea And Vomiting        Vitals:   There were no vitals taken for this visit.    ROS:   Review of Systems         Objective:   General: Patient is awake and in no acute distress  Psych: Mood and affect

## 2024-08-22 RX ORDER — MIRABEGRON 50 MG/1
50 TABLET, FILM COATED, EXTENDED RELEASE ORAL DAILY
Qty: 30 TABLET | Refills: 11 | Status: SHIPPED | OUTPATIENT
Start: 2024-08-22

## 2024-09-25 ENCOUNTER — TELEPHONE (OUTPATIENT)
Dept: FAMILY MEDICINE CLINIC | Facility: CLINIC | Age: 69
End: 2024-09-25

## 2024-09-25 DIAGNOSIS — G89.29 CHRONIC PAIN OF LEFT KNEE: Primary | ICD-10-CM

## 2024-09-25 DIAGNOSIS — M25.562 CHRONIC PAIN OF LEFT KNEE: Primary | ICD-10-CM

## 2024-09-25 NOTE — TELEPHONE ENCOUNTER
Pt would like to know if she should make an appt to be seen concerning her left knee and getting another referral to see a specialist. For a second opinion

## 2024-11-21 NOTE — PROGRESS NOTES
Abnormal -     Eyes:   EOM    [x]  Normal    [] Abnormal -   Sclera  [x]  Normal    [] Abnormal -          Discharge [x]  None visible   [] Abnormal -    HENT: [x] Normocephalic, atraumatic  [] Abnormal -  [x] Mouth/Throat: Mucous membranes are moist    External Ears [x] Normal  [] Abnormal -    Neck: [x] No visualized mass [] Abnormal -     Pulmonary/Chest: [x] Respiratory effort normal   [x] No visualized signs of difficulty breathing or respiratory distress        [] Abnormal -      Musculoskeletal:   [x] Normal gait with no signs of ataxia         [x] Normal range of motion of neck        [] Abnormal -     Neurological:        [x] No Facial Asymmetry (Cranial nerve 7 motor function) (limited exam due to video visit)          [x] No gaze palsy        [] Abnormal -         Skin:        [x] No significant exanthematous lesions or discoloration noted on facial skin         [] Abnormal -            Psychiatric:       [x] Normal Affect [] Abnormal -       [x] No Hallucinations    Other pertinent observable physical exam findings:-      ASSESSMENT:  (Medical Decision Making)      Diagnosis Orders   1. Obstructive sleep apnea  DME - DURABLE MEDICAL EQUIPMENT   Continue bipap at current settings. She wants to bring her machine to our office to have the humidity adjusted. Will ask to get download at that time as well.  DME - DURABLE MEDICAL EQUIPMENT      2. Severe obesity (BMI 35.0-39.9) with comorbidity  DME - DURABLE MEDICAL EQUIPMENT    DME - DURABLE MEDICAL EQUIPMENT   Reports weight gain        PLAN:  Continue bipap 8/4 cm  Renew supplies  She will bring the machine by our offie to increase humidity level and also check download  Follow up will be in 6 months or sooner if needed     Orders Placed This Encounter   Procedures    DME - DURABLE MEDICAL EQUIPMENT     [facility]    [facility] [place]  Phone: [address] [place] [id number]  Dept: [phone]      Patient

## 2024-11-22 ENCOUNTER — OFFICE VISIT (OUTPATIENT)
Dept: FAMILY MEDICINE CLINIC | Facility: CLINIC | Age: 69
End: 2024-11-22
Payer: MEDICARE

## 2024-11-22 VITALS
WEIGHT: 228 LBS | HEIGHT: 66 IN | BODY MASS INDEX: 36.64 KG/M2 | HEART RATE: 71 BPM | OXYGEN SATURATION: 100 % | DIASTOLIC BLOOD PRESSURE: 76 MMHG | SYSTOLIC BLOOD PRESSURE: 117 MMHG | RESPIRATION RATE: 16 BRPM | TEMPERATURE: 97.3 F

## 2024-11-22 DIAGNOSIS — Z00.00 MEDICARE ANNUAL WELLNESS VISIT, SUBSEQUENT: Primary | Chronic | ICD-10-CM

## 2024-11-22 PROCEDURE — 1123F ACP DISCUSS/DSCN MKR DOCD: CPT | Performed by: FAMILY MEDICINE

## 2024-11-22 PROCEDURE — 3017F COLORECTAL CA SCREEN DOC REV: CPT | Performed by: FAMILY MEDICINE

## 2024-11-22 PROCEDURE — G8484 FLU IMMUNIZE NO ADMIN: HCPCS | Performed by: FAMILY MEDICINE

## 2024-11-22 PROCEDURE — 1159F MED LIST DOCD IN RCRD: CPT | Performed by: FAMILY MEDICINE

## 2024-11-22 PROCEDURE — G0439 PPPS, SUBSEQ VISIT: HCPCS | Performed by: FAMILY MEDICINE

## 2024-11-22 PROCEDURE — 3078F DIAST BP <80 MM HG: CPT | Performed by: FAMILY MEDICINE

## 2024-11-22 PROCEDURE — 3074F SYST BP LT 130 MM HG: CPT | Performed by: FAMILY MEDICINE

## 2024-11-22 ASSESSMENT — PATIENT HEALTH QUESTIONNAIRE - PHQ9
1. LITTLE INTEREST OR PLEASURE IN DOING THINGS: NOT AT ALL
SUM OF ALL RESPONSES TO PHQ QUESTIONS 1-9: 0
SUM OF ALL RESPONSES TO PHQ QUESTIONS 1-9: 0
SUM OF ALL RESPONSES TO PHQ9 QUESTIONS 1 & 2: 0
SUM OF ALL RESPONSES TO PHQ QUESTIONS 1-9: 0
SUM OF ALL RESPONSES TO PHQ QUESTIONS 1-9: 0
2. FEELING DOWN, DEPRESSED OR HOPELESS: NOT AT ALL

## 2024-11-22 NOTE — PROGRESS NOTES
regularly involved in providing care):   Patient Care Team:  Damien Velasquez MD as PCP - General  Damien Velasquez MD as PCP - Empaneled Provider      Reviewed and updated this visit:  Tobacco  Allergies  Meds  Med Hx  Surg Hx  Soc Hx  Fam Hx

## 2024-11-25 ENCOUNTER — TELEMEDICINE (OUTPATIENT)
Dept: SLEEP MEDICINE | Age: 69
End: 2024-11-25
Payer: MEDICARE

## 2024-11-25 DIAGNOSIS — G47.33 OBSTRUCTIVE SLEEP APNEA: Primary | ICD-10-CM

## 2024-11-25 DIAGNOSIS — E66.01 SEVERE OBESITY (BMI 35.0-39.9) WITH COMORBIDITY: ICD-10-CM

## 2024-11-25 PROCEDURE — 1160F RVW MEDS BY RX/DR IN RCRD: CPT | Performed by: NURSE PRACTITIONER

## 2024-11-25 PROCEDURE — 1123F ACP DISCUSS/DSCN MKR DOCD: CPT | Performed by: NURSE PRACTITIONER

## 2024-11-25 PROCEDURE — G8427 DOCREV CUR MEDS BY ELIG CLIN: HCPCS | Performed by: NURSE PRACTITIONER

## 2024-11-25 PROCEDURE — 99213 OFFICE O/P EST LOW 20 MIN: CPT | Performed by: NURSE PRACTITIONER

## 2024-11-25 PROCEDURE — 3017F COLORECTAL CA SCREEN DOC REV: CPT | Performed by: NURSE PRACTITIONER

## 2024-11-25 PROCEDURE — 1090F PRES/ABSN URINE INCON ASSESS: CPT | Performed by: NURSE PRACTITIONER

## 2024-11-25 PROCEDURE — 1159F MED LIST DOCD IN RCRD: CPT | Performed by: NURSE PRACTITIONER

## 2024-11-25 PROCEDURE — G8400 PT W/DXA NO RESULTS DOC: HCPCS | Performed by: NURSE PRACTITIONER

## 2024-11-25 ASSESSMENT — SLEEP AND FATIGUE QUESTIONNAIRES
HOW LIKELY ARE YOU TO NOD OFF OR FALL ASLEEP WHILE SITTING AND TALKING TO SOMEONE: WOULD NEVER DOZE
HOW LIKELY ARE YOU TO NOD OFF OR FALL ASLEEP WHILE LYING DOWN TO REST IN THE AFTERNOON WHEN CIRCUMSTANCES PERMIT: SLIGHT CHANCE OF DOZING
ESS TOTAL SCORE: 2
HOW LIKELY ARE YOU TO NOD OFF OR FALL ASLEEP WHILE WATCHING TV: WOULD NEVER DOZE
HOW LIKELY ARE YOU TO NOD OFF OR FALL ASLEEP WHILE SITTING QUIETLY AFTER LUNCH WITHOUT ALCOHOL: WOULD NEVER DOZE
HOW LIKELY ARE YOU TO NOD OFF OR FALL ASLEEP IN A CAR, WHILE STOPPED FOR A FEW MINUTES IN TRAFFIC: WOULD NEVER DOZE
HOW LIKELY ARE YOU TO NOD OFF OR FALL ASLEEP WHEN YOU ARE A PASSENGER IN A CAR FOR AN HOUR WITHOUT A BREAK: WOULD NEVER DOZE
HOW LIKELY ARE YOU TO NOD OFF OR FALL ASLEEP WHILE SITTING AND READING: SLIGHT CHANCE OF DOZING
HOW LIKELY ARE YOU TO NOD OFF OR FALL ASLEEP WHILE SITTING INACTIVE IN A PUBLIC PLACE: WOULD NEVER DOZE

## 2025-01-15 SDOH — ECONOMIC STABILITY: TRANSPORTATION INSECURITY
IN THE PAST 12 MONTHS, HAS THE LACK OF TRANSPORTATION KEPT YOU FROM MEDICAL APPOINTMENTS OR FROM GETTING MEDICATIONS?: NO

## 2025-01-15 SDOH — ECONOMIC STABILITY: INCOME INSECURITY: IN THE LAST 12 MONTHS, WAS THERE A TIME WHEN YOU WERE NOT ABLE TO PAY THE MORTGAGE OR RENT ON TIME?: NO

## 2025-01-15 SDOH — ECONOMIC STABILITY: FOOD INSECURITY: WITHIN THE PAST 12 MONTHS, THE FOOD YOU BOUGHT JUST DIDN'T LAST AND YOU DIDN'T HAVE MONEY TO GET MORE.: NEVER TRUE

## 2025-01-15 SDOH — ECONOMIC STABILITY: FOOD INSECURITY: WITHIN THE PAST 12 MONTHS, YOU WORRIED THAT YOUR FOOD WOULD RUN OUT BEFORE YOU GOT MONEY TO BUY MORE.: NEVER TRUE

## 2025-01-15 ASSESSMENT — PATIENT HEALTH QUESTIONNAIRE - PHQ9
2. FEELING DOWN, DEPRESSED OR HOPELESS: NOT AT ALL
1. LITTLE INTEREST OR PLEASURE IN DOING THINGS: NOT AT ALL
SUM OF ALL RESPONSES TO PHQ QUESTIONS 1-9: 0
SUM OF ALL RESPONSES TO PHQ QUESTIONS 1-9: 0
SUM OF ALL RESPONSES TO PHQ9 QUESTIONS 1 & 2: 0
SUM OF ALL RESPONSES TO PHQ QUESTIONS 1-9: 0
SUM OF ALL RESPONSES TO PHQ QUESTIONS 1-9: 0
1. LITTLE INTEREST OR PLEASURE IN DOING THINGS: NOT AT ALL
2. FEELING DOWN, DEPRESSED OR HOPELESS: NOT AT ALL
SUM OF ALL RESPONSES TO PHQ9 QUESTIONS 1 & 2: 0

## 2025-01-16 ENCOUNTER — OFFICE VISIT (OUTPATIENT)
Dept: FAMILY MEDICINE CLINIC | Facility: CLINIC | Age: 70
End: 2025-01-16

## 2025-01-16 VITALS
DIASTOLIC BLOOD PRESSURE: 75 MMHG | OXYGEN SATURATION: 99 % | BODY MASS INDEX: 36.32 KG/M2 | WEIGHT: 226 LBS | RESPIRATION RATE: 18 BRPM | HEART RATE: 79 BPM | TEMPERATURE: 97.7 F | HEIGHT: 66 IN | SYSTOLIC BLOOD PRESSURE: 114 MMHG

## 2025-01-16 DIAGNOSIS — J45.40 MODERATE PERSISTENT ASTHMA WITHOUT COMPLICATION: ICD-10-CM

## 2025-01-16 DIAGNOSIS — K29.50 ANTRAL GASTRITIS: ICD-10-CM

## 2025-01-16 DIAGNOSIS — E78.00 HYPERCHOLESTEREMIA: ICD-10-CM

## 2025-01-16 DIAGNOSIS — I10 ESSENTIAL HYPERTENSION: Primary | ICD-10-CM

## 2025-01-16 DIAGNOSIS — E11.9 TYPE 2 DIABETES MELLITUS WITHOUT COMPLICATION, WITHOUT LONG-TERM CURRENT USE OF INSULIN (HCC): ICD-10-CM

## 2025-01-16 DIAGNOSIS — E66.01 MORBID OBESITY: ICD-10-CM

## 2025-01-16 LAB
ANION GAP SERPL CALC-SCNC: 10 MMOL/L (ref 7–16)
BUN SERPL-MCNC: 8 MG/DL (ref 8–23)
CALCIUM SERPL-MCNC: 9.2 MG/DL (ref 8.8–10.2)
CHLORIDE SERPL-SCNC: 108 MMOL/L (ref 98–107)
CO2 SERPL-SCNC: 25 MMOL/L (ref 20–29)
CREAT SERPL-MCNC: 0.69 MG/DL (ref 0.6–1.1)
EST. AVERAGE GLUCOSE BLD GHB EST-MCNC: 150 MG/DL
GLUCOSE SERPL-MCNC: 117 MG/DL (ref 70–99)
HBA1C MFR BLD: 6.9 % (ref 0–5.6)
POTASSIUM SERPL-SCNC: 3.8 MMOL/L (ref 3.5–5.1)
SODIUM SERPL-SCNC: 143 MMOL/L (ref 136–145)

## 2025-01-16 RX ORDER — CIPROFLOXACIN 500 MG/1
500 TABLET, FILM COATED ORAL EVERY 12 HOURS
COMMUNITY
Start: 2025-01-07

## 2025-01-16 RX ORDER — DICLOFENAC SODIUM 75 MG/1
75 TABLET, DELAYED RELEASE ORAL 2 TIMES DAILY
Qty: 60 TABLET | Refills: 2 | Status: SHIPPED | OUTPATIENT
Start: 2025-01-16

## 2025-01-16 RX ORDER — VALSARTAN 160 MG/1
160 TABLET ORAL DAILY
Qty: 30 TABLET | Refills: 5 | Status: SHIPPED | OUTPATIENT
Start: 2025-01-16

## 2025-01-16 RX ORDER — ATORVASTATIN CALCIUM 40 MG/1
40 TABLET, FILM COATED ORAL NIGHTLY
Qty: 30 TABLET | Refills: 5 | Status: SHIPPED | OUTPATIENT
Start: 2025-01-16

## 2025-01-16 RX ORDER — PANTOPRAZOLE SODIUM 40 MG/1
40 TABLET, DELAYED RELEASE ORAL 2 TIMES DAILY
Qty: 30 TABLET | Refills: 5 | Status: SHIPPED | OUTPATIENT
Start: 2025-01-16

## 2025-01-16 RX ORDER — DICYCLOMINE HCL 20 MG
20 TABLET ORAL EVERY 6 HOURS
Qty: 120 TABLET | Refills: 5 | Status: SHIPPED | OUTPATIENT
Start: 2025-01-16

## 2025-01-16 RX ORDER — MONTELUKAST SODIUM 10 MG/1
10 TABLET ORAL NIGHTLY
Qty: 30 TABLET | Refills: 5 | Status: SHIPPED | OUTPATIENT
Start: 2025-01-16

## 2025-01-16 RX ORDER — AMLODIPINE BESYLATE 10 MG/1
10 TABLET ORAL DAILY
Qty: 30 TABLET | Refills: 5 | Status: SHIPPED | OUTPATIENT
Start: 2025-01-16

## 2025-01-16 NOTE — PROGRESS NOTES
Subjective:  Lilliana Rob is a 69 y.o. female presents today for their semi-annual htn visit. They are having no side effects and are doing well.  Systems review of cardiovascular and pulmonary systems reveal no complaints or pertinent positives.  Patient denies any pounding heart beats or rapid heart beat intervals, flushing, panic like attacks, headaches, dizzyness or flushing.  They have drug reistant hypertension, on 3 or more different medicaitons including one diuretic- No    How much are you exercising? Yes 3d pw  Do you smoke? No  Are you taking your medicines as directed most every day? Yes  PHQ-9 Total Score: 0 (1/15/2025  1:31 PM)      Your blood pressure goal is listed under the \"plan section\" below    Allergies   Allergen Reactions    Latex Other (See Comments)     wheezing    Dog Epithelium (Canis Lupus Familiaris) Shortness Of Breath    Other Shortness Of Breath     PER PT-- ALL COLOGNE WILL START AN ASTHMA ATTACK    Pollen Extract Shortness Of Breath    Powder Scent Fragrance Anaphylaxis    Sulfa Antibiotics Nausea And Vomiting and Nausea Only      reports that she quit smoking about 28 years ago. Her smoking use included cigarettes. She started smoking about 32 years ago. She has a 1 pack-year smoking history. She has never used smokeless tobacco.  Current Outpatient Medications   Medication Sig Dispense Refill    ciprofloxacin (CIPRO) 500 MG tablet Take 1 tablet by mouth in the morning and 1 tablet in the evening. for 10 days.      MYRBETRIQ 50 MG TB24 TAKE 1 TABLET BY MOUTH EVERY DAY 30 tablet 11    amLODIPine (NORVASC) 10 MG tablet Take 1 tablet by mouth daily 30 tablet 5    atorvastatin (LIPITOR) 40 MG tablet Take 1 tablet by mouth at bedtime 30 tablet 5    diclofenac (VOLTAREN) 75 MG EC tablet Take 1 tablet by mouth 2 times daily 60 tablet 2    dicyclomine (BENTYL) 20 MG tablet Take 1 tablet by mouth every 6 hours 120 tablet 5    montelukast (SINGULAIR) 10 MG tablet Take 1 tablet by mouth

## 2025-01-26 DIAGNOSIS — I10 ESSENTIAL HYPERTENSION: ICD-10-CM

## 2025-01-27 DIAGNOSIS — I10 ESSENTIAL HYPERTENSION: ICD-10-CM

## 2025-01-27 RX ORDER — LOSARTAN POTASSIUM 50 MG/1
TABLET ORAL
Refills: 0 | OUTPATIENT
Start: 2025-01-27

## 2025-02-05 ENCOUNTER — TELEPHONE (OUTPATIENT)
Dept: FAMILY MEDICINE CLINIC | Facility: CLINIC | Age: 70
End: 2025-02-05

## 2025-02-05 NOTE — TELEPHONE ENCOUNTER
LVM notifying of appt change. DF 2/5     (Provider out of office) Advised that if this date or time doesn't work for her to please call the office to reschedule.

## 2025-04-01 RX ORDER — MIRABEGRON 25 MG/1
TABLET, FILM COATED, EXTENDED RELEASE ORAL
Refills: 0 | OUTPATIENT
Start: 2025-04-01

## 2025-04-01 RX ORDER — MIRABEGRON 25 MG/1
50 TABLET, FILM COATED, EXTENDED RELEASE ORAL DAILY
Qty: 180 TABLET | Refills: 3 | Status: SHIPPED | OUTPATIENT
Start: 2025-04-01

## 2025-04-02 ENCOUNTER — TELEPHONE (OUTPATIENT)
Dept: UROLOGY | Age: 70
End: 2025-04-02

## 2025-04-02 NOTE — TELEPHONE ENCOUNTER
Refill sent. Needs appointment sometime soon. Last visit over one year ago.    Sandy Royal, APRN - CNP

## 2025-04-29 RX ORDER — IBUPROFEN 600 MG/1
TABLET, FILM COATED ORAL
COMMUNITY

## 2025-04-29 RX ORDER — BUDESONIDE 0.5 MG/2ML
INHALANT ORAL
COMMUNITY
Start: 2024-08-13

## 2025-04-29 RX ORDER — CETIRIZINE HYDROCHLORIDE 10 MG/1
TABLET ORAL
COMMUNITY
Start: 2025-04-01

## 2025-04-30 ENCOUNTER — OFFICE VISIT (OUTPATIENT)
Dept: ORTHOPEDIC SURGERY | Age: 70
End: 2025-04-30
Payer: MEDICARE

## 2025-04-30 DIAGNOSIS — M19.072 ARTHRITIS OF LEFT MIDFOOT: ICD-10-CM

## 2025-04-30 DIAGNOSIS — M79.672 PAIN IN LEFT FOOT: Primary | ICD-10-CM

## 2025-04-30 PROCEDURE — G8428 CUR MEDS NOT DOCUMENT: HCPCS | Performed by: ORTHOPAEDIC SURGERY

## 2025-04-30 PROCEDURE — 3017F COLORECTAL CA SCREEN DOC REV: CPT | Performed by: ORTHOPAEDIC SURGERY

## 2025-04-30 PROCEDURE — 1090F PRES/ABSN URINE INCON ASSESS: CPT | Performed by: ORTHOPAEDIC SURGERY

## 2025-04-30 PROCEDURE — G8400 PT W/DXA NO RESULTS DOC: HCPCS | Performed by: ORTHOPAEDIC SURGERY

## 2025-04-30 PROCEDURE — 1123F ACP DISCUSS/DSCN MKR DOCD: CPT | Performed by: ORTHOPAEDIC SURGERY

## 2025-04-30 PROCEDURE — 99214 OFFICE O/P EST MOD 30 MIN: CPT | Performed by: ORTHOPAEDIC SURGERY

## 2025-04-30 PROCEDURE — G8417 CALC BMI ABV UP PARAM F/U: HCPCS | Performed by: ORTHOPAEDIC SURGERY

## 2025-04-30 PROCEDURE — 1036F TOBACCO NON-USER: CPT | Performed by: ORTHOPAEDIC SURGERY

## 2025-04-30 NOTE — PROGRESS NOTES
discussed with the patient that this is an NSAID, but due to its topical nature it has a low side effect profile.  I discussed skin reactions, itching an irritation.  If they develop any skin irritation, they are to stop the medication.    We discussed the findings in the office today.  Recommended an off-the-shelf carbon fiber plate in addition to topical anti-inflammatories.  Hopefully this will give her good pain relief.  We did discuss potential deep peroneal neurectomy in the future if all conservative treatment fails.

## 2025-05-05 ENCOUNTER — HOSPITAL ENCOUNTER (OUTPATIENT)
Dept: MAMMOGRAPHY | Age: 70
Discharge: HOME | End: 2025-05-08
Attending: FAMILY MEDICINE
Payer: MEDICARE

## 2025-05-05 VITALS — WEIGHT: 227 LBS | HEIGHT: 66 IN | BODY MASS INDEX: 36.48 KG/M2

## 2025-05-05 DIAGNOSIS — Z12.31 VISIT FOR SCREENING MAMMOGRAM: ICD-10-CM

## 2025-05-05 PROCEDURE — 77063 BREAST TOMOSYNTHESIS BI: CPT

## 2025-05-13 ENCOUNTER — RESULTS FOLLOW-UP (OUTPATIENT)
Dept: FAMILY MEDICINE CLINIC | Facility: CLINIC | Age: 70
End: 2025-05-13

## 2025-05-22 ENCOUNTER — OFFICE VISIT (OUTPATIENT)
Dept: UROLOGY | Age: 70
End: 2025-05-22
Payer: MEDICARE

## 2025-05-22 DIAGNOSIS — N32.81 OAB (OVERACTIVE BLADDER): Primary | ICD-10-CM

## 2025-05-22 DIAGNOSIS — N39.0 RECURRENT UTI: ICD-10-CM

## 2025-05-22 DIAGNOSIS — R31.29 MICROHEMATURIA: ICD-10-CM

## 2025-05-22 LAB
BILIRUBIN, URINE, POC: NEGATIVE
BLOOD URINE, POC: NORMAL
GLUCOSE URINE, POC: NEGATIVE MG/DL
KETONES, URINE, POC: NORMAL MG/DL
LEUKOCYTE ESTERASE, URINE, POC: NORMAL
NITRITE, URINE, POC: NEGATIVE
PH, URINE, POC: 6 (ref 4.6–8)
PROTEIN,URINE, POC: NORMAL MG/DL
SPECIFIC GRAVITY, URINE, POC: 1.02 (ref 1–1.03)
URINALYSIS CLARITY, POC: NORMAL
URINALYSIS COLOR, POC: NORMAL
UROBILINOGEN, POC: NORMAL MG/DL

## 2025-05-22 PROCEDURE — G8417 CALC BMI ABV UP PARAM F/U: HCPCS | Performed by: NURSE PRACTITIONER

## 2025-05-22 PROCEDURE — 1036F TOBACCO NON-USER: CPT | Performed by: NURSE PRACTITIONER

## 2025-05-22 PROCEDURE — 3017F COLORECTAL CA SCREEN DOC REV: CPT | Performed by: NURSE PRACTITIONER

## 2025-05-22 PROCEDURE — G8427 DOCREV CUR MEDS BY ELIG CLIN: HCPCS | Performed by: NURSE PRACTITIONER

## 2025-05-22 PROCEDURE — 99214 OFFICE O/P EST MOD 30 MIN: CPT | Performed by: NURSE PRACTITIONER

## 2025-05-22 PROCEDURE — 1160F RVW MEDS BY RX/DR IN RCRD: CPT | Performed by: NURSE PRACTITIONER

## 2025-05-22 PROCEDURE — 1123F ACP DISCUSS/DSCN MKR DOCD: CPT | Performed by: NURSE PRACTITIONER

## 2025-05-22 PROCEDURE — 1159F MED LIST DOCD IN RCRD: CPT | Performed by: NURSE PRACTITIONER

## 2025-05-22 PROCEDURE — 1090F PRES/ABSN URINE INCON ASSESS: CPT | Performed by: NURSE PRACTITIONER

## 2025-05-22 PROCEDURE — 81003 URINALYSIS AUTO W/O SCOPE: CPT | Performed by: NURSE PRACTITIONER

## 2025-05-22 PROCEDURE — G8400 PT W/DXA NO RESULTS DOC: HCPCS | Performed by: NURSE PRACTITIONER

## 2025-05-22 RX ORDER — MIRABEGRON 25 MG/1
50 TABLET, FILM COATED, EXTENDED RELEASE ORAL DAILY
Qty: 180 TABLET | Refills: 3 | Status: SHIPPED | OUTPATIENT
Start: 2025-05-22

## 2025-05-22 ASSESSMENT — ENCOUNTER SYMPTOMS
NAUSEA: 0
BACK PAIN: 0

## 2025-05-28 NOTE — PROGRESS NOTES
for surgery [id number]    Cystocele, midline 4/24/2015    Diverticulosis of colon 4/24/2015    Edema 4/24/2015    Including peripheral edema.( SOMETIMES) PER PT    Female stress incontinence     GERD (gastroesophageal reflux disease)     controlled with med    Hypercholesterolemia     no meds currently    Hyperplastic polyps of stomach 4/24/2015    Colonsocpy. 2013.    Hypertension     controlled with med    Internal hemorrhoids without mention of complication 4/24/2015    Mixed hyperlipidemia 8/16/2022    Obesity (BMI 30-39.9) 11/14/2017    BMI = 35    Obstructive sleep apnea 11/14/2017    wears cpap at hs    Other diseases of lung, not elsewhere classified 4/24/2015    being rechecked 8/2022-- followed by dr haider    Right upper quadrant pain     Urge incontinence     Ventricular bigeminy 8/31/2016    per pt-- was told it was ok-- does not see a cardiologist at present         Patient Active Problem List   Diagnosis    Tubulovillous adenoma of colon    Ventricular bigeminy    Urge incontinence    Essential hypertension    Advanced directives, counseling/discussion    Hypercholesteremia    Diverticulosis of colon    Antral gastritis    Moderate persistent asthma without complication    Obstructive sleep apnea    Type 2 diabetes mellitus without complication, without long-term current use of insulin (HCC)    Chondromalacia of right patella    Medicare annual wellness visit, subsequent    Atrophic vaginitis    Cystocele, midline    Chronic anxiety    Pneumothorax, right    S/P laparoscopic cholecystectomy    Morbid obesity (HCC)    Allergic rhinitis due to pollen    Inadequate sleep hygiene          Past Surgical History:   Procedure Laterality Date    CHOLECYSTECTOMY      CHOLECYSTECTOMY, LAPAROSCOPIC N/A 08/12/2022    ROBO SINGLE SITE CHOLECYSTECTOMY performed by Raphael Fan Jr., MD at [facility] MAIN OR    HYSTERECTOMY (CERVIX STATUS UNKNOWN)      KNEE ARTHROSCOPY Bilateral            Social History     Socioeconomic

## 2025-05-30 ENCOUNTER — OFFICE VISIT (OUTPATIENT)
Dept: SLEEP MEDICINE | Age: 70
End: 2025-05-30
Payer: MEDICARE

## 2025-05-30 VITALS
WEIGHT: 231.6 LBS | SYSTOLIC BLOOD PRESSURE: 122 MMHG | DIASTOLIC BLOOD PRESSURE: 81 MMHG | BODY MASS INDEX: 37.22 KG/M2 | HEIGHT: 66 IN | OXYGEN SATURATION: 98 % | TEMPERATURE: 96.6 F | RESPIRATION RATE: 16 BRPM | HEART RATE: 87 BPM

## 2025-05-30 DIAGNOSIS — G47.33 OBSTRUCTIVE SLEEP APNEA: Primary | ICD-10-CM

## 2025-05-30 PROCEDURE — 1090F PRES/ABSN URINE INCON ASSESS: CPT | Performed by: NURSE PRACTITIONER

## 2025-05-30 PROCEDURE — G8400 PT W/DXA NO RESULTS DOC: HCPCS | Performed by: NURSE PRACTITIONER

## 2025-05-30 PROCEDURE — 1159F MED LIST DOCD IN RCRD: CPT | Performed by: NURSE PRACTITIONER

## 2025-05-30 PROCEDURE — 3017F COLORECTAL CA SCREEN DOC REV: CPT | Performed by: NURSE PRACTITIONER

## 2025-05-30 PROCEDURE — 99213 OFFICE O/P EST LOW 20 MIN: CPT | Performed by: NURSE PRACTITIONER

## 2025-05-30 PROCEDURE — 3079F DIAST BP 80-89 MM HG: CPT | Performed by: NURSE PRACTITIONER

## 2025-05-30 PROCEDURE — 3074F SYST BP LT 130 MM HG: CPT | Performed by: NURSE PRACTITIONER

## 2025-05-30 PROCEDURE — 1123F ACP DISCUSS/DSCN MKR DOCD: CPT | Performed by: NURSE PRACTITIONER

## 2025-05-30 PROCEDURE — G8427 DOCREV CUR MEDS BY ELIG CLIN: HCPCS | Performed by: NURSE PRACTITIONER

## 2025-05-30 PROCEDURE — G8417 CALC BMI ABV UP PARAM F/U: HCPCS | Performed by: NURSE PRACTITIONER

## 2025-05-30 PROCEDURE — 1036F TOBACCO NON-USER: CPT | Performed by: NURSE PRACTITIONER

## 2025-05-30 ASSESSMENT — SLEEP AND FATIGUE QUESTIONNAIRES
HOW LIKELY ARE YOU TO NOD OFF OR FALL ASLEEP WHILE SITTING QUIETLY AFTER LUNCH WITHOUT ALCOHOL: WOULD NEVER DOZE
HOW LIKELY ARE YOU TO NOD OFF OR FALL ASLEEP WHILE SITTING INACTIVE IN A PUBLIC PLACE: WOULD NEVER DOZE
HOW LIKELY ARE YOU TO NOD OFF OR FALL ASLEEP IN A CAR, WHILE STOPPED FOR A FEW MINUTES IN TRAFFIC: WOULD NEVER DOZE
HOW LIKELY ARE YOU TO NOD OFF OR FALL ASLEEP WHILE WATCHING TV: WOULD NEVER DOZE
ESS TOTAL SCORE: 1
HOW LIKELY ARE YOU TO NOD OFF OR FALL ASLEEP WHEN YOU ARE A PASSENGER IN A CAR FOR AN HOUR WITHOUT A BREAK: WOULD NEVER DOZE
HOW LIKELY ARE YOU TO NOD OFF OR FALL ASLEEP WHILE SITTING AND TALKING TO SOMEONE: WOULD NEVER DOZE
HOW LIKELY ARE YOU TO NOD OFF OR FALL ASLEEP WHILE SITTING AND READING: WOULD NEVER DOZE
HOW LIKELY ARE YOU TO NOD OFF OR FALL ASLEEP WHILE LYING DOWN TO REST IN THE AFTERNOON WHEN CIRCUMSTANCES PERMIT: SLIGHT CHANCE OF DOZING

## 2025-05-30 NOTE — PATIENT INSTRUCTIONS
The company who will be taking care of your CPAP supplies is:      Address: [address] [place] [address], [place] [address]  Phone: [phone]  Fax: [phone]    
No significant past surgical history

## 2025-07-01 ENCOUNTER — OFFICE VISIT (OUTPATIENT)
Dept: FAMILY MEDICINE CLINIC | Facility: CLINIC | Age: 70
End: 2025-07-01

## 2025-07-01 VITALS
DIASTOLIC BLOOD PRESSURE: 83 MMHG | BODY MASS INDEX: 36.48 KG/M2 | SYSTOLIC BLOOD PRESSURE: 133 MMHG | HEIGHT: 66 IN | RESPIRATION RATE: 16 BRPM | TEMPERATURE: 97.5 F | HEART RATE: 92 BPM | WEIGHT: 227 LBS | OXYGEN SATURATION: 97 %

## 2025-07-01 DIAGNOSIS — E11.9 TYPE 2 DIABETES MELLITUS WITHOUT COMPLICATION, WITHOUT LONG-TERM CURRENT USE OF INSULIN (HCC): ICD-10-CM

## 2025-07-01 DIAGNOSIS — J45.40 MODERATE PERSISTENT ASTHMA WITHOUT COMPLICATION: ICD-10-CM

## 2025-07-01 DIAGNOSIS — K29.50 ANTRAL GASTRITIS: ICD-10-CM

## 2025-07-01 DIAGNOSIS — I10 ESSENTIAL HYPERTENSION: Primary | ICD-10-CM

## 2025-07-01 DIAGNOSIS — E66.01 MORBID OBESITY (HCC): ICD-10-CM

## 2025-07-01 DIAGNOSIS — E78.00 HYPERCHOLESTEREMIA: ICD-10-CM

## 2025-07-01 LAB
ANION GAP SERPL CALC-SCNC: 13 MMOL/L (ref 7–16)
CALCIUM SERPL-MCNC: 9.5 MG/DL (ref 8.8–10.2)
CO2 SERPL-SCNC: 24 MMOL/L (ref 20–29)
CREAT SERPL-MCNC: 0.58 MG/DL (ref 0.6–1.1)
CREAT UR-MCNC: 270 MG/DL (ref 28–217)
HBA1C MFR BLD: 6.4 % (ref 0–5.6)
HDLC SERPL-MCNC: 77 MG/DL (ref 40–60)
HDLC SERPL: 2.2 (ref 0–5)
LDLC SERPL CALC-MCNC: 82 MG/DL (ref 0–100)
MICROALBUMIN UR-MCNC: 2.03 MG/DL (ref 0–20)
MICROALBUMIN/CREAT UR-RTO: 8 MG/G (ref 0–30)
SODIUM SERPL-SCNC: 145 MMOL/L (ref 136–145)
TRIGL SERPL-MCNC: 51 MG/DL (ref 0–150)

## 2025-07-01 RX ORDER — PANTOPRAZOLE SODIUM 40 MG/1
40 TABLET, DELAYED RELEASE ORAL 2 TIMES DAILY
Qty: 30 TABLET | Refills: 5 | Status: SHIPPED | OUTPATIENT
Start: 2025-07-01

## 2025-07-01 RX ORDER — DICLOFENAC SODIUM 75 MG/1
75 TABLET, DELAYED RELEASE ORAL 2 TIMES DAILY
Qty: 60 TABLET | Refills: 2 | Status: SHIPPED | OUTPATIENT
Start: 2025-07-01

## 2025-07-01 RX ORDER — ATORVASTATIN CALCIUM 40 MG/1
40 TABLET, FILM COATED ORAL NIGHTLY
Qty: 30 TABLET | Refills: 5 | Status: SHIPPED | OUTPATIENT
Start: 2025-07-01

## 2025-07-01 RX ORDER — AMLODIPINE BESYLATE 10 MG/1
10 TABLET ORAL DAILY
Qty: 30 TABLET | Refills: 5 | Status: SHIPPED | OUTPATIENT
Start: 2025-07-01

## 2025-07-01 RX ORDER — VALSARTAN 160 MG/1
160 TABLET ORAL DAILY
Qty: 30 TABLET | Refills: 5 | Status: SHIPPED | OUTPATIENT
Start: 2025-07-01

## 2025-07-01 RX ORDER — MONTELUKAST SODIUM 10 MG/1
10 TABLET ORAL NIGHTLY
Qty: 30 TABLET | Refills: 5 | Status: SHIPPED | OUTPATIENT
Start: 2025-07-01

## 2025-07-01 NOTE — PROGRESS NOTES
Subjective:  Lilliana Rob is a 70 y.o. female presents today for their semi-annual htn and diet-controlled DM visit. They are having no side effects and are doing well.  Systems review of cardiovascular and pulmonary systems reveal no complaints or pertinent postivies.  They also have a diagnosis of dyslipidemia and are due for lipids, denying any current side effects, continuing diet and exercise the best they can.  Patient denies any pounding heart beats or rapid heart beat intervals, flushing, panic like attacks, headaches, dizzyness or flushing.  They have drug reistant hypertension, on 3 or more different medicaitons including one diuretic- No  No data recorded    How much are you exercising? Yes 3 d p w   Do you smoke? No  Are you taking your medicines as directed most every day? Yes  Do you follow a low sodium DASH diet or similar high blood pressure diet? Yes  Do you check your bp at home with an automated blood pressure device? No  If you don't have a home bp machine, you should purchase one at home and begin to check your pressure at home on a regular basis.  Your blood pressure goal is listed under the \"plan section\" below    Allergies   Allergen Reactions    Latex Other (See Comments)     wheezing    Dog Epithelium (Canis Lupus Familiaris) Shortness Of Breath    Other Shortness Of Breath     PER PT-- ALL COLOGNE WILL START AN ASTHMA ATTACK    Pollen Extract Shortness Of Breath    Powder Scent Fragrance Anaphylaxis    Sulfa Antibiotics Nausea And Vomiting and Nausea Only      reports that she quit smoking about 28 years ago. Her smoking use included cigarettes. She started smoking about 32 years ago. She has a 1 pack-year smoking history. She has never used smokeless tobacco.    Current Outpatient Medications   Medication Sig Dispense Refill    pantoprazole (PROTONIX) 40 MG tablet Take 1 tablet by mouth 2 times daily 30 tablet 5    amLODIPine (NORVASC) 10 MG tablet Take 1 tablet by mouth daily 30

## 2025-07-02 ENCOUNTER — RESULTS FOLLOW-UP (OUTPATIENT)
Dept: FAMILY MEDICINE CLINIC | Facility: CLINIC | Age: 70
End: 2025-07-02

## (undated) DEVICE — DRAPE,TOP,102X53,STERILE: Brand: MEDLINE

## (undated) DEVICE — NEEDLE HYPO 25GA L1IN BLU POLYPR HUB S STL REG BVL STR W/O

## (undated) DEVICE — CLIP INT M L POLYMER LOK LIG HEM O LOK

## (undated) DEVICE — CANNULA SEAL: Brand: SINGLE-SITE

## (undated) DEVICE — DRAPE INSTR ARM ROBOTIC ENDOWRIST DA VINCI S

## (undated) DEVICE — GLOVE SURG SZ 85 L12IN FNGR THK79MIL GRN LTX FREE

## (undated) DEVICE — GLOVE ORANGE PI 8   MSG9080

## (undated) DEVICE — SUTURE PDS II SZ 1 L27IN ABSRB VLT CT-1 L36MM 1/2 CIR Z341H

## (undated) DEVICE — INSUFFLATION NEEDLE TO ESTABLISH PNEUMOPERITONEUM.: Brand: INSUFFLATION NEEDLE

## (undated) DEVICE — WOUND RETRACTOR AND PROTECTOR: Brand: ALEXIS O WOUND PROTECTOR-RETRACTOR

## (undated) DEVICE — COVER MPLR TIP CRV SCIS ACC DA VINCI

## (undated) DEVICE — ELECTRO LUBE IS A SINGLE PATIENT USE DEVICE THAT IS INTENDED TO BE USED ON ELECTROSURGICAL ELECTRODES TO REDUCE STICKING.: Brand: KEY SURGICAL ELECTRO LUBE

## (undated) DEVICE — CANNULA SEAL

## (undated) DEVICE — ENDOSCOPE PORT: Brand: SINGLE-SITE

## (undated) DEVICE — TROCAR: Brand: KII FIOS FIRST ENTRY

## (undated) DEVICE — CORD ES L12FT BPLR FRCP

## (undated) DEVICE — GENERAL LAPAROSCOPY: Brand: MEDLINE INDUSTRIES, INC.

## (undated) DEVICE — SUTURE SZ 0 27IN 5/8 CIR UR-6  TAPER PT VIOLET ABSRB VICRYL J603H